# Patient Record
Sex: FEMALE | Employment: OTHER | URBAN - METROPOLITAN AREA
[De-identification: names, ages, dates, MRNs, and addresses within clinical notes are randomized per-mention and may not be internally consistent; named-entity substitution may affect disease eponyms.]

---

## 2017-01-17 ENCOUNTER — PRE VISIT (OUTPATIENT)
Dept: NEUROLOGY | Facility: CLINIC | Age: 82
End: 2017-01-17

## 2017-01-17 NOTE — TELEPHONE ENCOUNTER
1.  Date/reason for appt:  3/22/17   Peripheral Neuropathy    2.  Referring provider:  MARION Lew [facility]    3.  Call to patient (Yes / No - short description):  Yes, transferred from .  Only place seen in last 10 years for this.    4.  Previous care at / records requested from:  [facility]

## 2017-01-23 ASSESSMENT — ENCOUNTER SYMPTOMS
TREMORS: 0
NUMBNESS: 1
HEADACHES: 0
SPEECH CHANGE: 0
TINGLING: 1
DIZZINESS: 1
ARTHRALGIAS: 1
DISTURBANCES IN COORDINATION: 0
STIFFNESS: 1
NECK PAIN: 0
WEAKNESS: 0
SEIZURES: 0
BACK PAIN: 1
MYALGIAS: 1
LOSS OF CONSCIOUSNESS: 0
PARALYSIS: 0
MUSCLE CRAMPS: 1
MUSCLE WEAKNESS: 0
MEMORY LOSS: 0
JOINT SWELLING: 0

## 2017-01-23 ASSESSMENT — ACTIVITIES OF DAILY LIVING (ADL)
ARE_THERE_FIREARMS_IN_YOUR_HOME?: N
ARE_THERE_CARBON_MONOXIDE_DETECTORS_IN_YOUR_HOME?: Y
DO_MEMBERS_OF_YOUR_HOUSEHOLD_USE_SUNSCREEN?: Y
ARE_THERE_SMOKE_DETECTORS_IN_YOUR_HOME?: Y
DO_MEMBERS_OF_YOUR_HOUSEHOLD_USE_SAFETY_HELMETS?: Y
DO_MEMBERS_OF_YOUR_HOUSEHOLD_WEAR_SEAT_BELTS?: Y

## 2017-01-23 NOTE — TELEPHONE ENCOUNTER
[facility] records forwarded to clinic.     *Seen by neuro in [facility] (years ago)

## 2017-01-27 ENCOUNTER — OFFICE VISIT (OUTPATIENT)
Dept: INTERNAL MEDICINE | Facility: CLINIC | Age: 82
End: 2017-01-27

## 2017-01-27 VITALS
HEIGHT: 60 IN | TEMPERATURE: 97.7 F | DIASTOLIC BLOOD PRESSURE: 78 MMHG | WEIGHT: 121.5 LBS | OXYGEN SATURATION: 97 % | RESPIRATION RATE: 16 BRPM | SYSTOLIC BLOOD PRESSURE: 137 MMHG | BODY MASS INDEX: 23.85 KG/M2 | HEART RATE: 67 BPM

## 2017-01-27 DIAGNOSIS — I10 BENIGN ESSENTIAL HYPERTENSION: ICD-10-CM

## 2017-01-27 DIAGNOSIS — M89.9 DISORDER OF BONE: ICD-10-CM

## 2017-01-27 DIAGNOSIS — Z13.820 SCREENING FOR OSTEOPOROSIS: ICD-10-CM

## 2017-01-27 DIAGNOSIS — E78.5 HYPERLIPIDEMIA LDL GOAL <100: ICD-10-CM

## 2017-01-27 DIAGNOSIS — Z76.89 ENCOUNTER TO ESTABLISH CARE: ICD-10-CM

## 2017-01-27 DIAGNOSIS — E78.5 HYPERLIPIDEMIA LDL GOAL <100: Primary | ICD-10-CM

## 2017-01-27 LAB
ANION GAP SERPL CALCULATED.3IONS-SCNC: 8 MMOL/L (ref 3–14)
BUN SERPL-MCNC: 20 MG/DL (ref 7–30)
CALCIUM SERPL-MCNC: 9.4 MG/DL (ref 8.5–10.1)
CHLORIDE SERPL-SCNC: 107 MMOL/L (ref 94–109)
CHOLEST SERPL-MCNC: 153 MG/DL
CO2 SERPL-SCNC: 28 MMOL/L (ref 20–32)
CREAT SERPL-MCNC: 0.94 MG/DL (ref 0.52–1.04)
CREAT UR-MCNC: 99 MG/DL
GFR SERPL CREATININE-BSD FRML MDRD: 57 ML/MIN/1.7M2
GLUCOSE SERPL-MCNC: 91 MG/DL (ref 70–99)
HDLC SERPL-MCNC: 61 MG/DL
LDLC SERPL CALC-MCNC: 73 MG/DL
MICROALBUMIN UR-MCNC: 5 MG/L
MICROALBUMIN/CREAT UR: 5.5 MG/G CR (ref 0–25)
NONHDLC SERPL-MCNC: 92 MG/DL
POTASSIUM SERPL-SCNC: 4.1 MMOL/L (ref 3.4–5.3)
SODIUM SERPL-SCNC: 142 MMOL/L (ref 133–144)
TRIGL SERPL-MCNC: 96 MG/DL

## 2017-01-27 RX ORDER — ATORVASTATIN CALCIUM 20 MG/1
20 TABLET, FILM COATED ORAL DAILY
COMMUNITY
End: 2017-01-27

## 2017-01-27 RX ORDER — LISINOPRIL 10 MG/1
10 TABLET ORAL DAILY
Qty: 90 TABLET | Refills: 3 | Status: SHIPPED | OUTPATIENT
Start: 2017-01-27 | End: 2018-01-12

## 2017-01-27 RX ORDER — AMLODIPINE BESYLATE 10 MG/1
5 TABLET ORAL DAILY
Qty: 45 TABLET | Refills: 3 | Status: SHIPPED | OUTPATIENT
Start: 2017-01-27 | End: 2018-01-12

## 2017-01-27 RX ORDER — GABAPENTIN 400 MG/1
CAPSULE ORAL
COMMUNITY
End: 2017-04-03

## 2017-01-27 RX ORDER — ATORVASTATIN CALCIUM 20 MG/1
20 TABLET, FILM COATED ORAL DAILY
Qty: 90 TABLET | Refills: 3 | Status: SHIPPED | OUTPATIENT
Start: 2017-01-27 | End: 2018-01-12

## 2017-01-27 RX ORDER — AMLODIPINE BESYLATE 10 MG/1
10 TABLET ORAL DAILY
COMMUNITY
End: 2017-01-27

## 2017-01-27 ASSESSMENT — PAIN SCALES - GENERAL: PAINLEVEL: NO PAIN (0)

## 2017-01-27 NOTE — NURSING NOTE
Chief Complaint   Patient presents with     Establish Care     Here to establish care; from Emmanuelle Groves CMA at 10:05 AM on 1/27/2017

## 2017-01-27 NOTE — MR AVS SNAPSHOT
After Visit Summary   1/27/2017    Kerri Rocha    MRN: [id number]           Patient Information     Date Of Birth          1935        Visit Information        Provider Department      1/27/2017 10:00 AM Tammy Wellington MD [facility]        Today's Diagnoses     Hyperlipidemia LDL goal <100    -  1     Benign essential hypertension         Encounter to establish care         Screening for osteoporosis         Disorder of bone            Care Instructions    Primary Care Center Medication Refill Request Information:  * Please contact your pharmacy regarding ANY request for medication refills.  ** [facility] Prescription Fax = [phone]  * Please allow 3 business days for routine medication refills.  * Please allow 5 business days for controlled substance medication refills.     Primary Care Center Test Result notification information:  *You will be notified with in 7-10 days of your appointment day regarding the results of your test.  If you are on MyChart you will be notified as soon as the provider has reviewed the results and signed off on them.    [facility] [place] [phone]   Please have the Flu Vaccine you received in [id number], including both Pneumonia, and Tetanus Vaccine FAXED to [facility] at [phone] for your Immunization Records or bring a copy at your next visit. Thank you!  DEXA Screening Tool    Dexa Scan  Is the patient taking any calcium supplements? , if yes patient must stop calcium supplements 48 hours prior to appointment.  Radiology ([facility] Center) [phone]  Radiology ([facility]) [phone] (2nd Floor [facility])            Follow-ups after your visit        Your next 10 appointments already scheduled     Jan 27, 2017 10:30 AM   LAB with  LAB   [facility] Lab ([facility])    [address]  1st Floor  [place] [address] [phone]           Patient must bring picture ID.   Patient should be prepared to give a urine specimen  Please do not eat 10-12 hours before your appointment if you are coming in fasting for labs on lipids, cholesterol, or glucose (sugar).  Pregnant women should follow their Care Team instructions. Water with medications is okay. Do not drink coffee or other fluids.   If you have concerns about taking  your medications, please ask at office or if scheduling via [employer], send a message by clicking on Secure Messaging, Message Your Care Team.            Mar 22, 2017  9:50 AM   (Arrive by 9:35 AM)   New Neuropathy with Jaskaran Callahan MD   [facility] Neurology ([facility] [place])    [address]  [address] [place] [address] [phone]              Future tests that were ordered for you today     Open Future Orders        Priority Expected Expires Ordered    Dexa hip/pelvis/spine* Routine  1/27/2018 1/27/2017    Lipid panel reflex to direct LDL Routine  1/27/2018 1/27/2017    Basic metabolic panel Routine  1/27/2018 1/27/2017            Who to contact     Please call your clinic at [phone] to:    Ask questions about your health    Make or cancel appointments    Discuss your medicines    Learn about your test results    Speak to your doctor   If you have compliments or concerns about an experience at your clinic, or if you wish to file a complaint, please contact [facility] Patient Relations at [phone] or email us at [email or web address]         Additional Information About Your Visit        [employer] Information     [employer] gives you secure access to your electronic health record. If you see a primary care provider, you can also send messages to your care team and make appointments. If you have questions, please call your primary care clinic.  If you do not have a primary care provider, please call [phone] and they will assist you.      [employer] is an electronic gateway  "that provides easy, online access to your medical records. With [employer], you can request a clinic appointment, read your test results, renew a prescription or communicate with your care team.     To access your existing account, please contact your [facility] or call [phone] for assistance.        Care EveryWhere ID     This is your Care EveryWhere ID. This could be used by other organizations to access your [place] medical records  [id number]        Your Vitals Were     Pulse Temperature Respirations Height BMI (Body Mass Index) Pulse Oximetry    67 97.7  F (36.5  C) (Oral) 16 1.53 m (5' 0.25\") 23.54 kg/m2 97%    Breastfeeding?                   No            Blood Pressure from Last 3 Encounters:   01/27/17 137/78    Weight from Last 3 Encounters:   01/27/17 55.112 kg (121 lb 8 oz)              We Performed the Following     Albumin Random Urine Quantitative          Today's Medication Changes          These changes are accurate as of: 1/27/17 10:24 AM.  If you have any questions, ask your nurse or doctor.               These medicines have changed or have updated prescriptions.        Dose/Directions    amLODIPine 10 MG tablet   Commonly known as:  NORVASC   This may have changed:  how much to take   Used for:  Benign essential hypertension   Changed by:  Tammy Wellington MD        Dose:  5 mg   Take 0.5 tablets (5 mg) by mouth daily Take 1/2 tablet daily.   Quantity:  45 tablet   Refills:  3       lisinopril 10 MG tablet   Commonly known as:  PRINIVIL/ZESTRIL   This may have changed:    - medication strength  - when to take this   Used for:  Benign essential hypertension   Changed by:  Tammy Wellington MD        Dose:  10 mg   Take 1 tablet (10 mg) by mouth daily   Quantity:  90 tablet   Refills:  3            Where to get your medicines      These medications were sent to [facility] - [place], [place] - [address]  [address], [facility] [address]     Phone:  [phone]    - amLODIPine 10 MG tablet  - atorvastatin 20 MG tablet  - lisinopril 10 MG tablet             Primary Care Provider    None Specified       No primary provider on file.        Thank you!     Thank you for choosing [facility]  for your care. Our goal is always to provide you with excellent care. Hearing back from our patients is one way we can continue to improve our services. Please take a few minutes to complete the written survey that you may receive in the mail after your visit with us. Thank you!             Your Updated Medication List - Protect others around you: Learn how to safely use, store and throw away your medicines at www.disposemymeds.org.          This list is accurate as of: 1/27/17 10:24 AM.  Always use your most recent med list.                   Brand Name Dispense Instructions for use    amLODIPine 10 MG tablet    NORVASC    45 tablet    Take 0.5 tablets (5 mg) by mouth daily Take 1/2 tablet daily.       atorvastatin 20 MG tablet    LIPITOR    90 tablet    Take 1 tablet (20 mg) by mouth daily Take 1 tablet at bedtime daily.       gabapentin 400 MG capsule    NEURONTIN         lisinopril 10 MG tablet    PRINIVIL/ZESTRIL    90 tablet    Take 1 tablet (10 mg) by mouth daily

## 2017-01-27 NOTE — PATIENT INSTRUCTIONS
[facility] Medication Refill Request Information:  * Please contact your pharmacy regarding ANY request for medication refills.  ** [facility] Prescription Fax = [phone]  * Please allow 3 business days for routine medication refills.  * Please allow 5 business days for controlled substance medication refills.     [facility] Test Result notification information:  *You will be notified with in 7-10 days of your appointment day regarding the results of your test.  If you are on MyChart you will be notified as soon as the provider has reviewed the results and signed off on them.    [facility] [phone]   Please have the Flu Vaccine you received in [id number], including both Pneumonia, and Tetanus Vaccine FAXED to [facility] at [phone] for your Immunization Records or bring a copy at your next visit. Thank you!  DEXA Screening Tool    Dexa Scan  Is the patient taking any calcium supplements? , if yes patient must stop calcium supplements 48 hours prior to appointment.  Radiology ([facility]) [phone]  Radiology ([facility]) [phone] (2nd Floor [facility])

## 2017-01-27 NOTE — PROGRESS NOTES
HPI:       Kerri Rocha is a 81 year old female who presents for the following  Patient presents with: Establish Care    Kerri is moving here from [facility]. They have moved up here to be closer to their family. They haven't found a home yet so are living with their children. Her  is a retired superintendent of [employer]. She doesn't have much wrong with her. She had back surgery last March but that was about last year. She is wondering when she needs labs.     Problem, Medication and Allergy Lists were reviewed and are current.  Patient is a new patient to this clinic and so  I reviewed/updated the Past Medical History, the Family History and the Social History.          Review of Systems:   Review of Systems     Constitutional:  Negative for fever, chills, weight loss, weight gain, fatigue, decreased appetite, night sweats, recent stressors, height gain, height loss, post-operative complications, incisional pain, hallucinations, increased energy, hyperactivity and confused.   HENT:  Negative for ear pain, hearing loss, tinnitus, nosebleeds, trouble swallowing, hoarse voice, mouth sores, sore throat, ear discharge, tooth pain, gum tenderness, taste disturbance, smell disturbance, hearing aid, bleeding gums, dry mouth, sinus pain, sinus congestion and neck mass.    Eyes:  Negative for double vision, pain, redness, eye pain, decreased vision, eye watering, eye bulging, eye dryness, flashing lights, spots, floaters, strabismus, tunnel vision, jaundice and eye irritation.   Respiratory:   Negative for cough, hemoptysis, sputum production, shortness of breath, wheezing, sleep disturbances due to breathing, snores loudly, respiratory pain, dyspnea on exertion, cough disturbing sleep and postural dyspnea.    Cardiovascular:  Negative for chest pain, dyspnea on exertion, palpitations, orthopnea, claudication, leg swelling, fingers/toes turn blue, hypertension, hypotension, syncope, history of heart  murmur, chest pain on exertion, chest pain at rest, pacemaker, few scattered varicosities, leg pain, sleep disturbances due to breathing, tachycardia, light-headedness, exercise intolerance and edema.   Gastrointestinal:  Negative for heartburn, nausea, vomiting, abdominal pain, diarrhea, constipation, blood in stool, melena, rectal pain, bloating, hemorrhoids, bowel incontinence, jaundice, rectal bleeding, coffee ground emesis and change in stool.   Genitourinary:  Negative for bladder incontinence, dysuria, urgency, hematuria, flank pain, vaginal discharge, difficulty urinating, genital sores, dyspareunia, decreased libido, nocturia, voiding less frequently, arousal difficulty, abnormal vaginal bleeding, excessive menstruation, menstrual changes, hot flashes, vaginal dryness and postmenopausal bleeding.   Musculoskeletal:  Positive for myalgias, back pain, arthralgias, stiffness and muscle cramps. Negative for joint swelling, neck pain, bone pain, muscle weakness and fracture.   Skin:  Negative for nail changes, itching, poor wound healing, rash, hair changes, skin changes, acne, warts, poor wound healing, scarring, flaky skin, Raynaud's phenomenon, sensitivity to sunlight and skin thickening.   Neurological:  Positive for dizziness, tingling and numbness. Negative for tremors, speech change, seizures, loss of consciousness, weakness, light-headedness, headaches, disturbances in coordination, memory loss, difficulty walking and paralysis.   Endo/Heme:  Negative for anemia, swollen glands and bruises/bleeds easily.   Psychiatric/Behavioral:  Negative for depression, hallucinations, memory loss, decreased concentration, mood swings and panic attacks.    Breast:  Negative for breast discharge, breast mass, breast pain and nipple retraction.   Endocrine:  Negative for altered temperature regulation, polyphagia, polydipsia, unwanted hair growth and change in facial hair.            Physical Exam:   /78 mmHg   "Pulse 67  Temp(Src) 97.7  F (36.5  C) (Oral)  Resp 16  Ht 1.53 m (5' 0.25\")  Wt 55.112 kg (121 lb 8 oz)  BMI 23.54 kg/m2  SpO2 97%  Breastfeeding? No  Body mass index is 23.54 kg/(m^2).  Vitals were reviewed     Physical Exam   Constitutional: She is oriented to person, place, and time and well-developed, well-nourished, and in no distress. No distress.   HENT:   Head: Normocephalic and atraumatic.   Right Ear: Tympanic membrane normal.   Left Ear: Tympanic membrane normal.   Mouth/Throat: Oropharynx is clear and moist.   Eyes: Conjunctivae and EOM are normal. Pupils are equal, round, and reactive to light. No scleral icterus.   Neck: Neck supple. No thyromegaly present.   Cardiovascular: Normal rate, regular rhythm, normal heart sounds and intact distal pulses.  Exam reveals no gallop and no friction rub.    No murmur heard.  Pulmonary/Chest: Effort normal and breath sounds normal. No respiratory distress. She has no wheezes.   Abdominal: Soft. Bowel sounds are normal. She exhibits no distension. There is no tenderness.   Musculoskeletal: Normal range of motion. She exhibits no edema.   Lymphadenopathy:     She has no cervical adenopathy.   Neurological: She is alert and oriented to person, place, and time. She exhibits normal muscle tone. Gait normal. Coordination normal.   Skin: Skin is warm and dry. No rash noted. She is not diaphoretic.   Psychiatric: Mood and affect normal.           Results:     Pending  Assessment and Plan     Kerri was seen today for establish care.    Diagnoses and all orders for this visit:    Hyperlipidemia LDL goal <100  -     Lipid panel reflex to direct LDL; Future  -     atorvastatin (LIPITOR) 20 MG tablet; Take 1 tablet (20 mg) by mouth daily Take 1 tablet at bedtime daily.    Benign essential hypertension  -     lisinopril (PRINIVIL/ZESTRIL) 10 MG tablet; Take 1 tablet (10 mg) by mouth daily  -     amLODIPine (NORVASC) 10 MG tablet; Take 0.5 tablets (5 mg) by mouth daily Take " 1/2 tablet daily.  -     Basic metabolic panel; Future  -     Albumin Random Urine Quantitative    Encounter to establish care  -     Lipid panel reflex to direct LDL; Future  -     Basic metabolic panel; Future    Screening for osteoporosis  -     Dexa hip/pelvis/spine*; Future    Disorder of bone   -     Dexa hip/pelvis/spine*; Future        Options for treatment and follow-up care were reviewed with the patient. Kerri Rocha engaged in the decision making process and verbalized understanding of the options discussed and agreed with the final plan.    Tammy Galvan MD  Jan 27, 2017

## 2017-02-02 PROBLEM — G60.9 IDIOPATHIC PERIPHERAL NEUROPATHY: Status: ACTIVE | Noted: 2017-02-02

## 2017-02-02 ASSESSMENT — ENCOUNTER SYMPTOMS
PARALYSIS: 0
HEADACHES: 0
CLAUDICATION: 0
HYPOTENSION: 0
SLEEP DISTURBANCES DUE TO BREATHING: 0
MEMORY LOSS: 0
JAUNDICE: 0
DECREASED CONCENTRATION: 0
LEG SWELLING: 0
EXERCISE INTOLERANCE: 0
HALLUCINATIONS: 0
INCREASED ENERGY: 0
EYE IRRITATION: 0
DIZZINESS: 1
SPEECH CHANGE: 0
HEMATURIA: 0
CONSTIPATION: 0
LOSS OF CONSCIOUSNESS: 0
MYALGIAS: 1
SPUTUM PRODUCTION: 0
ALTERED TEMPERATURE REGULATION: 0
SHORTNESS OF BREATH: 0
JOINT SWELLING: 0
HYPERTENSION: 0
POSTURAL DYSPNEA: 0
FLANK PAIN: 0
INSOMNIA: 0
HEARTBURN: 0
SEIZURES: 0
HOT FLASHES: 0
CHILLS: 0
COUGH DISTURBING SLEEP: 0
NAIL CHANGES: 0
TASTE DISTURBANCE: 0
RECTAL BLEEDING: 0
WEAKNESS: 0
BACK PAIN: 1
LIGHT-HEADEDNESS: 0
SORE THROAT: 0
MUSCLE WEAKNESS: 0
EYE WATERING: 0
DISTURBANCES IN COORDINATION: 0
HOARSE VOICE: 0
DYSURIA: 0
NECK PAIN: 0
HEMOPTYSIS: 0
DOUBLE VISION: 0
EYE PAIN: 0
MUSCLE CRAMPS: 1
SINUS CONGESTION: 0
BLOOD IN STOOL: 0
SMELL DISTURBANCE: 0
PALPITATIONS: 0
DECREASED APPETITE: 0
POLYPHAGIA: 0
VOMITING: 0
TINGLING: 1
STIFFNESS: 1
TACHYCARDIA: 0
SKIN CHANGES: 0
DIARRHEA: 0
NERVOUS/ANXIOUS: 0
SYNCOPE: 0
WEIGHT GAIN: 0
ARTHRALGIAS: 1
RESPIRATORY PAIN: 0
ORTHOPNEA: 0
BOWEL INCONTINENCE: 0
POOR WOUND HEALING: 0
SNORES LOUDLY: 0
DYSPNEA ON EXERTION: 0
SWOLLEN GLANDS: 0
POLYDIPSIA: 0
NUMBNESS: 1
FEVER: 0
ABDOMINAL PAIN: 0
BREAST PAIN: 0
BLOATING: 0
NECK MASS: 0
DECREASED LIBIDO: 0
SINUS PAIN: 0
RECTAL PAIN: 0
NAUSEA: 0
TREMORS: 0
EYE REDNESS: 0
DEPRESSION: 0
BRUISES/BLEEDS EASILY: 0
TROUBLE SWALLOWING: 0
FATIGUE: 0
PANIC: 0
LEG PAIN: 0
COUGH: 0
BREAST MASS: 0
WHEEZING: 0
NIGHT SWEATS: 0
WEIGHT LOSS: 0
DIFFICULTY URINATING: 0

## 2017-03-08 ASSESSMENT — ENCOUNTER SYMPTOMS
SPEECH CHANGE: 0
SEIZURES: 0
JOINT SWELLING: 0
DIZZINESS: 1
MEMORY LOSS: 0
PARALYSIS: 0
STIFFNESS: 1
MUSCLE WEAKNESS: 1
DISTURBANCES IN COORDINATION: 0
ARTHRALGIAS: 0
TREMORS: 0
LOSS OF CONSCIOUSNESS: 0
WEAKNESS: 1
HEADACHES: 0
NECK PAIN: 1
TINGLING: 1
BACK PAIN: 1
MUSCLE CRAMPS: 1
MYALGIAS: 1

## 2017-03-14 ENCOUNTER — TELEPHONE (OUTPATIENT)
Dept: INTERNAL MEDICINE | Facility: CLINIC | Age: 82
End: 2017-03-14

## 2017-03-14 NOTE — TELEPHONE ENCOUNTER
DEXA scan shows osteoporosis. I recommend taking fosamax weekly for this. Please call and order if she is okay with this. Thanks.

## 2017-03-16 NOTE — TELEPHONE ENCOUNTER
Patient returned call. Reviewed Dexa scan result and recommendation from Dr. Kate. Patient with some apprehension regarding starting Fosamax. Would like to discuss further with Dr. Kate. Can be reached at #[phone].

## 2017-03-22 ENCOUNTER — OFFICE VISIT (OUTPATIENT)
Dept: NEUROLOGY | Facility: CLINIC | Age: 82
End: 2017-03-22

## 2017-03-22 VITALS
HEART RATE: 70 BPM | DIASTOLIC BLOOD PRESSURE: 56 MMHG | BODY MASS INDEX: 24.99 KG/M2 | HEIGHT: 60 IN | SYSTOLIC BLOOD PRESSURE: 146 MMHG | WEIGHT: 127.3 LBS

## 2017-03-22 DIAGNOSIS — G60.9 IDIOPATHIC NEUROPATHY: Primary | ICD-10-CM

## 2017-03-22 DIAGNOSIS — Z13.1 ENCOUNTER FOR SCREENING FOR DIABETES MELLITUS: ICD-10-CM

## 2017-03-22 RX ORDER — GABAPENTIN 400 MG/1
400 CAPSULE ORAL
COMMUNITY
End: 2017-04-03

## 2017-03-22 RX ORDER — OMEGA-3 FATTY ACIDS/FISH OIL 300-1000MG
2 CAPSULE ORAL
COMMUNITY

## 2017-03-22 RX ORDER — PHENOL 1.4 %
1200 AEROSOL, SPRAY (ML) MUCOUS MEMBRANE DAILY
COMMUNITY

## 2017-03-22 RX ORDER — AMLODIPINE BESYLATE 5 MG/1
5 TABLET ORAL
COMMUNITY
End: 2017-04-03

## 2017-03-22 RX ORDER — ACETAMINOPHEN 160 MG
1000 TABLET,DISINTEGRATING ORAL DAILY
COMMUNITY

## 2017-03-22 ASSESSMENT — PAIN SCALES - GENERAL: PAINLEVEL: NO PAIN (1)

## 2017-03-22 NOTE — LETTER
3/22/2017       RE: Kerri Rocha  [address]  [place] [address]     Dear Colleague,    Thank you for referring your patient, Kerri Rocha, to the [facility] at [facility]. Please see a copy of my visit note below.    2017      Tammy Galvan MD    [facility]    [address]    [place]  [address]       RE: Kerri Rocha   MRN: [id number]   : 1935      Dear Dr. Lavinia Galvan:      I had the pleasure to see Ms. Rocha at the [facility] today.  As you know, she is an 81-year-old woman with a diagnosis of idiopathic neuropathy.  Her symptoms began about 15-16 years ago from both toes.  It was initially numbness but also sharp or burning pains.  Over the years, the numbness has remained at the top of the foot and the toes and not progressed any proximally.  She denies similar sensory symptoms in the fingertips of the hands, proximal thighs, trunk, perineum or face.  She denies autonomic symp, toms such as bowel or bladder incontinence,orthostatic intolerance or syncope, dry eyes or dry mouth, gastroparesis, nocturnal diarrhea, etc.  She has no dysphagia, blurry or double vision, ptosis, facial numbness, dropped head, dysarthria or other cranial nerve symptoms.  She had cataract surgery last year, and her vision is now 20/20.  She endorses difficulty with her balance, which she attributes to aging.  This is more pronounced in dark places.  She has not had any falls and does not use any assistance to walk.  She drinks only 1 glass of wine per week.  She does not have diabetes, which has been checked several time. She has no family history of neuropathy among 7 siblings.  Her weight has been stable over the last year.  She had lumbar spine surgery a year ago that went well.  She endorses no constitutional symptoms such as night sweats, anorexia, blood in her urine, stool or cough,  suspicious lumps, and no symptoms of connective tissue disease such as alopecia, Raynaud syndrome, oral ulcers, pleuritis, pericarditis, kidney or heart disease, joint pains, etc.  She is not on any restrictive diet, has never had any cancer or chemotherapy.      She was followed by Dr. Quintero in [place], [place], for a number of years, but her original neuropathy workup was done back in [place] in the 2000s.  This was in [facility], and I do not have those records.  According to Dr. Quintero's notes, she was tested for B12, TSH, cryoglobulins, other rheumatic diseases and she had protein electrophoresis and inflammatory labs which were all unremarkable.  She takes multivitamins daily. She is currently on gabapentin at a total of 3200 mg per day (two 400 mg tablets in the morning, 2 at lunch and 4 in the evening).  She was on a larger dose in the past, which was probably working better for her.  When she was tapered recently, her pain has slightly worsened in the evening.  She currently endorses 1/10 pain in the morning, but it is worse as the day goes by.  She did not have sedation, dizziness, myoclonus or other side effects on the higher dose of gabapentin.  She has not been on amitriptyline, Cymbalta or other antidepressants for pain control.  She has tried some icepack preparations but no lidocaine patches or creams for the neuropathy.      FAMILY HISTORY:  Outlined above, negative for neuropathy.      ALLERGIES:  Codeine, Tylenol #3 and sulfa, reaction hives or rash with most of them.      SOCIAL HISTORY:  No smoking.  Drinks alcohol 1 wine a week.  No illicit drug use.  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      REVIEW OF SYSTEMS:  A 14-point review of systems is negative except for HPI.      CURRENT MEDICATIONS:  Amlodipine, atorvastatin, lisinopril, calcium, vitamin D, gabapentin, Omega 3 fatty acids.      PHYSICAL EXAMINATION:  She is a pleasant 81-year-old woman who appears her  stated age.  Her blood pressure is 146/56, pulse is 70 and regular.  She weighs 57.7 kg.  Height is 153 cm.  She endorses no pain, or 1/10.  Neuro exam shows that she is awake, alert and oriented x3, able to provide a coherent history.  Cranial nerves II through XII are normal.  Motor exam shows normal strength, tone and bulk throughout.  Reflexes are 2 in biceps, triceps, brachioradialis, knees, absent at the right ankle and 1 or trace at the left ankle.  Plantar responses are bilaterally flexor.  She has no Delmy response.  There are no fasciculations.  Sensory examination discloses asymmetrically reduced vibration at the toes which is about 1-2 seconds at the right great toe and 5 at the left (this may be explained by superimposed right L5 radiculopathy). It is 6 seconds at the right medial malleolus, 9 at the left, and 6 seconds and symmetric at the kneecaps and normal at index fingers, right 16 seconds, left 14-15.  Position sensation appeared normal.  Pinprick is reduced at the dorsum of the foot and toes, and it becomes normal about 1-2 inches higher than the ankle level.  This is the same as Dr. Quintero's exam findings in previous years.  She has no definite loss of pinprick or tactile sensation in the tips of the fingers.  Coordination is intact in finger-to-nose and heel-to-shin.  There is no tremor.  She can walk easily unassisted.  Romberg sign is probably negative.  She is actually able to do 5 steps of tandem, although she has some hesitancy.      IMPRESSION:  Idiopathic neuropathy.      I spent about 35 minutes with Ms. Rocha, of which more than half was counseling.  I told her I agree with the diagnosis.  She has had 15-16 years of symmetric sensory symptoms in the feet without any obvious cause from her history and physical examination, and reportedly unremarkable labs in the past.  I would probably repeat an oral glucose tolerance test, which I recommended and ordered.  Otherwise, I would not do  any labs again in the absence of risk factors.  I had her sign a release of records form so that I can look at the lab tests done in the early s in [facility] in [place].  If there is any important lab for neuropathy workup that was not done then, I am happy to add it.      I counseled her that idiopathic neuropathy generally has a benign prognosis and there is minimal or sometimes no progression of the symptoms over the years.  For symptomatic management, I recommended increasing the gabapentin to 2 pills in the morning, 3 at noon and 4 in the evening.  This would be a total of 3600 mg daily.  She can go higher, but doses more than 3600 mg may not be very well tolerated in the elderly, and in that situation, if the pain is incompletely relieved, I would recommend using lidocaine patches or cream.  I will see Ms. Hooker in followup as necessary. If symptomatic management of the pain becomes more challenging in the future, please contact our clinic.      Thank you for allowing me to participate in her care.      Sincerely,      Jaskaran Callahan MD      D: 2017 10:02   T: 2017 22:24   MT: DANTE      Name:     TUTU HOOKER   MRN:      [id number]        Account:      [id number]   :      1935           Service Date: 2017      Document: [id number]

## 2017-03-22 NOTE — MR AVS SNAPSHOT
After Visit Summary   3/22/2017    Kerri Rocha    MRN: [id number]           Patient Information     Date Of Birth          1935        Visit Information        Provider Department      3/22/2017 9:50 AM Jaskaran Callahan MD [facility]        Today's Diagnoses     Idiopathic neuropathy    -  1    Disorder of glucose metabolism (H)        Encounter for screening for diabetes mellitus           Care Instructions    We think your neuropathy is actually idiopathic (ie unexplained). The prognosis of those neuropathies is usually good, which means that they progress minimally (or not at all) over the years.  We will ask you to sign a release of records form so that we can look at the blood tests you did in [facility] in the past.  I ordered an oral glucose tolerance test for diabetes. This should be done after overnight fasting. This can be done in any [facility] of your choice.  Please increase your gabapentin to 2 tablets in the morning, 3 at noon, and 4 in the evening.   If that's not enough to control your pain in the evening/night, consider using lidocaine patches.    Call  with any questions and ask to speak to Neuropathy Clinic nurse (An Muniz).  Thank you!          Follow-ups after your visit        Your next 10 appointments already scheduled     Apr 03, 2017  1:30 PM CDT   (Arrive by 1:15 PM)   Return Visit with Tammy Galvan MD   [facility] ([facility])    [address] [place] [id number] [phone]              Future tests that were ordered for you today     Open Future Orders        Priority Expected Expires Ordered    Glucose tolerance std non preg Routine  3/22/2018 3/22/2017            Who to contact     Please call your clinic at [phone] to:    Ask questions about your health    Make or cancel appointments    Discuss your medicines    Learn about your test  "results    Speak to your doctor   If you have compliments or concerns about an experience at your clinic, or if you wish to file a complaint, please contact [facility] Patient Relations at [phone] or email us at [email or web address]         Additional Information About Your Visit        Naonexthart Information     [email or web address] gives you secure access to your electronic health record. If you see a primary care provider, you can also send messages to your care team and make appointments. If you have questions, please call your primary care clinic.  If you do not have a primary care provider, please call [phone] and they will assist you.      Re[employer] is an electronic gateway that provides easy, online access to your medical records. With Re[employer], you can request a clinic appointment, read your test results, renew a prescription or communicate with your care team.     To access your existing account, please contact your [facility] or call [phone] for assistance.        Care EveryWhere ID     This is your Care EveryWhere ID. This could be used by other organizations to access your [place] medical records  [id number]        Your Vitals Were     Pulse Height BMI (Body Mass Index)             70 1.53 m (5' 0.25\") 24.66 kg/m2          Blood Pressure from Last 3 Encounters:   03/22/17 146/56   01/27/17 137/78    Weight from Last 3 Encounters:   03/22/17 57.7 kg (127 lb 4.8 oz)   01/27/17 55.1 kg (121 lb 8 oz)               Primary Care Provider Office Phone # Fax #    Tammy Beverly Galvan [phone]       [address] [place] [address]        Thank you!     Thank you for choosing [facility]  for your care. Our goal is always to provide you with excellent care. Hearing back from our patients is one way we can continue to improve our services. Please take a few minutes to complete the " written survey that you may receive in the mail after your visit with us. Thank you!             Your Updated Medication List - Protect others around you: Learn how to safely use, store and throw away your medicines at www.disposemymeds.org.          This list is accurate as of: 3/22/17  9:54 AM.  Always use your most recent med list.                   Brand Name Dispense Instructions for use    * amLODIPine 5 MG tablet    NORVASC     Take 5 mg by mouth       * amLODIPine 10 MG tablet    NORVASC    45 tablet    Take 0.5 tablets (5 mg) by mouth daily Take 1/2 tablet daily.       atorvastatin 20 MG tablet    LIPITOR    90 tablet    Take 1 tablet (20 mg) by mouth daily Take 1 tablet at bedtime daily.       calcium carbonate 600 MG tablet    OS-CAMMIE 600 mg [place]. Ca         * gabapentin 400 MG capsule    NEURONTIN         * gabapentin 400 MG capsule    NEURONTIN     Take 400 mg by mouth       lisinopril 10 MG tablet    PRINIVIL/ZESTRIL    90 tablet    Take 1 tablet (10 mg) by mouth daily       omega 3 1000 MG Caps      Take 2 g by mouth       vitamin D3 2000 UNITS Caps          * Notice:  This list has 4 medication(s) that are the same as other medications prescribed for you. Read the directions carefully, and ask your doctor or other care provider to review them with you.

## 2017-03-22 NOTE — PATIENT INSTRUCTIONS
We think your neuropathy is actually idiopathic (ie unexplained). The prognosis of those neuropathies is usually good, which means that they progress minimally (or not at all) over the years.  We will ask you to sign a release of records form so that we can look at the blood tests you did in [facility] in the past.  I ordered an oral glucose tolerance test for diabetes. This should be done after overnight fasting. This can be done in any [facility] of your choice.  Please increase your gabapentin to 2 tablets in the morning, 3 at noon, and 4 in the evening.   If that's not enough to control your pain in the evening/night, consider using lidocaine patches.    Call  with any questions and ask to speak to Neuropathy Clinic nurse (An Muniz).  Thank you!

## 2017-03-23 NOTE — PROGRESS NOTES
2017      Tammy Galvan MD    [address], [facility] [address]    [place]  [address]       RE: Kerri Rocha   MRN: [id number]   : 1935      Dear Dr. Lavinia Galvan:      I had the pleasure to see Ms. Rocha at the [facility] today.  As you know, she is an 81-year-old woman with a diagnosis of idiopathic neuropathy.  Her symptoms began about 15-16 years ago from both toes.  It was initially numbness but also sharp or burning pains.  Over the years, the numbness has remained at the top of the foot and the toes and not progressed any proximally.  She denies similar sensory symptoms in the fingertips of the hands, proximal thighs, trunk, perineum or face.  She denies autonomic symp, toms such as bowel or bladder incontinence,orthostatic intolerance or syncope, dry eyes or dry mouth, gastroparesis, nocturnal diarrhea, etc.  She has no dysphagia, blurry or double vision, ptosis, facial numbness, dropped head, dysarthria or other cranial nerve symptoms.  She had cataract surgery last year, and her vision is now 20/20.  She endorses difficulty with her balance, which she attributes to aging.  This is more pronounced in dark places.  She has not had any falls and does not use any assistance to walk.  She drinks only 1 glass of wine per week.  She does not have diabetes, which has been checked several time. She has no family history of neuropathy among 7 siblings.  Her weight has been stable over the last year.  She had lumbar spine surgery a year ago that went well.  She endorses no constitutional symptoms such as night sweats, anorexia, blood in her urine, stool or cough, suspicious lumps, and no symptoms of connective tissue disease such as alopecia, Raynaud syndrome, oral ulcers, pleuritis, pericarditis, kidney or heart disease, joint pains, etc.  She is not on any restrictive diet, has never had any cancer or chemotherapy.      She was  followed by Dr. Quintero in [place], [place], for a number of years, but her original neuropathy workup was done back in [place] in the 2000s.  [facility] and I do not have those records.  According to Dr. Quintero's notes, she was tested for B12, TSH, cryoglobulins, other rheumatic diseases and she had protein electrophoresis and inflammatory labs which were all unremarkable.  She takes multivitamins daily. She is currently on gabapentin at a total of 3200 mg per day (two 400 mg tablets in the morning, 2 at lunch and 4 in the evening).  She was on a larger dose in the past, which was probably working better for her.  When she was tapered recently, her pain has slightly worsened in the evening.  She currently endorses 1/10 pain in the morning, but it is worse as the day goes by.  She did not have sedation, dizziness, myoclonus or other side effects on the higher dose of gabapentin.  She has not been on amitriptyline, Cymbalta or other antidepressants for pain control.  She has tried some icepack preparations but no lidocaine patches or creams for the neuropathy.      FAMILY HISTORY:  Outlined above, negative for neuropathy.      ALLERGIES:  Codeine, Tylenol #3 and sulfa, reaction hives or rash with most of them.      SOCIAL HISTORY:  No smoking.  Drinks alcohol 1 wine a week.  No illicit drug use.  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      REVIEW OF SYSTEMS:  A 14-point review of systems is negative except for HPI.      CURRENT MEDICATIONS:  Amlodipine, atorvastatin, lisinopril, calcium, vitamin D, gabapentin, Omega 3 fatty acids.      PHYSICAL EXAMINATION:  She is a pleasant 81-year-old woman who appears her stated age.  Her blood pressure is 146/56, pulse is 70 and regular.  She weighs 57.7 kg.  Height is 153 cm.  She endorses no pain, or 1/10.  Neuro exam shows that she is awake, alert and oriented x3, able to provide a coherent history.  Cranial nerves II through XII are  normal.  Motor exam shows normal strength, tone and bulk throughout.  Reflexes are 2 in biceps, triceps, brachioradialis, knees, absent at the right ankle and 1 or trace at the left ankle.  Plantar responses are bilaterally flexor.  She has no Delmy response.  There are no fasciculations.  Sensory examination discloses asymmetrically reduced vibration at the toes which is about 1-2 seconds at the right great toe and 5 at the left (this may be explained by superimposed right L5 radiculopathy). It is 6 seconds at the right medial malleolus, 9 at the left, and 6 seconds and symmetric at the kneecaps and normal at index fingers, right 16 seconds, left 14-15.  Position sensation appeared normal.  Pinprick is reduced at the dorsum of the foot and toes, and it becomes normal about 1-2 inches higher than the ankle level.  This is the same as Dr. Quintero's exam findings in previous years.  She has no definite loss of pinprick or tactile sensation in the tips of the fingers.  Coordination is intact in finger-to-nose and heel-to-shin.  There is no tremor.  She can walk easily unassisted.  Romberg sign is probably negative.  She is actually able to do 5 steps of tandem, although she has some hesitancy.      IMPRESSION:  Idiopathic neuropathy.      I spent about 35 minutes with Ms. Rocha, of which more than half was counseling.  I told her I agree with the diagnosis.  She has had 15-16 years of symmetric sensory symptoms in the feet without any obvious cause from her history and physical examination, and reportedly unremarkable labs in the past.  I would probably repeat an oral glucose tolerance test, which I recommended and ordered.  Otherwise, I would not do any labs again in the absence of risk factors.  I had her sign a release of records form so that I can look at the lab tests done in the early 2000s in [facility] in [place].  If there is any important lab for neuropathy workup that was not done then, I am happy to add  it.      I counseled her that idiopathic neuropathy generally has a benign prognosis and there is minimal or sometimes no progression of the symptoms over the years.  For symptomatic management, I recommended increasing the gabapentin to 2 pills in the morning, 3 at noon and 4 in the evening.  This would be a total of 3600 mg daily.  She can go higher, but doses more than 3600 mg may not be very well tolerated in the elderly, and in that situation, if the pain is incompletely relieved, I would recommend using lidocaine patches or cream.  I will see Ms. Hooker in followup as necessary. If symptomatic management of the pain becomes more challenging in the future, please contact our clinic.      Thank you for allowing me to participate in her care.      Sincerely,            MD DULCE Nettles MD             D: 2017 10:02   T: 2017 22:24   MT: DANTE      Name:     TUTU HOOKER   MRN:      [id number]        Account:      [id number]   :      1935           Service Date: 2017      Document: [id number]

## 2017-03-27 ENCOUNTER — TELEPHONE (OUTPATIENT)
Dept: NEUROLOGY | Facility: CLINIC | Age: 82
End: 2017-03-27

## 2017-03-27 NOTE — PROGRESS NOTES
Answers for HPI/ROS submitted by the patient on 3/8/2017   General Symptoms: No  Skin Symptoms: No  HENT Symptoms: No  EYE SYMPTOMS: No  HEART SYMPTOMS: No  LUNG SYMPTOMS: No  INTESTINAL SYMPTOMS: No  URINARY SYMPTOMS: Yes  GYNECOLOGIC SYMPTOMS: No  BREAST SYMPTOMS: No  SKELETAL SYMPTOMS: Yes  BLOOD SYMPTOMS: No  NERVOUS SYSTEM SYMPTOMS: Yes  MENTAL HEALTH SYMPTOMS: No  Trouble holding urine or incontinence: Yes  Back pain: Yes  Muscle aches: Yes  Neck pain: Yes  Swollen joints: No  Joint pain: No  Bone pain: No  Muscle cramps: Yes  Muscle weakness: Yes  Joint stiffness: Yes  Bone fracture: No  Trouble with coordination: No  Dizziness or trouble with balance: Yes  Fainting or black-out spells: No  Memory loss: No  Headache: No  Seizures: No  Speech problems: No  Tingling: Yes  Tremor: No  Weakness: Yes  Difficulty walking: No  Paralysis: No

## 2017-04-03 ENCOUNTER — OFFICE VISIT (OUTPATIENT)
Dept: INTERNAL MEDICINE | Facility: CLINIC | Age: 82
End: 2017-04-03

## 2017-04-03 VITALS
WEIGHT: 125 LBS | HEART RATE: 76 BPM | SYSTOLIC BLOOD PRESSURE: 138 MMHG | BODY MASS INDEX: 24.21 KG/M2 | DIASTOLIC BLOOD PRESSURE: 79 MMHG

## 2017-04-03 DIAGNOSIS — G62.9 NEUROPATHY: ICD-10-CM

## 2017-04-03 DIAGNOSIS — M81.8 OTHER OSTEOPOROSIS WITHOUT CURRENT PATHOLOGICAL FRACTURE: ICD-10-CM

## 2017-04-03 DIAGNOSIS — M81.0 OSTEOPOROSIS: Primary | ICD-10-CM

## 2017-04-03 DIAGNOSIS — M81.0 OSTEOPOROSIS: ICD-10-CM

## 2017-04-03 LAB
CALCIUM SERPL-MCNC: 8.7 MG/DL (ref 8.5–10.1)
DEPRECATED CALCIDIOL+CALCIFEROL SERPL-MC: 61 UG/L (ref 20–75)
MAGNESIUM SERPL-MCNC: 2.3 MG/DL (ref 1.6–2.3)
PHOSPHATE SERPL-MCNC: 3.4 MG/DL (ref 2.5–4.5)
PTH-INTACT SERPL-MCNC: 87 PG/ML (ref 12–72)
TSH SERPL DL<=0.005 MIU/L-ACNC: 1.52 MU/L (ref 0.4–4)

## 2017-04-03 PROCEDURE — 83970 ASSAY OF PARATHORMONE: CPT | Performed by: INTERNAL MEDICINE

## 2017-04-03 PROCEDURE — 82306 VITAMIN D 25 HYDROXY: CPT | Performed by: INTERNAL MEDICINE

## 2017-04-03 RX ORDER — GABAPENTIN 400 MG/1
CAPSULE ORAL
Qty: 270 CAPSULE | Refills: 11 | Status: SHIPPED | OUTPATIENT
Start: 2017-04-03 | End: 2018-04-23

## 2017-04-03 ASSESSMENT — PAIN SCALES - GENERAL: PAINLEVEL: NO PAIN (0)

## 2017-04-03 NOTE — PATIENT INSTRUCTIONS
Primary Care Center Medication Refill Request Information:  * Please contact your pharmacy regarding ANY request for medication refills.  ** [facility] Prescription Fax = [phone]  * Please allow 3 business days for routine medication refills.  * Please allow 5 business days for controlled substance medication refills.     Primary Care Center Test Result notification information:  *You will be notified with in 7-10 days of your appointment day regarding the results of your test.  If you are on MyChart you will be notified as soon as the provider has reviewed the results and signed off on them.      For osteoporosis:    -Recommend discussing with your dentist when it's okay to start Fosamax.    -When you're cleared by your dentist, call [phone], discuss with my nurse Brigid.    -Then I'll go ahead and prescribe it.

## 2017-04-03 NOTE — MR AVS SNAPSHOT
After Visit Summary   4/3/2017    Kerri Rocha    MRN: [id number]           Patient Information     Date Of Birth          1935        Visit Information        Provider Department      4/3/2017 1:30 PM Tammy Wellington MD [facility]        Today's Diagnoses     Neuropathy (H)    -  1    Osteoporosis        Other osteoporosis without current pathological fracture           Care Instructions    Primary Care Center Medication Refill Request Information:  * Please contact your pharmacy regarding ANY request for medication refills.  ** [facility] Prescription Fax = [phone]  * Please allow 3 business days for routine medication refills.  * Please allow 5 business days for controlled substance medication refills.     Primary Care Center Test Result notification information:  *You will be notified with in 7-10 days of your appointment day regarding the results of your test.  If you are on MyChart you will be notified as soon as the provider has reviewed the results and signed off on them.      For osteoporosis:    -Recommend discussing with your dentist when it's okay to start Fosamax.    -When you're cleared by your dentist, call [phone], discuss with my nurse Brigid.    -Then I'll go ahead and prescribe it.         Follow-ups after your visit        Future tests that were ordered for you today     Open Future Orders        Priority Expected Expires Ordered    Vitamin D Deficiency Routine 4/3/2017 4/3/2018 4/3/2017    Parathyroid Hormone Intact Routine 4/3/2017 4/3/2018 4/3/2017    Phosphorus Routine 4/3/2017 4/17/2017 4/3/2017    Calcium Routine 4/3/2017 4/17/2017 4/3/2017    Magnesium Routine 4/3/2017 4/17/2017 4/3/2017    TSH with free T4 reflex Routine 4/3/2017 4/17/2017 4/3/2017            Who to contact     Please call your clinic at [phone] to:    Ask questions about your health    Make or cancel appointments    Discuss your medicines    Learn about your test  results    Speak to your doctor   If you have compliments or concerns about an experience at your clinic, or if you wish to file a complaint, please contact [facility] Patient Relations at [phone] or email us at [email or web address]         Additional Information About Your Visit        Celcuityhart Information     Celcuityhart gives you secure access to your electronic health record. If you see a primary care provider, you can also send messages to your care team and make appointments. If you have questions, please call your primary care clinic.  If you do not have a primary care provider, please call [phone] and they will assist you.      Checkd.In is an electronic gateway that provides easy, online access to your medical records. With Checkd.In, you can request a clinic appointment, read your test results, renew a prescription or communicate with your care team.     To access your existing account, please contact your [facility] or call [phone] for assistance.        Care EveryWhere ID     This is your Care EveryWhere ID. This could be used by other organizations to access your [place] medical records  [id number]        Your Vitals Were     Pulse Breastfeeding? BMI (Body Mass Index)             76 No 24.21 kg/m2          Blood Pressure from Last 3 Encounters:   04/03/17 138/79   03/22/17 146/56   01/27/17 137/78    Weight from Last 3 Encounters:   04/03/17 56.7 kg (125 lb)   03/22/17 57.7 kg (127 lb 4.8 oz)   01/27/17 55.1 kg (121 lb 8 oz)                 Today's Medication Changes          These changes are accurate as of: 4/3/17  1:43 PM.  If you have any questions, ask your nurse or doctor.               These medicines have changed or have updated prescriptions.        Dose/Directions    amLODIPine 10 MG tablet   Commonly known as:  NORVASC   This may have changed:  Another medication with the same name was removed. Continue taking this  medication, and follow the directions you see here.   Used for:  Benign essential hypertension   Changed by:  Tammy Wellington MD        Dose:  5 mg   Take 0.5 tablets (5 mg) by mouth daily Take 1/2 tablet daily.   Quantity:  45 tablet   Refills:  3       gabapentin 400 MG capsule   Commonly known as:  NEURONTIN   This may have changed:    - additional instructions  - Another medication with the same name was removed. Continue taking this medication, and follow the directions you see here.   Used for:  Neuropathy (H), Osteoporosis   Changed by:  Tammy Wellington MD        Take 2 capsules in the morning, 3 at noon, and 4 in the evening.   Quantity:  270 capsule   Refills:  11            Where to get your medicines      These medications were sent to [facility] - [facility] [address]  [address], [facility] [address]     Phone:  [phone]     gabapentin 400 MG capsule                Primary Care Provider Office Phone # Fax #    Tammy Galvan [phone]       [address] [place] [address]        Thank you!     Thank you for choosing [facility]  for your care. Our goal is always to provide you with excellent care. Hearing back from our patients is one way we can continue to improve our services. Please take a few minutes to complete the written survey that you may receive in the mail after your visit with us. Thank you!             Your Updated Medication List - Protect others around you: Learn how to safely use, store and throw away your medicines at www.disposemymeds.org.          This list is accurate as of: 4/3/17  1:43 PM.  Always use your most recent med list.                   Brand Name Dispense Instructions for use    amLODIPine 10 MG tablet    NORVASC    45 tablet    Take 0.5 tablets (5 mg) by mouth daily Take 1/2 tablet daily.       atorvastatin 20 MG tablet     LIPITOR    90 tablet    Take 1 tablet (20 mg) by mouth daily Take 1 tablet at bedtime daily.       calcium carbonate 600 MG tablet    OS-CAMMIE 600 mg [place]. Ca         gabapentin 400 MG capsule    NEURONTIN    270 capsule    Take 2 capsules in the morning, 3 at noon, and 4 in the evening.       lisinopril 10 MG tablet    PRINIVIL/ZESTRIL    90 tablet    Take 1 tablet (10 mg) by mouth daily       omega 3 1000 MG Caps      Take 2 g by mouth       vitamin D3 2000 UNITS Caps

## 2017-04-03 NOTE — NURSING NOTE
Chief Complaint   Patient presents with     Results     Patient here to discuss dexa scan results.       Chris Marte CMA at 1:15 PM on 4/3/2017.

## 2017-04-04 LAB — ALBUMIN SERPL-MCNC: 4.1 G/DL (ref 3.4–5)

## 2017-08-16 ENCOUNTER — TRANSFERRED RECORDS (OUTPATIENT)
Dept: HEALTH INFORMATION MANAGEMENT | Facility: CLINIC | Age: 82
End: 2017-08-16

## 2017-08-22 ENCOUNTER — TRANSFERRED RECORDS (OUTPATIENT)
Dept: HEALTH INFORMATION MANAGEMENT | Facility: CLINIC | Age: 82
End: 2017-08-22

## 2017-08-22 ENCOUNTER — TELEPHONE (OUTPATIENT)
Dept: INTERNAL MEDICINE | Facility: CLINIC | Age: 82
End: 2017-08-22

## 2017-08-22 DIAGNOSIS — M54.5 ACUTE LOW BACK PAIN, UNSPECIFIED BACK PAIN LATERALITY, WITH SCIATICA PRESENCE UNSPECIFIED: Primary | ICD-10-CM

## 2017-08-22 NOTE — TELEPHONE ENCOUNTER
----- Message from Nat Glez sent at 8/22/2017 11:39 AM CDT -----  Regarding: Fall  Contact: [phone]  Patient called here twice regarding a fall, and she ended up going to ER and now having a MRI this afternoon.  They told her to call primary care regarding medication she might have to have.  Having MRI in [place] this afternoon.      Pt states she seen by spinal specialist today-Dr Olivas ordered MRI at 3:30 today.Dr Olivas to call Dr Kate with results and suggestions.  Tegan Britt RN 11:58 AM on 8/22/2017.

## 2017-08-24 RX ORDER — TRAMADOL HYDROCHLORIDE 50 MG/1
50 TABLET ORAL EVERY 8 HOURS PRN
Qty: 30 TABLET | Refills: 0 | Status: SHIPPED | OUTPATIENT
Start: 2017-08-24 | End: 2017-09-07

## 2017-08-24 NOTE — TELEPHONE ENCOUNTER
Rx authorized, faxed to [facility] pharmacy, pt notified.  Brigid Gonsalez RN  -----------------------      ----- Message from Donald Romeo sent at 8/23/2017  3:02 PM CDT -----  Regarding: Rx request   Contact: [phone]  Patient reports she was told by a spinal clinic to get a hold of her PCP to prescribed tramadol for her to help with her pain. She reports she had a fall recently and was given a back brace.

## 2017-09-07 ENCOUNTER — TELEPHONE (OUTPATIENT)
Dept: INTERNAL MEDICINE | Facility: CLINIC | Age: 82
End: 2017-09-07

## 2017-09-07 DIAGNOSIS — M54.5 ACUTE LOW BACK PAIN, UNSPECIFIED BACK PAIN LATERALITY, WITH SCIATICA PRESENCE UNSPECIFIED: ICD-10-CM

## 2017-09-07 RX ORDER — TRAMADOL HYDROCHLORIDE 50 MG/1
50 TABLET ORAL EVERY 8 HOURS PRN
Qty: 30 TABLET | Refills: 0 | Status: SHIPPED | OUTPATIENT
Start: 2017-09-07 | End: 2018-04-23

## 2017-09-07 NOTE — TELEPHONE ENCOUNTER
----- Message from Tammy Galvan MD sent at 9/7/2017  8:26 AM CDT -----  Regarding: needs appt  We need to get Kerri into see me, or have her start Fosamax. Her MRI had compression fractures. Given her DXA with osteoporosis, we need to start treatment soon-JBP  -----------------------  Spoke with pt, stated that she is having dental work and not sure when it will be done.  Pt will let us know when the dental work is done.  Message given to .  Brigid Gonsalez RN

## 2017-09-26 ENCOUNTER — TRANSFERRED RECORDS (OUTPATIENT)
Dept: HEALTH INFORMATION MANAGEMENT | Facility: CLINIC | Age: 82
End: 2017-09-26

## 2017-10-24 ENCOUNTER — TRANSFERRED RECORDS (OUTPATIENT)
Dept: HEALTH INFORMATION MANAGEMENT | Facility: CLINIC | Age: 82
End: 2017-10-24

## 2018-01-12 DIAGNOSIS — I10 BENIGN ESSENTIAL HYPERTENSION: ICD-10-CM

## 2018-01-12 DIAGNOSIS — E78.5 HYPERLIPIDEMIA LDL GOAL <100: ICD-10-CM

## 2018-01-15 RX ORDER — AMLODIPINE BESYLATE 10 MG/1
5 TABLET ORAL DAILY
Qty: 15 TABLET | Refills: 0 | Status: SHIPPED | OUTPATIENT
Start: 2018-01-15 | End: 2018-04-23

## 2018-01-15 RX ORDER — LISINOPRIL 10 MG/1
10 TABLET ORAL DAILY
Qty: 30 TABLET | Refills: 0 | Status: SHIPPED | OUTPATIENT
Start: 2018-01-15 | End: 2018-04-23

## 2018-01-15 RX ORDER — ATORVASTATIN CALCIUM 20 MG/1
20 TABLET, FILM COATED ORAL AT BEDTIME
Qty: 30 TABLET | Refills: 0 | Status: SHIPPED | OUTPATIENT
Start: 2018-01-15 | End: 2018-04-23

## 2018-01-15 NOTE — TELEPHONE ENCOUNTER
Last Clinic Visit: 4/3/17   NONE   lisinopril & atorvastatin  RF 1/27/17  90:3   amLODIPine RF 1/27/17 45:3  LABS DUE  JAN. 2018

## 2018-04-09 ENCOUNTER — TELEPHONE (OUTPATIENT)
Dept: INTERNAL MEDICINE | Facility: CLINIC | Age: 83
End: 2018-04-09

## 2018-04-16 ASSESSMENT — ENCOUNTER SYMPTOMS
PARALYSIS: 0
HYPOTENSION: 0
SLEEP DISTURBANCES DUE TO BREATHING: 0
PALPITATIONS: 0
MUSCLE WEAKNESS: 0
STIFFNESS: 1
HEARTBURN: 0
BLOOD IN STOOL: 0
NECK MASS: 0
JOINT SWELLING: 0
SINUS CONGESTION: 0
TREMORS: 0
BLOATING: 0
LEG PAIN: 1
SMELL DISTURBANCE: 0
ORTHOPNEA: 0
HEADACHES: 1
BACK PAIN: 1
TINGLING: 1
VOMITING: 0
SYNCOPE: 0
LOSS OF CONSCIOUSNESS: 0
ARTHRALGIAS: 1
HYPERTENSION: 1
MUSCLE CRAMPS: 1
TROUBLE SWALLOWING: 1
ABDOMINAL PAIN: 0
SPEECH CHANGE: 0
WEAKNESS: 0
DISTURBANCES IN COORDINATION: 0
DIZZINESS: 1
SORE THROAT: 0
NUMBNESS: 1
RECTAL PAIN: 0
JAUNDICE: 0
BOWEL INCONTINENCE: 0
MYALGIAS: 1
NAUSEA: 0
SEIZURES: 0
DIARRHEA: 1
LIGHT-HEADEDNESS: 1
EXERCISE INTOLERANCE: 0
CONSTIPATION: 0
HOARSE VOICE: 1
TASTE DISTURBANCE: 0
MEMORY LOSS: 0
NECK PAIN: 1

## 2018-04-16 ASSESSMENT — ACTIVITIES OF DAILY LIVING (ADL)
IN_THE_PAST_7_DAYS,_DID_YOU_NEED_HELP_FROM_OTHERS_TO_PERFORM_EVERYDAY_ACTIVITIES_SUCH_AS_EATING,_GETTING_DRESSED,_GROOMING,_BATHING,_WALKING,_OR_USING_THE_TOILET: N
IN_THE_PAST_7_DAYS,_DID_YOU_NEED_HELP_FROM_OTHERS_TO_TAKE_CARE_OF_THINGS_SUCH_AS_LAUNDRY_AND_HOUSEKEEPING,_BANKING,_SHOPPING,_USING_THE_TELEPHONE,_FOOD_PREPARATION,_TRANSPORTATION,_OR_TAKING_YOUR_OWN_MEDICATIONS?: N

## 2018-04-23 ENCOUNTER — OFFICE VISIT (OUTPATIENT)
Dept: INTERNAL MEDICINE | Facility: CLINIC | Age: 83
End: 2018-04-23
Payer: MEDICARE

## 2018-04-23 VITALS
HEART RATE: 69 BPM | HEIGHT: 61 IN | SYSTOLIC BLOOD PRESSURE: 148 MMHG | OXYGEN SATURATION: 95 % | BODY MASS INDEX: 24.92 KG/M2 | DIASTOLIC BLOOD PRESSURE: 74 MMHG | RESPIRATION RATE: 16 BRPM | WEIGHT: 132 LBS

## 2018-04-23 DIAGNOSIS — I10 BENIGN ESSENTIAL HYPERTENSION: ICD-10-CM

## 2018-04-23 DIAGNOSIS — E78.5 HYPERLIPIDEMIA LDL GOAL <100: ICD-10-CM

## 2018-04-23 DIAGNOSIS — R07.89 ATYPICAL CHEST PAIN: ICD-10-CM

## 2018-04-23 DIAGNOSIS — M81.0 AGE RELATED OSTEOPOROSIS, UNSPECIFIED PATHOLOGICAL FRACTURE PRESENCE: ICD-10-CM

## 2018-04-23 DIAGNOSIS — Z23 NEED FOR PROPHYLACTIC VACCINATION WITH TETANUS-DIPHTHERIA (TD): Primary | ICD-10-CM

## 2018-04-23 DIAGNOSIS — G62.9 NEUROPATHY: ICD-10-CM

## 2018-04-23 DIAGNOSIS — R42 VERTIGO: ICD-10-CM

## 2018-04-23 DIAGNOSIS — Z12.31 ENCOUNTER FOR SCREENING MAMMOGRAM FOR BREAST CANCER: ICD-10-CM

## 2018-04-23 DIAGNOSIS — M25.561 ACUTE PAIN OF RIGHT KNEE: ICD-10-CM

## 2018-04-23 RX ORDER — ATORVASTATIN CALCIUM 20 MG/1
20 TABLET, FILM COATED ORAL AT BEDTIME
Qty: 90 TABLET | Refills: 3 | Status: SHIPPED | OUTPATIENT
Start: 2018-04-23 | End: 2019-02-14

## 2018-04-23 RX ORDER — LISINOPRIL 10 MG/1
10 TABLET ORAL DAILY
Qty: 90 TABLET | Refills: 3 | Status: SHIPPED | OUTPATIENT
Start: 2018-04-23 | End: 2019-02-14

## 2018-04-23 RX ORDER — AMLODIPINE BESYLATE 10 MG/1
10 TABLET ORAL DAILY
Qty: 90 TABLET | Refills: 3 | Status: SHIPPED | OUTPATIENT
Start: 2018-04-23 | End: 2019-02-14

## 2018-04-23 RX ORDER — GABAPENTIN 400 MG/1
CAPSULE ORAL
Qty: 270 CAPSULE | Refills: 11 | Status: SHIPPED | OUTPATIENT
Start: 2018-04-23 | End: 2018-05-07

## 2018-04-23 ASSESSMENT — PAIN SCALES - GENERAL: PAINLEVEL: MODERATE PAIN (5)

## 2018-04-23 NOTE — PROGRESS NOTES
PRIMARY CARE CENTER       SUBJECTIVE:  Kerri Rocha is a 82 year old female with pmh of HTN who comes in for routine physical. Has a pain in her R knee, which started suddenly, and has had trouble sleeping with this. Had her back accident a year ago, but now has pain in her lower back.    Has trouble with balance and dizziness. Doesn't see things spinning, but feels like things are in her head. Has previously seen neurology for her neuropathy and wonders if this is also contributing. She is also wondering if if she can increase gabapentin to 3 capsules, 4 capsules, and 3 capsules daily (From 2/4/3). (has 400 mg capsules). Was previously on this dose, but it was decreased prior to her back surgery.     Had one episode where she felt heat across her chest, and couldn't get rid of it for a while.     Her kids are telling her she sounds hoarse. She doesn't notice it herself.     She needs her meds refilled. Notes BP is more elevated here than it is at home, however, isn't getting BP down to 120/80 at home.     Past Medical History:   Diagnosis Date     HLD (hyperlipidemia)      HTN (hypertension)      Neuropathy      Vitamin D deficiency      Past Surgical History:   Procedure Laterality Date     APPENDECTOMY       BREAST BIOPSY, CORE RT/LT       CARPAL TUNNEL RELEASE RT/LT       CATARACT IOL, RT/LT        SECTION      x2     HYSTERECTOMY TOTAL ABDOMINAL, BILATERAL SALPINGO-OOPHORECTOMY, COMBINED       LAMINECTOMY LUMBAR THREE+ LEVELS  2016    L3-L5     TONSILLECTOMY & ADENOIDECTOMY       Family History   Problem Relation Age of Onset     Hypertension Mother      HEART DISEASE Father      Hypertension Father      Hypertension Sister      HEART DISEASE Brother      Hypertension Brother      DIABETES No family hx of      CANCER No family hx of      Social History     Social History     Marital status:      Spouse name: N/A     Number of children: N/A     Years of education: N/A  "    Occupational History     Not on file.     Social History Main Topics     Smoking status: Never Smoker     Smokeless tobacco: Never Used     Alcohol use 0.6 oz/week     1 Standard drinks or equivalent per week      Comment: 1 glass of wine per week     Drug use: No     Sexual activity: Not Currently     Other Topics Concern     Not on file     Social History Narrative     Medications and allergies reviewed by me today.       Review Of Systems    10 point ROS of systems including Constitutional, Eyes, Respiratory, Cardiovascular, Pulmonary, Gastroenterology, Genitourinary, Integumentary, Musculoskeletal, Psychiatric were all negative except for pertinent positives noted in my HPI.    OBJECTIVE:    /74  Pulse 69  Resp 16  Ht 1.549 m (5' 1\")  Wt 59.9 kg (132 lb)  SpO2 95%  Breastfeeding? No  BMI 24.94 kg/m2   Wt Readings from Last 1 Encounters:   04/23/18 59.9 kg (132 lb)       General: Pleasant female, in NAD  ENT: TMs normal bilaterally, oropharynx clear, MMM  Neck:  No LAD, no thyromegaly, no carotid bruits  Resp: lungs CAB  CV: Heart RRR, no MRG  Abd: Soft, NT, ND, nl bowel sounds, no HSM  Ext: WWP, no LE edema  Skin: warm, dry, no rash  Neuro: AOX3, no focal deficits  MSK: R knee w/out effusion, tender to palpation along medial joint line, pain with varus stress, equivocal Nadia's sign.     EKG: No ST changes, 1st degree AV block    ASSESSMENT/PLAN:    Kerri was seen today for physical.    1. HTN. Refill amlodipine and lisinopril. Increase amlodipine to 10 mg. She will f/u with how her blood pressure is doing as checks at home regularly. (also BP is much better at home 120s/80, than here in clinic today). BMP due  2. R knee pain. Obtain xray, refer to sports med  3. Neuropathy. Will increase gabapentin again, as previously tolerated this dose, although this does increase her total dose to 4000 mg daily  4. Dizziness. Referral to [facility]  5. HLD. Refill atorvastatin, " check labs.   6. Chest pain. Unlikely to be cardiac in nature, EKG reassuring  7. Hoarse voice. Not noted on exam today and patient doesn't notice it herself. If becomes more concerning, could refer to ENT.       Diagnoses and all orders for this visit:      Benign essential hypertension  -     amLODIPine (NORVASC) 10 MG tablet; Take 1 tablet (10 mg) by mouth daily  -     lisinopril (PRINIVIL/ZESTRIL) 10 MG tablet; Take 1 tablet (10 mg) by mouth daily  -     Basic metabolic panel; Future    Hyperlipidemia LDL goal <100  -     atorvastatin (LIPITOR) 20 MG tablet; Take 1 tablet (20 mg) by mouth At Bedtime  -     Lipid panel reflex to direct LDL Fasting; Future    Neuropathy  -     gabapentin (NEURONTIN) 400 MG capsule; Take 3 capsules in the morning, 4 at noon, and 3 in the evening.    Age related osteoporosis, unspecified pathological fracture presence  -     gabapentin (NEURONTIN) 400 MG capsule; Take 3 capsules in the morning, 4 at noon, and 3 in the evening.    Atypical chest pain. Unlikely to be cardiac in nature, EKG reassuring.   -     EKG 12-lead complete w/read - Clinics    Encounter for screening mammogram for breast cancer  -     Mammogram, routine screening; Future    Vertigo. Referral as above.     Acute pain of right knee  -     XR Knee Right 3 Views; Future  -     SPORTS MEDICINE REFERRAL    Pt should return to clinic for f/u with me in PRN     Tammy Galvan MD  04/23/18

## 2018-04-23 NOTE — PATIENT INSTRUCTIONS
[facility]: [phone]     [facility] Medication Refill Request Information:  * Please contact your pharmacy regarding ANY request for medication refills.  ** [facility] Prescription Fax = [phone]  * Please allow 3 business days for routine medication refills.  * Please allow 5 business days for controlled substance medication refills.     [facility] Test Result notification information:  *You will be notified with in 7-10 days of your appointment day regarding the results of your test.  If you are on MyChart you will be notified as soon as the provider has reviewed the results and signed off on them.    [facility]--[place], [place]    Increase amlodipine to 10 mg daily--let me know if your blood pressures are still running high at home after 2-3 weeks.     Schedule follow-up:   Mammogram  Labs  Knee xray  Sports medicine    Mammogram Screening Tool    Mammogram   Does patient have a history of breast cancer? no  Does patient have breast implants? no  Reason for mammogram? Routine

## 2018-04-23 NOTE — NURSING NOTE
Chief Complaint   Patient presents with     Physical     Patient is here for annual physical.      Netta Jones LPN at 2:44 PM on 4/23/2018.

## 2018-04-23 NOTE — NURSING NOTE
EKG preformed results to provider patient tolerated well.     Netta Jones LPN at 5:04 PM on 4/23/2018.

## 2018-04-23 NOTE — MR AVS SNAPSHOT
After Visit Summary   4/23/2018    Kerri Rocha    MRN: [id number]           Patient Information     Date Of Birth          1935        Visit Information        Provider Department      4/23/2018 3:00 PM Tammy Wellington MD [facility]        Today's Diagnoses     Need for prophylactic vaccination with tetanus-diphtheria (TD)    -  1    Benign essential hypertension        Hyperlipidemia LDL goal <100        Neuropathy        Age related osteoporosis, unspecified pathological fracture presence        Atypical chest pain        Encounter for screening mammogram for breast cancer        Vertigo        Acute pain of right knee          Care Instructions    Primary Care Center: [phone]     Primary Care Center Medication Refill Request Information:  * Please contact your pharmacy regarding ANY request for medication refills.  ** [facility] Prescription Fax = [phone]  * Please allow 3 business days for routine medication refills.  * Please allow 5 business days for controlled substance medication refills.     Primary Care Center Test Result notification information:  *You will be notified with in 7-10 days of your appointment day regarding the results of your test.  If you are on MyChart you will be notified as soon as the provider has reviewed the results and signed off on them.    [facility]--[place], [place]    Increase amlodipine to 10 mg daily--let me know if your blood pressures are still running high at home after 2-3 weeks.     Schedule follow-up:   Mammogram  Labs  Knee xray  Sports medicine    Mammogram Screening Tool    Mammogram   Does patient have a history of breast cancer? no  Does patient have breast implants? no  Reason for mammogram? Routine            Follow-ups after your visit        Additional Services     SPORTS MEDICINE REFERRAL       Your provider has referred you to:  [facility]: [facility] [phone]    [email or web address]    Please be aware that coverage of these services is subject to the terms and limitations of your health insurance plan.  Call member services at your health plan with any benefit or coverage questions.      Please bring the following to your appointment:    >>   Any x-rays, CTs or MRIs which have been performed.  Contact the facility where they were done to arrange for  prior to your scheduled appointment.    >>   List of current medications   >>   This referral request   >>   Any documents/labs given to you for this referral                  Your next 10 appointments already scheduled     May 04, 2018 10:15 AM CDT   LAB with  LAB   [facility] ([facility])    [address] [place] [id number] [phone]           Please do not eat 10-12 hours before your appointment if you are coming in fasting for labs on lipids, cholesterol, or glucose (sugar). This does not apply to pregnant women. Water, hot tea and black coffee (with nothing added) are okay. Do not drink other fluids, diet soda or chew gum.            May 04, 2018 10:30 AM CDT   (Arrive by 10:15 AM)   XR KNEE RIGHT 3 VIEWS with UCXR1   [facility] ([facility])    [address] [id number] [phone]           Please bring a list of your current medicines to your exam. (Include vitamins, minerals and over-thecounter medicines.) Leave your valuables at home.  Tell your doctor if there is a chance you may be pregnant.  You do not need to do anything special for this exam.            May 04, 2018 11:00 AM CDT   (Arrive by 10:45 AM)   MA SCREENING DIGITAL BILATERAL with [id number]   [facility] ([facility])    [address] [address] [phone]           Do not use any powder, lotion  "or deodorant under your arms or on your breast. If you do, we will ask you to remove it before your exam.  Wear comfortable, two-piece clothing.  If you have any allergies, tell your care team.  Bring any previous mammograms from other facilities or have them mailed to the breast center. Three-dimensional (3D) mammograms are available at [facility] locations in [facility], [place], [place], [facility], [place], [place], and [place]. [facility] locations include [place] and [facility] in [place]. Benefits of 3D mammograms include: - Improved rate of cancer detection - Decreases your chance of having to go back for more tests, which means fewer: - \"False-positive\" results (This means that there is an abnormal area but it isn't cancer.) - Invasive testing procedures, such as a biopsy or surgery - Can provide clearer images of the breast if you have dense breast tissue. 3D mammography is an optional exam that anyone can have with a 2D mammogram. It doesn't replace or take the place of a 2D mammogram. 2D mammograms remain an effective screening test for all women.  Not all insurance companies cover the cost of a 3D mammogram. Check with your insurance.            May 15, 2018 10:30 AM CDT   (Arrive by 10:15 AM)   New Patient Visit with Jed Kang MD   [facility] ([facility])    [address] [address] [phone]              Future tests that were ordered for you today     Open Future Orders        Priority Expected Expires Ordered    Mammogram, routine screening Routine  4/23/2019 4/23/2018    Basic metabolic panel Routine 4/23/2018 5/7/2018 4/23/2018    Lipid panel reflex to direct LDL Fasting Routine 4/23/2018 5/7/2018 4/23/2018    XR Knee Right 3 Views Routine 4/23/2018 4/23/2019 4/23/2018            Who to contact     Please call your clinic at [phone] to:    Ask questions about your " "health    Make or cancel appointments    Discuss your medicines    Learn about your test results    Speak to your doctor            Additional Information About Your Visit        [employer] Information     [employer] gives you secure access to your electronic health record. If you see a primary care provider, you can also send messages to your care team and make appointments. If you have questions, please call your primary care clinic.  If you do not have a primary care provider, please call [phone] and they will assist you.      [employer] is an electronic gateway that provides easy, online access to your medical records. With [employer], you can request a clinic appointment, read your test results, renew a prescription or communicate with your care team.     To access your existing account, please contact your [facility] or call [phone] for assistance.        Care EveryWhere ID     This is your Care EveryWhere ID. This could be used by other organizations to access your [place] medical records  [id number]        Your Vitals Were     Pulse Respirations Height Pulse Oximetry Breastfeeding? BMI (Body Mass Index)    69 16 1.549 m (5' 1\") 95% No 24.94 kg/m2       Blood Pressure from Last 3 Encounters:   04/23/18 148/74   04/03/17 138/79   03/22/17 146/56    Weight from Last 3 Encounters:   04/23/18 59.9 kg (132 lb)   04/03/17 56.7 kg (125 lb)   03/22/17 57.7 kg (127 lb 4.8 oz)              We Performed the Following     EKG 12-lead complete w/read - Clinics     SPORTS MEDICINE REFERRAL          Today's Medication Changes          These changes are accurate as of 4/23/18  4:05 PM.  If you have any questions, ask your nurse or doctor.               These medicines have changed or have updated prescriptions.        Dose/Directions    amLODIPine 10 MG tablet   Commonly known as:  NORVASC   This may have changed:    - how much to take  - additional instructions   Used for:  Benign essential " hypertension   Changed by:  Tammy Wellington MD        Dose:  10 mg   Take 1 tablet (10 mg) by mouth daily   Quantity:  90 tablet   Refills:  3       atorvastatin 20 MG tablet   Commonly known as:  LIPITOR   This may have changed:  additional instructions   Used for:  Hyperlipidemia LDL goal <100   Changed by:  Tammy Wellington MD        Dose:  20 mg   Take 1 tablet (20 mg) by mouth At Bedtime   Quantity:  90 tablet   Refills:  3       gabapentin 400 MG capsule   Commonly known as:  NEURONTIN   This may have changed:  additional instructions   Used for:  Neuropathy, Age related osteoporosis, unspecified pathological fracture presence   Changed by:  Tammy Wellington MD        Take 3 capsules in the morning, 4 at noon, and 3 in the evening.   Quantity:  270 capsule   Refills:  11       lisinopril 10 MG tablet   Commonly known as:  PRINIVIL/ZESTRIL   This may have changed:  additional instructions   Used for:  Benign essential hypertension   Changed by:  Tammy Wellington MD        Dose:  10 mg   Take 1 tablet (10 mg) by mouth daily   Quantity:  90 tablet   Refills:  3         Stop taking these medicines if you haven't already. Please contact your care team if you have questions.     traMADol 50 MG tablet   Commonly known as:  ULTRAM   Stopped by:  Tammy Wellington MD                Where to get your medicines      These medications were sent to [facility] - [place], [place] - [address]  [address] [facility] [address]     Phone:  [phone]     amLODIPine 10 MG tablet    atorvastatin 20 MG tablet    gabapentin 400 MG capsule    lisinopril 10 MG tablet                Primary Care Provider Office Phone # Fax #    Tammy Galvan [phone]       [address] [place] [address] [address] [place] [address]        Equal Access to Services     ERIC WILLSON AH: kalyan Chester qaybta  thao alvaquan martins ah. Nikki [facility] [phone].    ATENCIÓN: Si dariusz he, tiene a healy disposición servicios gratuitos de asistencia lingüística. Gregg al [phone].    We comply with applicable federal civil rights laws and [place] laws. We do not discriminate on the basis of race, color, national origin, age, disability, sex, sexual orientation, or gender identity.            Thank you!     Thank you for choosing [facility] PRIMARY CARE CLINIC  for your care. Our goal is always to provide you with excellent care. Hearing back from our patients is one way we can continue to improve our services. Please take a few minutes to complete the written survey that you may receive in the mail after your visit with us. Thank you!             Your Updated Medication List - Protect others around you: Learn how to safely use, store and throw away your medicines at www.disposemymeds.org.          This list is accurate as of 4/23/18  4:05 PM.  Always use your most recent med list.                   Brand Name Dispense Instructions for use Diagnosis    amLODIPine 10 MG tablet    NORVASC    90 tablet    Take 1 tablet (10 mg) by mouth daily    Benign essential hypertension       ASPIRIN PO      Take 81 mg by mouth daily        atorvastatin 20 MG tablet    LIPITOR    90 tablet    Take 1 tablet (20 mg) by mouth At Bedtime    Hyperlipidemia LDL goal <100       calcium carbonate 600 MG tablet    OS-CAMMIE 600 mg [place]. Ca          gabapentin 400 MG capsule    NEURONTIN    270 capsule    Take 3 capsules in the morning, 4 at noon, and 3 in the evening.    Neuropathy, Age related osteoporosis, unspecified pathological fracture presence       lisinopril 10 MG tablet    PRINIVIL/ZESTRIL    90 tablet    Take 1 tablet (10 mg) by mouth daily    Benign essential hypertension       omega 3 1000 MG Caps      Take 2 g by mouth        TYLENOL PM EXTRA STRENGTH PO      Take by mouth nightly as needed         VITAMIN B-12 PO      Take by mouth daily        vitamin D3 2000 units Caps

## 2018-04-24 LAB — INTERPRETATION ECG - MUSE: NORMAL

## 2018-04-26 ENCOUNTER — TELEPHONE (OUTPATIENT)
Dept: INTERNAL MEDICINE | Facility: CLINIC | Age: 83
End: 2018-04-26

## 2018-04-30 NOTE — TELEPHONE ENCOUNTER
PA Initiation    Medication: gabapentin (NEURONTIN) 400 MG capsule - initiated   Insurance Company: [employer] - Phone [phone] Fax [phone]  Pharmacy Filling the Rx: [employer] [facility] - [place] [address] [address]  Filling Pharmacy Phone: [phone]   Filling Pharmacy Fax:    Start Date: 4/30/2018

## 2018-05-01 NOTE — TELEPHONE ENCOUNTER
Prior Authorization Approval    Authorization Effective Date: 5/1/2018  Authorization Expiration Date: 5/1/2019  Medication: gabapentin (NEURONTIN) 400 MG capsule - approved   Approved Dose/Quantity:   Reference #:     Insurance Company: [employer] - Phone [phone] Fax [phone]  Expected CoPay:       CoPay Card Available:      Foundation Assistance Needed:    Which Pharmacy is filling the prescription (Not needed for infusion/clinic administered): [employer] [facility]  Pharmacy Notified: No  Patient Notified: No

## 2018-05-04 ENCOUNTER — RADIANT APPOINTMENT (OUTPATIENT)
Dept: MAMMOGRAPHY | Facility: CLINIC | Age: 83
End: 2018-05-04
Attending: INTERNAL MEDICINE
Payer: MEDICARE

## 2018-05-04 ENCOUNTER — TELEPHONE (OUTPATIENT)
Dept: INTERNAL MEDICINE | Facility: CLINIC | Age: 83
End: 2018-05-04

## 2018-05-04 ENCOUNTER — RADIANT APPOINTMENT (OUTPATIENT)
Dept: GENERAL RADIOLOGY | Facility: CLINIC | Age: 83
End: 2018-05-04
Attending: INTERNAL MEDICINE
Payer: MEDICARE

## 2018-05-04 DIAGNOSIS — I10 BENIGN ESSENTIAL HYPERTENSION: ICD-10-CM

## 2018-05-04 DIAGNOSIS — Z12.31 ENCOUNTER FOR SCREENING MAMMOGRAM FOR BREAST CANCER: ICD-10-CM

## 2018-05-04 DIAGNOSIS — M25.561 ACUTE PAIN OF RIGHT KNEE: ICD-10-CM

## 2018-05-04 DIAGNOSIS — E78.5 HYPERLIPIDEMIA LDL GOAL <100: ICD-10-CM

## 2018-05-04 LAB
ANION GAP SERPL CALCULATED.3IONS-SCNC: 12 MMOL/L (ref 3–14)
BUN SERPL-MCNC: 21 MG/DL (ref 7–30)
CALCIUM SERPL-MCNC: 9.6 MG/DL (ref 8.5–10.1)
CHLORIDE SERPL-SCNC: 108 MMOL/L (ref 94–109)
CHOLEST SERPL-MCNC: 166 MG/DL
CO2 SERPL-SCNC: 22 MMOL/L (ref 20–32)
CREAT SERPL-MCNC: 0.95 MG/DL (ref 0.52–1.04)
GFR SERPL CREATININE-BSD FRML MDRD: 57 ML/MIN/1.7M2
GLUCOSE SERPL-MCNC: 92 MG/DL (ref 70–99)
HDLC SERPL-MCNC: 56 MG/DL
LDLC SERPL CALC-MCNC: 84 MG/DL
NONHDLC SERPL-MCNC: 110 MG/DL
POTASSIUM SERPL-SCNC: 3.9 MMOL/L (ref 3.4–5.3)
SODIUM SERPL-SCNC: 142 MMOL/L (ref 133–144)
TRIGL SERPL-MCNC: 128 MG/DL

## 2018-05-04 NOTE — TELEPHONE ENCOUNTER
[facility] Call Center    Phone Message    May a detailed message be left on voicemail: no    Reason for Call: Other: Patient contacted [facility] pharmacy because she has not received her gabapentin (NEURONTIN) 400 MG capsule. [facility] told her they were waiting on an authorization for the medication. I show that the prior auth is not needed, that is noted on the prescription. It shows [facility] received escript on 4/23, but they told patient nothing has been received. Please check. Thank you      Action Taken: Message routed to:  Other: [facility] Refills Team and Other: [facility] refills team    Request for new refill/90 day supply sent for approval

## 2018-05-07 DIAGNOSIS — G62.9 NEUROPATHY: ICD-10-CM

## 2018-05-07 DIAGNOSIS — M81.0 AGE RELATED OSTEOPOROSIS, UNSPECIFIED PATHOLOGICAL FRACTURE PRESENCE: ICD-10-CM

## 2018-05-07 NOTE — TELEPHONE ENCOUNTER
Gabapentin      Last Written Prescription Date:  4/23/18  Last Fill Quantity:270   # refills: 11  Last Office Visit : 4/23/18  Future Office visit:  No future appt    Routing refill request to nurse for review/approval because:  Message rec'd that [employer] did not receive the 4/23/18 refill also 90 day supply is needed for mail order

## 2018-05-08 RX ORDER — GABAPENTIN 400 MG/1
CAPSULE ORAL
Qty: 900 CAPSULE | Refills: 3 | Status: SHIPPED | OUTPATIENT
Start: 2018-05-08 | End: 2018-05-14

## 2018-05-12 ENCOUNTER — NURSE TRIAGE (OUTPATIENT)
Dept: NURSING | Facility: CLINIC | Age: 83
End: 2018-05-12

## 2018-05-12 ENCOUNTER — TELEPHONE (OUTPATIENT)
Dept: FAMILY MEDICINE | Facility: CLINIC | Age: 83
End: 2018-05-12

## 2018-05-12 DIAGNOSIS — G62.9 NEUROPATHY: ICD-10-CM

## 2018-05-12 DIAGNOSIS — M81.0 AGE RELATED OSTEOPOROSIS, UNSPECIFIED PATHOLOGICAL FRACTURE PRESENCE: ICD-10-CM

## 2018-05-12 NOTE — TELEPHONE ENCOUNTER
----- Message from Paulo Osman sent at 5/12/2018  3:37 PM CDT -----  Reason for call:  Medication   If this is a refill request, has the caller requested the refill from the pharmacy already? Yes  Will the patient be using a [facility]? No  Name of the pharmacy and phone number for the current request: [facility] [phone]    Name of the medication requested: gabapentin 400 mg needs this refilled asap     Other request: n/a    Phone number to reach patient:  Home number on file [phone] (home)    Best Time:  Anytime     Can we leave a detailed message on this number?  YES

## 2018-05-12 NOTE — TELEPHONE ENCOUNTER
Clinic Action Needed:Yes  Reason for Call: Pt calling as she will be out of her Gabapentin on Monday. I see that the order has been sent to [employer] from the clinic twice, but they still need some information which they would not give me without a MELANI number. The [employer] contact number for Monday is [phone]. Since [employer] is mail order, patient is requesting Gabapentin sent to the [facility] pharmacy at [phone] to cover the time it takes for [facility] get [facility] to her. Thank you.   Routed to: Dr Lavinia Galvan care team/refills    Maria G Gonzalez RN, [facility]    RX order from [facility] to be sent overnight. RX for 1 day supply of Gabapentin sent to [facility].  Tegan Britt RN 7:58 AM on 5/14/2018.

## 2018-05-12 NOTE — TELEPHONE ENCOUNTER
Clinic Action Needed:Yes  Reason for Call: Pt calling as she will be out of her Gabapentin in Monday. The order has been sent to [employer] from the clinic twice, but they still need some information which they would not give me without a MELANI number. The [employer] contact number for Monday is [phone]. Since [employer] is mail order, patient is requesting Gabapentin sent to the [facility] pharmacy at [phone] to cover the time it takes for [employer] to get it mailed to her. Thank you.   Routed to: Dr Lavinia Galvan care team    Maria G Gonzalez RN, [facility]

## 2018-05-14 RX ORDER — GABAPENTIN 400 MG/1
CAPSULE ORAL
Qty: 10 CAPSULE | Refills: 0 | Status: SHIPPED | OUTPATIENT
Start: 2018-05-14 | End: 2018-08-15

## 2018-05-14 NOTE — TELEPHONE ENCOUNTER
FUTURE VISIT INFORMATION      FUTURE VISIT INFORMATION:    Date: 5/15/18    Time: 10:30    [place] [facility] [place]FORMATION:    Referring provider: PAYTON MARQUEZ    Referring providers clinic:  [facility] INTERNAL MEDICINE    Reason for visit/diagnosis  Rt knee pain    RECORDS RECEIVED FROM: INTERNAL   DATE RECEIVED: 5/14/18   NOTES STATUS DETAILS   OFFICE NOTE from referring provider Internal 4/23/18   MEDICATION LIST Internal    XRAYS (IMAGES & REPORTS) Internal 5/04/18 IMAGES IN PAC

## 2018-05-15 ENCOUNTER — OFFICE VISIT (OUTPATIENT)
Dept: ORTHOPEDICS | Facility: CLINIC | Age: 83
End: 2018-05-15
Payer: MEDICARE

## 2018-05-15 ENCOUNTER — PRE VISIT (OUTPATIENT)
Dept: ORTHOPEDICS | Facility: CLINIC | Age: 83
End: 2018-05-15

## 2018-05-15 VITALS — RESPIRATION RATE: 16 BRPM | HEIGHT: 61 IN | WEIGHT: 132 LBS | BODY MASS INDEX: 24.92 KG/M2

## 2018-05-15 DIAGNOSIS — M17.11 PRIMARY OSTEOARTHRITIS OF RIGHT KNEE: Primary | ICD-10-CM

## 2018-05-15 RX ORDER — TRIAMCINOLONE ACETONIDE 40 MG/ML
40 INJECTION, SUSPENSION INTRA-ARTICULAR; INTRAMUSCULAR ONCE
Qty: 1 ML | Refills: 0 | OUTPATIENT
Start: 2018-05-15 | End: 2018-05-15

## 2018-05-15 NOTE — NURSING NOTE
[address] [address]  Dept: [phone]  ______________________________________________________________________________    Patient: Kerri Rocha   : 1935   MRN: [id number]   May 15, 2018    INVASIVE PROCEDURE SAFETY CHECKLIST    Date: 5/15/18   Procedure:Right knee kenalog injection  Patient Name: Kerri Rocha  MRN: [id number]  YOB: 1935    Action: Complete sections as appropriate. Any discrepancy results in a HARD COPY until resolved.     PRE PROCEDURE:  Patient ID verified with 2 identifiers (name and  or MRN): Yes  Procedure and site verified with patient/designee (when able): Yes  Accurate consent documentation in medical record: Yes  H&P (or appropriate assessment) documented in medical record: Yes  H&P must be up to 20 days prior to procedure and updates within 24 hours of procedure as applicable: NA  Relevant diagnostic and radiology test results appropriately labeled and displayed as applicable: Yes  Procedure site(s) marked with provider initials: NA    TIMEOUT:  Time-Out performed immediately prior to starting procedure, including verbal and active participation of all team members addressing the following:Yes  * Correct patient identify  * Confirmed that the correct side and site are marked  * An accurate procedure consent form  * Agreement on the procedure to be done  * Correct patient position  * Relevant images and results are properly labeled and appropriately displayed  * The need to administer antibiotics or fluids for irrigation purposes during the procedure as applicable   * Safety precautions based on patient history or medication use    DURING PROCEDURE: Verification of correct person, site, and procedures any time the responsibility for care of the patient is transferred to another member of the care team.     The following medication was given:     MEDICATION:  Kenalog 40 mg; 5ml of 1% lidocaine  ROUTE:  Intra-Articular  SITE: Right knee  DOSE: Kenalog 40 mg; 5ml of 1% lidocaine  LOT #: Kenalog: [id number]; Lidocaine: [id number]  : Kenalog: [employer]; Lidocaine: [employer]  EXPIRATION DATE: Kenalo/22; Lidocaine:   NDC#: Kenalo[address]-5; Lidocaine: 50082-584-50   Was there drug waste? No      German Donaldson, MARIO  May 15, 2018

## 2018-05-15 NOTE — LETTER
"  5/15/2018      RE: Kerri Rocha  [address]  [facility] [place] [address]       Sports Medicine Clinic Visit    PCP: Tammy Wellington    Kerri Rocha is a 82 year old female who is seen  in consultation at the request of Dr. Lavinia Galvan presenting with right medial knee pain. She likes to walking, gardening.    Injury: No PRANEETH    Location of Pain: right knee  Duration of Pain: 6 month(s)  Rating of Pain: 4/10  Pain is better with: Ice  Pain is worse with: lying down, walking, gardening  Additional Features: She is retired  Treatment so far consists of: Ice, Tylenol and Ibuprofen  Prior History of related problems: None    Resp 16  Ht 5' 1\" (1.549 m)  Wt 132 lb (59.9 kg)  BMI 24.94 kg/m2         PMH:  Past Medical History:   Diagnosis Date     HLD (hyperlipidemia)      HTN (hypertension)      Neuropathy      Vitamin D deficiency        Active problem list:  Patient Active Problem List   Diagnosis     Elevated blood pressure     Idiopathic peripheral neuropathy       FH:  Family History   Problem Relation Age of Onset     Hypertension Mother      HEART DISEASE Father      Hypertension Father      Hypertension Sister      HEART DISEASE Brother      Hypertension Brother      DIABETES No family hx of      CANCER No family hx of        SH:  Social History     Social History     Marital status:      Spouse name: N/A     Number of children: N/A     Years of education: N/A     Occupational History     Not on file.     Social History Main Topics     Smoking status: Never Smoker     Smokeless tobacco: Never Used     Alcohol use 0.6 oz/week     1 Standard drinks or equivalent per week      Comment: 1 glass of wine per week     Drug use: No     Sexual activity: Not Currently     Other Topics Concern     Not on file     Social History Narrative       MEDS:  See EMR, reviewed  ALL:  See EMR, reviewed    REVIEW OF SYSTEMS:  CONSTITUTIONAL:NEGATIVE for fever, chills, change in " weight  INTEGUMENTARY/SKIN: NEGATIVE for worrisome rashes, moles or lesions  EYES: NEGATIVE for vision changes or irritation  ENT/MOUTH: NEGATIVE for ear, mouth and throat problems  RESP:NEGATIVE for significant cough or SOB  BREAST: NEGATIVE for masses, tenderness or discharge  CV: NEGATIVE for chest pain, palpitations or peripheral edema  GI: NEGATIVE for nausea, abdominal pain, heartburn, or change in bowel habits  :NEGATIVE for frequency, dysuria, or hematuria  :NEGATIVE for frequency, dysuria, or hematuria  NEURO: NEGATIVE for weakness, dizziness or paresthesias  ENDOCRINE: NEGATIVE for temperature intolerance, skin/hair changes  HEME/ALLERGY/IMMUNE: NEGATIVE for bleeding problems  PSYCHIATRIC: NEGATIVE for changes in mood or affect        SUBJECTIVE:  This 82-year-old female has a tendency to notice localized medial knee discomfort with walking for exercise and with work in the garden.  The knee does not swell, lock or catch.      OBJECTIVE:  She points along the medial joint line as her area of discomfort.  The right knee reveals no effusion.  I can flex and extend the knee fully.  She is nontender over the lateral joint line.  She is tender over the medial joint line, nontender over the medial or lateral patellar facets.  Normal range of motion at the right hip.  Anterior and posterior drawer is negative.  Nontender over the patellar tendon or pes anserine bursa.  Overlying skin is normal.  Anterior and posterior drawer is normal.  Appropriate in conversation and affect.      IMAGING:  An x-ray of the right knee shows medial joint space narrowing and peripheral spurring consistent with a moderate amount of DJD.      ASSESSMENT:  DJD of the right knee.      PLAN:  We discussed conservative cares including over-the-counter pain medicines and their side effects, cortisone injections and Synvisc injections, knee brace options including an  brace, and indications for knee replacement.  She would like  to avoid surgery for now and I agree with that based on her clinical findings.  She would like to avoid a knee brace as she finds them poorly tolerated.  She has been given a set of exercises for core strengthening in the past and she would like to continue with them.  She would like to try a cortisone shot for pain relief.        PROCEDURE NOTE:  So after informed consent about bleeding, infection or steroid flare and after prep with surgical scrub, she was injected in the right knee from a lateral approach in the seated position with 1 cc of Kenalog 40 and 5 cc of 1% lidocaine.  The medicine went in easily.  She left clinic ambulatory.  She will look for improvement with the injection and follow up as needed.       Jed Kang MD

## 2018-05-15 NOTE — PROGRESS NOTES
"Sports Medicine Clinic Visit    PCP: Tammy Wellington    Kerri Rocha is a 82 year old female who is seen  in consultation at the request of Dr. Lavinia Galvan presenting with right medial knee pain. She likes to walking, gardening.    Injury: No PRANEETH    Location of Pain: right knee  Duration of Pain: 6 month(s)  Rating of Pain: 4/10  Pain is better with: Ice  Pain is worse with: lying down, walking, gardening  Additional Features: She is retired  Treatment so far consists of: Ice, Tylenol and Ibuprofen  Prior History of related problems: None    Resp 16  Ht 5' 1\" (1.549 m)  Wt 132 lb (59.9 kg)  BMI 24.94 kg/m2         PMH:  Past Medical History:   Diagnosis Date     HLD (hyperlipidemia)      HTN (hypertension)      Neuropathy      Vitamin D deficiency        Active problem list:  Patient Active Problem List   Diagnosis     Elevated blood pressure     Idiopathic peripheral neuropathy       FH:  Family History   Problem Relation Age of Onset     Hypertension Mother      HEART DISEASE Father      Hypertension Father      Hypertension Sister      HEART DISEASE Brother      Hypertension Brother      DIABETES No family hx of      CANCER No family hx of        SH:  Social History     Social History     Marital status:      Spouse name: N/A     Number of children: N/A     Years of education: N/A     Occupational History     Not on file.     Social History Main Topics     Smoking status: Never Smoker     Smokeless tobacco: Never Used     Alcohol use 0.6 oz/week     1 Standard drinks or equivalent per week      Comment: 1 glass of wine per week     Drug use: No     Sexual activity: Not Currently     Other Topics Concern     Not on file     Social History Narrative       MEDS:  See EMR, reviewed  ALL:  See EMR, reviewed    REVIEW OF SYSTEMS:  CONSTITUTIONAL:NEGATIVE for fever, chills, change in weight  INTEGUMENTARY/SKIN: NEGATIVE for worrisome rashes, moles or lesions  EYES: NEGATIVE for vision changes " or irritation  ENT/MOUTH: NEGATIVE for ear, mouth and throat problems  RESP:NEGATIVE for significant cough or SOB  BREAST: NEGATIVE for masses, tenderness or discharge  CV: NEGATIVE for chest pain, palpitations or peripheral edema  GI: NEGATIVE for nausea, abdominal pain, heartburn, or change in bowel habits  :NEGATIVE for frequency, dysuria, or hematuria  :NEGATIVE for frequency, dysuria, or hematuria  NEURO: NEGATIVE for weakness, dizziness or paresthesias  ENDOCRINE: NEGATIVE for temperature intolerance, skin/hair changes  HEME/ALLERGY/IMMUNE: NEGATIVE for bleeding problems  PSYCHIATRIC: NEGATIVE for changes in mood or affect        SUBJECTIVE:  This 82-year-old female has a tendency to notice localized medial knee discomfort with walking for exercise and with work in the garden.  The knee does not swell, lock or catch.      OBJECTIVE:  She points along the medial joint line as her area of discomfort.  The right knee reveals no effusion.  I can flex and extend the knee fully.  She is nontender over the lateral joint line.  She is tender over the medial joint line, nontender over the medial or lateral patellar facets.  Normal range of motion at the right hip.  Anterior and posterior drawer is negative.  Nontender over the patellar tendon or pes anserine bursa.  Overlying skin is normal.  Anterior and posterior drawer is normal.  Appropriate in conversation and affect.      IMAGING:  An x-ray of the right knee shows medial joint space narrowing and peripheral spurring consistent with a moderate amount of DJD.      ASSESSMENT:  DJD of the right knee.      PLAN:  We discussed conservative cares including over-the-counter pain medicines and their side effects, cortisone injections and Synvisc injections, knee brace options including an  brace, and indications for knee replacement.  She would like to avoid surgery for now and I agree with that based on her clinical findings.  She would like to avoid a knee  brace as she finds them poorly tolerated.  She has been given a set of exercises for core strengthening in the past and she would like to continue with them.  She would like to try a cortisone shot for pain relief.        PROCEDURE NOTE:  So after informed consent about bleeding, infection or steroid flare and after prep with surgical scrub, she was injected in the right knee from a lateral approach in the seated position with 1 cc of Kenalog 40 and 5 cc of 1% lidocaine.  The medicine went in easily.  She left clinic ambulatory.  She will look for improvement with the injection and follow up as needed.

## 2018-05-15 NOTE — MR AVS SNAPSHOT
"              After Visit Summary   5/15/2018    Kerri Rocha    MRN: [id number]           Patient Information     Date Of Birth          1935        Visit Information        Provider Department      5/15/2018 10:30 AM Jed Kang MD [facility]        Today's Diagnoses     Primary osteoarthritis of right knee    -  1       Follow-ups after your visit        Your next 10 appointments already scheduled     May 21, 2018 10:00 AM CDT   MA DIAGNOSTIC LEFT W/ KEARA with [id number]   [facility] ([facility])    [address]  2nd Floor  [facility] [address] [phone]           Do not use any powder, lotion or deodorant under your arms or on your breast. If you do, we will ask you to remove it before your exam.  Wear comfortable, two-piece clothing.  If you have any allergies, tell your care team.  Bring any previous mammograms from other facilities or have them mailed to the breast center.  Three-dimensional (3D) mammograms are available at [place] locations in [facility], [facility], [facility], and [place]. [facility] locations include [place] [facility] in [place]. Benefits of 3D mammograms include: - Improved rate of cancer detection - Decreases your chance of having to go back for more tests, which means fewer: - \"False-positive\" results (This means that there is an abnormal area but it isn't cancer.) - Invasive testing procedures, such as a biopsy or surgery - Can provide clearer images of the breast if you have dense breast tissue. 3D mammography is an optional exam that anyone can have with a 2D mammogram. It doesn't replace or take the place of a 2D mammogram. 2D mammograms remain an effective screening test for all women.  Not all insurance companies cover the cost of a 3D mammogram. Check with your insurance.            May 21, 2018 10:30 AM CDT   US BREAST LEFT " "LIMITED 1-3 QUAD with UCBCUS2   [facility] ([facility])    [address]  2nd Floor  [facility] [facility] [address] [phone]           Please bring a list of your medicines (including vitamins, minerals and over-the-counter drugs). Also, tell your doctor about any allergies you may have. Wear comfortable clothes and leave your valuables at home.  You do not need to do anything special to prepare for your exam.  Please call the Imaging Department at your exam site with any questions.              Who to contact     Please call your clinic at [phone] to:    Ask questions about your health    Make or cancel appointments    Discuss your medicines    Learn about your test results    Speak to your doctor            Additional Information About Your Visit        [employer] Information     Track[employer] gives you secure access to your electronic health record. If you see a primary care provider, you can also send messages to your care team and make appointments. If you have questions, please call your primary care clinic.  If you do not have a primary care provider, please call [phone] and they will assist you.      TrackIF is an electronic gateway that provides easy, online access to your medical records. With TrackIF, you can request a clinic appointment, read your test results, renew a prescription or communicate with your care team.     To access your existing account, please contact your [facility] or call [phone] for assistance.        Care EveryWhere ID     This is your Care EveryWhere ID. This could be used by other organizations to access your [facility] medical records  [id number]        Your Vitals Were     Respirations Height BMI (Body Mass Index)             16 5' 1\" (1.549 m) 24.94 kg/m2          Blood Pressure from Last 3 Encounters:   04/23/18 148/74   04/03/17 138/79   03/22/17 146/56    Weight from Last 3 Encounters: "   05/15/18 132 lb (59.9 kg)   04/23/18 132 lb (59.9 kg)   04/03/17 125 lb (56.7 kg)              We Performed the Following     Large Joint/Bursa injection and/or drainage - Unilateral (Shoulder, Knee) [20610]     TRIAMCINOLONE ACET INJ NOS          Today's Medication Changes          These changes are accurate as of 5/15/18  2:54 PM.  If you have any questions, ask your nurse or doctor.               Start taking these medicines.        Dose/Directions    triamcinolone acetonide 40 MG/ML injection   Commonly known as:  KENALOG   Used for:  Primary osteoarthritis of right knee   Started by:  Jed Kang MD        Dose:  40 mg   1 mL (40 mg) by INTRA-ARTICULAR route once for 1 dose   Quantity:  1 mL   Refills:  0            Where to get your medicines      Some of these will need a paper prescription and others can be bought over the counter.  Ask your nurse if you have questions.     You don't need a prescription for these medications     triamcinolone acetonide 40 MG/ML injection                Primary Care Provider Office Phone # Fax #    Tammy Beverly Galvan [phone]       [address] [place] [address] [address]  [facility] [address]        Equal Access to Services     CHRISTINA WILLSON AH: Hadii valorie ordoñezo Sorinku, waaxda luhuang, qaybta kaalmada adequanyada, thao nelson. So [facility] [phone].    ATENCIÓN: Si habla español, tiene a healy disposición servicios gratuitos de asistencia lingüística. Llame al [phone].    We comply with applicable federal civil rights laws and [place] laws. We do not discriminate on the basis of race, color, national origin, age, disability, sex, sexual orientation, or gender identity.            Thank you!     Thank you for choosing [facility]  for your care. Our goal is always to provide you with excellent care. Hearing back from our patients is one way we can continue to improve our services. Please take a few  minutes to complete the written survey that you may receive in the mail after your visit with us. Thank you!             Your Updated Medication List - Protect others around you: Learn how to safely use, store and throw away your medicines at www.disposemymeds.org.          This list is accurate as of 5/15/18  2:54 PM.  Always use your most recent med list.                   Brand Name Dispense Instructions for use Diagnosis    amLODIPine 10 MG tablet    NORVASC    90 tablet    Take 1 tablet (10 mg) by mouth daily    Benign essential hypertension       ASPIRIN PO      Take 81 mg by mouth daily        atorvastatin 20 MG tablet    LIPITOR    90 tablet    Take 1 tablet (20 mg) by mouth At Bedtime    Hyperlipidemia LDL goal <100       calcium carbonate 600 MG tablet    OS-CAMMIE 600 mg [place]          gabapentin 400 MG capsule    NEURONTIN    10 capsule    Take 3 capsules in the morning, 4 at noon, and 3 in the evening.    Neuropathy, Age related osteoporosis, unspecified pathological fracture presence       lisinopril 10 MG tablet    PRINIVIL/ZESTRIL    90 tablet    Take 1 tablet (10 mg) by mouth daily    Benign essential hypertension       omega 3 1000 MG Caps      Take 2 g by mouth        triamcinolone acetonide 40 MG/ML injection    KENALOG    1 mL    1 mL (40 mg) by INTRA-ARTICULAR route once for 1 dose    Primary osteoarthritis of right knee       TYLENOL PM EXTRA STRENGTH PO      Take by mouth nightly as needed        VITAMIN B-12 PO      Take by mouth daily        vitamin D3 2000 units Caps

## 2018-05-15 NOTE — Clinical Note
Thank you for allowing me to see your patient in Sports Medicine Clinic.  Please see the attached copy of our visit.  Sincerely,  Jed Kang MD

## 2018-05-21 ENCOUNTER — RADIANT APPOINTMENT (OUTPATIENT)
Dept: MAMMOGRAPHY | Facility: CLINIC | Age: 83
End: 2018-05-21
Attending: INTERNAL MEDICINE
Payer: MEDICARE

## 2018-05-21 DIAGNOSIS — R92.8 ABNORMAL MAMMOGRAM OF LEFT BREAST: ICD-10-CM

## 2018-06-13 ENCOUNTER — TRANSFERRED RECORDS (OUTPATIENT)
Dept: HEALTH INFORMATION MANAGEMENT | Facility: CLINIC | Age: 83
End: 2018-06-13

## 2018-08-15 DIAGNOSIS — M81.0 AGE RELATED OSTEOPOROSIS, UNSPECIFIED PATHOLOGICAL FRACTURE PRESENCE: ICD-10-CM

## 2018-08-15 DIAGNOSIS — G62.9 NEUROPATHY: ICD-10-CM

## 2018-08-16 NOTE — TELEPHONE ENCOUNTER
Gabapentin     Last Written Prescription Date:  5-14-18  Last Fill Quantity: 10,   # refills: 0  Last Office Visit : 4-23-18  Future Office visit:  none    Routing refill request to RN for review/approval because:  Drug not on the [facility] [facility] or [facility] refill protocol.  Need clarification for directions.  Dr. Kate note 4-23-18  Neuropathy  -     gabapentin (NEURONTIN) 400 MG capsule; Take 3 capsules in the morning, 4 at noon, and 3 in the evening.

## 2018-08-17 RX ORDER — GABAPENTIN 400 MG/1
CAPSULE ORAL
Qty: 300 CAPSULE | Refills: 2 | Status: SHIPPED | OUTPATIENT
Start: 2018-08-17 | End: 2018-09-28

## 2018-08-24 ENCOUNTER — TELEPHONE (OUTPATIENT)
Dept: INTERNAL MEDICINE | Facility: CLINIC | Age: 83
End: 2018-08-24

## 2018-08-24 NOTE — TELEPHONE ENCOUNTER
[facility] Call Center    Phone Message    May a detailed message be left on voicemail: yes    Reason for Call: Other: Pharmacy calling making sure that we know that rx is over the limit of 3600mg daily and needs a call to OK to fill it.     Action Taken: Message routed to:  [facility]    RX approved.  Tegan Britt RN 6:26 AM on 8/27/2018.

## 2018-09-28 ENCOUNTER — TELEPHONE (OUTPATIENT)
Dept: INTERNAL MEDICINE | Facility: CLINIC | Age: 83
End: 2018-09-28

## 2018-09-28 DIAGNOSIS — M81.0 AGE RELATED OSTEOPOROSIS, UNSPECIFIED PATHOLOGICAL FRACTURE PRESENCE: ICD-10-CM

## 2018-09-28 DIAGNOSIS — G62.9 NEUROPATHY: ICD-10-CM

## 2018-10-01 NOTE — TELEPHONE ENCOUNTER
gabapentin (NEURONTIN) 400 MG   Last Written Prescription Date:  8/17/18  Last Fill Quantity: 300,   # refills: 2  Last Office Visit : 4/23/18  Future Office visit:  NONE  Routing refill request to provider for review/approval because:  Drug not on the refill protocol or controlled substance

## 2018-10-02 RX ORDER — GABAPENTIN 400 MG/1
CAPSULE ORAL
Qty: 300 CAPSULE | Refills: 2 | Status: SHIPPED | OUTPATIENT
Start: 2018-10-02 | End: 2019-02-14

## 2018-10-09 ENCOUNTER — TELEPHONE (OUTPATIENT)
Dept: INTERNAL MEDICINE | Facility: CLINIC | Age: 83
End: 2018-10-09

## 2018-10-09 DIAGNOSIS — G62.9 NEUROPATHY: Primary | ICD-10-CM

## 2018-10-09 NOTE — TELEPHONE ENCOUNTER
[facility] Call Center    Phone Message    May a detailed message be left on voicemail: yes    Reason for Call: Other: Pt calling about getting a referral to see a Neurologist. Pleae call Pt back.     Action Taken: Message routed to:  [facility]

## 2018-10-09 NOTE — TELEPHONE ENCOUNTER
Neurology referral pended, sent to Dr. Kaet for approval, or for an appointment to see PCP again if recommended.    Romina Anderson RN on 10/9/2018 at 4:51 PM

## 2018-10-09 NOTE — TELEPHONE ENCOUNTER
Contacted patient regarding her need for referral to neurology.  Patient prefers a female provider as she has had difficulty on her past exam there on 3/22/17.  Her last exam was 4/20/2018 with Dr. Kate and writer informed her she may need a recheck visit to be then be referred.  She states she has neuropathy and would like to see a neurologist again without another exam. Will inform JUANCHO. Conrad Francois LPN at 4:01 PM on 10/9/2018

## 2018-10-10 NOTE — TELEPHONE ENCOUNTER
Patient was reached by phone and notified that referral was approved, and that she will receive a call from neurology schedulers.    Romina Anderson RN on 10/10/2018 at 9:00 AM

## 2018-11-03 ASSESSMENT — ENCOUNTER SYMPTOMS
JOINT SWELLING: 0
BACK PAIN: 1
TREMORS: 0
LOSS OF CONSCIOUSNESS: 0
HEADACHES: 1
WEAKNESS: 1
PARALYSIS: 0
DIZZINESS: 1
MEMORY LOSS: 0
MUSCLE WEAKNESS: 0
MUSCLE CRAMPS: 1
TINGLING: 1
ARTHRALGIAS: 1
DISTURBANCES IN COORDINATION: 1
NECK PAIN: 1
MYALGIAS: 1
SPEECH CHANGE: 0
STIFFNESS: 1
SEIZURES: 0
NUMBNESS: 1

## 2018-11-05 ENCOUNTER — OFFICE VISIT (OUTPATIENT)
Dept: NEUROLOGY | Facility: CLINIC | Age: 83
End: 2018-11-05
Payer: MEDICARE

## 2018-11-05 VITALS
SYSTOLIC BLOOD PRESSURE: 144 MMHG | BODY MASS INDEX: 25.11 KG/M2 | HEART RATE: 71 BPM | HEIGHT: 61 IN | DIASTOLIC BLOOD PRESSURE: 65 MMHG | OXYGEN SATURATION: 98 % | WEIGHT: 133 LBS

## 2018-11-05 DIAGNOSIS — G60.9 IDIOPATHIC PERIPHERAL NEUROPATHY: ICD-10-CM

## 2018-11-05 DIAGNOSIS — R03.0 ELEVATED BLOOD PRESSURE READING: ICD-10-CM

## 2018-11-05 DIAGNOSIS — R42 DIZZINESS: ICD-10-CM

## 2018-11-05 DIAGNOSIS — R26.89 BALANCE PROBLEMS: Primary | ICD-10-CM

## 2018-11-05 ASSESSMENT — PAIN SCALES - GENERAL: PAINLEVEL: MODERATE PAIN (5)

## 2018-11-05 NOTE — PATIENT INSTRUCTIONS
Plan:  Recommended to discuss with Primary Care about pain management and weaning off gabapentin to see if dizziness and balance will improve.   Brain MRI for any intracranial changes to explain balance problems  Vitamin B12 and MMA  Follow up after the imaging to review    Elevated blood pressure today. Recommended to follow up with your PCP.     Follow up for neuropathy in 6 months or sooner if needed if any weakness

## 2018-11-05 NOTE — MR AVS SNAPSHOT
After Visit Summary   11/5/2018    Kerri Rocha    MRN: [id number]           Patient Information     Date Of Birth          1935        Visit Information        Provider Department      11/5/2018 10:30 AM Freya Dumont APRN [facility]        Today's Diagnoses     Balance problems    -  1    Dizziness        Idiopathic peripheral neuropathy        Elevated blood pressure reading          Care Instructions    Plan:  Recommended to discuss with Primary Care about pain management and weaning off gabapentin to see if dizziness and balance will improve.   Brain MRI for any intracranial changes to explain balance problems  Vitamin B12 and MMA  Follow up after the imaging to review    Elevated blood pressure today. Recommended to follow up with your PCP.     Follow up for neuropathy in 6 months or sooner if needed if any weakness            Follow-ups after your visit        Your next 10 appointments already scheduled     Nov 18, 2018 10:45 AM CST   MR BRAIN W/O & W CONTRAST with [address] [facility] ([facility])    [address] [place] [id number] [phone]           How do I prepare for my exam? (Food and drink instructions) **If you will be receiving sedation or general anesthesia, please see special notes below.**  How do I prepare for my exam? (Other instructions) Take your medicines as usual, unless your doctor tells you not to. You may or may not receive intravenous (IV) contrast for this exam pending the discretion of the Radiologist.  You do not need to do anything special to prepare.  **If you will be receiving sedation or general anesthesia, please see special notes below.**  What should I wear: The MRI machine uses a strong magnet. Please wear clothes without metal (snaps, zippers). A sweatsuit works well, or we may give you a hospital gown. Please remove any body piercings and hair extensions  before you arrive. You will also remove watches, jewelry, hairpins, wallets, dentures, partial dental plates and hearing aids. You may wear contact lenses, and you may be able to wear your rings. We have a safe place to keep your personal items, but it is safer to leave them at home.  How long does the exam take: Most tests take 30 to 60 minutes.  HOWEVER, IF YOUR DOCTOR PRESCRIBES ANESTHESIA please plan on spending four to five hours in the recovery room.  What should I bring:  Bring a list of your current medicines to your exam (including vitamins, minerals and over-the-counter drugs).  Do I need a :  **If you will be receiving sedation or general anesthesia, please see special notes below.**  What should I do after the exam: No Restrictions, You may resume normal activities.  What is this test: MRI (magnetic resonance imaging) uses a strong magnet and radio waves to look inside the body. An MRA (magnetic resonance angiogram) does the same thing, but it lets us look at your blood vessels. A computer turns the radio waves into pictures showing cross sections of the body, much like slices of bread. This helps us see any problems more clearly. You may receive fluid (called  contrast ) before or during your scan. The fluid helps us see the pictures better. We give the fluid through an IV (small needle in your arm).  Who should I call with questions:  Please call the Imaging Department at your exam site with any questions. Directions, parking instructions, and other information is available on our website, [email or web address].  How do I prepare if I m having sedation or anesthesia? **IMPORTANT** THE INSTRUCTIONS BELOW ARE ONLY FOR THOSE PATIENTS WHO HAVE BEEN TOLD THEY WILL RECEIVE SEDATION OR GENERAL ANESTHESIA DURING THEIR MRI PROCEDURE:  IF YOU WILL RECEIVE SEDATION (take medicine to help you relax during your exam): You must get the medicine from your doctor before you arrive. Bring the medicine to the exam.  Do not take it at home. Arrive one hour early. Bring someone who can take you home after the test. Your medicine will make you sleepy. After the exam, you may not drive, take a bus or take a taxi by yourself. No eating 8 hours before your exam. You may have clear liquids up until 4 hours before your exam. (Clear liquids include water, clear tea, black coffee and fruit juice without pulp.)  IF YOU WILL RECEIVE ANESTHESIA (be asleep for your exam): Arrive 1 1/2 hours early. Bring someone who can take you home after the test. You may not drive, take a bus or take a taxi by yourself. No eating 8 hours before your exam. You may have clear liquids up until 4 hours before your exam. (Clear liquids include water, clear tea, black coffee and fruit juice without pulp.)            May 06, 2019 12:30 PM CDT   (Arrive by 12:15 PM)   Return Visit with CAROLINE Martinez [facility] ([facility])    [address] [place] [id number] [phone]              Future tests that were ordered for you today     Open Future Orders        Priority Expected Expires Ordered    MR Brain w/o & w Contrast Routine  11/5/2019 11/5/2018            Who to contact     Please call your clinic at [phone] to:    Ask questions about your health    Make or cancel appointments    Discuss your medicines    Learn about your test results    Speak to your doctor            Additional Information About Your Visit        [employer] Information     [employer] gives you secure access to your electronic health record. If you see a primary care provider, you can also send messages to your care team and make appointments. If you have questions, please call your primary care clinic.  If you do not have a primary care provider, please call [phone] and they will assist you.      [employer] is an electronic gateway that provides easy, online access to your medical records. With [employer],  "you can request a clinic appointment, read your test results, renew a prescription or communicate with your care team.     To access your existing account, please contact your [facility] or call [phone] for assistance.        Care EveryWhere ID     This is your Care EveryWhere ID. This could be used by other organizations to access your [place] medical records  [id number]        Your Vitals Were     Pulse Height Pulse Oximetry BMI (Body Mass Index)          71 1.549 m (5' 1\") 98% 25.13 kg/m2         Blood Pressure from Last 3 Encounters:   11/05/18 144/65   04/23/18 148/74   04/03/17 138/79    Weight from Last 3 Encounters:   11/05/18 60.3 kg (133 lb)   05/15/18 59.9 kg (132 lb)   04/23/18 59.9 kg (132 lb)              We Performed the Following     Methylmalonic acid     Vitamin B12        Primary Care Provider Office Phone # Fax #    Atmmy Beverly Galvan [phone]       [address] [facility] [address]        Equal Access to Services     [facility]: Hadii valorie pierre hadasho Sorinku, waaxda luqadaha, qaybta kaalmada ollie, thao martins . So [facility] [phone].    ATENCIÓN: Si habla español, tiene a healy disposición servicios gratuitos de asistencia lingüística. Gregg al [phone].    We comply with applicable federal civil rights laws and [place] laws. We do not discriminate on the basis of race, color, national origin, age, disability, sex, sexual orientation, or gender identity.            Thank you!     Thank you for choosing [facility]  for your care. Our goal is always to provide you with excellent care. Hearing back from our patients is one way we can continue to improve our services. Please take a few minutes to complete the written survey that you may receive in the mail after your visit with us. Thank you!             Your Updated Medication List - Protect others around you: Learn how to safely " use, store and throw away your medicines at www.disposemymeds.org.          This list is accurate as of 11/5/18 12:24 PM.  Always use your most recent med list.                   Brand Name Dispense Instructions for use Diagnosis    amLODIPine 10 MG tablet    NORVASC    90 tablet    Take 1 tablet (10 mg) by mouth daily    Benign essential hypertension       ASPIRIN PO      Take 81 mg by mouth daily        atorvastatin 20 MG tablet    LIPITOR    90 tablet    Take 1 tablet (20 mg) by mouth At Bedtime    Hyperlipidemia LDL goal <100       calcium carbonate 600 MG tablet    OS-CAMMIE 600 mg [place]. Ca          gabapentin 400 MG capsule    NEURONTIN    300 capsule    TAKE 3 CAPSULES IN THE MORNING, 4 CAPSULES AT NOON, AND 3 CAPSULES IN THE EVENING    Neuropathy, Age related osteoporosis, unspecified pathological fracture presence       lisinopril 10 MG tablet    PRINIVIL/ZESTRIL    90 tablet    Take 1 tablet (10 mg) by mouth daily    Benign essential hypertension       omega 3 1000 MG Caps      Take 2 g by mouth        TYLENOL PM EXTRA STRENGTH PO      Take by mouth nightly as needed        VITAMIN B-12 PO      Take by mouth daily        vitamin D3 2000 units Caps
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
## 2018-11-18 ENCOUNTER — RADIANT APPOINTMENT (OUTPATIENT)
Dept: MRI IMAGING | Facility: CLINIC | Age: 83
End: 2018-11-18
Attending: NURSE PRACTITIONER
Payer: MEDICARE

## 2018-11-18 DIAGNOSIS — R42 DIZZINESS: ICD-10-CM

## 2018-11-18 DIAGNOSIS — R26.89 BALANCE PROBLEMS: ICD-10-CM

## 2018-11-18 LAB
CREAT BLD-MCNC: 1 MG/DL (ref 0.52–1.04)
GFR SERPL CREATININE-BSD FRML MDRD: 53 ML/MIN/1.7M2

## 2018-11-18 RX ORDER — GADOBUTROL 604.72 MG/ML
7.5 INJECTION INTRAVENOUS ONCE
Status: COMPLETED | OUTPATIENT
Start: 2018-11-18 | End: 2018-11-18

## 2018-11-18 RX ADMIN — GADOBUTROL 7.5 ML: 604.72 INJECTION INTRAVENOUS at 10:49

## 2018-11-18 NOTE — DISCHARGE INSTRUCTIONS
MRI Contrast Discharge Instructions    The IV contrast you received today will pass out of your body in your  urine. This will happen in the next 24 hours. You will not feel this process.  Your urine will not change color.    Drink at least 4 extra glasses of water or juice today (unless your doctor  has restricted your fluids). This reduces the stress on your kidneys.  You may take your regular medicines.    If you are on dialysis: It is best to have dialysis today.    If you have a reaction: Most reactions happen right away. If you have  any new symptoms after leaving the hospital (such as hives or swelling),  call your hospital at the correct number below. Or call your family doctor.  If you have breathing distress or wheezing, call 911.    Special instructions: ***    I have read and understand the above information.    Signature:______________________________________ Date:___________    Staff:__________________________________________ Date:___________     Time:__________    [facility] Departments:    ___[facility]: [phone]  ___[facility]: [phone]  ___[facility]: [phone] ___[facility]: [phone]  ___[facility]: [phone]  ___[facility]: [phone]  ___[phone]  ___[facility]: [phone]  ___[phone]

## 2018-11-20 NOTE — PROGRESS NOTES
Dear Kerri,   Your recent brain MRI images and results were reviewed. No acute findings at this time. Please let me know if you have any questions.   Sincerely, Freya

## 2018-12-03 ENCOUNTER — MYC MEDICAL ADVICE (OUTPATIENT)
Dept: INTERNAL MEDICINE | Facility: CLINIC | Age: 83
End: 2018-12-03

## 2018-12-11 ENCOUNTER — TELEPHONE (OUTPATIENT)
Dept: INTERNAL MEDICINE | Facility: CLINIC | Age: 83
End: 2018-12-11

## 2018-12-11 NOTE — TELEPHONE ENCOUNTER
[facility] Call Center    Phone Message    May a detailed message be left on voicemail: yes    Reason for Call: Other: Pt instructed to schedule f/u appt with Dr. Kate, but first available is 1/22 and Pt doesn't want to wait that long. Pt also concerned about dizziness and balance and would like to be seen sooner if possible.      Action Taken: Message routed to:  [facility] ([facility] Primary Care Clinic

## 2018-12-13 ENCOUNTER — TELEPHONE (OUTPATIENT)
Dept: NEUROLOGY | Facility: CLINIC | Age: 83
End: 2018-12-13

## 2018-12-13 NOTE — TELEPHONE ENCOUNTER
Called and spoke to the patient. Worsening dizziness after starting weaning gabapentin. Recommended not to decrease gabapentin and follow up with PCP and Neurology. Patient is seeing Dr Kate and Neurology on Monday. Patient is in agreement with the plan.

## 2018-12-13 NOTE — TELEPHONE ENCOUNTER
"[facility] Call Center    Phone Message    May a detailed message be left on voicemail: yes    Reason for Call: Other: Pt calling stating that Freya had instructed her to talk to Dr. Kate about reducing her gabapentin dosage which she has but now states she is \"dizzier than ever\" and is asking if that could possibly be a symptom of withdrawal. Please call back to discuss.     Action Taken: Message routed to:  [facility] ([facility]):  NEUROLOGY  "

## 2018-12-15 ASSESSMENT — ENCOUNTER SYMPTOMS
SINUS PAIN: 0
LOSS OF CONSCIOUSNESS: 0
SMELL DISTURBANCE: 0
DISTURBANCES IN COORDINATION: 1
PARALYSIS: 0
HEADACHES: 1
SINUS CONGESTION: 0
TROUBLE SWALLOWING: 0
HOARSE VOICE: 0
SORE THROAT: 0
TASTE DISTURBANCE: 0
WEAKNESS: 1
MEMORY LOSS: 0
TINGLING: 1
SPEECH CHANGE: 0
SEIZURES: 0
DIZZINESS: 1
NECK MASS: 0
TREMORS: 0
NUMBNESS: 0

## 2018-12-17 ENCOUNTER — OFFICE VISIT (OUTPATIENT)
Dept: INTERNAL MEDICINE | Facility: CLINIC | Age: 83
End: 2018-12-17
Payer: MEDICARE

## 2018-12-17 ENCOUNTER — OFFICE VISIT (OUTPATIENT)
Dept: NEUROLOGY | Facility: CLINIC | Age: 83
End: 2018-12-17
Payer: MEDICARE

## 2018-12-17 VITALS
BODY MASS INDEX: 25.32 KG/M2 | WEIGHT: 134 LBS | SYSTOLIC BLOOD PRESSURE: 145 MMHG | HEART RATE: 74 BPM | DIASTOLIC BLOOD PRESSURE: 74 MMHG | OXYGEN SATURATION: 95 %

## 2018-12-17 VITALS
WEIGHT: 134 LBS | BODY MASS INDEX: 25.32 KG/M2 | OXYGEN SATURATION: 95 % | HEART RATE: 74 BPM | SYSTOLIC BLOOD PRESSURE: 145 MMHG | DIASTOLIC BLOOD PRESSURE: 74 MMHG

## 2018-12-17 DIAGNOSIS — R42 VERTIGO: Primary | ICD-10-CM

## 2018-12-17 DIAGNOSIS — M54.2 NECK PAIN: ICD-10-CM

## 2018-12-17 DIAGNOSIS — R29.2 HYPERREFLEXIA: ICD-10-CM

## 2018-12-17 DIAGNOSIS — R26.81 UNSTEADY GAIT: Primary | ICD-10-CM

## 2018-12-17 DIAGNOSIS — Z91.81 HISTORY OF FALL: ICD-10-CM

## 2018-12-17 ASSESSMENT — PAIN SCALES - GENERAL
PAINLEVEL: NO PAIN (0)
PAINLEVEL: NO PAIN (0)

## 2018-12-17 NOTE — PROGRESS NOTES
"                              Internal Medicine [facility]  -----------------------------------------------------------------  History of Present Illness   Kerri Rocha is a 83 year old female with a history of chronic  dizziness, HTN, HLD, europathy presenting for follow-up.    It was recommended she try to decrease gabapentin to reduce dizziness and imbalance issues. Was on an every third day taper of gabapentin, but stopped taper after temporarily worsened dizziness. Taper plan outlined below, but was stopped at 6 (2x2x2), so for a total 2400mg per day for temporarily worsened dizziness. She is on amlodipidine 10mg, lisinopril 10mg as well. Lightheadedness as decreased. Still dizzy and feeling imbalanced. Going to dizziness clinic every week. No change in the neuropathy with the low dose of the gabapentin. Sometimes swollen ankles after standing for a long time.    Not having falls since last visit. Needs railing for stairs. Feels \"wobbly.\" Worse when walking around. Does not walk as far as she used to, just due to being afraid of following.      Since starting taper has been getting headaches, which are frontal symmetric, lasting at most an hour, any time during the day. No confusion. Also last Thursday, had one time severe case of vertigo.  Did [place] maneuvers at dizziness clinic for this one time episode of room-spinning vertigo, which was successful.    Takes blood pressures at home: 133/72 this morning, often. Max has been 142 SBP, often 133-140 SBP. Pulse has been 70s.     Dizziness A/P from neuro 11/2018:  Subjective dizziness and balance problems worsening gradually for the past year. It appears to be multifactorial-medications may be contributing to her symptoms. No apparent parkinsonian ,cerebellar features, ataxia on the exam today.   Patient's therapist from [facility] concern of possible central etiology of patient's dizziness/vertigo. Recommended to discuss with Primary Care about pain management " and weaning off gabapentin to see if dizziness and balance will improve. Reviewed brain MRI results and no central findings to explain patient's balance problems at this time.     Taper plan, 12/3:  I have on the mediation instructions that you take 3 pills in the morning, 4 at noon, and 3 at night. The taper would then look like this:     Days 1-3: 3 pills in morning, 3 pills at noon, 3 pills at night   Days 4-6: 2 pills in morning, 3 pills at noon, 3 pills at night   Days 7-9: 2 pills in morning, 2 at noon, 2 at night   Days 10-12: 1 pill in morning, 2 at noon, 2 at night   Days 13-15: 1 pill in morning, 1 at noon, 2 at night.     -----------------------------------------------------------------  Assessment & Plan     Dizziness  Her dizziness is likely multifactorial from medications, elderly age, and longstanding progressive peripheral neuropathy all contributing. MRI brain negative for pathological central etiology. In terms of medications, dizziness experienced with 4-10% of patients on lisinopril, 1-3% on amlodipine; both dose related. For gabapentin 17-28% experience dizziness as an adverse effect.  Will finish neuro requested labs for B12 mediated neuropathy.   - Will slow taper to reduction by one 400mg gabapentin pill every seven days, until 1 pill (400mg) three times per day for total dose reduced to 1200mg daily   -Will get MRA of head/neck to r/o basilar insufficiency as pt feels her symptoms were better after PT worked on her neck   [ ] B12   [ ] MMA    Hypertension  Well controlled at home at this time.    Yusef Jones MD (PGY-1)  [facility]  -----------------------------------------------------------------  Physical Exam   /74   Pulse 74   Wt 60.8 kg (134 lb)   SpO2 95%   BMI 25.32 kg/m      General Appearance: in no apparent distress  HEENT: eyes equal and reactive to light  Respiratory: CTAB  Cardiovascular: RRR, no murmurs  GI: abdomen nontender, nondistended,  BS+  Lymph/Hematologic: no LAD  Genitourinary: not examined  Skin: no rashes   Musculoskeletal: no peripheral edema  Neurologic: A@O, decreased sensation to light touch and vibration ankles down  Psychiatric: appropriate affect    -----------------------------------------------------------------  Additional Patient History    Social History     Tobacco Use     Smoking status: Never Smoker     Smokeless tobacco: Never Used   Substance Use Topics     Alcohol use: Yes     Alcohol/week: 0.6 oz     Types: 1 Standard drinks or equivalent per week     Comment: 1 glass of wine per week     Drug use: No       Review of Systems   The 10 point Review of Systems is negative other than noted in the HPI or here.    Past Medical History    Past Medical History:   Diagnosis Date     HLD (hyperlipidemia)      HTN (hypertension)      Neuropathy      Vitamin D deficiency        Past Surgical History   Past Surgical History:   Procedure Laterality Date     APPENDECTOMY       BREAST BIOPSY, CORE RT/LT       CARPAL TUNNEL RELEASE RT/LT       CATARACT IOL, RT/LT        SECTION      x2     HYSTERECTOMY TOTAL ABDOMINAL, BILATERAL SALPINGO-OOPHORECTOMY, COMBINED       LAMINECTOMY LUMBAR THREE+ LEVELS  2016    L3-L5     TONSILLECTOMY & ADENOIDECTOMY         Family History    Family History   Problem Relation Age of Onset     Hypertension Mother      Heart Disease Father      Hypertension Father      Hypertension Sister      Heart Disease Brother      Hypertension Brother      Diabetes No family hx of      Cancer No family hx of        Prior to Admission Medications     Current Outpatient Medications on File Prior to Visit:  amLODIPine (NORVASC) 10 MG tablet Take 1 tablet (10 mg) by mouth daily   ASPIRIN PO Take 81 mg by mouth daily   atorvastatin (LIPITOR) 20 MG tablet Take 1 tablet (20 mg) by mouth At Bedtime   calcium carbonate (OS-CAMMIE 600 MG [place]. [place]) 600 MG tablet    Cholecalciferol (VITAMIN D3) 2000 UNITS CAPS     Cyanocobalamin (VITAMIN B-12 PO) Take by mouth daily   Diphenhydramine-APAP, sleep, (TYLENOL PM EXTRA STRENGTH PO) Take by mouth nightly as needed   gabapentin (NEURONTIN) 400 MG capsule TAKE 3 CAPSULES IN THE MORNING, 4 CAPSULES AT NOON, AND 3 CAPSULES IN THE EVENING   lisinopril (PRINIVIL/ZESTRIL) 10 MG tablet Take 1 tablet (10 mg) by mouth daily   omega 3 1000 MG CAPS Take 2 g by mouth     No current facility-administered medications on file prior to visit.     Allergies      Allergies   Allergen Reactions     Codeine      Sulfa Drugs      Sulfasalazine Other (See Comments)     Sulfate Rash     Tylenol Extra Strength Palpitations       Data: Reviewed in Epic.    Attestation:  I, Tammy Galvan, saw this patient with the resident and agree with the resident s findings and plan of care as documented in the resident s note.      Tammy Galvan MD

## 2018-12-17 NOTE — PROGRESS NOTES
"Re: Kerri Rocha      MRN# [id number]  YOB: 1935  Date of Visit:12/17/2018     OUTPATIENT NEUROLOGY VISIT NOTE    Reason for Visit:  Balance and dizziness follow up    Interval History:  Kerri Rocha is a 83-year-old female presents to the clinic today for balance and dizziness follow up.  Initial Neurology visit on 11/05/18 for dizziness and balance problems felt to be possibly medication related.   Onset of unbalance/dizzy feeling for about 4-5 years and gradual onset and worse in the couple of years. Patient reports that imbalance not getting better but dizziness may be better after starting weaning off gabapentin. Patient reports that she has a persistent neck  pain in the back of head and cervical MRI pending.   Patient reports that dizziness feeling is not \"room spinning\" but feeling of \"heaviness\" in the back of her head and a lot of \"clicking in the neck\" and \"shoulder tension. Patient reports that she feels unbalance or \"wobly\" and feels \"unsteady.\" Patient reports that \"wobly\" has been increasing after weaning off gabapentin. Patient reports that she has been taking about 2600 mg daily in divided doses.   Patient reports that neuropathy pain has not gotten worse and able to control it. No symptoms in the upper extremities.   Patient reports that she has persistent headaches in the frontal area and lasting 45 min -an hour. Patient reports that with positioning her head looking strength ahead headaches typically improved. No vision changes.    History of falls x2 last summer but mechanical falls (chasing grandkid in the house). No head injury. Reports that she \"twisted\" her lower back and was in the brace.   No SOB, double vision  No memory problems  No jaw pain  No tremor  Reports some joint stiffness due to arthritis.     Imbalance feeling with walking and feeling that her legs are \"wobly\" and feeling weak not improving with weaning gabapentin. Patient reports that dizziness may be somewhat " better with weaning off gabapentin    Brain MRI no acute findings to explain patient's chronic balance and dizziness symptoms.       Past Medical History reviewed   Past Medical History:   Diagnosis Date     HLD (hyperlipidemia)      HTN (hypertension)      Neuropathy      Vitamin D deficiency        Social History     Tobacco Use     Smoking status: Never Smoker     Smokeless tobacco: Never Used   Substance Use Topics     Alcohol use: Yes     Alcohol/week: 0.6 oz     Types: 1 Standard drinks or equivalent per week     Comment: 1 glass of wine per week    reviewed and verified with the patient     Allergies   Allergen Reactions     Codeine      Sulfa Drugs      Sulfasalazine Other (See Comments)     Sulfate Rash     Tylenol Extra Strength Palpitations       Current Outpatient Medications   Medication Sig Dispense Refill     amLODIPine (NORVASC) 10 MG tablet Take 1 tablet (10 mg) by mouth daily 90 tablet 3     ASPIRIN PO Take 81 mg by mouth daily       atorvastatin (LIPITOR) 20 MG tablet Take 1 tablet (20 mg) by mouth At Bedtime 90 tablet 3     calcium carbonate (OS-CAMMIE 600 MG [place]. [place]) 600 MG tablet        Cholecalciferol (VITAMIN D3) 2000 UNITS CAPS        Cyanocobalamin (VITAMIN B-12 PO) Take by mouth daily       Diphenhydramine-APAP, sleep, (TYLENOL PM EXTRA STRENGTH PO) Take by mouth nightly as needed       gabapentin (NEURONTIN) 400 MG capsule TAKE 3 CAPSULES IN THE MORNING, 4 CAPSULES AT NOON, AND 3 CAPSULES IN THE EVENING 300 capsule 2     lisinopril (PRINIVIL/ZESTRIL) 10 MG tablet Take 1 tablet (10 mg) by mouth daily 90 tablet 3     omega 3 1000 MG CAPS Take 2 g by mouth     reviewed and verified with the patient    Review of Systems:   A 10-point ROS including constitutional, eyes, respiratory, cardiovascular, gastroenterology, genitourinary, integumentary, musculoskeletal, neurology and psychiatric were all negative except as mentioned in the interval history.      General Exam:   /74   Pulse 74    Wt 60.8 kg (134 lb)   SpO2 95%   BMI 25.32 kg/m    GEN: Awake, NAD; good eye contact, responses appropriately   HEENT: Head atraumatic/Normocephalic. Scalp normal. Pupils equally round, 4 mm, reactive to light and accommodation, sclera and conjunctiva normal. Speech is fluent without dysarthria. EOM intact. There is no nystagmus. Has conjugated gaze. Face is symmetrical. Intact and symmetrical eyebrow and lid raise and eyelid closure, smiles. Hearing Intact to conversation speech. The palates elevates symmetrical. The tongue protrudes midline with no atrophy or fasciculations. Strength  5/5 in the upper and lower extremities bilaterally. Sensation is intact to  touch throughout.  Reflexes brisk and symmetrical at biceps, triceps, brachioradialis, 3+ bilateral patellar, and not able to illicit Achilles. Negative Babinski with downgoing toes bilaterally. Coordination reveals finger-nose-finger with normal speed and accuracy. Walks slow and somewhat wide based.   Assessment and Plan:  Persistent off balance/unsteadiness  feeling especially with walking and weakness in the legs and hyperreflexia, except not able to illicit Achilles but history of neuropathy. Symptoms gradually worsening in the past 1-2 years.  Question of myelopathy given her history of two falls in the summer.   Gabapentin weaning off may be helping with dizziness but not helping with the balance. Recommended cervical MRI for myelopathy evaluation.   Patient has brain MRA and neck MRA ordered per PCP and pending    Follow up after the imaging. Will stop weaning gabapentin for now until reviewing imaging.     Plan discussed with the patient:  Brain MRA and neck MRA -pending per PCP orders  Cervical MRI for imbalance and possible myelopathy evaluation   Stop for now weaning gabapentin until imaging results are back or weaning in a very slow rate  Labs-vit B12 and MMA was ordered on 11/05 but never completed  Follow up after the imaging.       I  discussed all my recommendation with Kerri Rocha. The patient verbalizes understanding and comfortable with the plan. The patient has our clinic phone number to call with any questions or concerns. All of the patient's questions were answered from the best of my current knowledge.   Time spent with pt answering questions, discussing findings, counseling and coordinating care was more than 50% the appointment time, 40 minutes.         CAROLINE Lobo, CNP  [facility]    Answers for HPI/ROS submitted by the patient on 12/15/2018   General Symptoms: No  Skin Symptoms: No  HENT Symptoms: Yes  EYE SYMPTOMS: No  HEART SYMPTOMS: No  LUNG SYMPTOMS: No  INTESTINAL SYMPTOMS: No  URINARY SYMPTOMS: No  GYNECOLOGIC SYMPTOMS: No  BREAST SYMPTOMS: No  SKELETAL SYMPTOMS: No  BLOOD SYMPTOMS: No  NERVOUS SYSTEM SYMPTOMS: Yes  MENTAL HEALTH SYMPTOMS: No  Ear pain: No  Ear discharge: No  Hearing loss: No  Tinnitus: No  Nosebleeds: No  Congestion: No  Sinus pain: No  Trouble swallowing: No   Voice hoarseness: No  Mouth sores: No  Sore throat: No  Tooth pain: No  Gum tenderness: No  Bleeding gums: No  Change in taste: No  Change in sense of smell: No  Dry mouth: Yes  Hearing aid used: No  Neck lump: No  Trouble with coordination: Yes  Dizziness or trouble with balance: Yes  Fainting or black-out spells: No  Memory loss: No  Headache: Yes  Seizures: No  Speech problems: No  Tingling: Yes  Tremor: No  Weakness: Yes  Difficulty walking: Yes  Paralysis: No  Numbness: No

## 2018-12-17 NOTE — LETTER
"12/17/2018       RE: Kerri Rocha  [address]  [facility] [address]     Dear Colleague,    Thank you for referring your patient, Kerri Rocha, to the [facility] at [facility]. Please see a copy of my visit note below.    Re: Kerri Rocha      MRN# [id number]  YOB: 1935  Date of Visit:12/17/2018     OUTPATIENT NEUROLOGY VISIT NOTE    Reason for Visit:  Balance and dizziness follow up    Interval History:  Kerri Rocha is a 83-year-old female presents to the clinic today for balance and dizziness follow up.  Initial Neurology visit on 11/05/18 for dizziness and balance problems felt to be possibly medication related.   Onset of unbalance/dizzy feeling for about 4-5 years and gradual onset and worse in the couple of years. Patient reports that imbalance not getting better but dizziness may be better after starting weaning off gabapentin. Patient reports that she has a persistent neck  pain in the back of head and cervical MRI pending.   Patient reports that dizziness feeling is not \"room spinning\" but feeling of \"heaviness\" in the back of her head and a lot of \"clicking in the neck\" and \"shoulder tension. Patient reports that she feels unbalance or \"wobly\" and feels \"unsteady.\" Patient reports that \"wobly\" has been increasing after weaning off gabapentin. Patient reports that she has been taking about 2600 mg daily in divided doses.   Patient reports that neuropathy pain has not gotten worse and able to control it. No symptoms in the upper extremities.   Patient reports that she has persistent headaches in the frontal area and lasting 45 min -an hour. Patient reports that with positioning her head looking strength ahead headaches typically improved. No vision changes.    History of falls x2 last summer but mechanical falls (chasing grandkid in the house). No head injury. Reports that she \"twisted\" her lower back and was in the brace.   No SOB, " "double vision  No memory problems  No jaw pain  No tremor  Reports some joint stiffness due to arthritis.     Imbalance feeling with walking and feeling that her legs are \"wobly\" and feeling weak not improving with weaning gabapentin. Patient reports that dizziness may be somewhat better with weaning off gabapentin    Brain MRI no acute findings to explain patient's chronic balance and dizziness symptoms.       Past Medical History reviewed   Past Medical History:   Diagnosis Date     HLD (hyperlipidemia)      HTN (hypertension)      Neuropathy      Vitamin D deficiency        Social History     Tobacco Use     Smoking status: Never Smoker     Smokeless tobacco: Never Used   Substance Use Topics     Alcohol use: Yes     Alcohol/week: 0.6 oz     Types: 1 Standard drinks or equivalent per week     Comment: 1 glass of wine per week    reviewed and verified with the patient     Allergies   Allergen Reactions     Codeine      Sulfa Drugs      Sulfasalazine Other (See Comments)     Sulfate Rash     Tylenol Extra Strength Palpitations       Current Outpatient Medications   Medication Sig Dispense Refill     amLODIPine (NORVASC) 10 MG tablet Take 1 tablet (10 mg) by mouth daily 90 tablet 3     ASPIRIN PO Take 81 mg by mouth daily       atorvastatin (LIPITOR) 20 MG tablet Take 1 tablet (20 mg) by mouth At Bedtime 90 tablet 3     calcium carbonate (OS-CAMMIE 600 MG [place]. [place]) 600 MG tablet        Cholecalciferol (VITAMIN D3) 2000 UNITS CAPS        Cyanocobalamin (VITAMIN B-12 PO) Take by mouth daily       Diphenhydramine-APAP, sleep, (TYLENOL PM EXTRA STRENGTH PO) Take by mouth nightly as needed       gabapentin (NEURONTIN) 400 MG capsule TAKE 3 CAPSULES IN THE MORNING, 4 CAPSULES AT NOON, AND 3 CAPSULES IN THE EVENING 300 capsule 2     lisinopril (PRINIVIL/ZESTRIL) 10 MG tablet Take 1 tablet (10 mg) by mouth daily 90 tablet 3     omega 3 1000 MG CAPS Take 2 g by mouth     reviewed and verified with the patient    Review of " Systems:   A 10-point ROS including constitutional, eyes, respiratory, cardiovascular, gastroenterology, genitourinary, integumentary, musculoskeletal, neurology and psychiatric were all negative except as mentioned in the interval history.      General Exam:   /74   Pulse 74   Wt 60.8 kg (134 lb)   SpO2 95%   BMI 25.32 kg/m     GEN: Awake, NAD; good eye contact, responses appropriately   HEENT: Head atraumatic/Normocephalic. Scalp normal. Pupils equally round, 4 mm, reactive to light and accommodation, sclera and conjunctiva normal. Speech is fluent without dysarthria. EOM intact. There is no nystagmus. Has conjugated gaze. Face is symmetrical. Intact and symmetrical eyebrow and lid raise and eyelid closure, smiles. Hearing Intact to conversation speech. The palates elevates symmetrical. The tongue protrudes midline with no atrophy or fasciculations. Strength  5/5 in the upper and lower extremities bilaterally. Sensation is intact to  touch throughout.  Reflexes brisk and symmetrical at biceps, triceps, brachioradialis, 3+ bilateral patellar, and not able to illicit Achilles. Negative Babinski with downgoing toes bilaterally. Coordination reveals finger-nose-finger with normal speed and accuracy. Walks slow and somewhat wide based.   Assessment and Plan:  Persistent off balance/unsteadiness  feeling especially with walking and weakness in the legs and hyperreflexia, except not able to illicit Achilles but history of neuropathy. Symptoms gradually worsening in the past 1-2 years.  Question of myelopathy given her history of two falls in the summer.   Gabapentin weaning off may be helping with dizziness but not helping with the balance. Recommended cervical MRI for myelopathy evaluation.   Patient has brain MRA and neck MRA ordered per PCP and pending    Follow up after the imaging. Will stop weaning gabapentin for now until reviewing imaging.     Plan discussed with the patient:  Brain MRA and neck MRA  -pending per PCP orders  Cervical MRI for imbalance and possible myelopathy evaluation   Stop for now weaning gabapentin until imaging results are back or weaning in a very slow rate  Labs-vit B12 and MMA was ordered on 11/05 but never completed  Follow up after the imaging.       I discussed all my recommendation with Kerri Rocha. The patient verbalizes understanding and comfortable with the plan. The patient has our clinic phone number to call with any questions or concerns. All of the patient's questions were answered from the best of my current knowledge.   Time spent with pt answering questions, discussing findings, counseling and coordinating care was more than 50% the appointment time, 40 minutes.         CAROLINE Lobo, CNP  [facility]    Answers for HPI/ROS submitted by the patient on 12/15/2018   General Symptoms: No  Skin Symptoms: No  HENT Symptoms: Yes  EYE SYMPTOMS: No  HEART SYMPTOMS: No  LUNG SYMPTOMS: No  INTESTINAL SYMPTOMS: No  URINARY SYMPTOMS: No  GYNECOLOGIC SYMPTOMS: No  BREAST SYMPTOMS: No  SKELETAL SYMPTOMS: No  BLOOD SYMPTOMS: No  NERVOUS SYSTEM SYMPTOMS: Yes  MENTAL HEALTH SYMPTOMS: No  Ear pain: No  Ear discharge: No  Hearing loss: No  Tinnitus: No  Nosebleeds: No  Congestion: No  Sinus pain: No  Trouble swallowing: No   Voice hoarseness: No  Mouth sores: No  Sore throat: No  Tooth pain: No  Gum tenderness: No  Bleeding gums: No  Change in taste: No  Change in sense of smell: No  Dry mouth: Yes  Hearing aid used: No  Neck lump: No  Trouble with coordination: Yes  Dizziness or trouble with balance: Yes  Fainting or black-out spells: No  Memory loss: No  Headache: Yes  Seizures: No  Speech problems: No  Tingling: Yes  Tremor: No  Weakness: Yes  Difficulty walking: Yes  Paralysis: No  Numbness: No      Again, thank you for allowing me to participate in the care of your patient.      Sincerely,    CAROLINE Martinez CNP

## 2018-12-17 NOTE — PATIENT INSTRUCTIONS
[facility] Medication Refill Request Information:  * Please contact your pharmacy regarding ANY request for medication refills.  ** [facility] Prescription Fax = [phone]  * Please allow 3 business days for routine medication refills.  * Please allow 5 business days for controlled substance medication refills.     [facility] Test Result notification information:  *You will be notified with in 7-10 days of your appointment day regarding the results of your test.  If you are on MyChart you will be notified as soon as the provider has reviewed the results and signed off on them.    [facility]: [phone]

## 2018-12-17 NOTE — NURSING NOTE
Chief Complaint   Patient presents with     Dizziness     here to follow up     NEUROPATHY     here to follow up       Emil Mackenzie, EMT 2:56 PM on 12/17/2018.

## 2018-12-17 NOTE — PATIENT INSTRUCTIONS
Plan:  Brain MRA and neck MRA -pending per PCP orders  Cervical MRI for imbalance and myelopathy evaluation   Stop for now weaning gabapentin until imaging results are back   Follow up after the imaging.

## 2018-12-17 NOTE — NURSING NOTE
Chief Complaint   Patient presents with     RECHECK     [facility] RETURN F/U DIZZINESS/ MED       Chago Aguilar, EMT

## 2019-01-08 ASSESSMENT — ENCOUNTER SYMPTOMS
HALLUCINATIONS: 0
NAUSEA: 1
INCREASED ENERGY: 1
ABDOMINAL PAIN: 0
HEARTBURN: 0
VOMITING: 0
ALTERED TEMPERATURE REGULATION: 1
POLYDIPSIA: 0
RECTAL PAIN: 0
BLOATING: 0
BLOOD IN STOOL: 0
CHILLS: 1
JAUNDICE: 0
POLYPHAGIA: 0
CONSTIPATION: 0
WEIGHT LOSS: 0
DECREASED APPETITE: 1
WEIGHT GAIN: 0
DIARRHEA: 1
FATIGUE: 1
FEVER: 1
NIGHT SWEATS: 0
BOWEL INCONTINENCE: 0

## 2019-01-12 ENCOUNTER — ANCILLARY PROCEDURE (OUTPATIENT)
Dept: MRI IMAGING | Facility: CLINIC | Age: 84
End: 2019-01-12
Attending: INTERNAL MEDICINE
Payer: MEDICARE

## 2019-01-12 ENCOUNTER — ANCILLARY PROCEDURE (OUTPATIENT)
Dept: MRI IMAGING | Facility: CLINIC | Age: 84
End: 2019-01-12
Attending: NURSE PRACTITIONER
Payer: MEDICARE

## 2019-01-12 DIAGNOSIS — Z91.81 HISTORY OF FALL: ICD-10-CM

## 2019-01-12 DIAGNOSIS — R29.2 HYPERREFLEXIA: ICD-10-CM

## 2019-01-12 DIAGNOSIS — M54.2 NECK PAIN: ICD-10-CM

## 2019-01-12 DIAGNOSIS — R26.81 UNSTEADY GAIT: ICD-10-CM

## 2019-01-12 DIAGNOSIS — R42 VERTIGO: ICD-10-CM

## 2019-01-12 RX ORDER — GADOBUTROL 604.72 MG/ML
7.5 INJECTION INTRAVENOUS ONCE
Status: COMPLETED | OUTPATIENT
Start: 2019-01-12 | End: 2019-01-12

## 2019-01-12 RX ADMIN — GADOBUTROL 6 ML: 604.72 INJECTION INTRAVENOUS at 10:24

## 2019-01-12 NOTE — DISCHARGE INSTRUCTIONS
MRI Contrast Discharge Instructions    The IV contrast you received today will pass out of your body in your  urine. This will happen in the next 24 hours. You will not feel this process.  Your urine will not change color.    Drink at least 4 extra glasses of water or juice today (unless your doctor  has restricted your fluids). This reduces the stress on your kidneys.  You may take your regular medicines.    If you are on dialysis: It is best to have dialysis today.    If you have a reaction: Most reactions happen right away. If you have  any new symptoms after leaving the hospital (such as hives or swelling),  call your hospital at the correct number below. Or call your family doctor.  If you have breathing distress or wheezing, call 911.    Special instructions: ***    I have read and understand the above information.    Signature:______________________________________ Date:___________    Staff:__________________________________________ Date:___________     Time:__________    [place] [facility] Departments:    ___[place]: [phone]  ___[facility]: [phone]  ___[place]: [phone] ___[facility]: [phone]  ___[facility]: [phone]  ___[facility]: [phone]  ___[phone]  ___[facility]: [phone]  ___[phone]

## 2019-01-16 ENCOUNTER — OFFICE VISIT (OUTPATIENT)
Dept: NEUROLOGY | Facility: CLINIC | Age: 84
End: 2019-01-16
Payer: MEDICARE

## 2019-01-16 VITALS
SYSTOLIC BLOOD PRESSURE: 123 MMHG | WEIGHT: 132.8 LBS | BODY MASS INDEX: 25.09 KG/M2 | OXYGEN SATURATION: 96 % | HEART RATE: 69 BPM | DIASTOLIC BLOOD PRESSURE: 74 MMHG

## 2019-01-16 DIAGNOSIS — R42 DIZZINESS: ICD-10-CM

## 2019-01-16 DIAGNOSIS — R26.89 BALANCE PROBLEMS: ICD-10-CM

## 2019-01-16 DIAGNOSIS — R26.9 ABNORMAL GAIT: Primary | ICD-10-CM

## 2019-01-16 ASSESSMENT — PAIN SCALES - GENERAL: PAINLEVEL: NO PAIN (0)

## 2019-01-16 NOTE — PROGRESS NOTES
"Re: Kerri Rocha      MRN# [id number]  YOB: 1935  Date of Visit:1/16/2018     OUTPATIENT NEUROLOGY VISIT NOTE    Reason for Visit:  Balance and dizziness follow up    Interval History:  Kerri Rocha is a 83-year-old female presents to the clinic today for balance and dizziness follow up. Last Neurology follow up on 12/17/2018 and balance and dizziness were felt medication related. Patient was in process of tapering gabapentin. Patient reports that decreasing gabapentin she feels that \"mentally\" in control but no a \"big improvement\" in dizziness or balance. Will continue gabapentin at current dose  Patient has been going  to the [facility] for chronic dizziness since spring of 2018 and feels that there are may be some benefits  Patient underwent brain MRI, cervical MRI and MRA and results and images reviewed and no structural findings to explain patient's dizziness.   History of idiopathic neuropathy and saw Dr Callahan in 2017, see note for details.   Patient denies falls. Neuropathy may be contributing to imbalance feeling per patient.   Patient reports fall in the summer of 2018 but did not hit her head or any concussion.   Patient reports that she has headache on the right side and always on the right side. Patient reports that pain can be pounding. Patient reports that headaches are daily for 3 weeks but occasional headaches before that. Patient reports that headache comes around mid-morning and gradually intensifies and patient takes acetaminophen extra strength and headache goes away and re-occurring in the afternoon \"sometime.\" Patient reports that headache makes her nauseous and \"keeps going.\" No worsening dizziness. No vision changes.     Patient reports that dizziness with \"up and walking\" but \"not laying down.\"  Dizziness is worse in the morning with walking around and moving her head head then dizziness subsides when sitting down/laying and worsen again with activity. Patient reports that she " "\"keeps going\".   Patient reports that she \"keeps going\" and has been feeling dizzy for 7 years.   Denies any chest pain, SOB or palpitation.     Denies history of head or neck trauma,  loss of consciousness, seizure, double vision, blurred vision, hearing difficulty, speech or swallowing difficulty, weakness or numbness in face and arms, urinary or bowel incontinence.     Past Medical History reviewed   Past Medical History:   Diagnosis Date     HLD (hyperlipidemia)      HTN (hypertension)      Neuropathy      Vitamin D deficiency        Social History     Tobacco Use     Smoking status: Never Smoker     Smokeless tobacco: Never Used   Substance Use Topics     Alcohol use: Yes     Alcohol/week: 0.6 oz     Types: 1 Standard drinks or equivalent per week     Comment: 1 glass of wine per week    reviewed and verified with the patient     Allergies   Allergen Reactions     Codeine      Sulfa Drugs      Sulfasalazine Other (See Comments)     Sulfate Rash     Tylenol Extra Strength Palpitations       Current Outpatient Medications   Medication Sig Dispense Refill     amLODIPine (NORVASC) 10 MG tablet Take 1 tablet (10 mg) by mouth daily 90 tablet 3     ASPIRIN PO Take 81 mg by mouth daily       atorvastatin (LIPITOR) 20 MG tablet Take 1 tablet (20 mg) by mouth At Bedtime 90 tablet 3     calcium carbonate (OS-CAMMIE 600 MG Sun'aq. CA) 600 MG tablet        Cholecalciferol (VITAMIN D3) 2000 UNITS CAPS        Cyanocobalamin (VITAMIN B-12 PO) Take by mouth daily       Diphenhydramine-APAP, sleep, (TYLENOL PM EXTRA STRENGTH PO) Take by mouth nightly as needed       gabapentin (NEURONTIN) 400 MG capsule TAKE 3 CAPSULES IN THE MORNING, 4 CAPSULES AT NOON, AND 3 CAPSULES IN THE EVENING 300 capsule 2     lisinopril (PRINIVIL/ZESTRIL) 10 MG tablet Take 1 tablet (10 mg) by mouth daily 90 tablet 3     omega 3 1000 MG CAPS Take 2 g by mouth     reviewed and verified with the patient    Review of Systems:   A 10-point ROS including " constitutional, eyes, respiratory, cardiovascular, gastroenterology, genitourinary, integumentary, musculoskeletal, neurology and psychiatric were all negative except as mentioned in the interval history.      General Exam:   /74 (BP Location: Left arm, Patient Position: Sitting, Cuff Size: Adult Regular)   Pulse 69   Wt 60.2 kg (132 lb 12.8 oz)   SpO2 96%   BMI 25.09 kg/m     BP sitting 144/66 HR 63 and after standing for 2 minutes Standing /69 HR 70  GEN: Awake, NAD; good eye contact, responses appropriately, some mild bradykinesia, moving slower around possibly due to feeling dizzy,    HEENT: Head atraumatic/Normocephalic. Scalp normal. TA intact. Myofascial tenderness neck and traps bilaterally. Pupils equally round, 2 mm, reactive to light and accommodation, sclera and conjunctiva normal. Fundoscopic examination reveals normal vessels no papilledema.   Neck: Some resistense with passive ROM.  Speech is fluent without dysarthria. EOM intact. There is no nystagmus. Face is symmetrical. Intact and symmetrical eyebrow and lid raise and eyelid closure, smiles. Hearing Intact to conversation speech. The palates elevates symmetrical. The tongue protrudes midline with no atrophy or fasciculations. Strength  5/5 in the upper and lower extremities bilaterally. Sensation is intact to  touch throughout.  Reflexes brisk symmetrical at biceps, triceps, brachioradialis, patellar, and left  Achilles and not able to obtain right Achilles. Coordination reveals finger-nose-finger and finger tapping with normal speed and accuracy. mild bradykinesia, subtle right resting hand tremor, some more pronounced increased tone right UE.  DATA:  Results for TUTU HOOKER (MRN [id number]) as of 1/16/2019 14:17   Ref. Range 4/3/2017 13:58   TSH Latest Ref Range: 0.40 - 4.00 mU/L 1.52     Assessment and Plan:  Chronic persistent subjective dizziness for 7 years and getting worse gradually. History of idiopathic neuropathy for  many years.   Patient has been going  to the [facility] for chronic dizziness since spring of 2018 and feels that there are may be some benefits but not significant.   Meds were evaluated for possibly causing dizziness and gabapentin dose was reduced but no improvement with reducing the dose so patient will continue gabapentin. Denies any falls and has been working with PT.   Negative brain MRI, cervical MRI and MRA for any structural causes of dizziness. No parkinsonian cardinal features on the exam. It appears that dizziness multifactorial. Patient reports some chronic neck pain but stops at the base of her skull on both sides with activity and right temple/frontal headache. Patient reports ongoing stress -her  lost his vision sight due to MVA. Patient  denies depression or anxiety-patient reports that she always can reach out.   Patient reports that headache is new but could be MSK and do to cervical kyphosis  There are some changes observed today-unstable narrow based gait with staggering to the sides, mild bradykinesia, mild/subtle right  hand tremor at rest, some more pronounced increased tone right UE, brisk reflexes except right Achilles -not able to illicit.   BP not orthostatic to explain patient's persistent off balance feeling and dizziness. Would like to observe patient over time and obtain a second opinion with one of our neurologist in 3-6 months. Patient is in agreement with the plan.     Plan:  Continue gabapentin for neuropathy pain management  PT for gait and falls prevention and muscle tension  Lab -Vitamin B12 and MMA levels.   Use a walker for dizziness and falls prevention. Main focus is on falls prevention-Do not fall.   Follow up with one of our neurologist in 3-6 months or sooner if needed.     Return sooner for headache re-evaluation and management if getting worse.         I discussed all my recommendation with Kerri Rocha. The patient verbalizes understanding and comfortable with the  plan. The patient has our clinic phone number to call with any questions or concerns. All of the patient's questions were answered from the best of my current knowledge.       Time spent with pt answering questions, discussing findings, counseling and coordinating care was more than 50% the appointment time, 46  minutes.         CAROLINE Lobo, CNP  [facility]    Answers for HPI/ROS submitted by the patient on 1/8/2019   General Symptoms: Yes  Skin Symptoms: No  HENT Symptoms: No  EYE SYMPTOMS: No  HEART SYMPTOMS: No  LUNG SYMPTOMS: No  INTESTINAL SYMPTOMS: Yes  URINARY SYMPTOMS: No  GYNECOLOGIC SYMPTOMS: No  BREAST SYMPTOMS: No  SKELETAL SYMPTOMS: No  BLOOD SYMPTOMS: No  NERVOUS SYSTEM SYMPTOMS: No  MENTAL HEALTH SYMPTOMS: No  Fever: Yes  Loss of appetite: Yes  Weight loss: No  Weight gain: No  Fatigue: Yes  Night sweats: No  Chills: Yes  Increased stress: No  Excessive hunger: No  Excessive thirst: No  Feeling hot or cold when others believe the temperature is normal: Yes  Loss of height: No  Post-operative complications: No  Surgical site pain: No  Hallucinations: No  Change in or Loss of Energy: Yes  Hyperactivity: No  Confusion: No  Heart burn or indigestion: No  Nausea: Yes  Vomiting: No  Abdominal pain: No  Bloating: No  Constipation: No  Diarrhea: Yes  Blood in stool: No  Black stools: No  Rectal or Anal pain: No  Fecal incontinence: No  Yellowing of skin or eyes: No  Vomit with blood: No  Change in stools: No

## 2019-01-16 NOTE — PATIENT INSTRUCTIONS
Plan:  Continue gabapentin for neuropathy pain management  PT for gait and falls prevention and muscle tension  Lab -Vitamin B12 and MMA levels.   Use a walker for dizziness and falls prevention. Main focus is on falls prevention-Do not fall.   Follow up with one of our neurologist in 3-6 months or sooner if needed.     Return sooner for headache re-evaluation and management if getting worse.

## 2019-01-16 NOTE — NURSING NOTE
Chief Complaint   Patient presents with     RECHECK     [facility] RETURN - MED/DIZZINESS FOLLOW UP       Chago Aguilar, EMT

## 2019-02-06 DIAGNOSIS — R26.89 BALANCE PROBLEMS: ICD-10-CM

## 2019-02-06 LAB — VIT B12 SERPL-MCNC: 2230 PG/ML (ref 193–986)

## 2019-02-08 LAB — METHYLMALONATE SERPL-SCNC: 0.14 UMOL/L (ref 0–0.4)

## 2019-02-14 DIAGNOSIS — I10 BENIGN ESSENTIAL HYPERTENSION: ICD-10-CM

## 2019-02-14 DIAGNOSIS — G62.9 NEUROPATHY: ICD-10-CM

## 2019-02-14 DIAGNOSIS — E78.5 HYPERLIPIDEMIA LDL GOAL <100: ICD-10-CM

## 2019-02-14 DIAGNOSIS — M81.0 AGE RELATED OSTEOPOROSIS, UNSPECIFIED PATHOLOGICAL FRACTURE PRESENCE: ICD-10-CM

## 2019-02-15 RX ORDER — LISINOPRIL 10 MG/1
10 TABLET ORAL DAILY
Qty: 90 TABLET | Refills: 0 | Status: SHIPPED | OUTPATIENT
Start: 2019-02-15 | End: 2019-07-01

## 2019-02-15 RX ORDER — ATORVASTATIN CALCIUM 20 MG/1
20 TABLET, FILM COATED ORAL AT BEDTIME
Qty: 90 TABLET | Refills: 0 | Status: SHIPPED | OUTPATIENT
Start: 2019-02-15 | End: 2019-07-01

## 2019-02-15 RX ORDER — AMLODIPINE BESYLATE 10 MG/1
10 TABLET ORAL DAILY
Qty: 90 TABLET | Refills: 0 | Status: SHIPPED | OUTPATIENT
Start: 2019-02-15 | End: 2019-07-01

## 2019-02-15 RX ORDER — GABAPENTIN 400 MG/1
CAPSULE ORAL
Qty: 300 CAPSULE | Refills: 2 | Status: SHIPPED | OUTPATIENT
Start: 2019-02-15 | End: 2019-07-01

## 2019-02-15 NOTE — TELEPHONE ENCOUNTER
gabapentin (NEURONTIN) 400 MG    Last Written Prescription Date:  10/2/18  Last Fill Quantity: 300,   # refills: 2  Last Office Visit : 12/17/18  Future Office visit:  NONE  Routing refill request to provider for review/approval because:  Drug not on the refill protocol      lisinopril (PRINIVIL/ZESTRIL) 10 MG      Last Written Prescription Date:  4/23/18   Last Fill Quantity: 90,   # refills: 3  Routing refill request to provider for review/approval because:  90 DAY RF PENDING     ABNORMAL CREATININE      amLODIPine (NORVASC) 10 MG tablet  Last Written Prescription Date:  4/23/18  Last Fill Quantity: 90,   # refills: 3  Routing refill request to provider for review/approval because:  90 DAY RF PENDING     ABNORMAL CREATININE      ** EARLY / MAIL ORDERS

## 2019-03-28 ASSESSMENT — ENCOUNTER SYMPTOMS
NUMBNESS: 0
MYALGIAS: 1
JOINT SWELLING: 0
MUSCLE CRAMPS: 1
WEAKNESS: 1
LOSS OF CONSCIOUSNESS: 0
DISTURBANCES IN COORDINATION: 0
SEIZURES: 0
MEMORY LOSS: 0
DIZZINESS: 1
TINGLING: 1
ARTHRALGIAS: 1
MUSCLE WEAKNESS: 1
NECK PAIN: 1
SPEECH CHANGE: 0
STIFFNESS: 1
PARALYSIS: 0
TREMORS: 0
BACK PAIN: 1
HEADACHES: 0

## 2019-04-04 ENCOUNTER — OFFICE VISIT (OUTPATIENT)
Dept: NEUROLOGY | Facility: CLINIC | Age: 84
End: 2019-04-04
Payer: MEDICARE

## 2019-04-04 VITALS
WEIGHT: 133 LBS | BODY MASS INDEX: 26.11 KG/M2 | RESPIRATION RATE: 16 BRPM | HEIGHT: 60 IN | OXYGEN SATURATION: 97 % | TEMPERATURE: 98 F

## 2019-04-04 DIAGNOSIS — R29.898 WEAKNESS OF BOTH LOWER EXTREMITIES: Primary | ICD-10-CM

## 2019-04-04 PROBLEM — H52.223 REGULAR ASTIGMATISM OF BOTH EYES: Status: ACTIVE | Noted: 2018-08-31

## 2019-04-04 PROBLEM — Z96.1 PSEUDOPHAKIA OF BOTH EYES: Status: ACTIVE | Noted: 2018-08-31

## 2019-04-04 ASSESSMENT — MIFFLIN-ST. JEOR: SCORE: 984.78

## 2019-04-04 ASSESSMENT — PAIN SCALES - GENERAL: PAINLEVEL: NO PAIN (0)

## 2019-04-04 NOTE — LETTER
4/4/2019       RE: Kerri Rocha  [address] [facility] [address]     Dear Colleague,    Thank you for referring your patient, Kerri Rocha, to the [facility]. Please see a copy of my visit note below.    Service Date: 04/04/2019      REASON FOR VISIT:   This patient is an 83-year-old right-handed woman seen for evaluation of problems with balance.        HISTORY OF PRESENT ILLNESS:   She has a complicated history.  She reports that she has had neuropathy for 15 years.  She also has a history of high blood pressure and is on treatment for that.  She has a couple of different symptoms.  She has problems with dizziness.  She also has problems with imbalance.  She distinguishes between the two.  The dizziness is a sensation of movement.  She has had an extensive evaluation for this.  This included MRI of the brain and also MRI of the cervical spine.  She had a 10-week course of treatment at the [facility].  Her symptom of dizziness improved but not the symptom of imbalance.  I have reviewed the MRI of the brain with her.  It was done in November of last year.  It was normal for age.  She also had an MRI of the cervical spine which showed multilevel degenerative change but no compromise of neural structures.  An MR angiogram of the neck showed no stenosis.      She reports the problems with the balance have been going on for the last few years.  She did have low back surgery in 2015 for severe spinal stenosis at L4-L5.  She did notice progressive symptoms of imbalance.  This probably began 3 years ago and has slowly worsened.  She notices it especially when she is working in the garden.  If she gets down on her knees, she has a very difficult time trying to get up.  When she gets up, she feels off balance.  Also, if she gets out of bed in the morning she feels off balance.  Her walking is tentative.  She takes baby steps.   She does not walk like she used to.  She feels part of this may be due to the neuropathy.  She gets an occasional electric feeling in her body when she is walking.  She does have symptoms of lightheadedness but also feels that her legs are weak.  She is aware of the Epley maneuver and can do it herself for intermittent symptoms of vertigo.  She has had numbness in her big toes for many years.      PAST MEDICAL HISTORY:  Significant for high blood pressure.  She does not have diabetes, thyroid or asthma.  She is on gabapentin.  She has also been on vitamin B12 replacement.  She has not had pertinent surgery or trauma to the head, neck or back, other than the low back surgery of 2015.  She does not drink or smoke.      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      FAMILY HISTORY:  Noncontributory.      PHYSICAL EXAMINATION:   GENERAL:  The patient is cooperative and in no distress.   VITAL SIGNS:  Her blood pressure is 150/65 sitting and 150/70 standing.     There are no carotid bruits.  Auscultation of the heart shows S1 and S2.   NEUROLOGIC:  The patient is alert, oriented and lucid.  Cranial nerve testing shows full visual fields to confrontation.  Funduscopic exam shows sharp discs bilaterally.  Eye movements are complete and conjugate without nystagmus.  Pupils react minimally to light.  Facial sensation is normal.  Face moves symmetrically.  Palate elevates in the midline.  Tongue protrudes in the midline.  Motor evaluation shows no pronator drift, normal finger tapping, finger-nose-finger and heel-knee-shin.  Strength is fairly well preserved to bedside exam in the arms, hands, legs and feet.  Muscle stretch reflexes are low amplitude and symmetric in the arms, brisk at the knees, reduced at the left ankle and questionably present at the right ankle.  Right toe is equivocal, probably because of withdrawal.  Left toe is downgoing.  Sensory exam shows preserved vibration and  temperature in the hands but absent vibration in the toes and preserved at the ankles.  Romberg sign is present.  She can walk on her toes, holding onto me for balance, but cannot walk on her heels.  When she walks down the gardner, her gait is slow and deliberate and she does tend to take baby steps.  She says she does this because she feels unsure of her feet.      She did have a recent vitamin B12 level performed in February and it was normal at 2200.  Imaging studies are noted above.  I also reviewed an MRI scan of the lumbar spine done at [facility] in 2017.  It does show some degenerative change and disk bulging but no major compromise of neural structures and no significant spinal stenosis.      ASSESSMENT:   1.  Complaints of imbalance and leg weakness.   2.  History of dizziness.   3.  History of neuropathy.   4.  History of lumbar spine decompression L4-L5.      DISCUSSION:  The patient is seen with the above problem list.  In the patient's mind, she draws a distinction between the symptom of dizziness and the symptom of imbalance.  The symptom of imbalance seems to relate more to her legs, such as when she is working in the garden and tries to get up and she cannot get up.  She does have some weakness in ankle dorsiflexion when trying to walk on her heels bilaterally.  She has a history of lumbar spine surgery.  Imaging a year and a half ago was unremarkable.  I am going to repeat MRI imaging of the lumbar spine to make sure there is not recurring spinal stenosis.  Electrodiagnostic study of the legs will also be obtained to evaluate neuropathy and also polyradiculopathy.  I will see her in followup at the conclusion of this testing for reexamination.      Raymundo Solorio MD      cc:   CAROLINE Sandoval, CNP   [id number]   [place] [address]      D: 04/04/2019   T: 04/04/2019   MT: AKA      Name:     TUTU HOOKER   MRN:      [id number]        Account:       [id number]   :      1935           Service Date: 2019      Document: [id number]

## 2019-04-04 NOTE — PROGRESS NOTES
Service Date: 04/04/2019      REASON FOR VISIT:   This patient is an 83-year-old right-handed woman seen for evaluation of problems with balance.        HISTORY OF PRESENT ILLNESS:   She has a complicated history.  She reports that she has had neuropathy for 15 years.  She also has a history of high blood pressure and is on treatment for that.  She has a couple of different symptoms.  She has problems with dizziness.  She also has problems with imbalance.  She distinguishes between the two.  The dizziness is a sensation of movement.  She has had an extensive evaluation for this.  This included MRI of the brain and also MRI of the cervical spine.  She had a 10-week course of treatment at the [facility].  Her symptom of dizziness improved but not the symptom of imbalance.  I have reviewed the MRI of the brain with her.  It was done in November of last year.  It was normal for age.  She also had an MRI of the cervical spine which showed multilevel degenerative change but no compromise of neural structures.  An MR angiogram of the neck showed no stenosis.      She reports the problems with the balance have been going on for the last few years.  She did have low back surgery in 2015 for severe spinal stenosis at L4-L5.  She did notice progressive symptoms of imbalance.  This probably began 3 years ago and has slowly worsened.  She notices it especially when she is working in the garden.  If she gets down on her knees, she has a very difficult time trying to get up.  When she gets up, she feels off balance.  Also, if she gets out of bed in the morning she feels off balance.  Her walking is tentative.  She takes baby steps.  She does not walk like she used to.  She feels part of this may be due to the neuropathy.  She gets an occasional electric feeling in her body when she is walking.  She does have symptoms of lightheadedness but also feels that her legs are weak.  She is aware of the Epley  maneuver and can do it herself for intermittent symptoms of vertigo.  She has had numbness in her big toes for many years.      PAST MEDICAL HISTORY:  Significant for high blood pressure.  She does not have diabetes, thyroid or asthma.  She is on gabapentin.  She has also been on vitamin B12 replacement.  She has not had pertinent surgery or trauma to the head, neck or back, other than the low back surgery of 2015.  She does not drink or smoke.      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      FAMILY HISTORY:  Noncontributory.      PHYSICAL EXAMINATION:   GENERAL:  The patient is cooperative and in no distress.   VITAL SIGNS:  Her blood pressure is 150/65 sitting and 150/70 standing.     There are no carotid bruits.  Auscultation of the heart shows S1 and S2.   NEUROLOGIC:  The patient is alert, oriented and lucid.  Cranial nerve testing shows full visual fields to confrontation.  Funduscopic exam shows sharp discs bilaterally.  Eye movements are complete and conjugate without nystagmus.  Pupils react minimally to light.  Facial sensation is normal.  Face moves symmetrically.  Palate elevates in the midline.  Tongue protrudes in the midline.  Motor evaluation shows no pronator drift, normal finger tapping, finger-nose-finger and heel-knee-shin.  Strength is fairly well preserved to bedside exam in the arms, hands, legs and feet.  Muscle stretch reflexes are low amplitude and symmetric in the arms, brisk at the knees, reduced at the left ankle and questionably present at the right ankle.  Right toe is equivocal, probably because of withdrawal.  Left toe is downgoing.  Sensory exam shows preserved vibration and temperature in the hands but absent vibration in the toes and preserved at the ankles.  Romberg sign is present.  She can walk on her toes, holding onto me for balance, but cannot walk on her heels.  When she walks down the gardner, her gait is slow and deliberate and she does  tend to take baby steps.  She says she does this because she feels unsure of her feet.      She did have a recent vitamin B12 level performed in February and it was normal at 2200.  Imaging studies are noted above.  I also reviewed an MRI scan of the lumbar spine done at [facility] in 2017.  It does show some degenerative change and disk bulging but no major compromise of neural structures and no significant spinal stenosis.      ASSESSMENT:   1.  Complaints of imbalance and leg weakness.   2.  History of dizziness.   3.  History of neuropathy.   4.  History of lumbar spine decompression L4-L5.      DISCUSSION:  The patient is seen with the above problem list.  In the patient's mind, she draws a distinction between the symptom of dizziness and the symptom of imbalance.  The symptom of imbalance seems to relate more to her legs, such as when she is working in the garden and tries to get up and she cannot get up.  She does have some weakness in ankle dorsiflexion when trying to walk on her heels bilaterally.  She has a history of lumbar spine surgery.  Imaging a year and a half ago was unremarkable.  I am going to repeat MRI imaging of the lumbar spine to make sure there is not recurring spinal stenosis.  Electrodiagnostic study of the legs will also be obtained to evaluate neuropathy and also polyradiculopathy.  I will see her in followup at the conclusion of this testing for reexamination.      Wilver Solorio MD      cc:   CAROLINE Sandoval, CNP   [id number]   [place] [address]         WILVER SOLORIO MD             D: 2019   T: 2019   MT: AKA      Name:     TUTU HOOKER   MRN:      [id number]        Account:      [id number]   :      1935           Service Date: 2019      Document: [id number]          4/15 addendum: reviewed MRI with pt.  There is evidence for multilevel foraminal narrowing, which might contribute to sx of leg  weakness. No spinal stenosis.  Follow-up after emg.         4/24 addendum: reviewed EMG report with pt.  Worsening neuropathy, with superimposed polyradiculopathy.  Will discuss at f/u appt.

## 2019-04-04 NOTE — NURSING NOTE
Chief Complaint   Patient presents with     Dizziness     Gait Problem     [facility] NEW DIZZINESS/BALANCE PROBLEM F/U       Ramón Espinal, EMT

## 2019-04-13 ENCOUNTER — ANCILLARY PROCEDURE (OUTPATIENT)
Dept: MRI IMAGING | Facility: CLINIC | Age: 84
End: 2019-04-13
Attending: PSYCHIATRY & NEUROLOGY
Payer: MEDICARE

## 2019-04-13 DIAGNOSIS — R29.898 WEAKNESS OF BOTH LOWER EXTREMITIES: ICD-10-CM

## 2019-04-13 LAB
CREAT BLD-MCNC: 0.9 MG/DL (ref 0.52–1.04)
GFR SERPL CREATININE-BSD FRML MDRD: 60 ML/MIN/{1.73_M2}

## 2019-04-13 RX ORDER — GADOBUTROL 604.72 MG/ML
7.5 INJECTION INTRAVENOUS ONCE
Status: COMPLETED | OUTPATIENT
Start: 2019-04-13 | End: 2019-04-13

## 2019-04-13 RX ADMIN — GADOBUTROL 6 ML: 604.72 INJECTION INTRAVENOUS at 14:35

## 2019-04-22 ENCOUNTER — OFFICE VISIT (OUTPATIENT)
Dept: NEUROLOGY | Facility: CLINIC | Age: 84
End: 2019-04-22
Attending: PSYCHIATRY & NEUROLOGY
Payer: MEDICARE

## 2019-04-22 DIAGNOSIS — R29.898 WEAKNESS OF BOTH LOWER EXTREMITIES: ICD-10-CM

## 2019-04-22 NOTE — LETTER
4/22/2019       RE: Kerri Rocha  [address] [facility] [facility] [address]     Dear Colleague,    Thank you for referring your patient, Kerri Rocha, to the [facility] EMG at [facility]. Please see a copy of my visit note below.        [facility]  Electrodiagnostic Laboratory    Nerve Conduction & EMG Report          Patient: Kerri Rocha YOB: 1935  Patient ID: [id number] Age: 83 Years 11 Months  Gender: Female      History & Examination:  Referred by Dr Solorio for EMG evaluation of bilateral leg pain and neuropathy.     Kerri reports she has been diagnosed with a peripheral neuropathy for 20 years but more recently symptoms have spread from legs up to arms. She is having trouble with balance because of the numbness and her legs feel weak.    She previously has had back surgery and had a recent MRI lumbar spine revealing multilevel lumbar spondylosis most pronounced at L3-4. A synovial cyst is narrowing the L5-S1 neural foramen on the right possibly impinging he S1 nerve root.    Techniques:  Sensory and motor conduction studies were done with surface recording electrodes. EMG was done with a concentric needle electrode.     Results:  Sensory studies of the right lower extremity elicit no response.  In the right upper extremity both median and ulnar antidromic studies are low amplitude.  Right radial study is within normal limits.    Motor nerve conduction studies:  Peroneal and tibial motor nerve conduction studies reveal low amplitude responses.  Median and ulnar motor nerve conduction studies are within normal limits.    Right tibial F waves are within normal limits.    On needle examination the patient has prominent neurogenic changes seen distally that normalized more proximally in the right lower extremity.      Interpretation:  This is an abnormal EMG.  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    There is not clear evidence for overlying lumbosacral radiculopathy but in light of the severe peripheral neuropathy these may be masked and clinical correlation is advised.      EMG Physician:    Janine Maher MD FAAN      Sensory NCS      Nerve / Sites Rec. Site Onset Peak NP Amp Ref. PP Amp Dist Micha Ref. Temp     ms ms  V  V  V cm m/s m/s  C   R MEDIAN - Dig II Anti      Wrist Dig II 2.81 3.59 7.9 10.0 13.4 14 49.8 48.0 33.6   R ULNAR - Dig V Anti      Wrist Dig V 2.29 2.81 4.3 8.0 0.78 12.5 54.5 48.0 33.1   R RADIAL - Snuff      Forearm Snuff 2.03 2.60 18.8 15.0 36.5 12.5 61.5 48.0 32.9   R SURAL - Lat Mall 60      Calf Ankle NR NR NR 5.0 NR 14 NR 38.0 33.9   R SUP PERONEAL      Lat Leg Thao NR NR NR  NR 12.5 NR 38.0 33.9       Motor NCS      Nerve / Sites Rec. Site Lat Ref. Amp Ref. Rel Amp Dist Micha Ref. Dur. Area Temp.     ms ms mV mV % cm m/s m/s ms %  C   R MEDIAN - APB      Wrist APB 3.65 4.40 5.1 5.0 100 8   5.21 100 32.9      Elbow APB 7.55  4.4  86.4 20 51.2 48.0 5.83 92.4 33   R ULNAR - ADM      Wrist ADM 2.40 3.50 7.1 5.0 100 8   6.35 100 33      B.Elbow ADM 5.78  5.9  83 18 53.2 48.0 5.99 90.1 33      A.Elbow ADM 7.34  5.3  75.6 8 51.2 48.0 6.09 85.6 33   R DEEP PERONEAL - EDB 60      Ankle EDB 4.11 6.00 1.2 2.0 100 8   3.91 100 33.9      FibHead EDB 10.78  1.0  87.3 29 43.5 38.0 4.38 80.3 34      Pop Fos EDB 12.71  1.1  91 8 41.5 38.0 4.11 94.4 33.9   R TIBIAL - AH      Ankle AH 4.01 6.00 3.3 4.0 100 8   5.16 100 33.6      Pop Fos AH 12.29  2.0  60.2 39 47.1 38.0 6.41 91.4 33.6       F  Wave      Nerve Min F Lat Max F Lat Mean FLat Temp.    ms ms ms  C   R TIBIAL 44.38 57.34 50.91 33.6       EMG Summary Table     Spontaneous MUAP Recruitment    IA Fib/PSW Fasc H.F. Amp Dur. PPP Pattern   R. TIB ANTERIOR N None None None 1+ 1+ None Mild Reduced   R. GASTROCN (MED) N None None None 2+ 2+ None Mild Reduced   R. TIB POSTERIOR N None None None 1+ 1+  None Mild Reduced   R. VAST LATERALIS N None None None N N None Normal   R. ADD LONGUS N None None None 1+ 1+ None Normal   R. GLUTEUS MED N None None None N N None Normal   R. GLUTEUS MAX N None None None N N None Normal                              Again, thank you for allowing me to participate in the care of your patient.      Sincerely,    Janine Maher MD

## 2019-04-22 NOTE — PROGRESS NOTES
[facility]  Electrodiagnostic Laboratory    Nerve Conduction & EMG Report          Patient: Kerri Rocha YOB: 1935  Patient ID: [id number] Age: 83 Years 11 Months  Gender: Female      History & Examination:  Referred by Dr Solorio for EMG evaluation of bilateral leg pain and neuropathy.     Kerri reports she has been diagnosed with a peripheral neuropathy for 20 years but more recently symptoms have spread from legs up to arms. She is having trouble with balance because of the numbness and her legs feel weak.    She previously has had back surgery and had a recent MRI lumbar spine revealing multilevel lumbar spondylosis most pronounced at L3-4. A synovial cyst is narrowing the L5-S1 neural foramen on the right possibly impinging he S1 nerve root.    Techniques:  Sensory and motor conduction studies were done with surface recording electrodes. EMG was done with a concentric needle electrode.     Results:  Sensory studies of the right lower extremity elicit no response.  In the right upper extremity both median and ulnar antidromic studies are low amplitude.  Right radial study is within normal limits.    Motor nerve conduction studies:  Peroneal and tibial motor nerve conduction studies reveal low amplitude responses.  Median and ulnar motor nerve conduction studies are within normal limits.    Right tibial F waves are within normal limits.    On needle examination the patient has prominent neurogenic changes seen distally that normalized more proximally in the right lower extremity.      Interpretation:  This is an abnormal EMG.  Findings are consistent with a severe length-dependent axonal sensorimotor polyneuropathy that appears to have advanced from prior EMG in 2003.    There is not clear evidence for overlying lumbosacral radiculopathy but in light of the severe peripheral neuropathy these may be masked and clinical correlation is advised.      EMG Physician:    Janine Maher MD  FAAN            Sensory NCS      Nerve / Sites Rec. Site Onset Peak NP Amp Ref. PP Amp Dist Micha Ref. Temp     ms ms  V  V  V cm m/s m/s  C   R MEDIAN - Dig II Anti      Wrist Dig II 2.81 3.59 7.9 10.0 13.4 14 49.8 48.0 33.6   R ULNAR - Dig V Anti      Wrist Dig V 2.29 2.81 4.3 8.0 0.78 12.5 54.5 48.0 33.1   R RADIAL - Snuff      Forearm Snuff 2.03 2.60 18.8 15.0 36.5 12.5 61.5 48.0 32.9   R SURAL - Lat Mall 60      Calf Ankle NR NR NR 5.0 NR 14 NR 38.0 33.9   R SUP PERONEAL      Lat Leg Thao NR NR NR  NR 12.5 NR 38.0 33.9       Motor NCS      Nerve / Sites Rec. Site Lat Ref. Amp Ref. Rel Amp Dist Micha Ref. Dur. Area Temp.     ms ms mV mV % cm m/s m/s ms %  C   R MEDIAN - APB      Wrist APB 3.65 4.40 5.1 5.0 100 8   5.21 100 32.9      Elbow APB 7.55  4.4  86.4 20 51.2 48.0 5.83 92.4 33   R ULNAR - ADM      Wrist ADM 2.40 3.50 7.1 5.0 100 8   6.35 100 33      B.Elbow ADM 5.78  5.9  83 18 53.2 48.0 5.99 90.1 33      A.Elbow ADM 7.34  5.3  75.6 8 51.2 48.0 6.09 85.6 33   R DEEP PERONEAL - EDB 60      Ankle EDB 4.11 6.00 1.2 2.0 100 8   3.91 100 33.9      FibHead EDB 10.78  1.0  87.3 29 43.5 38.0 4.38 80.3 34      Pop Fos EDB 12.71  1.1  91 8 41.5 38.0 4.11 94.4 33.9   R TIBIAL - AH      Ankle AH 4.01 6.00 3.3 4.0 100 8   5.16 100 33.6      Pop Fos AH 12.29  2.0  60.2 39 47.1 38.0 6.41 91.4 33.6       F  Wave      Nerve Min F Lat Max F Lat Mean FLat Temp.    ms ms ms  C   R TIBIAL 44.38 57.34 50.91 33.6       EMG Summary Table     Spontaneous MUAP Recruitment    IA Fib/PSW Fasc H.F. Amp Dur. PPP Pattern   R. TIB ANTERIOR N None None None 1+ 1+ None Mild Reduced   R. GASTROCN (MED) N None None None 2+ 2+ None Mild Reduced   R. TIB POSTERIOR N None None None 1+ 1+ None Mild Reduced   R. VAST LATERALIS N None None None N N None Normal   R. ADD LONGUS N None None None 1+ 1+ None Normal   R. GLUTEUS MED N None None None N N None Normal   R. GLUTEUS MAX N None None None N N None Normal

## 2019-04-27 ASSESSMENT — ENCOUNTER SYMPTOMS
SPEECH CHANGE: 0
MYALGIAS: 1
NUMBNESS: 1
DISTURBANCES IN COORDINATION: 0
JOINT SWELLING: 0
MUSCLE CRAMPS: 1
PARALYSIS: 0
TREMORS: 0
STIFFNESS: 1
MEMORY LOSS: 0
LOSS OF CONSCIOUSNESS: 0
NECK PAIN: 1
TINGLING: 1
SEIZURES: 0
HEADACHES: 0
ARTHRALGIAS: 0
MUSCLE WEAKNESS: 1
DIZZINESS: 1
WEAKNESS: 1
BACK PAIN: 1

## 2019-05-02 ENCOUNTER — OFFICE VISIT (OUTPATIENT)
Dept: NEUROLOGY | Facility: CLINIC | Age: 84
End: 2019-05-02
Payer: MEDICARE

## 2019-05-02 VITALS
OXYGEN SATURATION: 95 % | SYSTOLIC BLOOD PRESSURE: 148 MMHG | HEART RATE: 70 BPM | BODY MASS INDEX: 25.29 KG/M2 | WEIGHT: 128.8 LBS | HEIGHT: 60 IN | DIASTOLIC BLOOD PRESSURE: 60 MMHG

## 2019-05-02 DIAGNOSIS — M54.16 LUMBAR RADICULOPATHY: Primary | ICD-10-CM

## 2019-05-02 ASSESSMENT — MIFFLIN-ST. JEOR: SCORE: 961.76

## 2019-05-02 ASSESSMENT — PAIN SCALES - GENERAL: PAINLEVEL: NO PAIN (0)

## 2019-05-02 NOTE — LETTER
5/2/2019       RE: Kerri Rocha  [address] [facility] [address]     Dear Colleague,    Thank you for referring your patient, Kerri Rocha, to the [facility] NEUROLOGY at [facility]. Please see a copy of my visit note below.    Service Date: 05/02/2019      INTERVAL HISTORY:  This patient is seen in followup with issues of dizziness, imbalance and neuropathy.  I saw the patient about a month ago.  She has a somewhat complicated history.  Her main complaint is that when she goes to work in her garden, she cannot get up back onto her feet.  She has prickly feet from neuropathy.  She has a history of neuropathy going back 15 years.  She initially saw Dr. Callahan for this 2 years ago.  The neuropathy symptoms have not really changed much in the last year.  Her legs feel wobbly.  She is off balance.  She did have low back surgery.  That helped for a period of time.  She is having the symptoms back.  She has had an extensive evaluation.  She had a course of treatment at the [facility] and really did not improve at all.  I focused more issues with her low back and legs.  She seemed to have some weakness in ankle dorsiflexion.  Also, her main symptom as noted is difficulty getting back onto her feet when she has been a kneeling and working in the garden.  She does have some difficulty climbing stairs and has to hold onto the banister because of dizziness and her legs feeling wobbly.      PHYSICAL EXAMINATION:   GENERAL:   The patient is cooperative and in no distress.   VITAL SIGNS:  Her blood pressure is 148/60.     NEUROLOGIC:  She has good strength in her legs.  She can perform a partial genuflection with each leg holding onto me for balance.  She can get up on her heels and toes and take a couple of steps.  Muscle stretch reflexes are normal at the knees, reduced at the left ankle and trace at the right ankle.      She did have MRI of the lumbar  spine.  This was in addition to other studies performed earlier including MRI cervical and MRI brain.  The MRI of the lumbar spine shows multilevel degenerative change.  There is spondylosis, especially at L3-L4 and also at L4-L5.  There was evidence of the fusion at L4-L5.  There is foraminal narrowing on the left at L3-L4 and on the right at S1 due to a cyst.  She had an electrodiagnostic evaluation by Dr. Maher.  I have reviewed those results with the patient.  It shows worsening neuropathy compared to an EMG from .  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  The left leg was not tested.      ASSESSMENT:   1.  Dizziness, imbalance and leg weakness.   2.  Longstanding history of neuropathy.      DISCUSSION:  The patient is seen with the above problem list.  At this point, I have recommended to the patient a course of physical therapy to try and focus on improving strength and conditioning in the legs.  I think that might be a contributing part of the problem.  I suspect that a large part of her dizziness and imbalance relates to the neuropathy and impaired proprioception.  She has a longstanding diagnosis of idiopathic neuropathy.  More recently, a vitamin B12 level was checked and it was normal.  I am not going to pursue additional testing at this time.  She reports no history of diabetes and no family history of diabetes.  I will see her in followup in 4-6 weeks for reexamination.      This was a 25-minute appointment, more than half of which was spent in counseling and coordination of care.      Raymundo Solorio MD       D: 2019   T: 2019   MT: AKA      Name:     TUTU HOOKER   MRN:      [id number]        Account:      [id number]   :      1935           Service Date: 2019      Document: [id number]

## 2019-05-02 NOTE — NURSING NOTE
Chief Complaint   Patient presents with     RECHECK     [facility] RETURN - 1 MONTH FOLLOW UP       Chago Aguilar, EMT

## 2019-05-02 NOTE — PROGRESS NOTES
Service Date: 05/02/2019      INTERVAL HISTORY:  This patient is seen in followup with issues of dizziness, imbalance and neuropathy.  I saw the patient about a month ago.  She has a somewhat complicated history.  Her main complaint is that when she goes to work in her garden, she cannot get up back onto her feet.  She has prickly feet from neuropathy.  She has a history of neuropathy going back 15 years.  She initially saw Dr. Callahan for this 2 years ago.  The neuropathy symptoms have not really changed much in the last year.  Her legs feel wobbly.  She is off balance.  She did have low back surgery.  That helped for a period of time.  She is having the symptoms back.  She has had an extensive evaluation.  She had a course of treatment at the [facility] and really did not improve at all.  I focused more issues with her low back and legs.  She seemed to have some weakness in ankle dorsiflexion.  Also, her main symptom as noted is difficulty getting back onto her feet when she has been a kneeling and working in the garden.  She does have some difficulty climbing stairs and has to hold onto the banister because of dizziness and her legs feeling wobbly.      PHYSICAL EXAMINATION:   GENERAL:   The patient is cooperative and in no distress.   VITAL SIGNS:  Her blood pressure is 148/60.     NEUROLOGIC:  She has good strength in her legs.  She can perform a partial genuflection with each leg holding onto me for balance.  She can get up on her heels and toes and take a couple of steps.  Muscle stretch reflexes are normal at the knees, reduced at the left ankle and trace at the right ankle.      She did have MRI of the lumbar spine.  This was in addition to other studies performed earlier including MRI cervical and MRI brain.  The MRI of the lumbar spine shows multilevel degenerative change.  There is spondylosis, especially at L3-L4 and also at L4-L5.  There was evidence of the fusion at L4-L5.   There is foraminal narrowing on the left at L3-L4 and on the right at S1 due to a cyst.  She had an electrodiagnostic evaluation by Dr. Maher.  I have reviewed those results with the patient.  It shows worsening neuropathy compared to an EMG from .  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  The left leg was not tested.      ASSESSMENT:   1.  Dizziness, imbalance and leg weakness.   2.  Longstanding history of neuropathy.      DISCUSSION:  The patient is seen with the above problem list.  At this point, I have recommended to the patient a course of physical therapy to try and focus on improving strength and conditioning in the legs.  I think that might be a contributing part of the problem.  I suspect that a large part of her dizziness and imbalance relates to the neuropathy and impaired proprioception.  She has a longstanding diagnosis of idiopathic neuropathy.  More recently, a vitamin B12 level was checked and it was normal.  I am not going to pursue additional testing at this time.  She reports no history of diabetes and no family history of diabetes.  I will see her in followup in 4-6 weeks for reexamination.      This was a 25-minute appointment, more than half of which was spent in counseling and coordination of care.      MD WILVER Alatorre MD             D: 2019   T: 2019   MT: AKA      Name:     TUTU HOOKER   MRN:      [id number]        Account:      [id number]   :      1935           Service Date: 2019      Document: [id number]

## 2019-05-08 ENCOUNTER — HOSPITAL ENCOUNTER (OUTPATIENT)
Dept: PHYSICAL THERAPY | Facility: CLINIC | Age: 84
End: 2019-05-08
Payer: MEDICARE

## 2019-05-08 DIAGNOSIS — M62.81 GENERALIZED MUSCLE WEAKNESS: Primary | ICD-10-CM

## 2019-05-08 DIAGNOSIS — R26.89 IMPAIRMENT OF BALANCE: ICD-10-CM

## 2019-05-08 DIAGNOSIS — M54.16 LUMBAR RADICULOPATHY: ICD-10-CM

## 2019-05-08 PROCEDURE — 97112 NEUROMUSCULAR REEDUCATION: CPT | Mod: GP | Performed by: PHYSICAL THERAPIST

## 2019-05-08 PROCEDURE — 97162 PT EVAL MOD COMPLEX 30 MIN: CPT | Mod: GP | Performed by: PHYSICAL THERAPIST

## 2019-05-08 PROCEDURE — 97110 THERAPEUTIC EXERCISES: CPT | Mod: GP | Performed by: PHYSICAL THERAPIST

## 2019-05-08 NOTE — PROGRESS NOTES
[facility]        OUTPATIENT PHYSICAL THERAPY FUNCTIONAL EVALUATION  PLAN OF TREATMENT FOR OUTPATIENT REHABILITATION  (COMPLETE FOR INITIAL CLAIMS ONLY)  Patient's Last Name, First Name, M.I.  YOB: 1935  Kerri Rocha        Provider's Name   [facility]   Medical Record No.  [id number]     Start of Care Date:  05/08/19   Onset Date:  05/02/19(date of order; long history of back pain & nueropathy)   Type:     _X__PT   ____OT  ____SLP Medical Diagnosis: Lumbar radiculopathy      PT Diagnosis:  Impaired balance Visits from SOC:  1                              __________________________________________________________________________________  Plan of Treatment/Functional Goals:  balance training, neuromuscular re-education, gait training, strengthening, stretching, IADL retraining     GOALS  HEP  In order to facilitate gains in general strength and endurance, gait, balance and functional mobility outside of physical therapy, patient will demonstrate independence with a HEP.  07/06/19    Functional Reach  In order to demonstrate decreased risk for falls, patient will demonstrate improved functional reach of > 10 inches.  07/06/19    Functional lower extremity strength  Patient will report improved ease and tolerance for functional mobility and demonstrate increased functional lower extremity strength and endurance by completing at least 2 heel rises.  07/06/19    25' walk  Patient will demonstrate improved gait speed of 6.46 seconds (preferred pace) in order to demonstrate decreased risk for falls and improved bebeto.  07/06/19    Therapy Frequency:  1 time/week   Predicted Duration of Therapy Intervention:  60 days    America Vidal, PT, DPT                                    I CERTIFY THE NEED FOR THESE SERVICES FURNISHED UNDER        THIS PLAN OF TREATMENT AND WHILE  UNDER MY CARE     (Physician co-signature of this document indicates review and certification of the therapy plan).                Certification Date From:    5/8/2019  Certification Date To:   7/6/2019    Referring Provider:  Raymundo Solorio MD    Initial Assessment  See Epic Evaluation- Start of Care Date: 05/08/19

## 2019-05-08 NOTE — PROGRESS NOTES
"   05/08/19 0945   Quick Adds   Quick Adds Certification   Type of Visit Initial OP PT Evaluation   General Information   Start of Care Date 05/08/19   Referring Physician Raymundo Solorio MD   Orders Evaluate and Treat as Indicated   Order Date 05/02/19   Medical Diagnosis Lumbar radiculopathy    Onset of illness/injury or Date of Surgery 05/02/19  (date of order; long history of back pain & nueropathy)   Special Instructions pt with polyradiculopathy and neuropathy.  leg strengthening and conditioning.   Surgical/Medical history reviewed Yes   Pertinent history of current problem (include personal factors and/or comorbidities that impact the POC) Patient is an 83 y.o. female referred to physical therapy to gaurang and treat.  Patient reporting that she has neuropathy in her feet (notices most at night and will use ice packs to help her sleep and chronic low back and neck pain.  Patient also reports that she is dizzy; has had therapy at [facility] for 8 weeks with no significant improvement noted.  Patient reporting that she is feels \"wobbly all the time\" in her legs and that the dizziness is intermittent.  Patient reporting that she will \"wait it out\" when feeling dizzy.  Patient reporting ability to perform ADLs, IADLs, and walk as long as she is not dizzy.  Patient has not been able to garden because on inability to get up from the ground and at times will feel dizzy.  Patient reporting that she is not sure if therapy will help as she has had therapy in the past (pre and post back surgery and for dizziness) without significant improvements; is also worried that Medicare will not pay for therapy.  Pertinent PMH and surgical history includes: HLD, HTN, neuropathy, and lumbar fusion.   Pertinent Visual History  glasses   Prior level of function comment Active and independent - likes to garden, walk, and read.   Current Community Support Family/friend caregiver  ()   Patient " "role/Employment history Retired  (teacher)   Living environment [place]   Home/Community Accessibility Comments Patient lives in a one level home with her  with 14 stairs to balance (rail present).   Current Assistive Devices   (None)   ADL Devices   (None)   Patient/Family Goals Statement \"strength in legs\"   Fall Risk Screen   Fall screen completed by PT   Have you fallen 2 or more times in the past year? No   Have you fallen and had an injury in the past year? No   Timed Up and Go score (seconds) 8.70   Is patient a fall risk? Yes   Fall screen comments Based on below objective findings, patient at risk for falls.   Abuse Screen (yes response referral indicated)   Feels Unsafe at Home or Work/School no   Feels Threatened by Someone no   Does Anyone Try to Keep You From Having Contact with Others or Doing Things Outside Your Home? no   Physical Signs of Abuse Present no   Pain   Pain comments Chronic neck and low back pain is \"ache\" - uses Advil; Neuropathy is \"prickly\" - uses gabapentin    Cognitive Status Examination   Orientation orientation to person, place and time   Level of Consciousness alert   Follows Commands and Answers Questions 100% of the time   Personal Safety and Judgment intact   Memory intact   Posture   Posture Forward head position;Protracted shoulders   Range of Motion (ROM)   ROM Comment Bilateral upper and lower extremity AROM WFL for tasks performed   Strength   Strength Comments Bilateral lower extremity strength impaired based on 30 second sit to/from stand test (see below) and heel-rise test; patient unable to perform without support able to perform 11 on right and 9 on left with bilateral upper extremity support; mean for gender and age 61-80 is 2.7.   Bed Mobility   Bed Mobility Comments Patient reports independence   Transfer Skills   Transfer Comments Independent   Gait   Gait Comments Patient able to ambulate around clinic space independently demonstrating decreased " "speed; no significant postural instability observed.   Gait Special Tests   Gait Special Tests 25 FOOT TIMED WALK   Gait Special Tests 25 Foot Timed Walk   Seconds 7.80  (for age and gender < 6.63 seconds)   Steps 15 Steps   Comments when asked to walk faster: 6.46 seconds in 14 steps   Balance   Balance Comments Patient demonstrating impaired standing balance, see below objective findings.   Balance Special Tests   Balance Special Tests Sit to stand reps;Sharpened Romberg;Romberg;Single leg stance right;Single leg stance left   Balance special tests other Functional reach: 6.5 inches; sorcing indicating 2 times greater risk for falls in the next 6 months   Balance Special Tests Single Leg Stance Right,   Comments unable to perform without upper extremity support   Balance Special Tests Single Leg Stance Left   Comments unable to perform without upper extremity support   Balance Special Tests Romberg   Seconds 30 Seconds   Comments Patient able to obtain position independently and hold for at least 30 seconds with eyes opened and eyes closed; increased postural stay noted with eyes closed and patient reporting feeling very \"wobbly\" and off balanced   Balance Special Tests Sharpened Romberg   Comments Unable to obtain position; able to obtain modified tandem stance (heel by arch) with upper extremity to obtain position, once in position able to hold for at least 20-30 seconds without upper extremity support with ankle strategies observed and patient reporting feeling very \"wobbly\" and off balanced   Balance Special Tests Sit to Stand Reps in 30 Seconds   Reps in 30 seconds 8  (norm for age and gender > 9)   Height 17 inches   Comments no upper extremity support; slow to perform because of fear of getting dizzy   Sensory Examination   Sensory Perception Comments diminished sensation to light touch bilateral feet (left > right); big toe proprioception WNL bilateral   Coordination   Coordination no deficits were " identified   Muscle Tone   Muscle Tone no deficits were identified   Planned Therapy Interventions   Planned Therapy Interventions balance training;neuromuscular re-education;gait training;strengthening;stretching;IADL retraining   Clinical Impression   Criteria for Skilled Therapeutic Interventions Met yes, treatment indicated   PT Diagnosis Impaired balance   Influenced by the following impairments Decreased functional bilateral lower extremity strength, impaired balance, decreased gait speed, chronic neck and low back pain, chronic dizziness   Functional limitations due to impairments Decreased safety and tolerance for IADLs and recreational activities, increased risk for falls   Clinical Presentation Evolving/Changing   Clinical Presentation Rationale Based on current presentation, PMH, and social support.   Clinical Decision Making (Complexity) Moderate complexity   Therapy Frequency 1 time/week   Predicted Duration of Therapy Intervention (days/wks) 60 days   Risk & Benefits of therapy have been explained Yes   Patient, Family & other staff in agreement with plan of care Yes   Education Assessment   Preferred Learning Style Listening;Demonstration;Pictures/video   Barriers to Learning No barriers   GOALS   PT Eval Goals 1;2;3;4   Goal 1   Goal Identifier HEP   Goal Description In order to facilitate gains in general strength and endurance, gait, balance and functional mobility outside of physical therapy, patient will demonstrate independence with a HEP.   Target Date 07/06/19   Goal 2   Goal Identifier Functional Reach   Goal Description In order to demonstrate decreased risk for falls, patient will demonstrate improved functional reach of > 10 inches.   Target Date 07/06/19   Goal 3   Goal Identifier Functional lower extremity strength   Goal Description Patient will report improved ease and tolerance for functional mobility and demonstrate increased functional lower extremity strength and endurance by  completing at least 2 heel rises.   Target Date 07/06/19   Goal 4   Goal Identifier 25' walk   Goal Description Patient will demonstrate improved gait speed of 6.46 seconds (preferred pace) in order to demonstrate decreased risk for falls and improved bebeto.   Target Date 07/06/19   Total Evaluation Time   PT [place], Moderate Complexity Minutes (62380) 30

## 2019-05-15 ENCOUNTER — HOSPITAL ENCOUNTER (OUTPATIENT)
Dept: PHYSICAL THERAPY | Facility: CLINIC | Age: 84
End: 2019-05-15
Payer: MEDICARE

## 2019-05-15 DIAGNOSIS — M54.16 LUMBAR RADICULOPATHY: ICD-10-CM

## 2019-05-15 DIAGNOSIS — R26.89 IMPAIRMENT OF BALANCE: ICD-10-CM

## 2019-05-15 DIAGNOSIS — M62.81 GENERALIZED MUSCLE WEAKNESS: Primary | ICD-10-CM

## 2019-05-15 PROCEDURE — 97112 NEUROMUSCULAR REEDUCATION: CPT | Mod: GP | Performed by: PHYSICAL THERAPIST

## 2019-05-15 PROCEDURE — 97110 THERAPEUTIC EXERCISES: CPT | Mod: GP | Performed by: PHYSICAL THERAPIST

## 2019-05-22 ENCOUNTER — HOSPITAL ENCOUNTER (OUTPATIENT)
Dept: PHYSICAL THERAPY | Facility: CLINIC | Age: 84
End: 2019-05-22
Payer: MEDICARE

## 2019-05-22 DIAGNOSIS — M62.81 GENERALIZED MUSCLE WEAKNESS: Primary | ICD-10-CM

## 2019-05-22 DIAGNOSIS — R26.89 IMPAIRMENT OF BALANCE: ICD-10-CM

## 2019-05-22 DIAGNOSIS — M54.16 LUMBAR RADICULOPATHY: ICD-10-CM

## 2019-05-22 PROCEDURE — 97110 THERAPEUTIC EXERCISES: CPT | Mod: GP | Performed by: PHYSICAL THERAPIST

## 2019-05-22 PROCEDURE — 97112 NEUROMUSCULAR REEDUCATION: CPT | Mod: GP | Performed by: PHYSICAL THERAPIST

## 2019-05-29 ENCOUNTER — HOSPITAL ENCOUNTER (OUTPATIENT)
Dept: PHYSICAL THERAPY | Facility: CLINIC | Age: 84
End: 2019-05-29
Payer: MEDICARE

## 2019-05-29 DIAGNOSIS — R26.89 IMPAIRMENT OF BALANCE: Primary | ICD-10-CM

## 2019-05-29 DIAGNOSIS — M62.81 GENERALIZED MUSCLE WEAKNESS: ICD-10-CM

## 2019-05-29 DIAGNOSIS — M54.16 LUMBAR RADICULOPATHY: ICD-10-CM

## 2019-05-29 PROCEDURE — 97110 THERAPEUTIC EXERCISES: CPT | Mod: GP | Performed by: PHYSICAL THERAPIST

## 2019-05-29 PROCEDURE — 97112 NEUROMUSCULAR REEDUCATION: CPT | Mod: GP | Performed by: PHYSICAL THERAPIST

## 2019-05-30 ASSESSMENT — ENCOUNTER SYMPTOMS
MEMORY LOSS: 0
STIFFNESS: 1
HEADACHES: 0
SEIZURES: 0
PARALYSIS: 0
DIZZINESS: 1
JOINT SWELLING: 1
MUSCLE CRAMPS: 1
TINGLING: 1
DISTURBANCES IN COORDINATION: 1
NECK PAIN: 1
WEAKNESS: 1
LOSS OF CONSCIOUSNESS: 0
MUSCLE WEAKNESS: 1
MYALGIAS: 1
TREMORS: 0
ARTHRALGIAS: 1
NUMBNESS: 1
SPEECH CHANGE: 0
BACK PAIN: 1

## 2019-06-05 ENCOUNTER — HOSPITAL ENCOUNTER (OUTPATIENT)
Dept: PHYSICAL THERAPY | Facility: CLINIC | Age: 84
End: 2019-06-05
Payer: MEDICARE

## 2019-06-05 DIAGNOSIS — M62.81 GENERALIZED MUSCLE WEAKNESS: Primary | ICD-10-CM

## 2019-06-05 DIAGNOSIS — R26.89 IMPAIRMENT OF BALANCE: ICD-10-CM

## 2019-06-05 DIAGNOSIS — M54.16 LUMBAR RADICULOPATHY: ICD-10-CM

## 2019-06-05 PROCEDURE — 97112 NEUROMUSCULAR REEDUCATION: CPT | Mod: GP | Performed by: PHYSICAL THERAPIST

## 2019-06-05 PROCEDURE — 97110 THERAPEUTIC EXERCISES: CPT | Mod: GP | Performed by: PHYSICAL THERAPIST

## 2019-06-05 PROCEDURE — 97530 THERAPEUTIC ACTIVITIES: CPT | Mod: GP | Performed by: PHYSICAL THERAPIST

## 2019-06-07 ENCOUNTER — OFFICE VISIT (OUTPATIENT)
Dept: NEUROLOGY | Facility: CLINIC | Age: 84
End: 2019-06-07
Payer: MEDICARE

## 2019-06-07 VITALS
WEIGHT: 131.6 LBS | SYSTOLIC BLOOD PRESSURE: 137 MMHG | BODY MASS INDEX: 25.84 KG/M2 | OXYGEN SATURATION: 96 % | DIASTOLIC BLOOD PRESSURE: 74 MMHG | HEIGHT: 60 IN | HEART RATE: 79 BPM

## 2019-06-07 DIAGNOSIS — M54.16 LUMBAR POLYRADICULOPATHY: Primary | ICD-10-CM

## 2019-06-07 ASSESSMENT — PAIN SCALES - GENERAL: PAINLEVEL: NO PAIN (0)

## 2019-06-07 ASSESSMENT — MIFFLIN-ST. JEOR: SCORE: 973.43

## 2019-06-07 NOTE — LETTER
6/7/2019       RE: Kerri Rocha  [address] [facility] [address]     Dear Colleague,    Thank you for referring your patient, Kerri Rocha, to the [facility] NEUROLOGY at [facility]. Please see a copy of my visit note below.    Service Date: 06/07/2019      INTERVAL HISTORY:  This patient is seen in followup with multiple issues of imbalance, neuropathy and lumbar radiculopathy.  I saw her most recently in early May.  She has longstanding issues with dizziness.  Her main complaint is that when she is working in the garden she has a difficult time getting up off her knees because she will get dizzy and lightheaded.  She has prickly feet from neuropathy.  She has had an extensive course of evaluation and treatment including through the [facility], and that was not very helpful to her.  She was in clinic in May and at that time I decided to focus on her chronic lumbar radiculopathy.  She has had lumbar spinal fusion at L4-L5.  I thought that if we could improve strength and conditioning in the legs through physical therapy that that might help part of her problem.  She has been going to therapy.  The dizziness is not any better.  She has chronic low back pain and was wondering if she could get an epidural steroid injection.  Her calves are painful bilaterally and her feet have neuropathic symptoms.  She is on gabapentin 2400 mg a day, which helps her neuropathy.  Her sleep is good, but she does take Tylenol PM to help with her sleep.  She thinks the therapy may be helping somewhat focusing on the exercise.      PHYSICAL EXAMINATION:     GENERAL:  The patient is cooperative and in no distress.     VITAL SIGNS:  Her blood pressure is 137/74.     NEUROLOGIC:  She easily gets out of a chair without the use of her arms.  She can walk on her heels and toes.  She can perform a partial genuflection on each leg individually holding onto me for balance.   Reflexes are present at the knees and absent at the ankles.      I reviewed again the MRI of the lumbar spine.  There is multilevel degenerative change.  There is a moderately severe left and mild right neural foraminal narrowing at L3-L4, and there is also a cyst at the right neural foramina of L5-S1 likely impinging the right S1 nerve root.      ASSESSMENT:   1.  Dizziness, lightheadedness and imbalance.   2.  Chronic lumbar radiculopathy.   3.  Neuropathy.      DISCUSSION:  The patient is seen with the above problem list.  The patient's main complaint has to do with difficulty getting up off her knees when she is working in her garden and this is extremely frustrating to her.  I think most of this relates to deconditioning and leg weakness related to her neuropathy and chronic lumbar problems.  She is in a course of physical therapy for the legs and I am going to recommend a conditioning program for core strengthening.  I have sent this referral in to physical therapy.  I will see her in followup in 3 months for reexamination.  I encouraged her to call if there are questions or problems before then.      Raymundo Solorio MD       D: 2019   T: 2019   MT: AKA      Name:     TUTU HOOKER   MRN:      [id number]        Account:      [id number]   :      1935           Service Date: 2019      Document: [id number]

## 2019-06-07 NOTE — NURSING NOTE
Chief Complaint   Patient presents with     RECHECK     [facility] RETURN NEUROPATHY AND DIZZINESS 4-6WKS       Monica Zuleta, EMT

## 2019-06-07 NOTE — PROGRESS NOTES
Service Date: 06/07/2019      INTERVAL HISTORY:  This patient is seen in followup with multiple issues of imbalance, neuropathy and lumbar radiculopathy.  I saw her most recently in early May.  She has longstanding issues with dizziness.  Her main complaint is that when she is working in the garden she has a difficult time getting up off her knees because she will get dizzy and lightheaded.  She has prickly feet from neuropathy.  She has had an extensive course of evaluation and treatment including through the [facility], and that was not very helpful to her.  She was in clinic in May and at that time I decided to focus on her chronic lumbar radiculopathy.  She has had lumbar spinal fusion at L4-L5.  I thought that if we could improve strength and conditioning in the legs through physical therapy that that might help part of her problem.  She has been going to therapy.  The dizziness is not any better.  She has chronic low back pain and was wondering if she could get an epidural steroid injection.  Her calves are painful bilaterally and her feet have neuropathic symptoms.  She is on gabapentin 2400 mg a day, which helps her neuropathy.  Her sleep is good, but she does take Tylenol PM to help with her sleep.  She thinks the therapy may be helping somewhat focusing on the exercise.      PHYSICAL EXAMINATION:     GENERAL:  The patient is cooperative and in no distress.     VITAL SIGNS:  Her blood pressure is 137/74.     NEUROLOGIC:  She easily gets out of a chair without the use of her arms.  She can walk on her heels and toes.  She can perform a partial genuflection on each leg individually holding onto me for balance.  Reflexes are present at the knees and absent at the ankles.      I reviewed again the MRI of the lumbar spine.  There is multilevel degenerative change.  There is a moderately severe left and mild right neural foraminal narrowing at L3-L4, and there is also a cyst at the right  neural foramina of L5-S1 likely impinging the right S1 nerve root.      ASSESSMENT:   1.  Dizziness, lightheadedness and imbalance.   2.  Chronic lumbar radiculopathy.   3.  Neuropathy.      DISCUSSION:  The patient is seen with the above problem list.  The patient's main complaint has to do with difficulty getting up off her knees when she is working in her garden and this is extremely frustrating to her.  I think most of this relates to deconditioning and leg weakness related to her neuropathy and chronic lumbar problems.  She is in a course of physical therapy for the legs and I am going to recommend a conditioning program for core strengthening.  I have sent this referral in to physical therapy.  I will see her in followup in 3 months for reexamination.  I encouraged her to call if there are questions or problems before then.      MD WILVER Alatorre MD             D: 2019   T: 2019   MT: AKA      Name:     TUTU HOOKER   MRN:      [id number]        Account:      [id number]   :      1935           Service Date: 2019      Document: [id number]

## 2019-06-12 ENCOUNTER — HOSPITAL ENCOUNTER (OUTPATIENT)
Dept: PHYSICAL THERAPY | Facility: CLINIC | Age: 84
End: 2019-06-12
Payer: MEDICARE

## 2019-06-12 DIAGNOSIS — M62.81 GENERALIZED MUSCLE WEAKNESS: Primary | ICD-10-CM

## 2019-06-12 DIAGNOSIS — R26.89 IMPAIRMENT OF BALANCE: ICD-10-CM

## 2019-06-12 DIAGNOSIS — M54.16 LUMBAR RADICULOPATHY: ICD-10-CM

## 2019-06-12 PROCEDURE — 97112 NEUROMUSCULAR REEDUCATION: CPT | Mod: GP | Performed by: PHYSICAL THERAPIST

## 2019-06-12 PROCEDURE — 97110 THERAPEUTIC EXERCISES: CPT | Mod: GP | Performed by: PHYSICAL THERAPIST

## 2019-06-26 ENCOUNTER — HOSPITAL ENCOUNTER (OUTPATIENT)
Dept: PHYSICAL THERAPY | Facility: CLINIC | Age: 84
End: 2019-06-26
Payer: MEDICARE

## 2019-06-26 DIAGNOSIS — R26.89 IMPAIRMENT OF BALANCE: ICD-10-CM

## 2019-06-26 DIAGNOSIS — M54.16 LUMBAR RADICULOPATHY: ICD-10-CM

## 2019-06-26 DIAGNOSIS — M62.81 GENERALIZED MUSCLE WEAKNESS: Primary | ICD-10-CM

## 2019-06-26 PROCEDURE — 97112 NEUROMUSCULAR REEDUCATION: CPT | Mod: GP | Performed by: PHYSICAL THERAPIST

## 2019-06-26 PROCEDURE — 97110 THERAPEUTIC EXERCISES: CPT | Mod: GP | Performed by: PHYSICAL THERAPIST

## 2019-07-01 ENCOUNTER — TELEPHONE (OUTPATIENT)
Dept: NEUROLOGY | Facility: CLINIC | Age: 84
End: 2019-07-01

## 2019-07-01 DIAGNOSIS — E78.5 HYPERLIPIDEMIA LDL GOAL <100: ICD-10-CM

## 2019-07-01 DIAGNOSIS — G62.9 NEUROPATHY: ICD-10-CM

## 2019-07-01 DIAGNOSIS — I10 BENIGN ESSENTIAL HYPERTENSION: ICD-10-CM

## 2019-07-01 DIAGNOSIS — M81.0 AGE RELATED OSTEOPOROSIS, UNSPECIFIED PATHOLOGICAL FRACTURE PRESENCE: ICD-10-CM

## 2019-07-01 RX ORDER — LISINOPRIL 10 MG/1
10 TABLET ORAL DAILY
Qty: 90 TABLET | Refills: 0 | Status: SHIPPED | OUTPATIENT
Start: 2019-07-01 | End: 2019-08-02

## 2019-07-01 RX ORDER — AMLODIPINE BESYLATE 10 MG/1
10 TABLET ORAL DAILY
Qty: 90 TABLET | Refills: 0 | Status: SHIPPED | OUTPATIENT
Start: 2019-07-01 | End: 2019-08-02

## 2019-07-01 RX ORDER — GABAPENTIN 400 MG/1
CAPSULE ORAL
Qty: 300 CAPSULE | Refills: 2 | Status: SHIPPED | OUTPATIENT
Start: 2019-07-01 | End: 2020-03-25

## 2019-07-01 RX ORDER — ATORVASTATIN CALCIUM 20 MG/1
20 TABLET, FILM COATED ORAL AT BEDTIME
Qty: 90 TABLET | Refills: 0 | Status: SHIPPED | OUTPATIENT
Start: 2019-07-01 | End: 2019-08-02

## 2019-07-01 NOTE — TELEPHONE ENCOUNTER
Attempted to reach patient regarding what change was suppose to be made to the medication. Want to clarify for accuracy before sending in new script

## 2019-07-01 NOTE — TELEPHONE ENCOUNTER
Rx Authorization:    Date last refill ordered: 2/15/19    Quantity ordered: 300    # refills: 2    Date of last clinic visit: 6/7/19    Date of next clinic visit: 9/19/19    Include pertinent information from patients message: Script needs to be changed from primary to neurology for prescribing.

## 2019-07-01 NOTE — TELEPHONE ENCOUNTER
[facility] Call Center    Phone Message    May a detailed message be left on voicemail: no    Reason for Call: Medication Refill Request    Has the patient contacted the pharmacy for the refill? Yes   Name of medication being requested: lisinopril (PRINIVIL/ZESTRIL) 10 MG tablet  amLODIPine (NORVASC) 10 MG tablet  atorvastatin (LIPITOR) 20 MG tablet  Provider who prescribed the medication:  Dr. Kate  Pharmacy: [facility]  Date medication is needed: Pt has enough for 7 days      Action Taken: Message routed to:  [facility] MED REFILLS TEAM

## 2019-07-01 NOTE — TELEPHONE ENCOUNTER
[facility] Call Center    Phone Message    May a detailed message be left on voicemail: no    Reason for Call: Medication Question or concern regarding medication   Prescription Clarification  Name of Medication: gabapentin (NEURONTIN) 400 MG capsule  Prescribing Provider: Dr. Kate listed, med change done by Freya Dumont, NP, Pt's neurologist is Dr. Solorio   Pharmacy: [facility]   What on the order needs clarification? Pt says her med script that was changed by Freya Dumont was never sent out to be changed to her pharmacy.          Action Taken: Message routed to:  [facility] ([facility]): [facility] NEUROLOGY ADULT [facility]

## 2019-07-01 NOTE — TELEPHONE ENCOUNTER
RX clarified with patient, sent to [facility] for refill

## 2019-07-03 ENCOUNTER — HOSPITAL ENCOUNTER (OUTPATIENT)
Dept: PHYSICAL THERAPY | Facility: CLINIC | Age: 84
End: 2019-07-03
Payer: MEDICARE

## 2019-07-03 DIAGNOSIS — M62.81 GENERALIZED MUSCLE WEAKNESS: Primary | ICD-10-CM

## 2019-07-03 DIAGNOSIS — M54.16 LUMBAR RADICULOPATHY: ICD-10-CM

## 2019-07-03 DIAGNOSIS — R26.89 IMPAIRMENT OF BALANCE: ICD-10-CM

## 2019-07-03 PROCEDURE — 97110 THERAPEUTIC EXERCISES: CPT | Mod: GP | Performed by: PHYSICAL THERAPIST

## 2019-07-03 PROCEDURE — 97112 NEUROMUSCULAR REEDUCATION: CPT | Mod: GP | Performed by: PHYSICAL THERAPIST

## 2019-07-03 NOTE — PROGRESS NOTES
[facility]      OUTPATIENT PHYSICAL THERAPY  PLAN OF TREATMENT FOR OUTPATIENT REHABILITATION    Patient's Last Name, First Name, M.I.                YOB: 1935  Kerri Rocha                           Provider's Name  [facility] Medical Record No.  [id number]                               Onset Date: 05/02/19(date of order; long history of back pain & nueropathy)   Start of Care Date: 5/8/2019   Type:     _X_PT   ___OT   ___SLP Medical Diagnosis: Lumbar radiculopathy                        PT Diagnosis: Impaired balance      _________________________________________________________________________________  Plan of Treatment:  balance training, neuromuscular re-education, gait training, strengthening, stretching, IADL retraining    Frequency/Duration: 1x/week for 4 weeks     Goals:    Goal Identifier HEP   Goal Description In order to facilitate gains in general strength and endurance, gait, balance and functional mobility outside of physical therapy, patient will demonstrate independence with a HEP.   Target Date 08/09/19   Date Met      Progress: Has been able tolerate progression of exercises, continues to benefit from cues to perform correctly.      Goal Identifier MET: Functional Reach   Goal Description In order to demonstrate decreased risk for falls, patient will demonstrate improved functional reach of > 10 inches.   Target Date 07/06/19   Date Met  07/03/19   Progress:      Goal Identifier Functional lower extremity strength   Goal Description Patient will report improved ease and tolerance for functional mobility and demonstrate increased functional lower extremity strength and endurance by completing at least 2 heel rises.   Target Date 08/09/19   Date Met      Progress: Improved since initial evaluation; able to complete 1 on each side with light finger tips on wall vs  requiring upper extremity support to complete.      Goal Identifier MET: 25' walk   Goal Description Patient will demonstrate improved gait speed of 6.46 seconds (preferred pace) in order to demonstrate decreased risk for falls and improved bebeto.   Target Date 07/06/19   Date Met  07/03/19   Progress:      Progress Toward Goals: Patient has met 2 out of 4 goals and is demonstrating good progression towards remaining goals.  Patient continues to present with limitations with regards to functional core and bilateral lower extremity strength, however, is improving.  Patient will continue to benefit from skilled physical therapy to reach remaining goals in order to maximize tolerance, ease, and safety with functional mobility tasks, IADLs and recreational activities, and minimize risk for falls.     Certification date from 7/3/2019 to 8/9/2019.    America Vidal, PT, DPT          I CERTIFY THE NEED FOR THESE SERVICES FURNISHED UNDER        THIS PLAN OF TREATMENT AND WHILE UNDER MY CARE     (Physician co-signature of this document indicates review and certification of the therapy plan).                 Referring Provider: Raymundo Solorio MD

## 2019-07-03 NOTE — PROGRESS NOTES
"Outpatient Physical Therapy Progress Note     Patient: Kerri Rocha  : 1935    Beginning/End Dates of Reporting Period:  2019 to 7/3/2019; total of     Referring Provider: Raymundo Solorio MD    Therapy Diagnosis: Impaired balance     Client Self Report: Patient arriving today and states that she had no dizzy spells this past week and that her plantar fasciitis is better.  Patient reporting occasional pain in right lower back (T10-L2 area) and describes it as \"achy\" and can be \"sharp\" if turn too fast.  Patient reports that overall things have improved since attending physical therapy.  Patient reporting that she still needs to hold onto something when getting up from the ground, but is okay with this.  Patient reporting that her walking is better - no longer needs to reach out to hold onto things when walking.  Patient reporting that stairs are difficult - \"I don't feel steady, I have to hang onto the railing, if my back is out I have to go one step at a time.\"  Patient reporting that she has changed her primary provider.    Objective Measurements:  Objective Measure: functional reach  Details: 10.5 inches(at eval: 6.5 inches)  Objective Measure: heel raise  Details: able to complete 1 on each side with finger tips on wall(at eval required bilateral upper extremity support)  Objective Measure: 25' walk  Details: 6.27 seconds in 13 steps(at eval 7.80 seconds in 15 steps)      Outcome Measures (most recent score): N/A    Goals:  Goal Identifier HEP   Goal Description In order to facilitate gains in general strength and endurance, gait, balance and functional mobility outside of physical therapy, patient will demonstrate independence with a HEP.   Target Date 19   Date Met      Progress: Has been able tolerate progression of exercises, continues to benefit from cues to perform correctly.     Goal Identifier MET: Functional Reach   Goal Description In order to demonstrate decreased risk for " falls, patient will demonstrate improved functional reach of > 10 inches.   Target Date 07/06/19   Date Met  07/03/19   Progress:     Goal Identifier Functional lower extremity strength   Goal Description Patient will report improved ease and tolerance for functional mobility and demonstrate increased functional lower extremity strength and endurance by completing at least 2 heel rises.   Target Date 08/09/19   Date Met      Progress: See above     Goal Identifier MET: 25' walk   Goal Description Patient will demonstrate improved gait speed of 6.46 seconds (preferred pace) in order to demonstrate decreased risk for falls and improved bebeto.   Target Date 07/06/19   Date Met  07/03/19   Progress:     Progress Toward Goals: Patient has met 2 out of 4 goals and is demonstrating good progression towards remaining goals.  Patient continues to present with limitations with regards to functional core and bilateral lower extremity strength, however, is improving.  Patient will continue to benefit from skilled physical therapy to reach remaining goals in order to maximize tolerance, ease, and safety with functional mobility tasks, IADLs and recreational activities, and minimize risk for falls.    Plan:  Continue therapy per current plan of care; 1x/week for 4 weeks    Discharge:  No

## 2019-07-10 ENCOUNTER — HOSPITAL ENCOUNTER (OUTPATIENT)
Dept: PHYSICAL THERAPY | Facility: CLINIC | Age: 84
End: 2019-07-10
Payer: MEDICARE

## 2019-07-10 DIAGNOSIS — M62.81 GENERALIZED MUSCLE WEAKNESS: ICD-10-CM

## 2019-07-10 DIAGNOSIS — M54.16 LUMBAR RADICULOPATHY: ICD-10-CM

## 2019-07-10 DIAGNOSIS — R26.89 IMPAIRMENT OF BALANCE: Primary | ICD-10-CM

## 2019-07-10 PROCEDURE — 97112 NEUROMUSCULAR REEDUCATION: CPT | Mod: GP | Performed by: PHYSICAL THERAPIST

## 2019-07-10 PROCEDURE — 97110 THERAPEUTIC EXERCISES: CPT | Mod: GP | Performed by: PHYSICAL THERAPIST

## 2019-07-15 ENCOUNTER — HOSPITAL ENCOUNTER (OUTPATIENT)
Dept: PHYSICAL THERAPY | Facility: CLINIC | Age: 84
End: 2019-07-15
Payer: MEDICARE

## 2019-07-15 DIAGNOSIS — M62.81 GENERALIZED MUSCLE WEAKNESS: Primary | ICD-10-CM

## 2019-07-15 DIAGNOSIS — R26.89 IMPAIRMENT OF BALANCE: ICD-10-CM

## 2019-07-15 DIAGNOSIS — M54.16 LUMBAR RADICULOPATHY: ICD-10-CM

## 2019-07-15 PROCEDURE — 97110 THERAPEUTIC EXERCISES: CPT | Mod: GP | Performed by: PHYSICAL THERAPIST

## 2019-07-15 PROCEDURE — 97112 NEUROMUSCULAR REEDUCATION: CPT | Mod: GP | Performed by: PHYSICAL THERAPIST

## 2019-07-15 NOTE — PROGRESS NOTES
"Outpatient Physical Therapy Discharge Note     Patient: Kerri Rocha  : 1935    Beginning/End Dates of Reporting Period:  7/3/2019 to 7/15/2019; total of 10 visits from 2019 - 7/15/2019    Referring Provider: Raymundo Solorio MD    Therapy Diagnosis: Impaired balance     Client Self Report: Patient arriving today and reports things are going well; no dizziness since previous session, still feels \"on and off\" with balance - states that is from the neuropathy, some pain with performing plank.  Patient reports that she signed up for [employer] at the [facility]; has not gone to a class yet. About 10-12 minutes into session patient reporting feeling good and confident with independent HEP and asked to be discharged.  Overall, patient pleased with her progress and is feeling much better with general mobility and functional activities.    Objective Measurements:  Objective Measure: heel raise  Details: able to complete 2 on each side with finger tips on wall     Goals:  Goal Identifier MET: HEP   Goal Description In order to facilitate gains in general strength and endurance, gait, balance and functional mobility outside of physical therapy, patient will demonstrate independence with a HEP.   Target Date 19   Date Met  07/15/19   Progress:     Goal Identifier MET: Functional Reach   Goal Description In order to demonstrate decreased risk for falls, patient will demonstrate improved functional reach of > 10 inches.   Target Date 19   Date Met  19   Progress:     Goal Identifier MET: Functional lower extremity strength   Goal Description Patient will report improved ease and tolerance for functional mobility and demonstrate increased functional lower extremity strength and endurance by completing at least 2 heel rises.   Target Date 19   Date Met  07/15/19   Progress:     Goal Identifier MET: 25' walk   Goal Description Patient will demonstrate improved gait " speed of 6.46 seconds (preferred pace) in order to demonstrate decreased risk for falls and improved bebeto.   Target Date 07/06/19   Date Met  07/03/19   Progress:     Progress Toward Goals: Patient has met all goals at this time.  Patient continues to report some balance issues, however, objective assessments and performance of higher level balance tasks indicating minimal risk for falls and patient demonstrating ability to continue to address this with performance of independent HEP.    Plan:  Discharge from therapy.    Discharge:    Reason for Discharge: Patient has met all goals.    Equipment Issued: N/A    Discharge Plan: Patient to continue home program and is planning to participate in community exercise classes.

## 2019-08-01 ASSESSMENT — ENCOUNTER SYMPTOMS
JOINT SWELLING: 0
SORE THROAT: 0
NAUSEA: 0
DIZZINESS: 0
MYALGIAS: 1
FREQUENCY: 0
DYSURIA: 0
EYE PAIN: 0
PALPITATIONS: 0
FEVER: 0
CONSTIPATION: 0
PARESTHESIAS: 0
WEAKNESS: 0
ARTHRALGIAS: 1
HEMATOCHEZIA: 0
BREAST MASS: 0
CHILLS: 0
HEARTBURN: 0
HEMATURIA: 0
ABDOMINAL PAIN: 0
DIARRHEA: 0
COUGH: 1
HEADACHES: 0
SHORTNESS OF BREATH: 0
NERVOUS/ANXIOUS: 0

## 2019-08-01 ASSESSMENT — ACTIVITIES OF DAILY LIVING (ADL): CURRENT_FUNCTION: NO ASSISTANCE NEEDED

## 2019-08-02 ENCOUNTER — OFFICE VISIT (OUTPATIENT)
Dept: PEDIATRICS | Facility: CLINIC | Age: 84
End: 2019-08-02
Payer: MEDICARE

## 2019-08-02 VITALS
WEIGHT: 133 LBS | DIASTOLIC BLOOD PRESSURE: 56 MMHG | TEMPERATURE: 98.2 F | OXYGEN SATURATION: 97 % | HEART RATE: 71 BPM | HEIGHT: 60 IN | SYSTOLIC BLOOD PRESSURE: 128 MMHG | BODY MASS INDEX: 26.11 KG/M2

## 2019-08-02 DIAGNOSIS — M81.6 LOCALIZED OSTEOPOROSIS WITHOUT CURRENT PATHOLOGICAL FRACTURE: ICD-10-CM

## 2019-08-02 DIAGNOSIS — R60.0 LOWER EXTREMITY EDEMA: ICD-10-CM

## 2019-08-02 DIAGNOSIS — E78.2 MIXED HYPERLIPIDEMIA: ICD-10-CM

## 2019-08-02 DIAGNOSIS — I10 ESSENTIAL HYPERTENSION: ICD-10-CM

## 2019-08-02 DIAGNOSIS — Z00.00 ENCOUNTER FOR MEDICARE ANNUAL WELLNESS EXAM: Primary | ICD-10-CM

## 2019-08-02 DIAGNOSIS — G60.9 IDIOPATHIC PERIPHERAL NEUROPATHY: ICD-10-CM

## 2019-08-02 PROCEDURE — 99213 OFFICE O/P EST LOW 20 MIN: CPT | Mod: 25 | Performed by: INTERNAL MEDICINE

## 2019-08-02 PROCEDURE — G0439 PPPS, SUBSEQ VISIT: HCPCS | Performed by: INTERNAL MEDICINE

## 2019-08-02 RX ORDER — LISINOPRIL 10 MG/1
10 TABLET ORAL DAILY
Qty: 90 TABLET | Refills: 3 | Status: SHIPPED | OUTPATIENT
Start: 2019-08-02 | End: 2020-08-10

## 2019-08-02 RX ORDER — ATORVASTATIN CALCIUM 20 MG/1
20 TABLET, FILM COATED ORAL AT BEDTIME
Qty: 90 TABLET | Refills: 3 | Status: SHIPPED | OUTPATIENT
Start: 2019-08-02 | End: 2020-08-10

## 2019-08-02 RX ORDER — AMLODIPINE BESYLATE 10 MG/1
10 TABLET ORAL DAILY
Qty: 90 TABLET | Refills: 3 | Status: SHIPPED | OUTPATIENT
Start: 2019-08-02 | End: 2020-08-10

## 2019-08-02 ASSESSMENT — ENCOUNTER SYMPTOMS
HEARTBURN: 0
NAUSEA: 0
JOINT SWELLING: 0
SORE THROAT: 0
COUGH: 1
DIARRHEA: 0
PALPITATIONS: 0
ABDOMINAL PAIN: 0
CHILLS: 0
MYALGIAS: 1
FEVER: 0
NERVOUS/ANXIOUS: 0
DYSURIA: 0
SHORTNESS OF BREATH: 0
CONSTIPATION: 0
PARESTHESIAS: 0
ARTHRALGIAS: 1
WEAKNESS: 0
EYE PAIN: 0
BREAST MASS: 0
HEADACHES: 0
HEMATOCHEZIA: 0
FREQUENCY: 0
HEMATURIA: 0
DIZZINESS: 0

## 2019-08-02 ASSESSMENT — ACTIVITIES OF DAILY LIVING (ADL): CURRENT_FUNCTION: NO ASSISTANCE NEEDED

## 2019-08-02 ASSESSMENT — MIFFLIN-ST. JEOR: SCORE: 977.29

## 2019-08-02 NOTE — PROGRESS NOTES
"SUBJECTIVE:   Kerri Rocha is a 84 year old female who presents for Preventive Visit.    Are you in the first 12 months of your Medicare coverage?  No    Healthy Habits:     In general, how would you rate your overall health?  Good    Frequency of exercise:  6-7 days/week    Duration of exercise:  15-30 minutes    Do you usually eat at least 4 servings of fruit and vegetables a day, include whole grains    & fiber and avoid regularly eating high fat or \"junk\" foods?  Yes    Taking medications regularly:  Yes    Medication side effects:  None    Ability to successfully perform activities of daily living:  No assistance needed    Home Safety:  No safety concerns identified    Hearing Impairment:  No hearing concerns    In the past 6 months, have you been bothered by leaking of urine?  No    In general, how would you rate your overall mental or emotional health?  Good      PHQ-2 Total Score: 0    Additional concerns today:  Yes    Other Concerns: Would like Referral for Neurologist   ------------------------------------    # Idiopathic neuropathy (right foot to mid ankle, left foot and starting to go up mid ankle)  - [facility]  - sees Neurology - said she was complicated bc the dizziness doesn't usually go with neuropathy  - physical therapy was really helpful for the dizziness  - imbalance is still there - does exercises - doesn't this this is going to get better  - wonders if there is a more local Neurologist she can see, hard to get to the .  - 6/2019 Neurology - Dr. Solorio  I reviewed again the MRI of the lumbar spine.  There is multilevel degenerative change.  There is a moderately severe left and mild right neural foraminal narrowing at L3-L4, and there is also a cyst at the right neural foramina of L5-S1 likely impinging the right S1 nerve root.   ASSESSMENT:   1.  Dizziness, lightheadedness and imbalance.   2.  Chronic lumbar radiculopathy.   3.  Neuropathy.   DISCUSSION:  The patient " is seen with the above problem list.  The patient's main complaint has to do with difficulty getting up off her knees when she is working in her garden and this is extremely frustrating to her.  I think most of this relates to deconditioning and leg weakness related to her neuropathy and chronic lumbar problems.  She is in a course of physical therapy for the legs and I am going to recommend a conditioning program for core strengthening.  I have sent this referral in to physical therapy.  I will see her in followup in 3 months for reexamination.  I encouraged her to call if there are questions or problems before then.       4/2018 Last Physical with Dr. Kate  1. HTN. Refill amlodipine and lisinopril. Increase amlodipine to 10 mg. She will f/u with how her blood pressure is doing as checks at home regularly. (also BP is much better at home 120s/80, than here in clinic today). BMP due  2. R knee pain. Obtain xray, refer to sports med  3. Neuropathy. Will increase gabapentin again, as previously tolerated this dose, although this does increase her total dose to 4000 mg daily  4. Dizziness. [facility]  5. HLD. Refill atorvastatin, check labs.   6. Chest pain. Unlikely to be cardiac in nature, EKG reassuring  7. Hoarse voice. Not noted on exam today and patient doesn't notice it herself. If becomes more concerning, could refer to ENT.     # HTN  - amlodipine 10mg, lisinopril 10mg  - last BMP 5/2018   Ref. Range 5/4/2018 10:19   Sodium Latest Ref Range: 133 - 144 mmol/L 142   Potassium Latest Ref Range: 3.4 - 5.3 mmol/L 3.9   Chloride Latest Ref Range: 94 - 109 mmol/L 108   Carbon Dioxide Latest Ref Range: 20 - 32 mmol/L 22   Urea Nitrogen Latest Ref Range: 7 - 30 mg/dL 21   Creatinine Latest Ref Range: 0.52 - 1.04 mg/dL 0.95   GFR Estimate Latest Ref Range: >60 mL/min/1.7m2 57 (L)   GFR Estimate If Black Latest Ref Range: >60 mL/min/1.7m2 68   Calcium Latest Ref Range: 8.5 - 10.1  mg/dL 9.6     # HLD  - atorvastatin 20mg  - last lipids   Ref. Range 1/27/2017 10:59   Cholesterol Latest Ref Range: <200 mg/dL 153   HDL Cholesterol Latest Ref Range: >49 mg/dL 61   LDL Cholesterol Calculated Latest Ref Range: <100 mg/dL 73   Non HDL Cholesterol Latest Ref Range: <130 mg/dL 92   Triglycerides Latest Ref Range: <150 mg/dL 96     # Osteoporosis  # Vitamin D def  - vitamin D 2000 international unit(s) daily  - 4/2017 Vitamin D 25OH: 61  - 3/2017 Dual energy x-ray absorptiometry results:  Region BMD T - score Z - score   Neck Left 0.707 g/cm  -2.4 0.0   Total Left 0.732 g/cm  -2.2 0.1             Neck Right 0.981 g/cm  -0.4 2.0   Total Right 0.796 g/cm  -1.7 0.6             Radius 33% 0.460 g/cm  -3.5 -0.7                       Conclusions:  The most negative and valid T-score of   -3.5   at the level of the wrist,      at the level of the total body,   corresponds with  osteoporosis.    ----------------------------------------------------------------    Do you feel safe in your environment? Yes    Do you have a Health Care Directive? Yes: Patient states has Advance Directive and will bring in a copy to clinic.    Fall risk  Fallen 2 or more times in the past year?: No  Any fall with injury in the past year?: No    Cognitive Screening   1) Repeat 3 items (Leader, Season, Table)    2) Clock draw: NORMAL  3) 3 item recall: Recalls 2 objects   Results: NORMAL clock, 1-2 items recalled: COGNITIVE IMPAIRMENT LESS LIKELY    Mini-CogTM Copyright [employer] [place]. Licensed by the author for use in [employer]; reprinted with permission (trina@.[email or web address]). All rights reserved.      Do you have sleep apnea, excessive snoring or daytime drowsiness?: no    Reviewed and updated as needed this visit by clinical staff  Tobacco  Allergies  Meds  Med Hx  Surg Hx  Fam Hx  Soc Hx      Reviewed and updated as needed this visit by Provider  Meds  Med Hx  Surg Hx        Social History     Tobacco Use     Smoking  status: Never Smoker     Smokeless tobacco: Never Used   Substance Use Topics     Alcohol use: Not Currently     Alcohol/week: 0.6 oz     Types: 1 Standard drinks or equivalent per week     Comment: 1 glass of wine per week     If you drink alcohol do you typically have >3 drinks per day or >7 drinks per week? No    Alcohol Use 8/2/2019   Prescreen: >3 drinks/day or >7 drinks/week? -   Prescreen: >3 drinks/day or >7 drinks/week? No     Current providers sharing in care for this patient include:   Patient Care Team:  Parrish Arizmendi MD as PCP - General (Internal Medicine)  Raymundo Solorio MD as MD (Neurology)    The following health maintenance items are reviewed in Epic and correct as of today:  Health Maintenance   Topic Date Due     ANNUAL REVIEW OF HM ORDERS  1935     ADVANCE CARE PLANNING  1935     ZOSTER IMMUNIZATION (1 of 2) 05/19/1986     PNEUMOCOCCAL IMMUNIZATION 65+ LOW/MEDIUM RISK (2 of 2 - PPSV23) 10/20/2005     MEDICARE ANNUAL WELLNESS VISIT  04/23/2019     FALL RISK ASSESSMENT  04/23/2019     INFLUENZA VACCINE (1) 09/01/2019     DTAP/TDAP/TD IMMUNIZATION (4 - Td) 08/15/2022     DEXA  Completed     PHQ-2  Completed     IPV IMMUNIZATION  Aged Out     MENINGITIS IMMUNIZATION  Aged Out     Pneumonia Vaccine:Adults age 65+ who have not received previous Pneumovax (PPSV23) or PCV13 as an adult: Should first be given PCV13 AND then should be given PPSV23 6-12 months after PCV13    Review of Systems   Constitutional: Negative for chills and fever.   HENT: Negative for congestion, ear pain, hearing loss and sore throat.    Eyes: Negative for pain and visual disturbance.   Respiratory: Positive for cough. Negative for shortness of breath.    Cardiovascular: Positive for peripheral edema. Negative for chest pain and palpitations.   Gastrointestinal: Negative for abdominal pain, constipation, diarrhea, heartburn, hematochezia and nausea.   Breasts:  Negative for tenderness, breast mass  "and discharge.   Genitourinary: Negative for dysuria, frequency, genital sores, hematuria, pelvic pain, urgency, vaginal bleeding and vaginal discharge.   Musculoskeletal: Positive for arthralgias and myalgias. Negative for joint swelling.   Skin: Negative for rash.   Neurological: Negative for dizziness, weakness, headaches and paresthesias.   Psychiatric/Behavioral: Negative for mood changes. The patient is not nervous/anxious.      OBJECTIVE:   /56 (BP Location: Right arm, Patient Position: Chair, Cuff Size: Adult Regular)   Pulse 71   Temp 98.2  F (36.8  C) (Oral)   Ht 1.52 m (4' 11.84\")   Wt 60.3 kg (133 lb)   SpO2 97%   BMI 26.11 kg/m   Estimated body mass index is 26.11 kg/m  as calculated from the following:    Height as of this encounter: 1.52 m (4' 11.84\").    Weight as of this encounter: 60.3 kg (133 lb).  Physical Exam  GENERAL: healthy, alert and no distress  EYES: Eyes grossly normal to inspection, PERRL and conjunctivae and sclerae normal  HENT: ear canals and TM's normal, nose and mouth without ulcers or lesions  NECK: no adenopathy, no asymmetry, masses, or scars and thyroid normal to palpation  RESP: lungs clear to auscultation - no rales, rhonchi or wheezes  CV: regular rate and rhythm, normal S1 S2, no S3 or S4, no murmur, click or rub, and peripheral pulses strong, trace to 1+ LE edema bilaterally  ABDOMEN: soft, nontender, no hepatosplenomegaly, no masses and bowel sounds normal  MS: no gross musculoskeletal defects noted, trace to 1+ LE edema bilaterally  SKIN: no suspicious lesions or rashes  NEURO: Normal strength and tone, mentation intact and speech normal  PSYCH: mentation appears normal, affect normal/bright    Diagnostic Test Results:  See results note.    ASSESSMENT / PLAN:   1. Encounter for Medicare annual wellness exam  Establishing care.   Reviewed colon cancer and mammo screening recommendations given age. Will hold on any screening this year and readdress next year. " May have > 10 years life expectancy so would not be unreasonable but certainly would need to balance risks/benefits.    2. Essential hypertension  Well controlled.   Continue current meds, refilled.   Consider lower dose of amlodipine if LE swelling does not resolve.  - Albumin Random Urine Quantitative with Creat Ratio; Future  - Comprehensive metabolic panel; Future  - amLODIPine (NORVASC) 10 MG tablet; Take 1 tablet (10 mg) by mouth daily  Dispense: 90 tablet; Refill: 3  - lisinopril (PRINIVIL/ZESTRIL) 10 MG tablet; Take 1 tablet (10 mg) by mouth daily  Dispense: 90 tablet; Refill: 3  - OFFICE/OUTPT VISIT,EST,LEVL III    3. Hyperlipidemia LDL goal <100  Stable on lipitor.   Discussed that without CAD/DM history there is not great evidence to recommend asa as primary prevention in people > age 70.  - Lipid panel reflex to direct LDL Fasting; Future  - atorvastatin (LIPITOR) 20 MG tablet; Take 1 tablet (20 mg) by mouth At Bedtime  Dispense: 90 tablet; Refill: 3  - OFFICE/OUTPT VISIT,EST,LEVL III    4. Localized osteoporosis without current pathological fracture  Would recommend treatment with fosamax but having ongoing dental work. She will call when this is complete. Would want OV or phone visit to review risks/benefits of fosamax.  - Vitamin D Deficiency; Future    5. Idiopathic peripheral neuropathy  Stable, extensive workup completed. She will continue to follow with Neuro, local option given.  - NEUROLOGY ADULT REFERRAL    6. Lower extremity edema  ? May be related to humidity. No other signs of CHF. Would consider decreasing amlodipine dose if does not resolve to see if this helps. She will let us know.    ----------  PATIENT INSTRUCTIONS    It was nice to see you in clinic.    Let me know if you ever want a new referral to Physical Therapy - keep doing your exercises.    Neurology  You saw Freya Dumont APRN CNP (Nurse Practitioner)     Nurse Practitioner at the [facility]  I did also place  "a referral to a group that works down in [place]. You would need to call them to schedule. I would recommend having your Neurology records and Imaging reports sent to them prior to your appointment.    You do have osteoporosis. When you're done with the dental work we can talk about a medication to treat this called Fosamax. I'd want to talk either in person or on the phone before we start this.     We'll have you come back for fasting labs (electrolytes, kidney function, liver function, vitamin D, cholesterol)    We'll talk about if a mammogram next year makes sense or not.   We decided to hold on a colonoscopy for now.    --------------------------    End of Life Planning:  Patient currently has an advanced directive: Yes.  Practitioner is supportive of decision.    COUNSELING:  Reviewed preventive health counseling, as reflected in patient instructions       Regular exercise       Healthy diet/nutrition       Dental care       Fall risk prevention     Estimated body mass index is 26.11 kg/m  as calculated from the following:    Height as of this encounter: 1.52 m (4' 11.84\").    Weight as of this encounter: 60.3 kg (133 lb).     reports that she has never smoked. She has never used smokeless tobacco.    Appropriate preventive services were discussed with this patient, including applicable screening as appropriate for cardiovascular disease, diabetes, osteopenia/osteoporosis, and glaucoma.  As appropriate for age/gender, discussed screening for colorectal cancer, prostate cancer, breast cancer, and cervical cancer. Checklist reviewing preventive services available has been given to the patient.    Reviewed patients plan of care and provided an AVS. The Basic Care Plan (routine screening as documented in Health Maintenance) for Kerri meets the Care Plan requirement. This Care Plan has been established and reviewed with the Patient.    Counseling Resources:  ATP IV Guidelines  Pooled Cohorts Equation " Calculator  Breast Cancer Risk Calculator  FRAX Risk Assessment  ICSI Preventive Guidelines  Dietary Guidelines for Americans, 2010  [employer]'s MyPlate  ASA Prophylaxis  Lung CA Screening    Parrish Arizmendi MD  [facility]

## 2019-08-02 NOTE — PATIENT INSTRUCTIONS
It was nice to see you in clinic.    Let me know if you ever want a new referral to Physical Therapy - keep doing your exercises.    Neurology  You saw Freya Dmuont APRN CNP (Nurse Practitioner)     Nurse Practitioner at the [facility]  I did also place a referral to a group that works down in [place]. You would need to call them to schedule. I would recommend having your Neurology records and Imaging reports sent to them prior to your appointment.    You do have osteoporosis. When you're done with the dental work we can talk about a medication to treat this called Fosamax. I'd want to talk either in person or on the phone before we start this.     We'll have you come back for fasting labs (electrolytes, kidney function, liver function, vitamin D, cholesterol)    We'll talk about if a mammogram next year makes sense or not.   We decided to hold on a colonoscopy for now.    Shingles  If you are over 50 I recommend the new Shingrix vaccine. Two doses of Shingrix provides more than 90% protection against shingles and postherpetic neuralgia (PHN), a type of chronic pain that is the most common complication of shingles.   - even if you've had the older shingles vaccine (Zostavax) it's okay to get the new vaccine.   - even if you've had singles before the vaccine can be helpful.    Typically the best (and cheapest!) place to get this vaccine is our pharmacy downstairs. You do not need an appointment and can walk in any time they are open. The vaccine is a two shot series - I recommend getting the second shot 2-6 months after the first dose.    You may have a sore arm and feel mild flu-like symptoms for a day or two after the vaccine. Most people do not need to adjust their regular activities. It's okay to take tylenol or ibuprofen if you have side effects.       Patient Education   Personalized Prevention Plan  You are due for the preventive services outlined below.  Your care team is available to  assist you in scheduling these services.  If you have already completed any of these items, please share that information with your care team to update in your medical record.  Health Maintenance Due   Topic Date Due     ANNUAL REVIEW OF HM ORDERS  1935     Discuss Advance Care Planning  1935     Zoster (Shingles) Vaccine (1 of 2) 05/19/1986     Pneumococcal Vaccine (2 of 2 - PPSV23) 10/20/2005     Annual Wellness Visit  04/23/2019     FALL RISK ASSESSMENT  04/23/2019

## 2019-08-06 DIAGNOSIS — M81.6 LOCALIZED OSTEOPOROSIS WITHOUT CURRENT PATHOLOGICAL FRACTURE: ICD-10-CM

## 2019-08-06 DIAGNOSIS — E78.2 MIXED HYPERLIPIDEMIA: ICD-10-CM

## 2019-08-06 DIAGNOSIS — I10 ESSENTIAL HYPERTENSION: ICD-10-CM

## 2019-08-06 LAB
ALBUMIN SERPL-MCNC: 3.7 G/DL (ref 3.4–5)
ALP SERPL-CCNC: 70 U/L (ref 40–150)
ALT SERPL W P-5'-P-CCNC: 28 U/L (ref 0–50)
ANION GAP SERPL CALCULATED.3IONS-SCNC: 7 MMOL/L (ref 3–14)
AST SERPL W P-5'-P-CCNC: 32 U/L (ref 0–45)
BILIRUB SERPL-MCNC: 0.5 MG/DL (ref 0.2–1.3)
BUN SERPL-MCNC: 20 MG/DL (ref 7–30)
CALCIUM SERPL-MCNC: 9.3 MG/DL (ref 8.5–10.1)
CHLORIDE SERPL-SCNC: 113 MMOL/L (ref 94–109)
CHOLEST SERPL-MCNC: 140 MG/DL
CO2 SERPL-SCNC: 23 MMOL/L (ref 20–32)
CREAT SERPL-MCNC: 0.98 MG/DL (ref 0.52–1.04)
CREAT UR-MCNC: 100 MG/DL
GFR SERPL CREATININE-BSD FRML MDRD: 54 ML/MIN/{1.73_M2}
GLUCOSE SERPL-MCNC: 90 MG/DL (ref 70–99)
HDLC SERPL-MCNC: 32 MG/DL
LDLC SERPL CALC-MCNC: 77 MG/DL
MICROALBUMIN UR-MCNC: 6 MG/L
MICROALBUMIN/CREAT UR: 5.74 MG/G CR (ref 0–25)
NONHDLC SERPL-MCNC: 108 MG/DL
POTASSIUM SERPL-SCNC: 4.6 MMOL/L (ref 3.4–5.3)
PROT SERPL-MCNC: 7 G/DL (ref 6.8–8.8)
SODIUM SERPL-SCNC: 143 MMOL/L (ref 133–144)
TRIGL SERPL-MCNC: 153 MG/DL

## 2019-08-06 PROCEDURE — 36415 COLL VENOUS BLD VENIPUNCTURE: CPT | Performed by: INTERNAL MEDICINE

## 2019-08-06 PROCEDURE — 80061 LIPID PANEL: CPT | Performed by: INTERNAL MEDICINE

## 2019-08-06 PROCEDURE — 80053 COMPREHEN METABOLIC PANEL: CPT | Performed by: INTERNAL MEDICINE

## 2019-08-06 PROCEDURE — 82043 UR ALBUMIN QUANTITATIVE: CPT | Performed by: INTERNAL MEDICINE

## 2019-08-06 PROCEDURE — 82306 VITAMIN D 25 HYDROXY: CPT | Performed by: INTERNAL MEDICINE

## 2019-08-08 LAB — DEPRECATED CALCIDIOL+CALCIFEROL SERPL-MC: 66 UG/L (ref 20–75)

## 2019-09-25 ENCOUNTER — OFFICE VISIT (OUTPATIENT)
Dept: PEDIATRICS | Facility: CLINIC | Age: 84
End: 2019-09-25
Payer: MEDICARE

## 2019-09-25 VITALS
HEART RATE: 71 BPM | HEIGHT: 60 IN | TEMPERATURE: 97.8 F | WEIGHT: 130.7 LBS | SYSTOLIC BLOOD PRESSURE: 120 MMHG | RESPIRATION RATE: 16 BRPM | DIASTOLIC BLOOD PRESSURE: 50 MMHG | OXYGEN SATURATION: 97 % | BODY MASS INDEX: 25.66 KG/M2

## 2019-09-25 DIAGNOSIS — R42 DIZZINESS: Primary | ICD-10-CM

## 2019-09-25 PROCEDURE — 99213 OFFICE O/P EST LOW 20 MIN: CPT | Mod: GE | Performed by: STUDENT IN AN ORGANIZED HEALTH CARE EDUCATION/TRAINING PROGRAM

## 2019-09-25 ASSESSMENT — MIFFLIN-ST. JEOR: SCORE: 966.86

## 2019-10-22 ENCOUNTER — HOSPITAL ENCOUNTER (OUTPATIENT)
Dept: PHYSICAL THERAPY | Facility: CLINIC | Age: 84
End: 2019-10-22
Attending: PEDIATRICS
Payer: MEDICARE

## 2019-10-22 DIAGNOSIS — R42 DIZZINESS: Primary | ICD-10-CM

## 2019-10-22 PROCEDURE — 97162 PT EVAL MOD COMPLEX 30 MIN: CPT | Mod: GP | Performed by: PHYSICAL THERAPIST

## 2019-10-28 NOTE — PROGRESS NOTES
"   10/22/19 1300   Quick Adds   Quick Adds Vestibular Eval   Type of Visit Initial OP PT Evaluation   General Information   Start of Care Date 10/22/19   Referring Physician Alicia Uriostegui MD    Orders Evaluate and Treat as Indicated   Medical Diagnosis Dizziness   Pertinent history of current problem (include personal factors and/or comorbidities that impact the POC) Patient presenting with long standing dizziness complaints. Reports to have worked with PT recently for balance- focused a lot on core. Did find this helpful and balance has improved, but am still dizzy. Patient reporting that she still does exercises previously prescribed including VOR x 1 drills. Reporting to have also worked with the dizziness and balance center in [place]. Did not feel this to be affective and that testing did not show any problems with the inner ear. Reporting that she will feel dizziness \"inside my head.\" Denying a room spinning sensation and notes that she is more symptomatic nearing end of day and when in busy spaces. No apparent positional component. Reporting occasional headaches, neck pain. Denying migraine history. PMH: Arthritis, elevated BP, peripheral neuropathy. Denying changes in hearing or vision. BP is normally well controlled with medicaitons. MRI returned normal head, neck, upper back- negative.   Living environment House/[facility]   Home/Community Accessibility Comments Main floor living, but is able to go up and down steps.  Hand rail present.   Assistive Devices Comments Is not utilizing an AD   Patient/Family Goals Statement Resolve dizziness   Fall Risk Screen   Fall screen completed by PT   Have you fallen 2 or more times in the past year? No   Have you fallen and had an injury in the past year? No   Is patient a fall risk? Yes   Fall screen comments Inherently at elevated risk of falling due to dizziness symptoms.    Functional Scales   Functional Scales and Outcomes DHI = 44/100   Pain   Pain " comments Neuropathy- feet and ankles; pins and needles.  Headaches on the R side predominately- cervicogenic presentation   Cognitive Status Examination   Orientation orientation to person, place and time   Level of Consciousness alert   Follows Commands and Answers Questions 100% of the time   Personal Safety and Judgment intact   Memory intact   Posture   Posture Forward head position;Protracted shoulders   Range of Motion (ROM)   ROM Comment B LEs functionally assessed through general mobility screening and found to bew ithin functional limits.    Strength   Strength Comments B LEs functionally assessed through general mobility screening and found to be within functional limits and globally graded at least 3/5 as patient is able to lift B extremities agaisnt gravity without restriction.    Bed Mobility   Bed Mobility Comments IND   Transfer Skills   Transfer Comments IND   Gait   Gait Comments IND and demonstrating normalized mechancis    Balance   Balance Comments Deficits noted in postural stability with elevated symptoms. patint able to self correct without difficulty. Baseline neuropathy will place paitent at elevated risk of falling with EC conditioning and when navigating in the dark.    Sensory Examination   Sensory Perception Comments Baseline neuropathy through B feet.    Muscle Tone   Muscle Tone no deficits were identified   Cervicogenic Screen   Neck ROM Restricted with extension and B rotation. Asymmetry noted in rotation with greater degree of restriction occuring with R sided rotation. Patient reproting heavy feeling through forehead with flexion, though ROM is within normative range. Cervicogenic testing positive with repeated rotation x 30 seconds (performed in offcie chair with inner ear stationary).   Vertebral Artery Test Normal   Alar Ligament Test Normal   Transverse Ligament Test Normal   Transverse Ligament Test Comments Tested with use of sharp lamin   Oculomotor Exam   Smooth Pursuit  Normal   Saccades Normal   VOR Normal   Rapid Head Thrust Normal   Rapid Head Thrust Comments Significant guarding noted, requiring multiple attempts.    Convergence Testing Normal   Infrared Goggle Exam or Frenzel Lense Exam   Vestibular Suppressant in Last 24 Hours? No   Exam completed with Infrared Goggles   Spontaneous Nystagmus Negative   Gaze Evoked Nystagmus Negative   Head Shake Horizontal Nystagmus Negative   Head Shake Horizontal Nystagmus comments Reporting heaviness.    Elliott-Hallpike (right) Negative   Elliott-Hallpike (Left) Negative   HSCC Supine Roll Test (Right) Negative   HSCC Supine Roll Test (Left) Negative   Planned Therapy Interventions   Planned Therapy Interventions balance training;neuromuscular re-education;ROM;strengthening;stretching;manual therapy;motor coordination training;other (see comments)  (Vestibualr rheab)   Clinical Impression   Criteria for Skilled Therapeutic Interventions Met yes, treatment indicated   PT Diagnosis Decreased safe functional mobility and activity toerlance   Influenced by the following impairments Dizziness, asymmetry in cervical ROM, cervicogenic presentation headaches, balance deficits; peripheral neuropathy.    Functional limitations due to impairments Decreased safe functional mobility and activity toerlance   Clinical Presentation Evolving/Changing   Clinical Decision Making (Complexity) Moderate complexity   Therapy Frequency 2 times/Week   Predicted Duration of Therapy Intervention (days/wks) 6 weeks; decreasing frequency as indicated   Risk & Benefits of therapy have been explained Yes   Patient, Family & other staff in agreement with plan of care Yes   Clinical Impression Comments Patient presneting with long standing dizziness symptoms. Has particiapted in traditional balance therapy and vestibualr therapy (VOR x 1 drills) without relief. Testing found negative for vestibualr hypofunction or BPPV. Positive testing noted for cervicogenic screening. The  patient will benefit from skilled PT services to address identieifed limitaitosn and facilitate return to baseline level of function.    GOALS   PT Eval Goals 1;2;3   Goal 1   Goal Identifier DHI   Goal Description The patient will score 26/100 or less on DHI assessment to demonstrate a significant improvement in dizziness symptom severity.    Target Date 12/31/19   Goal 2   Goal Identifier HEP   Goal Description The patient will be IND in performance of HEP to address dizziness symptoms, posture, strength and mobility to facilitate continued gains outside of therapy.    Target Date 12/31/19   Total Evaluation Time   PT [place], Moderate Complexity Minutes (13353) 60

## 2019-10-28 NOTE — PROGRESS NOTES
[facility]        OUTPATIENT PHYSICAL THERAPY FUNCTIONAL EVALUATION  PLAN OF TREATMENT FOR OUTPATIENT REHABILITATION  (COMPLETE FOR INITIAL CLAIMS ONLY)  Patient's Last Name, First Name, M.I.  YOB: 1935  Kerri Rocha        Provider's Name   [facility]   Medical Record No.  [id number]     Start of Care Date:  10/22/19   Onset Date:   9/25/19   Type:     _X__PT   ____OT  ____SLP Medical Diagnosis:   Dizziness     PT Diagnosis:  Decreased safe functional mobility and activity toerlance Visits from SOC:  1                              __________________________________________________________________________________  Plan of Treatment/Functional Goals:  balance training, neuromuscular re-education, ROM, strengthening, stretching, manual therapy, motor coordination training, other (see comments)(Vestibualr rheab)           GOALS  DHI  The patient will score 26/100 or less on DHI assessment to demonstrate a significant improvement in dizziness symptom severity.   12/31/19    HEP  The patient will be IND in performance of HEP to address dizziness symptoms, posture, strength and mobility to facilitate continued gains outside of therapy.   12/31/19                                                                      Therapy Frequency:  2 times/Week   Predicted Duration of Therapy Intervention:  6 weeks; decreasing frequency as indicated    Mel Blanton, PT                                    I CERTIFY THE NEED FOR THESE SERVICES FURNISHED UNDER        THIS PLAN OF TREATMENT AND WHILE UNDER MY CARE     (Physician co-signature of this document indicates review and certification of the therapy plan).                Certification Date From:    10/22/19  Certification Date To:   1/20/19    Referring Provider:  Alicia Uriostegui MD     Initial Assessment  See  Epic Evaluation- Start of Care Date: 10/22/19

## 2019-10-30 ENCOUNTER — HOSPITAL ENCOUNTER (OUTPATIENT)
Dept: PHYSICAL THERAPY | Facility: CLINIC | Age: 84
End: 2019-10-30
Payer: MEDICARE

## 2019-10-30 DIAGNOSIS — R42 DIZZINESS: Primary | ICD-10-CM

## 2019-10-30 PROCEDURE — 97110 THERAPEUTIC EXERCISES: CPT | Mod: GP | Performed by: PHYSICAL THERAPIST

## 2019-10-30 PROCEDURE — 97140 MANUAL THERAPY 1/> REGIONS: CPT | Mod: GP | Performed by: PHYSICAL THERAPIST

## 2019-10-31 ENCOUNTER — HOSPITAL ENCOUNTER (OUTPATIENT)
Dept: PHYSICAL THERAPY | Facility: CLINIC | Age: 84
End: 2019-10-31
Payer: MEDICARE

## 2019-10-31 DIAGNOSIS — M54.16 LUMBAR RADICULOPATHY: ICD-10-CM

## 2019-10-31 DIAGNOSIS — M62.81 GENERALIZED MUSCLE WEAKNESS: ICD-10-CM

## 2019-10-31 DIAGNOSIS — R42 DIZZINESS: ICD-10-CM

## 2019-10-31 DIAGNOSIS — R26.89 IMPAIRMENT OF BALANCE: Primary | ICD-10-CM

## 2019-10-31 PROCEDURE — 97110 THERAPEUTIC EXERCISES: CPT | Mod: GP | Performed by: PHYSICAL THERAPIST

## 2019-10-31 PROCEDURE — 97140 MANUAL THERAPY 1/> REGIONS: CPT | Mod: GP | Performed by: PHYSICAL THERAPIST

## 2019-11-13 ENCOUNTER — HOSPITAL ENCOUNTER (OUTPATIENT)
Dept: PHYSICAL THERAPY | Facility: CLINIC | Age: 84
End: 2019-11-13
Payer: MEDICARE

## 2019-11-13 DIAGNOSIS — R42 DIZZINESS: ICD-10-CM

## 2019-11-13 DIAGNOSIS — R26.89 IMPAIRMENT OF BALANCE: Primary | ICD-10-CM

## 2019-11-13 DIAGNOSIS — M54.16 LUMBAR RADICULOPATHY: ICD-10-CM

## 2019-11-13 DIAGNOSIS — M62.81 GENERALIZED MUSCLE WEAKNESS: ICD-10-CM

## 2019-11-13 PROCEDURE — 97140 MANUAL THERAPY 1/> REGIONS: CPT | Mod: GP | Performed by: PHYSICAL THERAPIST

## 2019-11-13 PROCEDURE — 97110 THERAPEUTIC EXERCISES: CPT | Mod: GP | Performed by: PHYSICAL THERAPIST

## 2019-11-14 ENCOUNTER — TELEPHONE (OUTPATIENT)
Dept: NEUROLOGY | Facility: CLINIC | Age: 84
End: 2019-11-14

## 2019-11-14 NOTE — TELEPHONE ENCOUNTER
I talked with pt about scheduling appointment with Dr. Solorio on 12/20. She says she will be out of town that day. I offered her a spot at [facility] on 12/3 with Dr. Solorio. She is going to see if anyone could drive her to this appointment. She will call me back tomorrow. I will hold for her overnight.     Kenisha Husain RN

## 2019-11-14 NOTE — TELEPHONE ENCOUNTER
I called pt to see what her appointment with Freya was regarding. She did confirm it was for possible pinched nerve. Her primary care recommended she see neurology. As Freya is primarily a headache provider we cancelled her appointment today. I will work to find her the appropriate provider and soonest appointment.     Kenisha Husain RN

## 2019-11-15 ENCOUNTER — TELEPHONE (OUTPATIENT)
Dept: NEUROLOGY | Facility: CLINIC | Age: 84
End: 2019-11-15

## 2019-11-15 NOTE — TELEPHONE ENCOUNTER
I called pt and left voicemail we had set her up for a phone visit with Dr. Solorio on 12/3 at 2pm. I asked her to stay close to her phone at that time. I will have the clinic notify her if they are running behind that day.     Kenisha Husain RN

## 2019-11-15 NOTE — TELEPHONE ENCOUNTER
I returned pt call. She says she is unable to make the available office visits with Dr. Solorio. I will ask Dr. Solorio if he could do a phone visit with her. I still have a slot held for her on 12/3  At 2pm.     Kenisha Husain RN

## 2019-11-15 NOTE — TELEPHONE ENCOUNTER
[facility] Call Center    Phone Message    May a detailed message be left on voicemail: yes    Reason for Call: Other: Pt wanted to call to inform nursing that she cannot make either 12/3 or 12/20, and that she will call back at a later time. Please advise.    Action Taken: Message routed to:  [facility] ([facility] Neuro

## 2019-11-18 ENCOUNTER — HOSPITAL ENCOUNTER (OUTPATIENT)
Dept: PHYSICAL THERAPY | Facility: CLINIC | Age: 84
End: 2019-11-18
Payer: MEDICARE

## 2019-11-18 DIAGNOSIS — M62.81 GENERALIZED MUSCLE WEAKNESS: Primary | ICD-10-CM

## 2019-11-18 DIAGNOSIS — R26.89 IMPAIRMENT OF BALANCE: ICD-10-CM

## 2019-11-18 DIAGNOSIS — R42 DIZZINESS: ICD-10-CM

## 2019-11-18 DIAGNOSIS — M54.16 LUMBAR RADICULOPATHY: ICD-10-CM

## 2019-11-18 PROCEDURE — 97140 MANUAL THERAPY 1/> REGIONS: CPT | Mod: GP | Performed by: PHYSICAL THERAPIST

## 2019-11-18 PROCEDURE — 97110 THERAPEUTIC EXERCISES: CPT | Mod: GP | Performed by: PHYSICAL THERAPIST

## 2019-12-03 ENCOUNTER — VIRTUAL VISIT (OUTPATIENT)
Dept: NEUROLOGY | Facility: CLINIC | Age: 84
End: 2019-12-03
Payer: MEDICARE

## 2019-12-03 ENCOUNTER — HOSPITAL ENCOUNTER (OUTPATIENT)
Dept: PHYSICAL THERAPY | Facility: CLINIC | Age: 84
End: 2019-12-03
Payer: MEDICARE

## 2019-12-03 ENCOUNTER — TELEPHONE (OUTPATIENT)
Dept: PEDIATRICS | Facility: CLINIC | Age: 84
End: 2019-12-03

## 2019-12-03 DIAGNOSIS — M54.9 BACK PAIN, UNSPECIFIED BACK LOCATION, UNSPECIFIED BACK PAIN LATERALITY, UNSPECIFIED CHRONICITY: Primary | ICD-10-CM

## 2019-12-03 DIAGNOSIS — R26.9 GAIT DISORDER: Primary | ICD-10-CM

## 2019-12-03 DIAGNOSIS — R26.89 IMPAIRMENT OF BALANCE: ICD-10-CM

## 2019-12-03 DIAGNOSIS — M62.81 GENERALIZED MUSCLE WEAKNESS: ICD-10-CM

## 2019-12-03 DIAGNOSIS — R42 DIZZINESS: Primary | ICD-10-CM

## 2019-12-03 PROCEDURE — G2012 BRIEF CHECK IN BY MD/QHP: HCPCS | Performed by: PSYCHIATRY & NEUROLOGY

## 2019-12-03 PROCEDURE — 97110 THERAPEUTIC EXERCISES: CPT | Mod: GP | Performed by: PHYSICAL THERAPIST

## 2019-12-03 NOTE — PROGRESS NOTES
[facility] Virtual Visit Medical Authorization (Verbal).  Completed this form with pt, prior to transferring call to Dr. Solorio.  Sent to HIM's to be scanned to pts chart.  Ksenia Devlin LPN

## 2019-12-03 NOTE — TELEPHONE ENCOUNTER
Reason for call:  Other   Patient called regarding (reason for call): Referral for Ortho  Additional comments: Patient had a telephone visit today with her neurologist who recommended that she see an orthopedist for her back pain, patient needs a referral from her PCP for this.    Phone number to reach patient:  Home number on file [phone] (home)    Best Time:  any    Can we leave a detailed message on this number?  YES

## 2019-12-05 ENCOUNTER — TELEPHONE (OUTPATIENT)
Dept: NEUROLOGY | Facility: CLINIC | Age: 84
End: 2019-12-05

## 2019-12-09 NOTE — PROGRESS NOTES
Visit Date:   12/03/2019      NEUROLOGY CONSULTATION      HISTORY OF PRESENT ILLNESS:  This was a phone consultation with the patient.  I had seen her in the past, most recently in June.  She has dizziness, lightheadedness, imbalance and chronic lumbar radiculopathy as well as neuropathy.  I have sent her to physical therapy which includes Dizzy and Balance therapy.  She has been receiving intense treatments.  She seems to be at a plateau.  She has had an extensive evaluation for her problems.  She had a brain MRI performed in 11/2018.  It was an unremarkable study including the auditory apparatus.  In January, she had an MRI of the cervical spine which showed mild changes, but no significant stenosis.  In April, she had an MRI of the lumbar spine.  She has had lumbar spine surgery in 2016 including fusion.  This was beneficial.  She is now having ongoing symptoms in the low back, mainly in the legs there is weakness.  She has the well-established numbness and tingling in the feet due to the neuropathy, but she says in the legs it is more the weakness and clumsiness.  The MRI of the lumbar spine showed multilevel spondylosis, especially at 3-4 with moderate to severe left foraminal narrowing and a synovial cyst on the right at L5-S1, likely impinging on the right S1 nerve root.  She says when she gets up she takes baby steps and it takes a little while before she can actually get her feet going on.  She feels like she is having worsening balance, incoordination, walking, and increased low back pain.      She also had an electrodiagnostic evaluation in April by Dr. Maher.  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      ASSESSMENT:  Concerns about low back pain, worsening dizziness, imbalance and leg weakness.      DISCUSSION:  The patient is concerned about the above issues.  This was a phone  interview, so I did not have the opportunity to examine her.  I explained the differential diagnosis to her.  These problems could be coming from the low back.  If that is the case, she has had physical therapy and it is not helping.  The question would be whether or not she should be referred to Orthopedic or Neurosurgery to address the low back issues to see if there is anything further that can be done.  In the absence of ongoing denervation on electrodiagnostic study, I am not sure there is much to offer her at this time.  Alternatively, there may be another explanation.  The therapist raised the possibility that it could be coming from her hips.  If that is the case, the hip issue should be addressed first.  She does complain of pain in her hips.      She does have an upcoming trip to [place] with her family, but is concerned that she may not be able to be very active if she goes to [place] because her walking is so limited by the symptoms described above.      I advised the patient to follow up with primary care and get a referral with regard to the hip question.  If that is not the explanation, she will need to see me back in clinic for reevaluation and reexamination to determine what else should be undertaken to try and sort out her issues.  She will keep me posted on her progress.                     WILVER MOON MD             D: 2019   T: 2019   MT: AURELIO      Name:     TUTU HOOKER   MRN:      [id number]        Account:      [id number]   :      1935           Visit Date:   2019      Document: [id number]

## 2019-12-10 ENCOUNTER — HOSPITAL ENCOUNTER (OUTPATIENT)
Dept: PHYSICAL THERAPY | Facility: CLINIC | Age: 84
End: 2019-12-10
Payer: MEDICARE

## 2019-12-10 ENCOUNTER — OFFICE VISIT (OUTPATIENT)
Dept: PALLIATIVE MEDICINE | Facility: CLINIC | Age: 84
End: 2019-12-10
Payer: MEDICARE

## 2019-12-10 VITALS
SYSTOLIC BLOOD PRESSURE: 136 MMHG | HEART RATE: 70 BPM | WEIGHT: 130 LBS | OXYGEN SATURATION: 97 % | DIASTOLIC BLOOD PRESSURE: 65 MMHG | HEIGHT: 60 IN | BODY MASS INDEX: 25.52 KG/M2

## 2019-12-10 DIAGNOSIS — Z98.1 S/P LUMBAR FUSION: ICD-10-CM

## 2019-12-10 DIAGNOSIS — M54.16 LUMBAR RADICULOPATHY: ICD-10-CM

## 2019-12-10 DIAGNOSIS — M47.816 SPONDYLOSIS OF LUMBAR REGION WITHOUT MYELOPATHY OR RADICULOPATHY: ICD-10-CM

## 2019-12-10 DIAGNOSIS — R26.89 IMPAIRMENT OF BALANCE: ICD-10-CM

## 2019-12-10 DIAGNOSIS — R42 DIZZINESS: Primary | ICD-10-CM

## 2019-12-10 DIAGNOSIS — M53.3 SACROILIAC JOINT PAIN: Primary | ICD-10-CM

## 2019-12-10 DIAGNOSIS — M62.81 GENERALIZED MUSCLE WEAKNESS: ICD-10-CM

## 2019-12-10 PROCEDURE — 99207 ZZC CONSULT E&M CHANGED TO INITIAL LEVEL: CPT | Performed by: PHYSICAL MEDICINE & REHABILITATION

## 2019-12-10 PROCEDURE — 97110 THERAPEUTIC EXERCISES: CPT | Mod: GP | Performed by: PHYSICAL THERAPIST

## 2019-12-10 PROCEDURE — 99203 OFFICE O/P NEW LOW 30 MIN: CPT | Performed by: PHYSICAL MEDICINE & REHABILITATION

## 2019-12-10 ASSESSMENT — MIFFLIN-ST. JEOR: SCORE: 963.64

## 2019-12-10 NOTE — PROGRESS NOTES
[facility] Medical Spine Consultation    Date of visit: 12/10/2019    Primary Care Provider: Dr. Parrish Arizmendi    Reason for consultation:    Kerri Rocha is a 84 year old female who is seen in consultation today at the request of Constantin Carrion MD.    Consultation and Evaluation for: Medical spine evaluation    Review of [place] Prescription Monitoring Program (): Today I have also reviewed the patient's history of controlled substance use, as provided by [place] licensed pharmacies and prescriber dispensers.     Review of Pain Questionnaire: Please see the [facility] [place] health questionnaire, which the patient completed and reviewed with me in detail.    Review of Electronic Chart: Today I have also reviewed available medical information in the patient's medical record at [facility] ([facility]), including relevant provider notes, laboratory work, and imaging.     Chief Complaint:    Back pain    Pain history:  Kerri Rocha is a 84 year old female with a PMH significant for idiopathic peripheral neuropathy, HLD, HTN who first started having problems with back pain several years ago. She had a L4-5 lumbar fusion in 2016. She had good relief of pain after the fusion. Today she states that over the last 1.5 years back pain has been increasing gradually over time. The pain is fairly constant but varies in severity. It is aggravated by prolonged sitting or standing in one spot, getting into the out of the car. It is somewhat relieved with heat. Mainly located in bilateral low back/buttock. No radiating symptoms into the legs. Mostly aching in quality. On average pain is 6-7/10 in severity. Denies red flags including: bowel or bladder symptoms, fever, chills, saddle anesthesia, profound motor loss, history of cancer, history of immune compromise, weight loss.     She has a hx of peripheral neuropathy. States she has difficulty walking intially during the day and by the middle of the  afternoon she feels she can walk normally. She has impaired balance. Had an EMG in 2019 which also showed evidence of a chronic polyradiculopathy of L3,L5 and S1 myotomes (this report is not available to review).  There was no evidence for ongoing denervation. She had an MRI which showed moderate to severe left L3-4 foraminal stenosis and potentially right S1 nerve root impingement.    She has been working with PT mainly for dizziness and balance.     Current medication treatments include:  Gabapentin 800 mg TID - [facility] for back pain  Tylenol 1,000 mg - [facility]   Advil prn - [facility]    Pain medications are being prescribed by [facility].    Previous medication treatments included:  Lidocaine patches - [facility]    Other treatments have included:  Behavioral interventions: none  PT: Yes - has had PT for dizziness, balance, core strengthening - No benefit with core strengthening.   Manual Medicine: No  Surgeries: L4-5 lumbar fusion in  - [place]  Acupuncture: none  TENs Unit: Yes - before surgery  Injections: Yes - prior to surgery - [facility] for short period of time   Other: heat - [facility]    Past Medical History:  Past Medical History:   Diagnosis Date     HLD (hyperlipidemia)      HTN (hypertension)      Idiopathic neuropathy      Vitamin D deficiency      Past Surgical History:  Past Surgical History:   Procedure Laterality Date     APPENDECTOMY       BREAST BIOPSY, CORE RT/LT       CARPAL TUNNEL RELEASE RT/LT       CATARACT IOL, RT/LT        SECTION      x2     HYSTERECTOMY TOTAL ABDOMINAL, BILATERAL SALPINGO-OOPHORECTOMY, COMBINED       LAMINECTOMY LUMBAR THREE+ LEVELS  2016    L3-L5     TONSILLECTOMY & ADENOIDECTOMY       Medications:  Current Outpatient Medications   Medication Sig Dispense Refill     amLODIPine (NORVASC) 10 MG tablet Take 1 tablet (10 mg) by mouth daily 90 tablet 3     ASPIRIN PO Take 81 mg by mouth daily       atorvastatin (LIPITOR) 20 MG tablet Take 1 tablet (20 mg) by mouth At Bedtime 90 tablet 3      calcium carbonate (OS-CAMMIE 600 MG [place]. [place]) 600 MG tablet Take 1,200 mg by mouth daily        Cholecalciferol (VITAMIN D3) 2000 UNITS CAPS 5,000 Int'l Units daily        Cyanocobalamin (VITAMIN B-12 PO) Take by mouth daily       Diphenhydramine-APAP, sleep, (TYLENOL PM EXTRA STRENGTH PO) Take by mouth nightly as needed       gabapentin (NEURONTIN) 400 MG capsule TAKE 2 CAPSULES IN THE MORNING, TAKE 2 CAPSULES AT NOON AND TAKE 2 CAPSULES IN THE EVENING 300 capsule 2     lisinopril (PRINIVIL/ZESTRIL) 10 MG tablet Take 1 tablet (10 mg) by mouth daily 90 tablet 3     omega 3 1000 MG CAPS Take 2 g by mouth       Allergies:     Allergies   Allergen Reactions     Codeine      Sulfa Drugs      Sulfasalazine Other (See Comments)     Sulfate Rash     Social History:  Home situation: Lives in [place], [place] with .   Occupation/Schooling: Retired teacher  Tobacco use: None  Alcohol use: None  Drug use: None    Family history:  Family History   Problem Relation Age of Onset     Hypertension Mother      Heart Disease Father      Hypertension Father      Coronary Artery Disease Father      Hypertension Sister      Heart Disease Brother      Hypertension Brother      Diabetes No family hx of      Cancer No family hx of      Diagnostic Tests:  Lumbar MRI completed on  showed:        Review of Systems:    POSTIVE IN BOLD  GENERAL: fever/chills, fatigue, general unwell feeling, weight gain/loss.  HEAD/EYES:  headache, dizziness, or vision changes.    EARS/NOSE/THROAT:  Nosebleeds, hearing loss, sinus infection, earache, tinnitus.  IMMUNE:  Allergies, cancer, immune deficiency, or infections.  SKIN:  Urticaria, rash, hives  HEME/Lymphatic:   anemia, easy bruising, easy bleeding.  RESPIRATORY:  cough, wheezing, or shortness of breath  CARDIOVASCULAR/Circulation:  Extremity edema, syncope, hypertension, tachycardia, or angina.  GASTROINTESTINAL:  abdominal pain, nausea/emesis, diarrhea, constipation,  hematochezia, or  "melena.  ENDOCRINE:  Diabetes, steroid use,  thyroid disease or osteoporosis.  MUSCULOSKELETAL: neck pain, back pain, arthralgia, arthritis, or gout.  GENITOURINARY:  frequency, urgency, dysuria, difficulty voiding, hematuria or incontinence.  NEUROLOGIC:  weakness, numbness, paresthesias, seizure, tremor, stroke or memory loss.  PSYCHIATRIC:  depression, anxiety, stress, suicidal thoughts or mood swings.     Physical Exam:  Vitals:    12/10/19 1524   BP: 136/65   Pulse: 70   SpO2: 97%   Weight: 59 kg (130 lb)   Height: 1.52 m (4' 11.84\")     Exam:  Constitutional: healthy, alert and no distress  Head: normocephalic. Atraumatic.   Eyes: no redness or jaundice noted   ENT: oropharnx normal.  MMM.  Neck supple.    Respiratory: breathing unlabored on room air  Gastrointestinal: soft, non-tender  : deferred  Skin: no suspicious lesions or rashes  Psychiatric: mentation appears normal and affect normal/bright    Musculoskeletal exam:  Gait/Station/Posture: Gait slow, posture normal  Thoracic spine:  Normal   Lumbar spine: ROM wnl. Pain with lumbar extension/rotation, right > left.   Tender to palpation over b/l PSIS, b/l gluteal tenderness, pain with palpation of SI joints b/l and with sacral compression. Positive yeoman's b/l  Straight leg exam: negative b/l     Bilateral hip motion without discomfort     Neurologic exam:  Motor:  5/5 LE strength    Reflexes:     Patella:  R:  2/4 L: 2/4   Achilles:  R:  1/4 L: 1/4     Sensory:  (ower extremities):   Light touch: normal    Allodynia: absent    Hyperalgesia: absent     Assessment:  Kerri Rocha is a 84 year old female with a past medical history significant for idiopathic peripheral neuropathy, s/p L4-5 lumbar fusion, HLD, HTN  who presents with complaints of axial low back pain.     1. Chronic low back pain: Etiology likely due to bilateral SI joint dysfunction. On exam she has positive yeoman's, pain with palpation of PSIS, positive yeoman's, pain with sacral " compression and palpation of SI joints. There could also be a component of facet arthropathy below her fusion at L5-S1. MRI shows moderate to severe left L3-4 foraminal stenosis and potentially right S1 nerve root impingement but she has no symptoms correlating with these findings.      2. S/p L4-5 lumbar fusion    3. Hx of idiopathic peripheral neuropathy     Plan:  Diagnosis reviewed, treatment options addressed, and risks/benefits discussed. The patient was involved in shared decision making regarding the plan as laid out below.    1. Physical Therapy:  Consider PT for low back pain at next visit.   2. Diagnostic Studies:  No new imaging needed at this time.   3. Medication Management: No medication changes made today.    4. Further procedures recommended: Order placed for bilateral SI joint injections  5. Follow up: 2-3 weeks after injection    Frieda Novak MD  [facility]      Total time spent face to face was 60 minutes and more than 50% of face to face time was spent in counseling and/or coordination of care regarding the diagnosis and recommendations above.

## 2019-12-10 NOTE — PATIENT INSTRUCTIONS
Thank you for coming to [facility] for your care. It is my goal to partner with you to help you reach your optimal state of health.     You were seen today for your back pain. As discussed during your visit these are the recommendations:    1. Medications: No medication changes made today.  2. Therapies: Will see how the injection works and at the next visit can discuss PT for back pain.   3. Procedures: Order placed for SI joint injections. You will be called to have these scheduled.  4. Imaging/Diagnostic Studies: No new imaging needed today  5. Follow up: follow up with me in clinic 2-3 weeks after the injection.   ----------------------------------------------------------------  Clinic Number:  [phone]     Call with any questions about your care and for scheduling assistance.     Calls are returned Monday through Friday between 8 AM and 4:30 PM. We usually get back to you within 2 business days depending on the issue/request.    If we are prescribing your medications:    For opioid medication refills, call the clinic or send a [employer] message 7 days in advance.  Please include:    Name of requested medication    Name of the pharmacy.    For non-opioid medications, call your pharmacy directly to request a refill. Please allow 3-4 days to be processed.     Per [place] State Law:    All controlled substance prescriptions must be filled within 30 days of being written.      For those controlled substances allowing refills, pickup must occur within 30 days of last fill.      We believe regular attendance is key to your success in our program!      Any time you are unable to keep your appointment we ask that you call us at least 24 hours in advance to cancel.This will allow us to offer the appointment time to another patient.     Multiple missed appointments may lead to dismissal from the clinic.

## 2019-12-10 NOTE — TELEPHONE ENCOUNTER
Left message for Ivone stating that Dr. Solorio did have a telephone visit with this pt so changing it to an office visit is not advised due to coding. Recommend she check with IT on how to get the notes transcribed. Francisca Pyle RN    
Office visit notes are transcribed. Francisca Pyle RN    
[facility] Call Center    Phone Message    May a detailed message be left on voicemail: yes    Reason for Call: Ivone from [facility] called stating that the patients 12/3/19 appointment with Dr. Solorio needs to be changed from a Telephone visit to a office visit. She is unable to transcribe the visit. Please advise. Thank you    Action Taken: Message routed to:  Adult Clinics: Neurology p [id number]  
62 y/o F with CHF, DM, HTN presents with abdominal pain worsened with eating x 2 weeks. Well appearing, afebrile. Symptoms possibly due to cholelithiasis, will perform RUQ US. Symptoms possibly due to gastroparesis.

## 2019-12-11 ENCOUNTER — TELEPHONE (OUTPATIENT)
Dept: PALLIATIVE MEDICINE | Facility: CLINIC | Age: 84
End: 2019-12-11

## 2019-12-11 NOTE — TELEPHONE ENCOUNTER
CHAR to schedule Bilateral SI joint.    Janine NORRIS    [facility]

## 2019-12-11 NOTE — TELEPHONE ENCOUNTER
Pre-screening Questions for Radiology Injections:    Injection to be done at which interventional clinic site? [facility]    Instruct patient to arrive as directed prior to the scheduled appointment time:    [place] minutes before      [place]: 30 minutes before; if IV needed 1 hour before     Procedure ordered by Dr. Novak    Procedure ordered? Bilateral SI Joint      Transforaminal Cervical MAGUE - Dr. Ly Lester ONLY    What insurance would patient like us to bill for this procedure? Medicare/ Cigna      Worker's comp or MVA (motor vehicle accident) -Any injection DO NOT SCHEDULE and route to Rozina Desouza.      [email or web address] insurance - For SI joint injections, DO NOT SCHEDULE and route Rozina Desouza.       [employer] - Any injection besides hip/shoulder/knee joint DO NOT SCHEDULE and route to Rozina Desouza. She will obtain PA and call pt back to schedule procedure or notify pt of denial.       [employer]-Route to Rozina for review      **BCBS- ALL need to be routed to [place] for review if a PA is needed**      IF SCHEDULING IN [place] AND NEEDS A PA, IT IS OKAY TO SCHEDULE. [place] HANDLES THEIR OWN PA'S AFTER THE PATIENT IS SCHEDULED. PLEASE SCHEDULE AT LEAST 1 WEEK OUT SO A PA CAN BE OBTAINED.    Any chance of pregnancy? Not Applicable   If YES, do NOT schedule and route to RN pool    Is an  needed? No     Patient has a drive home? (mandatory) YES: Informed    Is patient taking any blood thinners (i.e. plavix, coumadin, jantoven, warfarin, heparin, pradaxa or dabigatran, etc)? No   If hold needed, do NOT schedule, route to RN pool     Is patient taking any aspirin products (includes Excedrin and Fiorinal)? Yes - Pt takes 81mg daily; instructed to hold 0 day(s) prior to procedure.      If more than 325mg/day do NOT schedule; route to RN pool     For CERVICAL procedures, hold all aspirin products for 6 days.     Tell pt that if aspirin product is not held for 6 days, the procedure WILL BE  cancelled.      Does the patient have a bleeding or clotting disorder? No     If YES, okay to schedule AND route to RN nurse pool    For any patients with platelet count <100, must be forwarded to provider    Is patient diabetic?  No  If YES, instruct them to bring their glucometer.    Does patient have an active infection or treated for one within the past week? No     Is patient currently taking any antibiotics?  No     For patients on chronic, preventative, or prophylactic antibiotics, procedures may be scheduled.     For patients on antibiotics for active or recent infection:antibiotic course must have been completed for 4 days    Is patient currently taking any steroid medications? (i.e. Prednisone, Medrol)  No     For patients on steroid medications, course must have been completed for 4 days    Reviewed with patient:  If you are started on any steroids or antibiotics between now and your appointment, you must contact us because the procedure may need to be cancelled.  Yes    Is patient actively being treated for cancer or immunocompromised? No  If YES, do NOT schedule and route to RN pool     Are you able to get on and off an exam table with minimal or no assistance? Yes  If NO, do NOT schedule and route to RN pool    Are you able to roll over and lay on your stomach with minimal or no assistance? Yes  If NO, do NOT schedule and route to RN pool     Any allergies to contrast dye, iodine, shellfish, or numbing and steroid medications? No  If YES, route to RN pool AND add allergy information to appointment notes    Allergies: Codeine; Sulfa drugs; Sulfasalazine; and Sulfate      Has the patient had a flu shot or any other vaccinations within 7 days before or after the procedure.  No     Does patient have an MRI/CT?  Not Applicable  Check Procedure Scheduling Grid to see if required.      Was the MRI done within the last 3 years?  NA    If yes, where was the MRI done i.e.[facility], [facility], [place], [facility] etc?       If no, do not schedule and route to RN pool    If MRI was not done at [facility] do NOT schedule and route to RN pool.      If pt has an imaging disc, the injection MAY be scheduled but pt has to bring disc to appt.     If they show up without the disc the injection cannot be done    Reminders (please tell patient if applicable):       Instructed pt to arrive 30 minutes early for IV start if required. (Check Procedure Scheduling Grid)  Not Applicable      If celiac plexus block, informed patient NPO for 6 hours and that it is okay to take medications with sips of water, especially blood pressure medications  Not Applicable         If this is for a cervical procedure, informed patient that aspirin needs to be held for 6 days.   Not Applicable      For all patients not having spinal cord stimulator (SCS) trials or radiofrequency ablations (RFAs), informed patient:    IV sedation is not provided for this procedure.  If you feel that an oral anti-anxiety medication is needed, you can discuss this further with your referring provider or primary care provider.  The Pain Clinic provider will discuss specifics of what the procedure includes at your appointment.  Most procedures last 10-20 minutes.  We use numbing medications to help with any discomfort during the procedure.  Not Applicable      Do not schedule procedures requiring IV placement in the first appointment of the day or first appointment after lunch. Do NOT schedule at 0745, 0815 or 1245.       For patients 85 or older we recommend having an adult stay w/ them for the remainder of the day.       Does the patient have any questions?  NO  Janine Haskins  [facility]

## 2019-12-13 ENCOUNTER — RADIOLOGY INJECTION OFFICE VISIT (OUTPATIENT)
Dept: PALLIATIVE MEDICINE | Facility: CLINIC | Age: 84
End: 2019-12-13
Payer: MEDICARE

## 2019-12-13 ENCOUNTER — ANCILLARY PROCEDURE (OUTPATIENT)
Dept: GENERAL RADIOLOGY | Facility: CLINIC | Age: 84
End: 2019-12-13
Attending: PHYSICAL MEDICINE & REHABILITATION
Payer: MEDICARE

## 2019-12-13 VITALS — SYSTOLIC BLOOD PRESSURE: 149 MMHG | OXYGEN SATURATION: 96 % | HEART RATE: 70 BPM | DIASTOLIC BLOOD PRESSURE: 68 MMHG

## 2019-12-13 DIAGNOSIS — M53.3 SACROILIAC JOINT PAIN: ICD-10-CM

## 2019-12-13 DIAGNOSIS — M53.3 SACROILIAC JOINT PAIN: Primary | ICD-10-CM

## 2019-12-13 PROCEDURE — 27096 INJECT SACROILIAC JOINT: CPT | Mod: 50 | Performed by: PHYSICAL MEDICINE & REHABILITATION

## 2019-12-13 NOTE — PROGRESS NOTES
[facility] - Procedure Note    Date of Service: 12/13/2019  Procedure performed: Bilateral sacroiliac joint injection  Diagnosis: Sacroiliac joint pain  : Frieda Novak MD  Anesthesia: None    Indications: Kerri Rocha is a 84 year old female who is seen by me in clinic presents today for a sacroiliac joint injection. The patient describes bilateral low back/buttock pain. Exam shows full strength and sensation in both lower extremities, tenderness of the sacroiliac (SI) joint, and positive yeoman's bilaterally. The patient reports minimal improvement with conservative treatment, including PT and medications.    Imaging:  MRI completed on 4/13/2019 showed:  Findings: Regarding numbering convention, there are 5 lumbar-type  vertebrae assumed for the purposes of this dictation.  The tip of the  conus medullaris is at  L1.  Regarding alignment, there is grade 1  anterolisthesis of L4 on L5 and of L5 on S1. Postsurgical changes of  instrumented spinal fusion of L4-5 with transpedicular screws,  interconnecting fusion rods and interbody fusion spacer. There is an  interbody fusion spacer at T11-12. There is multilevel disc height  narrowing and disc desiccation, most pronounced at L3-4 with mild disc  height loss.  Regarding bone marrow signal intensity, there is T1 and  T2 hyperintense signal scattered throughout the lumbar vertebral  bodies and the sacrum which drops out on the STIR images, consistent  with focal fatty deposition or possibly cavernous hemangiomas. There  is a superior endplate compression deformity of L1.     On a level by level basis:     L1-2: Posterior disc bulge. Facet arthropathy bilaterally. Mild neural  foraminal stenosis bilaterally. Spinal canal is patent..     L2-3: Posterior disc bulge. Facet arthropathy bilaterally. Ligamentum  flavum hypertrophy. Moderate left and mild right neural foraminal  stenosis. Mild spinal canal stenosis..     L3-4: Small  posterior disc bulge. Facet arthropathy bilaterally. Left  laminectomy. Moderate to severe left and mild to moderate right neural  foraminal stenosis. Spinal canal is patent..     L4-5: Small posterior osteophytic spurring. Facet arthropathy  bilaterally. Decompressive laminectomy. No spinal canal or neural  foraminal stenosis.     L5-S1: Posterior disc bulge. Facet arthropathy bilaterally. Right  sided synovial cyst measures 5 x 5 x 6 mm narrows the right lateral  recess and likely impinges upon the traversing right S1 nerve root.  Mild neural foraminal stenosis bilaterally. Spinal canal is patent..     Contrast-enhanced images demonstrate no abnormal enhancement in the  visualized paraspinous tissues anteriorly or in the spinal canal or  vertebra.                                                                      Impression:   1. Multilevel lumbar spondylosis, most pronounced at L3-4 with  moderate severe left and mild to moderate right neural foraminal  stenosis.  2. Synovial cyst within the right neural foramen of L5-S1 narrows the  right lateral recess and likely impinges upon the traversing right S1  nerve root.  3. Postsurgical changes of instrumented fusion of L4-5.    Allergies:      Allergies   Allergen Reactions     Codeine      Sulfa Drugs      Sulfasalazine Other (See Comments)     Sulfate Rash        Vitals:  BP (!) 146/71   Pulse 71   SpO2 97%     Options/alternatives, benefits and risks were discussed with the patient including bleeding, infection, tissue trauma, exposure to radiation, reaction to medications including seizure, nerve injury, weakness, and numbness.  Questions were answered to her satisfaction and she agrees to proceed. Voluntary informed consent was obtained and signed.     Procedure:  After getting informed consent, patient was brought into the procedure suite and was placed in a prone position on the procedure table.   A Pause for the Cause was performed.  Patient was prepped  and draped in sterile fashion.     After identifying the right SI joint, the C-arm was rotated to a obliquely to obtain the best view of the inferior angle of the joint.  A total of 2 ml of Lidocaine 1%  was used to anesthetize the skin at a skin entry site coaxial with the fluoroscopy beam at this location.  A 22 gauge 3.5 inch needle was advanced under intermittent fluoroscopy until it was felt to enter the SI joint.    The same procedure was repeated on the left side.     A total of 1 ml of Omnipaque-300 was injected, confirming appropriate position, with spread into the intraarticular space, with no intravascular uptake noted.  9 ml of Omnipaque-300 was wasted. Location was verified in lateral view.    4.5 ml of 0.25% bupivacaine with 60 mg of kenalog was injected equally between both sides..  The needle was removed. Hemostasis was achieved, the area was cleaned, and bandaids were placed when appropriate.  The patient tolerated the procedure well, and was taken to the recovery room.    Images were saved to PACS.    Pre-procedure pain score: 7/10  Post-procedure pain score: 0/10    Assessment/Plan: Kerri Rocha is a 84 year old female s/p bilateral SIJ injection due to low back/buttock pain.    1. Following today's procedure, the patient was advised to contact the [facility] for any of the following:   Fever, chills, or night sweats   New onset of pain, numbness, or weakness   Any questions/concerns regarding the procedure  If unable to contact the Pain Center, the patient was instructed to go to a local Emergency Room for any complications.   2. The patient will receive a follow-up call in 1 week.  3. The patient should follow-up with me in 2-3 weeks        Frieda Novak MD  [facility] [place]

## 2019-12-13 NOTE — NURSING NOTE
Discharge Information    IV Discontiued Time:  NA    Amount of Fluid Infused:  NA    Discharge Criteria = When patient returns to baseline or as per MD order    Consciousness:  Pt is fully awake    Circulation:  BP +/- 20% of pre-procedure level    Respiration:  Patient is able to breathe deeply    O2 Sat:  Patient is able to maintain O2 Sat >92% on room air    Activity:  Moves 4 extremities on command    Ambulation:  Patient is able to stand and walk or stand and pivot into wheelchair    Dressing:  Clean/dry or No Dressing    Notes:   Discharge instructions and AVS given to patient    Patient meets criteria for discharge?  YES    Admitted to PCU?  No    Responsible adult present to accompany patient home?  Yes    Signature/Title:    Belen Frey RN Care Coordinator  [facility] [place]     -OM found on L 2nd toe w/ overlying leg cellulitis, ESR 29, gram + cocci on wound cx  -Blood cx (+) Staphylococcus aureus & Serratia marcescens    -c/w broad spec abx w/ vanco and zosyn and downgrade afterwards  -f/u podiatry recs  -plan for amputation in OR on 5/3. medically optimized.  -LILIA inconclusive -OM found on L 2nd toe w/ overlying leg cellulitis, ESR 29, gram + cocci on wound cx  -f/u CRP, BCx2  -c/w broad spec abx w/ vanco and zosyn and downgrade afterwards  -f/u podiatry recs  -plan for amputation in OR on 5/3. will medically optimize prior to OR (see below)  -LILIA inconclusive, will follow up with Podiatry -OM found on L 2nd toe w/ overlying leg cellulitis, ESR 29, gram + cocci on wound cx  -f/u CRP, BCx2  -c/w broad spec abx w/ vanco and zosyn and downgrade afterwards  -f/u podiatry recs  -plan for amputation in OR on 5/3. will medically optimize prior to OR (see below)  -f/u LILIA/PVR and if significant may need vascular surgery consult OM found on L 2nd toe w/ overlying leg cellulitis, ESR 29, gram + cocci on wound cx, s/p amputation on 5/3  -Blood cx (+) Staphylococcus aureus & Serratia marcescens    -f/u podiatry recs, they are not concerned about residual infection as infected area of toe was removed completely  -LILIA inconclusive

## 2019-12-13 NOTE — NURSING NOTE
Pre-procedure Intake    Have you been fasting? NA    If yes, for how long?     Are you taking a prescribed blood thinner such as coumadin, Plavix, Xarelto?    No    If yes, when did you take your last dose?     Do you take aspirin?  Yes -   ASA    If cervical procedure, have you held aspirin for 6 days?   NA    Do you have any allergies to contrast dye, iodine, steroid and/or numbing medications?  NO    Are you currently taking antibiotics or have an active infection?  NO    Have you had a fever/elevated temperature within the past week? NO    Are you currently taking oral steroids? NO    Do you have a ? Yes       Are you pregnant or breastfeeding?  Not Applicable    Are the vital signs normal?  No: BP:146/71

## 2019-12-13 NOTE — PATIENT INSTRUCTIONS
[facility] Procedure Discharge Instructions    Today you saw:   Dr. Frieda Novak       Your procedure:  Sacro-iliac joint injection      Medications used:  Lidocaine (anesthetic)  Bupivacaine (anesthetic)     Kenalog (steroid)  Omnipaque (contrast)              Be cautious when walking as numbness and/or weakness in the legs may occur up to 6-8 hours after the procedure due to effect of the local anesthetic    Do not drive for 6 hours. The effect of the local anesthetic could slow your reflexes.     Avoid strenuous activity for the first 24 hours. You may resume your regular activities after that.     You may shower, however avoid swimming, tub baths or hot tubs for 24 hours following your procedure    You may have a mild to moderate increase in pain for several days following the injection.      You may use ice packs for 10-15 minutes, 3 to 4 times a day at the injection site for comfort    Do not use heat to painful areas for 6 to 8 hours. This will give the local anesthetic time to wear off and prevent you from accidentally burning your skin.    Unless you have been directed to avoid the use of anti-inflammatory medications (NSAIDS-ibuprofen, Aleve, Motrin), you may use these medications or Tylenol for pain control if needed.     With diabetes, check your blood sugar more frequently than usual as your blood sugar may be higher than normal for 10-14 days following a steroid injection. Contact your doctor who manages your diabetes if your blood sugar is higher than usual    Possible side effects of steroids that you may experience include flushing, elevated blood pressure, increased appetite, mild headaches and restlessness.  All of these symptoms will get better with time.    It may take up to 14 days for the steroid medication to start working although you may feel the effect as early as a few days after the procedure.     Follow up with your referring provider in 2-3 weeks      If you  experience any of the following, call the pain center line during work hours at [phone] or on-call physician after hours at [phone]:  -Fever over 100 degree F  -Swelling, bleeding, redness, drainage, warmth at the injection site  -Progressive weakness or numbness in your legs   -Loss of bowel or bladder function  -Unusual headache that is not relieved by Tylenol or your regular headache medication  -Unusual new onset of pain that is not improving

## 2020-01-21 ENCOUNTER — HOSPITAL ENCOUNTER (OUTPATIENT)
Dept: PHYSICAL THERAPY | Facility: CLINIC | Age: 85
End: 2020-01-21
Payer: MEDICARE

## 2020-01-21 DIAGNOSIS — R26.89 IMPAIRMENT OF BALANCE: Primary | ICD-10-CM

## 2020-01-21 DIAGNOSIS — M62.81 GENERALIZED MUSCLE WEAKNESS: ICD-10-CM

## 2020-01-21 DIAGNOSIS — R42 DIZZINESS: ICD-10-CM

## 2020-01-21 PROCEDURE — 97112 NEUROMUSCULAR REEDUCATION: CPT | Mod: GP | Performed by: PHYSICAL THERAPIST

## 2020-01-21 PROCEDURE — 97162 PT EVAL MOD COMPLEX 30 MIN: CPT | Mod: GP | Performed by: PHYSICAL THERAPIST

## 2020-01-27 NOTE — PROGRESS NOTES
01/21/20 1300   Quick Adds   Type of Visit OP PT Re-evaluation   General Information   Start of Care Date 01/21/20   Referring Physician Alicia Uriostegui MD; Frieda Novak MD    Orders Other   Medical Diagnosis PT for LBP  (Verbally noted in chart, requesting order)   Onset of illness/injury or Date of Surgery 12/10/19   Pertinent history of current problem (include personal factors and/or comorbidities that impact the POC) Patient presenting with recent update in medical history. Reporting that she has been working with her new MD for management of back pain. Dr. Novak did provide injections to her sacral region and the patient reports a significant improvement with this. Patient reprots that her MD did recommend PT for her back (confirmed per chart review). The patietn would also like to spend some time working on balance. She reports that her dizziness is a lot better but that she still feels imbalanced in her legs. Confirms that she continues to experience numbness and tingling through her legs and ankles.    Home/Community Accessibility Comments See previous eval- no change   Assistive Devices Comments No AD   Patient/Family Goals Statement Address LBP, balance.    Fall Risk Screen   Fall screen completed by PT   Have you fallen 2 or more times in the past year? No   Have you fallen and had an injury in the past year? No   Is patient a fall risk? Yes   Fall screen comments FGA scoring indicates patient to be at elevated risk of falling. See below for further detail.    Pain   Pain comments LBP- though this has improved since sacral injections.    Cognitive Status Examination   Cognitive Comment See eval dated 10/22- no change   Posture   Posture Forward head position;Protracted shoulders   Range of Motion (ROM)   ROM Comment Completed general ROM screening for low back. Noting Asymmetry in side bending and rotation (greater motion to the L side). ALso limited in extension.    Strength   Manual  Muscle Testing Quick Adds MMT: Hip;MMT: Knee;MMT: Ankle   Strength Comments Proximal weakness identified.    MMT: Hip, Rehab Eval   Hip Flexion - Left Side (3+/5) fair plus, left   Hip ABduction - Left Side (4/5) good, left   Hip ADduction - Left Side (4/5) good, left   Hip Flexion - Right Side (3+/5) fair plus, right   Hip ABduction - Right Side (4/5) good, right   Hip ADduction - Right Side (4/5) good, right   MMT: Knee, Rehab Eval   Knee Flexion - Left Side (4-/5) good minus, left   Knee Extension - Left Side (4-/5) good minus, left   Knee Flexion - Right Side (4/5) good, right   Knee Extension - Right Side (4/5) good, right   MMT: Ankle, Rehab Eval   Ankle Dorsiflexion - Left Side (4-/5) good minus, left   Ankle Inversion - Right Side (4-/5) good minus, right   Bed Mobility   Bed Mobility Comments IND   Transfer Skills   Transfer Comments IND though relying on use of UEs and compensation noted with use of larger FABIAN.    Gait   Gait Comments Patient ambualting with larger FABIAN, asymmetry in stepping length and observable limitations in postural stability.    Gait Special Tests   Gait Special Tests FUNCTIONAL GAIT ASSESSMENT   Gait Special Tests Functional Gait Assessment Score out of 30   Score out of 30 15   Comments Scoring indicates that the patient is at elevated risk of falling.    Balance   Balance Comments Deficits noted in both dynamic and static balance. See FGA comments below. Likewise, patient presenting with excessive postural sway with rhomberg stance. Unable to assume and maintain sharpened rhomberg positioning.    Balance Special Tests   Balance Special Tests Sit to stand reps   Balance Special Tests Sit to Stand Reps in 30 Seconds   Reps in 30 seconds 12   Comments Standard height chair, no UE support though compensation noted through FABIAN.    Sensory Examination   Sensory Perception Comments Deficits in ankles and LEs B.   Coordination   Coordination Comments Deficits noted with partciaption in  higher level balance drills and accuracy of foot placement with heel to toe walking.    Muscle Tone   Muscle Tone no deficits were identified   Planned Therapy Interventions   Planned Therapy Interventions balance training;ADL retraining;IADL retraining;gait training;motor coordination training;neuromuscular re-education;ROM;strengthening;stretching   Clinical Impression   Criteria for Skilled Therapeutic Interventions Met yes, treatment indicated   PT Diagnosis Decreased safe functional mobility, limited activity toerlance due to pain.   Influenced by the following impairments LBP, limitations in strength, postural stability and balance.    Functional limitations due to impairments Decreased safe functional mobility, limited activity toerlance due to pain.   Clinical Presentation Evolving/Changing   Clinical Decision Making (Complexity) Moderate complexity   Therapy Frequency 2 times/Week   Predicted Duration of Therapy Intervention (days/wks) 6 weeks   Risk & Benefits of therapy have been explained Yes   Patient, Family & other staff in agreement with plan of care Yes   GOALS   PT Eval Goals 1;2;3;4   Goal 1   Goal Identifier FGA   Goal Description The patient will score 23/30 or greater on FGA assessment to demonstarte a significant improvement in dynamic balance and to demonstrate that she is at a minimal risk of falling with community based ambualtion.    Target Date 03/31/20   Goal 2   Goal Identifier HEP   Goal Description The patient will be IND in performance of HEP to facilitate gains in strength, enduranc enad balance outside of therapy.    Target Date 03/31/20   Goal 3   Goal Identifier Posture   Goal Description The patient will identify 3-5 strategies that can be used to support her posture in varied activities and will demonstrate approrpiate lifting, carrying, and bending techniques to prevent worsenning back pain and to show improved insight into posture and machanical movement.    Target Date  03/31/20   Goal 4   Goal Identifier Back Pain   Goal Description The patient will reprot a 50% improvement in LBP to demonstrate improved symptom severity and to promote improved ease with functional mobility tasks limiting further debility.    Target Date 03/31/20   Total Evaluation Time   PT [place], Moderate Complexity Minutes (17521) 35

## 2020-01-28 ENCOUNTER — MEDICAL CORRESPONDENCE (OUTPATIENT)
Dept: HEALTH INFORMATION MANAGEMENT | Facility: CLINIC | Age: 85
End: 2020-01-28

## 2020-01-28 ENCOUNTER — HOSPITAL ENCOUNTER (OUTPATIENT)
Dept: PHYSICAL THERAPY | Facility: CLINIC | Age: 85
End: 2020-01-28
Payer: MEDICARE

## 2020-01-28 DIAGNOSIS — R26.89 IMPAIRMENT OF BALANCE: ICD-10-CM

## 2020-01-28 DIAGNOSIS — R42 DIZZINESS: ICD-10-CM

## 2020-01-28 DIAGNOSIS — M62.81 GENERALIZED MUSCLE WEAKNESS: Primary | ICD-10-CM

## 2020-01-28 PROCEDURE — 97110 THERAPEUTIC EXERCISES: CPT | Mod: GP | Performed by: PHYSICAL THERAPIST

## 2020-01-28 PROCEDURE — 97112 NEUROMUSCULAR REEDUCATION: CPT | Mod: GP | Performed by: PHYSICAL THERAPIST

## 2020-01-28 NOTE — PROGRESS NOTES
[facility]    Date of visit: 1/29/2020    Chief complaint:   Chief Complaint   Patient presents with     Pain     Interval history:  Kerri Rocha is a 84 year old female last seen by me on 12/10/2019.      Recommendations/plan at the last visit included:  1. Physical Therapy:  Consider PT for low back pain at next visit.   2. Diagnostic Studies:  No new imaging needed at this time.   3. Medication Management: No medication changes made today.    4. Further procedures recommended: Order placed for bilateral SI joint injections  5. Follow up: 2-3 weeks after injection    Since her last visit, Kerri Rocha reports:  -her pain is better than it was at last visit.   -Had bilateral SI joint injections on 12/13/2019 which were very helpful. Still having ongoing benefit. She was able to enjoy her trip to [place] without pain.   - She is doing PT for low back pain which is helpful. Going once per week for at least 6 weeks.   - Doing her HEP on regular basis   - States she wakes up with some low back pain in the mornings but overall is manageable.     Pain Information:   Pain quality: Aching    Pain timing: Intermittent     Pain rating: intensity ranges from 0/10 to 4/10, and averages 1/10 on a 0-10 scale.    Aggravating factors include: pain when getting up in the morning   Relieving factors include: stretches     Current pain treatments:   Gabapentin 800 mg TID - [facility] for back pain  Tylenol 500 mg daily prn - [facility]  Advil prn - [facility]    Current MME: 0    Review of [place] Prescription Monitoring Program (): No concern for abuse or misuse of controlled medications based on this report.     Annual Controlled Substance Agreement/UDS due date: NA    Past pain treatments:  Previous medication treatments included:  Lidocaine patches - [facility]     Other treatments have included:  Behavioral interventions: none  PT: Yes - has had PT for dizziness, balance, core strengthening - No benefit with core  strengthening.   Manual Medicine: No  Surgeries: L4-5 lumbar fusion in 2016 - [facility]  Acupuncture: none  TENs Unit: Yes - before surgery  Injections: Yes - prior to surgery - [facility] for short period of time. Bilateral SI joint injections on 12/13/2019 by myself - [facility]  Other: heat - [facility]    Medications:  Current Outpatient Medications   Medication Sig Dispense Refill     amLODIPine (NORVASC) 10 MG tablet Take 1 tablet (10 mg) by mouth daily 90 tablet 3     ASPIRIN PO Take 81 mg by mouth daily       atorvastatin (LIPITOR) 20 MG tablet Take 1 tablet (20 mg) by mouth At Bedtime 90 tablet 3     calcium carbonate (OS-CAMMIE 600 MG [place]. [place]) 600 MG tablet Take 1,200 mg by mouth daily        Cholecalciferol (VITAMIN D3) 2000 UNITS CAPS 5,000 Int'l Units daily        Cyanocobalamin (VITAMIN B-12 PO) Take by mouth daily       Diphenhydramine-APAP, sleep, (TYLENOL PM EXTRA STRENGTH PO) Take by mouth nightly as needed       gabapentin (NEURONTIN) 400 MG capsule TAKE 2 CAPSULES IN THE MORNING, TAKE 2 CAPSULES AT NOON AND TAKE 2 CAPSULES IN THE EVENING 300 capsule 2     lisinopril (PRINIVIL/ZESTRIL) 10 MG tablet Take 1 tablet (10 mg) by mouth daily 90 tablet 3     omega 3 1000 MG CAPS Take 2 g by mouth         Medical History: any changes in medical history since they were last seen? No    Review of Systems:  The 14 system ROS was reviewed from the intake questionnaire, and is positive for: none  Any bowel or bladder problems: none  Mood: ok    Physical Exam:  Blood pressure (!) 159/54, pulse 54, SpO2 97 %, not currently breastfeeding.  General: A&O x 3, in no distress  Gait: Normal  Neuro: strength 5/5 in BUE and BLE    Imaging:  Lumbar MRI completed on 4/13/2019 showed:  Findings: Regarding numbering convention, there are 5 lumbar-type  vertebrae assumed for the purposes of this dictation.  The tip of the  conus medullaris is at  L1.  Regarding alignment, there is grade 1  anterolisthesis of L4 on L5 and of L5 on S1. Postsurgical changes  of  instrumented spinal fusion of L4-5 with transpedicular screws,  interconnecting fusion rods and interbody fusion spacer. There is an  interbody fusion spacer at T11-12. There is multilevel disc height  narrowing and disc desiccation, most pronounced at L3-4 with mild disc  height loss.  Regarding bone marrow signal intensity, there is T1 and  T2 hyperintense signal scattered throughout the lumbar vertebral  bodies and the sacrum which drops out on the STIR images, consistent  with focal fatty deposition or possibly cavernous hemangiomas. There  is a superior endplate compression deformity of L1.     On a level by level basis:     L1-2: Posterior disc bulge. Facet arthropathy bilaterally. Mild neural  foraminal stenosis bilaterally. Spinal canal is patent..     L2-3: Posterior disc bulge. Facet arthropathy bilaterally. Ligamentum  flavum hypertrophy. Moderate left and mild right neural foraminal  stenosis. Mild spinal canal stenosis..     L3-4: Small posterior disc bulge. Facet arthropathy bilaterally. Left  laminectomy. Moderate to severe left and mild to moderate right neural  foraminal stenosis. Spinal canal is patent..     L4-5: Small posterior osteophytic spurring. Facet arthropathy  bilaterally. Decompressive laminectomy. No spinal canal or neural  foraminal stenosis.     L5-S1: Posterior disc bulge. Facet arthropathy bilaterally. Right  sided synovial cyst measures 5 x 5 x 6 mm narrows the right lateral  recess and likely impinges upon the traversing right S1 nerve root.  Mild neural foraminal stenosis bilaterally. Spinal canal is patent..     Contrast-enhanced images demonstrate no abnormal enhancement in the  visualized paraspinous tissues anteriorly or in the spinal canal or  vertebra.                                                                      Impression:   1. Multilevel lumbar spondylosis, most pronounced at L3-4 with  moderate severe left and mild to moderate right neural  foraminal  stenosis.  2. Synovial cyst within the right neural foramen of L5-S1 narrows the  right lateral recess and likely impinges upon the traversing right S1  nerve root.  3. Postsurgical changes of instrumented fusion of L4-5.       Assessment:   Kerri Rocha is a 84 year old female with a past medical history significant for idiopathic peripheral neuropathy, s/p L4-5 lumbar fusion, HLD, HTN  who presents with complaints of axial low back pain.      1. Chronic low back pain: Etiology likely due to bilateral SI joint dysfunction. On exam she has positive yeoman's, pain with palpation of PSIS, positive yeoman's, pain with sacral compression and palpation of SI joints. There could also be a component of facet arthropathy below her fusion at L5-S1. MRI shows moderate to severe left L3-4 foraminal stenosis and potentially right S1 nerve root impingement but she has no symptoms correlating with these findings.       2. S/p L4-5 lumbar fusion     3. Hx of idiopathic peripheral neuropathy     Plan:  1. Physical Therapy:  Continue PT for low back pain. She is doing HEP regularly.   2. Diagnostic Studies:  No new imaging needed at this time.   3. Medication Management: No medication changes made today. Discussed she can utilize Tylenol more than she has been if needed. She can use up to 1,000 mg at a time every 8 hours prn.   4. Further procedures recommended: Can repeat bilateral SI joint injections in the future if needed. Recommend no sooner than 3 months.  5. Follow up: PRN  6. She will follow up with her PCP regarding low pulse. She is asymptomatic.     I spent 15 minutes of time face to face with the patient.  Greater than 50% of this time was spent in patient counseling and/or coordination of care.    Frieda Novak MD  [facility]

## 2020-01-28 NOTE — PROGRESS NOTES
[facility]        OUTPATIENT PHYSICAL THERAPY FUNCTIONAL EVALUATION  PLAN OF TREATMENT FOR OUTPATIENT REHABILITATION  (COMPLETE FOR INITIAL CLAIMS ONLY)  Patient's Last Name, First Name, M.I.  YOB: 1935  Kerri Rocha     Provider's Name   [facility]   Medical Record No.  [id number]     Start of Care Date:  01/21/20   Onset Date:  12/10/19   Type:     _X__PT   ____OT  ____SLP Medical Diagnosis:   LBP     PT Diagnosis:  Decreased safe functional mobility, limited activity toerlance due to pain. Visits from SOC:  1                              __________________________________________________________________________________  Plan of Treatment/Functional Goals:  balance training, ADL retraining, IADL retraining, gait training, motor coordination training, neuromuscular re-education, ROM, strengthening, stretching           GOALS  FGA  The patient will score 23/30 or greater on FGA assessment to demonstarte a significant improvement in dynamic balance and to demonstrate that she is at a minimal risk of falling with community based ambualtion.   03/31/20    HEP  The patient will be IND in performance of HEP to facilitate gains in strength, enduranc enad balance outside of therapy.   03/31/20    Posture  The patient will identify 3-5 strategies that can be used to support her posture in varied activities and will demonstrate approrpiate lifting, carrying, and bending techniques to prevent worsenning back pain and to show improved insight into posture and machanical movement.   03/31/20    Back Pain  The patient will reprot a 50% improvement in LBP to demonstrate improved symptom severity and to promote improved ease with functional mobility tasks limiting further debility.   03/31/20                                                Therapy Frequency:  2 times/Week    Predicted Duration of Therapy Intervention:  6 weeks    Mel Blanton, PT                                    I CERTIFY THE NEED FOR THESE SERVICES FURNISHED UNDER        THIS PLAN OF TREATMENT AND WHILE UNDER MY CARE     (Physician co-signature of this document indicates review and certification of the therapy plan).                Certification Date From:    1/21/20  Certification Date To:   4/19/20    Referring Provider:  Frieda Novak MD     Initial Assessment  See Epic Evaluation- Start of Care Date: 01/21/20

## 2020-01-29 ENCOUNTER — OFFICE VISIT (OUTPATIENT)
Dept: PALLIATIVE MEDICINE | Facility: CLINIC | Age: 85
End: 2020-01-29
Payer: MEDICARE

## 2020-01-29 VITALS — SYSTOLIC BLOOD PRESSURE: 159 MMHG | DIASTOLIC BLOOD PRESSURE: 54 MMHG | OXYGEN SATURATION: 97 % | HEART RATE: 54 BPM

## 2020-01-29 DIAGNOSIS — M47.816 SPONDYLOSIS OF LUMBAR REGION WITHOUT MYELOPATHY OR RADICULOPATHY: ICD-10-CM

## 2020-01-29 DIAGNOSIS — Z98.1 S/P LUMBAR FUSION: ICD-10-CM

## 2020-01-29 DIAGNOSIS — M53.3 SACROILIAC JOINT PAIN: Primary | ICD-10-CM

## 2020-01-29 PROCEDURE — 99213 OFFICE O/P EST LOW 20 MIN: CPT | Performed by: PHYSICAL MEDICINE & REHABILITATION

## 2020-01-29 ASSESSMENT — PAIN SCALES - GENERAL: PAINLEVEL: NO PAIN (1)

## 2020-01-29 NOTE — PATIENT INSTRUCTIONS
1. You can take Tylenol up to 1,000 mg at a time every 8 hours as needed. Do not take more than 3,000 mg per day.   2. Consider trying acupuncture for pain  3. Can repeat SI joint injections in the future if pain returns. Recommend no sooner than every 3 months an no more than 4 per year.   4. No follow up scheduled. Can follow up as needed.    ----------------------------------------------------------------  Clinic Number:  [phone]     Call with any questions about your care and for scheduling assistance.     Calls are returned Monday through Friday between 8 AM and 4:30 PM. We usually get back to you within 2 business days depending on the issue/request.    If we are prescribing your medications:    For opioid medication refills, call the clinic or send a [facility] message 7 days in advance.  Please include:    Name of requested medication    Name of the pharmacy.    For non-opioid medications, call your pharmacy directly to request a refill. Please allow 3-4 days to be processed.     Per [place] State Law:    All controlled substance prescriptions must be filled within 30 days of being written.      For those controlled substances allowing refills, pickup must occur within 30 days of last fill.      We believe regular attendance is key to your success in our program!      Any time you are unable to keep your appointment we ask that you call us at least 24 hours in advance to cancel.This will allow us to offer the appointment time to another patient.     Multiple missed appointments may lead to dismissal from the clinic.

## 2020-02-11 ENCOUNTER — HOSPITAL ENCOUNTER (OUTPATIENT)
Dept: PHYSICAL THERAPY | Facility: CLINIC | Age: 85
End: 2020-02-11
Payer: MEDICARE

## 2020-02-11 DIAGNOSIS — M62.81 GENERALIZED MUSCLE WEAKNESS: ICD-10-CM

## 2020-02-11 DIAGNOSIS — R26.89 IMPAIRMENT OF BALANCE: Primary | ICD-10-CM

## 2020-02-11 DIAGNOSIS — R42 DIZZINESS: ICD-10-CM

## 2020-02-11 PROCEDURE — 97110 THERAPEUTIC EXERCISES: CPT | Mod: GP | Performed by: PHYSICAL THERAPIST

## 2020-02-25 ENCOUNTER — HOSPITAL ENCOUNTER (OUTPATIENT)
Dept: PHYSICAL THERAPY | Facility: CLINIC | Age: 85
End: 2020-02-25
Payer: MEDICARE

## 2020-02-25 DIAGNOSIS — M62.81 GENERALIZED MUSCLE WEAKNESS: ICD-10-CM

## 2020-02-25 DIAGNOSIS — R26.89 IMPAIRMENT OF BALANCE: Primary | ICD-10-CM

## 2020-02-25 DIAGNOSIS — R42 DIZZINESS: ICD-10-CM

## 2020-02-25 PROCEDURE — 97110 THERAPEUTIC EXERCISES: CPT | Mod: GP | Performed by: PHYSICAL THERAPIST

## 2020-03-03 ENCOUNTER — HOSPITAL ENCOUNTER (OUTPATIENT)
Dept: PHYSICAL THERAPY | Facility: CLINIC | Age: 85
End: 2020-03-03
Payer: MEDICARE

## 2020-03-03 DIAGNOSIS — R26.89 IMPAIRMENT OF BALANCE: Primary | ICD-10-CM

## 2020-03-03 DIAGNOSIS — M62.81 GENERALIZED MUSCLE WEAKNESS: ICD-10-CM

## 2020-03-03 DIAGNOSIS — R42 DIZZINESS: ICD-10-CM

## 2020-03-03 PROCEDURE — 97112 NEUROMUSCULAR REEDUCATION: CPT | Mod: GP | Performed by: PHYSICAL THERAPIST

## 2020-03-03 PROCEDURE — 97110 THERAPEUTIC EXERCISES: CPT | Mod: GP | Performed by: PHYSICAL THERAPIST

## 2020-03-10 ENCOUNTER — HEALTH MAINTENANCE LETTER (OUTPATIENT)
Age: 85
End: 2020-03-10

## 2020-03-11 NOTE — ADDENDUM NOTE
Encounter addended by: Mel Blanton, PT on: 3/11/2020 9:19 AM   Actions taken: Flowsheet data copied forward, Flowsheet accepted

## 2020-03-23 DIAGNOSIS — M81.0 AGE RELATED OSTEOPOROSIS, UNSPECIFIED PATHOLOGICAL FRACTURE PRESENCE: ICD-10-CM

## 2020-03-23 DIAGNOSIS — G62.9 NEUROPATHY: ICD-10-CM

## 2020-03-24 NOTE — TELEPHONE ENCOUNTER
Rx Authorization:    Requested Medication/ Dose: Gabapentin 400mg    Date last refill ordered: 7/1/2019    Quantity ordered: 300    # refills: 2    Date of last clinic visit with ordering provider: 6/7/2019    Date of next clinic visit with ordering provider: none    All pertinent protocol data (lab date/result):     Include pertinent information from patients message:

## 2020-03-25 RX ORDER — GABAPENTIN 400 MG/1
CAPSULE ORAL
Qty: 300 CAPSULE | Refills: 2 | Status: SHIPPED | OUTPATIENT
Start: 2020-03-25 | End: 2020-09-03

## 2020-07-31 ENCOUNTER — TELEPHONE (OUTPATIENT)
Dept: PEDIATRICS | Facility: CLINIC | Age: 85
End: 2020-07-31

## 2020-07-31 NOTE — TELEPHONE ENCOUNTER
Order/Referral Request:  Who is requesting: patient  Orders being requested: injection   Reason service is needed/diagnosis: Sacroiliac joint pain  When are orders needed by: as soon as possible  Has this been discussed with Provider: Yes  Does patient have a preference on a Group/Provider/Facility?  [facility]  Does patient have an appointment scheduled: No  Where to send Orders: referral on chart  Okay to leave detailed message?  Yes at Home number on file [phone] (home)    Routing

## 2020-07-31 NOTE — TELEPHONE ENCOUNTER
Pt advised to submit evisit.  Pt reports they will wait until their appointment on 8/10 to discuss with Dr. Arizmendi.  This writer informed pt to call if anything changes or gets worse.

## 2020-08-06 NOTE — PROGRESS NOTES
"SUBJECTIVE:   Kerri Rocha is a 85 year old female who presents for Preventive Visit.    Are you in the first 12 months of your Medicare coverage?  No    Healthy Habits:     In general, how would you rate your overall health?  Good    Frequency of exercise:  6-7 days/week    Duration of exercise:  15-30 minutes    Do you usually eat at least 4 servings of fruit and vegetables a day, include whole grains    & fiber and avoid regularly eating high fat or \"junk\" foods?  Yes    Taking medications regularly:  Yes    Barriers to taking medications:  None    Medication side effects:  None    Ability to successfully perform activities of daily living:  Bathing requires assistance    Home Safety:  No safety concerns identified    Hearing Impairment:  No hearing concerns    In the past 6 months, have you been bothered by leaking of urine?  No    In general, how would you rate your overall mental or emotional health?  Excellent      PHQ-2 Total Score: 0    Additional concerns today:  Yes      Do you feel safe in your environment? Yes    Have you ever done Advance Care Planning? (For example, a Health Directive, POLST, or a discussion with a medical provider or your loved ones about your wishes): Yes, advance care planning is on file.    Fall risk  Fallen 2 or more times in the past year?: No  Any fall with injury in the past year?: No  Cognitive Screening   1) Repeat 3 items (Leader, Season, Table)    2) Clock draw: NORMAL  3) 3 item recall: Recalls 1 object   Results: NORMAL clock, 1-2 items recalled: COGNITIVE IMPAIRMENT LESS LIKELY    Mini-CogTM Copyright MISSAEL Keith. Licensed by the author for use in [facility]; reprinted with permission (trina@.[facility]). All rights reserved.      Do you have sleep apnea, excessive snoring or daytime drowsiness?: no    Reviewed and updated as needed this visit by clinical staff  Tobacco  Allergies  Med Hx  Surg Hx  Fam Hx  Soc Hx        Reviewed and updated as needed this " visit by Provider        Social History     Tobacco Use     Smoking status: Never Smoker     Smokeless tobacco: Never Used   Substance Use Topics     Alcohol use: No     Alcohol/week: 1.0 standard drinks     Comment: 1 glass of wine per week     Alcohol Use 8/7/2020   Prescreen: >3 drinks/day or >7 drinks/week? Not Applicable   Prescreen: >3 drinks/day or >7 drinks/week? -     Current providers sharing in care for this patient include:  Patient Care Team:  Parrish Arizmendi MD as PCP - General (Internal Medicine)  Parrish Arizmendi MD as Assigned PCP  Raymundo Solorio MD as MD (Neurology)    The following health maintenance items are reviewed in Epic and correct as of today:  Health Maintenance   Topic Date Due     ANNUAL REVIEW OF HM ORDERS  1935     ADVANCE CARE PLANNING  1935     ZOSTER IMMUNIZATION (1 of 2) 05/19/1986     PNEUMOCOCCAL IMMUNIZATION 65+ LOW/MEDIUM RISK (2 of 2 - PPSV23) 10/20/2005     FALL RISK ASSESSMENT  08/02/2020     INFLUENZA VACCINE (1) 09/01/2020     MEDICARE ANNUAL WELLNESS VISIT  08/10/2021     DTAP/TDAP/TD IMMUNIZATION (4 - Td) 08/15/2022     DEXA  Completed     PHQ-2  Completed     IPV IMMUNIZATION  Aged Out     MENINGITIS IMMUNIZATION  Aged Out     # SI joint pain  - Pain - Dr. Novak - physical therapy, tylenol, can repeat bilateral injections in the future (3 months)    # Dizziness  - still persists but comes and goes  - working on core per recs from Dr. Solorio  - vestibular therapy was helpful    # HLD  - lipitor    # osteoporosis  - vit D, oscal  - had dental implant and due for another one  ? Fosamax    # HTN  - amlodipine 10  - lisinopril 10      Review of Systems   Constitutional: Negative for chills and fever.   HENT: Positive for sore throat. Negative for congestion, ear pain and hearing loss.    Eyes: Negative for pain and visual disturbance.   Respiratory: Negative for cough and shortness of breath.    Cardiovascular: Negative for chest  "pain, palpitations and peripheral edema.   Gastrointestinal: Negative for abdominal pain, constipation, diarrhea, heartburn, hematochezia and nausea.   Breasts:  Negative for tenderness, breast mass and discharge.   Genitourinary: Negative for dysuria, frequency, genital sores, hematuria, pelvic pain, urgency, vaginal bleeding and vaginal discharge.   Musculoskeletal: Positive for arthralgias, joint swelling and myalgias.   Skin: Negative for rash.   Neurological: Positive for dizziness and weakness. Negative for headaches and paresthesias.   Psychiatric/Behavioral: Negative for mood changes. The patient is not nervous/anxious.      Constitutional, HEENT, cardiovascular, pulmonary, gi and gu systems are negative, except as otherwise noted.    OBJECTIVE:   /54 (BP Location: Right arm, Patient Position: Chair, Cuff Size: Adult Regular)   Pulse 67   Temp 97.7  F (36.5  C) (Tympanic)   Resp 18   Ht 1.499 m (4' 11\")   Wt 57.6 kg (126 lb 14.4 oz)   SpO2 98%   BMI 25.63 kg/m   Estimated body mass index is 25.63 kg/m  as calculated from the following:    Height as of this encounter: 1.499 m (4' 11\").    Weight as of this encounter: 57.6 kg (126 lb 14.4 oz).  Physical Exam  GENERAL: healthy, alert and no distress  EYES: Eyes grossly normal to inspection, PERRL and conjunctivae and sclerae normal  HENT: ear canals and TM's normal, nose and mouth without ulcers or lesions  NECK: no adenopathy, no asymmetry, masses, or scars and thyroid normal to palpation  RESP: lungs clear to auscultation - no rales, rhonchi or wheezes  BREAST: normal without masses, tenderness or nipple discharge and no palpable axillary masses or adenopathy, inverted nipples  CV: regular rate and rhythm, normal S1 S2, no S3 or S4, no murmur, click or rub, no peripheral edema and peripheral pulses strong  ABDOMEN: soft, nontender, no hepatosplenomegaly, no masses and bowel sounds normal  MS: no gross musculoskeletal defects noted, no " edema  SKIN: no suspicious lesions or rashes  NEURO: Normal strength and tone, mentation intact and speech normal  PSYCH: mentation appears normal, affect normal/bright    Diagnostic Test Results:  Labs in process    ASSESSMENT / PLAN:   1. Encounter for Medicare annual wellness exam  Discussed okay to discontinue asa as being used for primary prevention and age > 70 years.  - PPSV23, IM/SUBQ (2+ YRS) - [id number]  - ADMIN 1st VACCINE    2. Essential hypertension  BP Readings from Last 6 Encounters:   08/10/20 122/54   01/29/20 (!) 159/54   12/13/19 (!) 149/68   12/10/19 136/65   09/25/19 120/50   08/02/19 128/56     - Albumin Random Urine Quantitative with Creat Ratio  - amLODIPine (NORVASC) 10 MG tablet; Take 1 tablet (10 mg) by mouth daily  Dispense: 90 tablet; Refill: 3  - lisinopril (ZESTRIL) 10 MG tablet; Take 1 tablet (10 mg) by mouth daily  Dispense: 90 tablet; Refill: 3    3. Mixed hyperlipidemia  - Lipid Profile (Chol, Trig, HDL, LDL calc)  - Comprehensive metabolic panel (BMP + Alb, Alk Phos, ALT, AST, Total. Bili, TP)  - atorvastatin (LIPITOR) 20 MG tablet; Take 1 tablet (20 mg) by mouth At Bedtime  Dispense: 90 tablet; Refill: 3    4. Localized osteoporosis without current pathological fracture  Continues to need dental work (implants) - will discuss with Pharmacy and if risk of osteonecrosis high will talk about Endo referral.   - Vitamin D Deficiency  - DX Hip/Pelvis/Spine; Future    5. Idiopathic peripheral neuropathy  6. Vertigo  Establishing with a new Neurologist.    7. Sacroiliac joint pain  Order placed for repeat SI injection.   - PAIN MANAGEMENT REFERRAL    8. Elevated vitamin B12 level  - Vitamin B12    9. Encounter for immunization   - PPSV23, IM/SUBQ (2+ YRS) - [id number]    COUNSELING:  Reviewed preventive health counseling, as reflected in patient instructions       Regular exercise       Vision screening       Hearing screening       Dental care       Immunizations    Vaccinated for:  "Pneumococcal      Estimated body mass index is 25.63 kg/m  as calculated from the following:    Height as of this encounter: 1.499 m (4' 11\").    Weight as of this encounter: 57.6 kg (126 lb 14.4 oz).     reports that she has never smoked. She has never used smokeless tobacco.    Appropriate preventive services were discussed with this patient, including applicable screening as appropriate for cardiovascular disease, diabetes, osteopenia/osteoporosis, and glaucoma.  As appropriate for age/gender, discussed screening for colorectal cancer, prostate cancer, breast cancer, and cervical cancer. Checklist reviewing preventive services available has been given to the patient.    Reviewed patients plan of care and provided an AVS. The Basic Care Plan (routine screening as documented in Health Maintenance) for Kerri meets the Care Plan requirement. This Care Plan has been established and reviewed with the Patient.    Counseling Resources:  ATP IV Guidelines  Pooled Cohorts Equation Calculator  Breast Cancer Risk Calculator  FRAX Risk Assessment  ICSI Preventive Guidelines  Dietary Guidelines for Americans, 2010  [employer]'s MyPlate  ASA Prophylaxis  Lung CA Screening    Parrish Arizmendi MD  [facility]    Identified Health Risks:  "

## 2020-08-06 NOTE — PATIENT INSTRUCTIONS
You should not need a referral for Dr. Gillette. Can call their office and schedule anytime.     Referral to Pain for another injection - I'm so glad it was helpful. They should call you to schedule.     Labs today - I'll send your results via [facility].     They should call you to schedule the dexa scan. I'll be in touch about your bone health and options.     Okay to stop aspirin    Meds refilled for the year.     If you are over 50 I recommend the new Shingrix vaccine. Two doses of Shingrix provides more than 90% protection against shingles and postherpetic neuralgia (PHN), a type of chronic pain that is the most common complication of shingles.   - even if you've had the older shingles vaccine (Zostavax) it's okay to get the new vaccine.   - even if you've had singles before the vaccine can be helpful.    Typically the best (and cheapest!) place to get this vaccine is our pharmacy downstairs. You do not need an appointment and can walk in any time they are open. The vaccine is a two shot series - I recommend getting the second shot 2-6 months after the first dose.    You may have a sore arm and feel mild flu-like symptoms for a day or two after the vaccine. Most people do not need to adjust their regular activities. It's okay to take tylenol or ibuprofen if you have side effects.       Patient Education   Personalized Prevention Plan  You are due for the preventive services outlined below.  Your care team is available to assist you in scheduling these services.  If you have already completed any of these items, please share that information with your care team to update in your medical record.  Health Maintenance Due   Topic Date Due     ANNUAL REVIEW OF HM ORDERS  1935     Discuss Advance Care Planning  1935     Zoster (Shingles) Vaccine (1 of 2) 05/19/1986     Pneumococcal Vaccine (2 of 2 - PPSV23) 10/20/2005     PHQ-2  01/01/2020     FALL RISK ASSESSMENT  08/02/2020     Annual Wellness Visit   08/02/2020

## 2020-08-07 ASSESSMENT — ACTIVITIES OF DAILY LIVING (ADL): CURRENT_FUNCTION: BATHING REQUIRES ASSISTANCE

## 2020-08-07 ASSESSMENT — ENCOUNTER SYMPTOMS
WEAKNESS: 1
BREAST MASS: 0
HEMATURIA: 0
NAUSEA: 0
MYALGIAS: 1
FEVER: 0
PARESTHESIAS: 0
ARTHRALGIAS: 1
FREQUENCY: 0
DIARRHEA: 0
SHORTNESS OF BREATH: 0
ABDOMINAL PAIN: 0
CHILLS: 0
DIZZINESS: 1
DYSURIA: 0
COUGH: 0
CONSTIPATION: 0
NERVOUS/ANXIOUS: 0
HEADACHES: 0
SORE THROAT: 1
HEMATOCHEZIA: 0
PALPITATIONS: 0
EYE PAIN: 0
HEARTBURN: 0
JOINT SWELLING: 1

## 2020-08-10 ENCOUNTER — OFFICE VISIT (OUTPATIENT)
Dept: PEDIATRICS | Facility: CLINIC | Age: 85
End: 2020-08-10
Payer: MEDICARE

## 2020-08-10 VITALS
RESPIRATION RATE: 18 BRPM | WEIGHT: 126.9 LBS | DIASTOLIC BLOOD PRESSURE: 54 MMHG | SYSTOLIC BLOOD PRESSURE: 122 MMHG | HEART RATE: 67 BPM | BODY MASS INDEX: 25.58 KG/M2 | TEMPERATURE: 97.7 F | OXYGEN SATURATION: 98 % | HEIGHT: 59 IN

## 2020-08-10 DIAGNOSIS — M53.3 SACROILIAC JOINT PAIN: ICD-10-CM

## 2020-08-10 DIAGNOSIS — Z23 ENCOUNTER FOR IMMUNIZATION: ICD-10-CM

## 2020-08-10 DIAGNOSIS — E78.2 MIXED HYPERLIPIDEMIA: ICD-10-CM

## 2020-08-10 DIAGNOSIS — R42 VERTIGO: ICD-10-CM

## 2020-08-10 DIAGNOSIS — I10 ESSENTIAL HYPERTENSION: ICD-10-CM

## 2020-08-10 DIAGNOSIS — R79.89 ELEVATED VITAMIN B12 LEVEL: ICD-10-CM

## 2020-08-10 DIAGNOSIS — G60.9 IDIOPATHIC PERIPHERAL NEUROPATHY: ICD-10-CM

## 2020-08-10 DIAGNOSIS — Z00.00 ENCOUNTER FOR MEDICARE ANNUAL WELLNESS EXAM: Primary | ICD-10-CM

## 2020-08-10 DIAGNOSIS — M81.6 LOCALIZED OSTEOPOROSIS WITHOUT CURRENT PATHOLOGICAL FRACTURE: ICD-10-CM

## 2020-08-10 LAB
ALBUMIN SERPL-MCNC: 4.1 G/DL (ref 3.4–5)
ALP SERPL-CCNC: 67 U/L (ref 40–150)
ALT SERPL W P-5'-P-CCNC: 21 U/L (ref 0–50)
ANION GAP SERPL CALCULATED.3IONS-SCNC: 5 MMOL/L (ref 3–14)
AST SERPL W P-5'-P-CCNC: 27 U/L (ref 0–45)
BILIRUB SERPL-MCNC: 0.5 MG/DL (ref 0.2–1.3)
BUN SERPL-MCNC: 18 MG/DL (ref 7–30)
CALCIUM SERPL-MCNC: 10 MG/DL (ref 8.5–10.1)
CHLORIDE SERPL-SCNC: 109 MMOL/L (ref 94–109)
CHOLEST SERPL-MCNC: 147 MG/DL
CO2 SERPL-SCNC: 27 MMOL/L (ref 20–32)
CREAT SERPL-MCNC: 0.97 MG/DL (ref 0.52–1.04)
CREAT UR-MCNC: 27 MG/DL
GFR SERPL CREATININE-BSD FRML MDRD: 54 ML/MIN/{1.73_M2}
GLUCOSE SERPL-MCNC: 99 MG/DL (ref 70–99)
HDLC SERPL-MCNC: 42 MG/DL
LDLC SERPL CALC-MCNC: 79 MG/DL
MICROALBUMIN UR-MCNC: <5 MG/L
MICROALBUMIN/CREAT UR: NORMAL MG/G CR (ref 0–25)
NONHDLC SERPL-MCNC: 105 MG/DL
POTASSIUM SERPL-SCNC: 4.5 MMOL/L (ref 3.4–5.3)
PROT SERPL-MCNC: 7.9 G/DL (ref 6.8–8.8)
SODIUM SERPL-SCNC: 141 MMOL/L (ref 133–144)
TRIGL SERPL-MCNC: 129 MG/DL
VIT B12 SERPL-MCNC: 1705 PG/ML (ref 193–986)

## 2020-08-10 PROCEDURE — G0439 PPPS, SUBSEQ VISIT: HCPCS | Performed by: INTERNAL MEDICINE

## 2020-08-10 PROCEDURE — 82607 VITAMIN B-12: CPT | Performed by: INTERNAL MEDICINE

## 2020-08-10 PROCEDURE — 82306 VITAMIN D 25 HYDROXY: CPT | Performed by: INTERNAL MEDICINE

## 2020-08-10 PROCEDURE — 36415 COLL VENOUS BLD VENIPUNCTURE: CPT | Performed by: INTERNAL MEDICINE

## 2020-08-10 PROCEDURE — 80061 LIPID PANEL: CPT | Performed by: INTERNAL MEDICINE

## 2020-08-10 PROCEDURE — 82043 UR ALBUMIN QUANTITATIVE: CPT | Performed by: INTERNAL MEDICINE

## 2020-08-10 PROCEDURE — 80053 COMPREHEN METABOLIC PANEL: CPT | Performed by: INTERNAL MEDICINE

## 2020-08-10 PROCEDURE — G0009 ADMIN PNEUMOCOCCAL VACCINE: HCPCS | Performed by: INTERNAL MEDICINE

## 2020-08-10 PROCEDURE — 99213 OFFICE O/P EST LOW 20 MIN: CPT | Mod: 25 | Performed by: INTERNAL MEDICINE

## 2020-08-10 PROCEDURE — 90732 PPSV23 VACC 2 YRS+ SUBQ/IM: CPT | Performed by: INTERNAL MEDICINE

## 2020-08-10 RX ORDER — AMLODIPINE BESYLATE 10 MG/1
10 TABLET ORAL DAILY
Qty: 90 TABLET | Refills: 3 | Status: SHIPPED | OUTPATIENT
Start: 2020-08-10 | End: 2021-07-30

## 2020-08-10 RX ORDER — LISINOPRIL 10 MG/1
10 TABLET ORAL DAILY
Qty: 90 TABLET | Refills: 3 | Status: SHIPPED | OUTPATIENT
Start: 2020-08-10 | End: 2021-07-30

## 2020-08-10 RX ORDER — ATORVASTATIN CALCIUM 20 MG/1
20 TABLET, FILM COATED ORAL AT BEDTIME
Qty: 90 TABLET | Refills: 3 | Status: SHIPPED | OUTPATIENT
Start: 2020-08-10 | End: 2021-07-30

## 2020-08-10 ASSESSMENT — ENCOUNTER SYMPTOMS
DIARRHEA: 0
HEARTBURN: 0
HEMATURIA: 0
NAUSEA: 0
FEVER: 0
BREAST MASS: 0
DYSURIA: 0
CHILLS: 0
HEMATOCHEZIA: 0
HEADACHES: 0
FREQUENCY: 0
SHORTNESS OF BREATH: 0
SORE THROAT: 1
COUGH: 0
WEAKNESS: 1
PALPITATIONS: 0
NERVOUS/ANXIOUS: 0
JOINT SWELLING: 1
MYALGIAS: 1
ABDOMINAL PAIN: 0
EYE PAIN: 0
ARTHRALGIAS: 1
PARESTHESIAS: 0
DIZZINESS: 1
CONSTIPATION: 0

## 2020-08-10 ASSESSMENT — MIFFLIN-ST. JEOR: SCORE: 931.24

## 2020-08-10 ASSESSMENT — ACTIVITIES OF DAILY LIVING (ADL): CURRENT_FUNCTION: BATHING REQUIRES ASSISTANCE

## 2020-08-10 NOTE — PROGRESS NOTES
Patient stated that she would like to call back to schedule Dexa appointment.     Aniyah Valle MA  8/10/2020 @ 10:00am

## 2020-08-11 ENCOUNTER — TELEPHONE (OUTPATIENT)
Dept: PALLIATIVE MEDICINE | Facility: CLINIC | Age: 85
End: 2020-08-11

## 2020-08-11 LAB — DEPRECATED CALCIDIOL+CALCIFEROL SERPL-MC: 70 UG/L (ref 20–75)

## 2020-08-11 NOTE — TELEPHONE ENCOUNTER
"Screening Questions for Radiology Injections:    Injection to be done at which interventional clinic site? [facility]    Instruct patient to arrive as directed prior to the scheduled appointment time:    [place] minutes before      [place]: 30 minutes before; if IV needed 1 hour before     Dr. Navarrete-no IV needed for Cervical MAGUE; please instruct to arrive 30\" early    Procedure ordered by Marissa-Bleil    Procedure ordered? bilateral SI joint inection    As a reminder, receiving steroids can decrease your body's ability to fight infection.   Would you still like to move forward with scheduling the injection?  Yes      Transforaminal Cervical MAGUE - no pain provider currently performing    What insurance would patient like us to bill for this procedure? Medicare      Worker's comp or MVA (motor vehicle accident) -Any injection DO NOT SCHEDULE and route to Rozina Deo.      [employer] insurance - For SI joint injections, DO NOT SCHEDULE and route Rozina Deo.       [employer] - Any injection besides hip/shoulder/knee joint DO NOT SCHEDULE and route to Rozina Deo. She will obtain PA and call pt back to schedule procedure or notify pt of denial.       [employer]-Route to [place] for review      **BCBS- ALL need to be routed to [place] for review if a PA is needed**      IF SCHEDULING IN [place] AND NEEDS A PA, IT IS OKAY TO SCHEDULE. [place] HANDLES THEIR OWN PA'S AFTER THE PATIENT IS SCHEDULED. PLEASE SCHEDULE AT LEAST 1 WEEK OUT SO A PA CAN BE OBTAINED.    Any chance of pregnancy? NO   If YES, do NOT schedule and route to RN pool    Is an  needed? No     Patient has a drive home? (mandatory) YES: Informed    Is patient taking any blood thinners (i.e. plavix, coumadin, jantoven, warfarin, heparin, pradaxa or dabigatran, etc)? No   If hold needed, do NOT schedule, route to RN pool     Is patient taking any aspirin products (includes Excedrin and Fiorinal)? Yes - Pt takes 81mg daily; instructed " to hold 0 day(s) prior to procedure.      If more than 325mg/day, OK to schedule; Instruct pt to decrease to less than 325 mg for 7 days AND route to RN pool    For CERVICAL procedures, hold all aspirin products for 6 days.     Tell pt that if aspirin product is not held for 6 days, the procedure WILL BE cancelled.      Does the patient have a bleeding or clotting disorder? No     If YES, okay to schedule AND route to RN nurse pool    For any patients with platelet count <100, must be forwarded to provider    Is patient diabetic?  No  If YES, instruct them to bring their glucometer.    Does patient have an active infection or treated for one within the past week? No     Is patient currently taking any antibiotics?  No     For patients on chronic, preventative, or prophylactic antibiotics, procedures may be scheduled.     For patients on antibiotics for active or recent infection:antibiotic course must have been completed for 4 days    Is patient currently taking any steroid medications? (i.e. Prednisone, Medrol)  No     For patients on steroid medications, course must have been completed for 4 days    Is patient actively being treated for cancer or immunocompromised? No  If YES, do NOT schedule and route to RN pool     Are you able to get on and off an exam table with minimal or no assistance? Yes  If NO, do NOT schedule and route to RN pool    Are you able to roll over and lay on your stomach with minimal or no assistance? Yes  If NO, do NOT schedule and route to RN pool     Any allergies to contrast dye, iodine, shellfish, or numbing and steroid medications? No  If YES, route to RN pool AND add allergy information to appointment notes    Allergies: Codeine; Sulfa drugs; Sulfasalazine; and Sulfate      Has the patient had a flu shot or any other vaccinations within 7 days before or after the procedure.  No     Does patient have an MRI/CT?  Not Applicable  Check Procedure Scheduling Grid to see if required.      Was  the MRI done within the last 3 years?  NA    If yes, where was the MRI done i.e.[facility], [facility], [facility] [facility], [facility] etc?       If no, do not schedule and route to RN pool    If MRI was not done at [facility], [facility] or [facility] do NOT schedule and route to RN pool.      If pt has an imaging disc, the injection MAY be scheduled but pt has to bring disc to appt.     If they show up without the disc the injection cannot be done    Procedure Specific Instructions:      If celiac plexus block, informed patient NPO for 6 hours and that it is okay to take medications with sips of water, especially blood pressure medications  Not Applicable         If this is for a cervical procedure, informed patient that aspirin needs to be held for 6 days.   Not Applicable      If IV needed:    Do not schedule procedures requiring IV placement in the first appointment of the day or first appointment after lunch. Do NOT schedule at 0745, 0815 or 1245.     Instructed pt to arrive 30 minutes early for IV start if required. (Check Procedure Scheduling Grid)  Not Applicable    Reminders:      If you are started on any steroids or antibiotics between now and your appointment, you must contact us because the procedure may need to be cancelled.  Yes      For all procedures except radiofrequency ablations (RFAs) and spinal cord stimulator (SCS) trials, informed patient:    IV sedation is not provided for this procedure.  If you feel that an oral anti-anxiety medication is needed, you can discuss this further with your referring provider or primary care provider.  The Pain Clinic provider will discuss specifics of what the procedure includes at your appointment.  Most procedures last 10-20 minutes.  We use numbing medications to help with any discomfort during the procedure.  Not Applicable      For patients 85 or older we recommend having an adult stay w/ them for the remainder of the day.       Does the patient have any  questions?  NO  Janine Haskins  [facility]

## 2020-08-14 ENCOUNTER — TELEPHONE (OUTPATIENT)
Dept: PALLIATIVE MEDICINE | Facility: CLINIC | Age: 85
End: 2020-08-14

## 2020-08-14 NOTE — TELEPHONE ENCOUNTER
LVM reminded patient of date, time and location of appointment.     Requested patient to contact clinic to reschedule if had fever or coughing in the last 14 days.   Reminded patient to bring and wear mask during their visit.    Reminded patient they will need a  for this appointment and requested that the  stays out of the clinic unless its medically necessary.      Mónica Higgins [facility]  [place]

## 2020-08-17 ENCOUNTER — RADIOLOGY INJECTION OFFICE VISIT (OUTPATIENT)
Dept: PALLIATIVE MEDICINE | Facility: CLINIC | Age: 85
End: 2020-08-17
Payer: MEDICARE

## 2020-08-17 ENCOUNTER — ANCILLARY PROCEDURE (OUTPATIENT)
Dept: GENERAL RADIOLOGY | Facility: CLINIC | Age: 85
End: 2020-08-17
Attending: PHYSICAL MEDICINE & REHABILITATION
Payer: MEDICARE

## 2020-08-17 VITALS — OXYGEN SATURATION: 98 % | SYSTOLIC BLOOD PRESSURE: 128 MMHG | DIASTOLIC BLOOD PRESSURE: 64 MMHG | HEART RATE: 76 BPM

## 2020-08-17 DIAGNOSIS — M53.3 SACROILIAC JOINT PAIN: ICD-10-CM

## 2020-08-17 DIAGNOSIS — M53.3 SACROILIAC JOINT PAIN: Primary | ICD-10-CM

## 2020-08-17 PROCEDURE — 27096 INJECT SACROILIAC JOINT: CPT | Mod: 50 | Performed by: PHYSICAL MEDICINE & REHABILITATION

## 2020-08-17 NOTE — PROGRESS NOTES
[facility] - Procedure Note    Date of Service: 8/17/2020  Procedure performed: Bilateral sacroiliac joint injection  Diagnosis: Sacroiliac joint pain  : Frieda Novak MD  Anesthesia: None    Indications: Kerri Rocha is a 84 year old female who is Parrish Arizmendi MD for a sacroiliac joint injection. The patient describes bilateral low back/buttock pain. Exam shows full strength and sensation in both lower extremities, tenderness of the sacroiliac (SI) joint, and positive yeoman's bilaterally. The patient reports minimal improvement with conservative treatment, including PT and medications.    This is a repeat injection. Last one completed on 12/13/2019 which provided at least 3-4 months of relief.    Imaging:  MRI completed on 4/13/2019 showed:  Findings: Regarding numbering convention, there are 5 lumbar-type  vertebrae assumed for the purposes of this dictation.  The tip of the  conus medullaris is at  L1.  Regarding alignment, there is grade 1  anterolisthesis of L4 on L5 and of L5 on S1. Postsurgical changes of  instrumented spinal fusion of L4-5 with transpedicular screws,  interconnecting fusion rods and interbody fusion spacer. There is an  interbody fusion spacer at T11-12. There is multilevel disc height  narrowing and disc desiccation, most pronounced at L3-4 with mild disc  height loss.  Regarding bone marrow signal intensity, there is T1 and  T2 hyperintense signal scattered throughout the lumbar vertebral  bodies and the sacrum which drops out on the STIR images, consistent  with focal fatty deposition or possibly cavernous hemangiomas. There  is a superior endplate compression deformity of L1.     On a level by level basis:     L1-2: Posterior disc bulge. Facet arthropathy bilaterally. Mild neural  foraminal stenosis bilaterally. Spinal canal is patent..     L2-3: Posterior disc bulge. Facet arthropathy bilaterally. Ligamentum  flavum hypertrophy. Moderate  left and mild right neural foraminal  stenosis. Mild spinal canal stenosis..     L3-4: Small posterior disc bulge. Facet arthropathy bilaterally. Left  laminectomy. Moderate to severe left and mild to moderate right neural  foraminal stenosis. Spinal canal is patent..     L4-5: Small posterior osteophytic spurring. Facet arthropathy  bilaterally. Decompressive laminectomy. No spinal canal or neural  foraminal stenosis.     L5-S1: Posterior disc bulge. Facet arthropathy bilaterally. Right  sided synovial cyst measures 5 x 5 x 6 mm narrows the right lateral  recess and likely impinges upon the traversing right S1 nerve root.  Mild neural foraminal stenosis bilaterally. Spinal canal is patent..     Contrast-enhanced images demonstrate no abnormal enhancement in the  visualized paraspinous tissues anteriorly or in the spinal canal or  vertebra.                                                                      Impression:   1. Multilevel lumbar spondylosis, most pronounced at L3-4 with  moderate severe left and mild to moderate right neural foraminal  stenosis.  2. Synovial cyst within the right neural foramen of L5-S1 narrows the  right lateral recess and likely impinges upon the traversing right S1  nerve root.  3. Postsurgical changes of instrumented fusion of L4-5.    Allergies:      Allergies   Allergen Reactions     Codeine      Sulfa Drugs      Sulfasalazine Other (See Comments)     Sulfate Rash        Vitals:  /64   Pulse 76   SpO2 98%     Options/alternatives, benefits and risks were discussed with the patient including bleeding, infection, tissue trauma, exposure to radiation, reaction to medications including seizure, nerve injury, weakness, and numbness.  Questions were answered to her satisfaction and she agrees to proceed. Voluntary informed consent was obtained and signed.     Procedure:  After getting informed consent, patient was brought into the procedure suite and was placed in a prone  position on the procedure table.   A Pause for the Cause was performed.  Patient was prepped and draped in sterile fashion.     After identifying the right SI joint, the C-arm was rotated to a obliquely to obtain the best view of the inferior angle of the joint.  A total of 2 ml of Lidocaine 1%  was used to anesthetize the skin at a skin entry site coaxial with the fluoroscopy beam at this location.  A 22 gauge 3.5 inch needle was advanced under intermittent fluoroscopy until it was felt to enter the SI joint.    The same procedure was repeated on the left side.     A total of 1 ml of Omnipaque-300 was injected, confirming appropriate position, with spread into the intraarticular space, with no intravascular uptake noted.  9 ml of Omnipaque-300 was wasted. Location was verified in lateral view.    4.5 ml of 0.25% bupivacaine with 60 mg of kenalog was injected equally between both sides..  The needle was removed. Hemostasis was achieved, the area was cleaned, and bandaids were placed when appropriate.  The patient tolerated the procedure well, and was taken to the recovery room.    Images were saved to PACS.    Pre-procedure pain score: 7/10  Post-procedure pain score: 0/10    Assessment/Plan: Kerri Rocha is a 84 year old female s/p bilateral SIJ injection due to low back/buttock pain.    1. Following today's procedure, the patient was advised to contact the [facility] for any of the following:   Fever, chills, or night sweats   New onset of pain, numbness, or weakness   Any questions/concerns regarding the procedure  If unable to contact the Pain Center, the patient was instructed to go to a local Emergency Room for any complications.   2. The patient will receive a follow-up call in 1 week.  3. The patient should follow-up with referring provider.         Frieda Novak MD  [facility] [place]

## 2020-08-17 NOTE — PATIENT INSTRUCTIONS
[facility] Procedure Discharge Instructions    Today you saw:Dr. Frieda Novak    Your procedure:   Sacro-iliac joint injection     Medications used:  Lidocaine (anesthetic)  Bupivacaine (anesthetic)  Kenalog (steroid)  Omnipaque (contrast)             Be cautious when walking as numbness and/or weakness in the legs may occur up to 6-8 hours after the procedure due to effect of the local anesthetic    Do not drive for 6 hours. The effect of the local anesthetic could slow your reflexes.     Avoid strenuous activity for the first 24 hours. You may resume your regular activities after that.     You may shower, however avoid swimming, tub baths or hot tubs for 24 hours following your procedure    You may have a mild to moderate increase in pain for several days following the injection.      You may use ice packs for 10-15 minutes, 3 to 4 times a day at the injection site for comfort    Do not use heat to painful areas for 6 to 8 hours. This will give the local anesthetic time to wear off and prevent you from accidentally burning your skin.    Unless you have been directed to avoid the use of anti-inflammatory medications (NSAIDS-ibuprofen, Aleve, Motrin), you may use these medications or Tylenol for pain control if needed.     With diabetes, check your blood sugar more frequently than usual as your blood sugar may be higher than normal for 10-14 days following a steroid injection. Contact your doctor who manages your diabetes if your blood sugar is higher than usual    Possible side effects of steroids that you may experience include flushing, elevated blood pressure, increased appetite, mild headaches and restlessness.  All of these symptoms will get better with time.    It may take up to 14 days for the steroid medication to start working although you may feel the effect as early as a few days after the procedure.     Follow up with your referring provider in 2-3 weeks      If you experience any of  the following, call the pain center line during work hours at [phone] or on-call physician after hours at [phone]:  -Fever over 100 degree F  -Swelling, bleeding, redness, drainage, warmth at the injection site  -Progressive weakness or numbness in your legs   -Loss of bowel or bladder function  -Unusual headache that is not relieved by Tylenol or your regular headache medication  -Unusual new onset of pain that is not improving

## 2020-08-17 NOTE — NURSING NOTE
Discharge Information    IV Discontiued Time:  NA    Amount of Fluid Infused:  NA    Discharge Criteria = When patient returns to baseline or as per MD order    Consciousness:  Pt is fully awake    Circulation:  BP +/- 20% of pre-procedure level    Respiration:  Patient is able to breathe deeply    O2 Sat:  Patient is able to maintain O2 Sat >92% on room air    Activity:  Moves 4 extremities on command    Ambulation:  Patient is able to stand and walk or stand and pivot into wheelchair    Dressing:  Clean/dry or No Dressing    Notes:   Discharge instructions and AVS given to patient    Patient meets criteria for discharge?  YES    Admitted to PCU?  No    Responsible adult present to accompany patient home?  Yes    Signature/Title:    Carmencita Payton RN  RN Care Coordinator  [facility] [place]

## 2020-08-28 ENCOUNTER — ANCILLARY PROCEDURE (OUTPATIENT)
Dept: BONE DENSITY | Facility: CLINIC | Age: 85
End: 2020-08-28
Attending: INTERNAL MEDICINE
Payer: MEDICARE

## 2020-08-28 DIAGNOSIS — M81.6 LOCALIZED OSTEOPOROSIS WITHOUT CURRENT PATHOLOGICAL FRACTURE: ICD-10-CM

## 2020-08-28 DIAGNOSIS — M81.6 LOCALIZED OSTEOPOROSIS OF LEQUESNE: ICD-10-CM

## 2020-08-28 PROCEDURE — 77081 DXA BONE DENSITY APPENDICULR: CPT | Mod: 59 | Performed by: FAMILY MEDICINE

## 2020-08-28 PROCEDURE — 77080 DXA BONE DENSITY AXIAL: CPT | Performed by: FAMILY MEDICINE

## 2020-09-03 DIAGNOSIS — M81.0 AGE RELATED OSTEOPOROSIS, UNSPECIFIED PATHOLOGICAL FRACTURE PRESENCE: ICD-10-CM

## 2020-09-03 DIAGNOSIS — G62.9 NEUROPATHY: ICD-10-CM

## 2020-09-03 RX ORDER — GABAPENTIN 400 MG/1
CAPSULE ORAL
Qty: 300 CAPSULE | Refills: 2 | Status: SHIPPED | OUTPATIENT
Start: 2020-09-03 | End: 2021-01-13

## 2020-09-03 NOTE — TELEPHONE ENCOUNTER
gabapentin (NEURONTIN) 400 MG capsule       Last Written Prescription Date:  3/25/2020  Last Fill Quantity: 300,   # refills: 2  Last Office Visit: 8/10/2020  Future Office visit:       Routing refill request to provider for review/approval because:  Drug not on the [facility], [facility] or [facility] refill protocol or controlled substance.    Shira Morgan RN

## 2020-10-09 ENCOUNTER — OFFICE VISIT (OUTPATIENT)
Dept: NEUROLOGY | Facility: CLINIC | Age: 85
End: 2020-10-09
Attending: INTERNAL MEDICINE
Payer: MEDICARE

## 2020-10-09 VITALS
HEART RATE: 75 BPM | DIASTOLIC BLOOD PRESSURE: 69 MMHG | HEIGHT: 59 IN | OXYGEN SATURATION: 97 % | BODY MASS INDEX: 25.12 KG/M2 | WEIGHT: 124.6 LBS | SYSTOLIC BLOOD PRESSURE: 126 MMHG

## 2020-10-09 DIAGNOSIS — R79.9 ABNORMAL FINDING OF BLOOD CHEMISTRY, UNSPECIFIED: ICD-10-CM

## 2020-10-09 DIAGNOSIS — G60.9 IDIOPATHIC PERIPHERAL NEUROPATHY: ICD-10-CM

## 2020-10-09 DIAGNOSIS — R29.898 WEAKNESS OF BOTH LOWER EXTREMITIES: ICD-10-CM

## 2020-10-09 DIAGNOSIS — R26.9 ABNORMAL GAIT: Primary | ICD-10-CM

## 2020-10-09 LAB
CRP SERPL-MCNC: <2.9 MG/L (ref 0–8)
ERYTHROCYTE [SEDIMENTATION RATE] IN BLOOD BY WESTERGREN METHOD: 10 MM/H (ref 0–30)
HBA1C MFR BLD: 5.6 % (ref 0–5.6)
TSH SERPL DL<=0.005 MIU/L-ACNC: 2.65 MU/L (ref 0.4–4)

## 2020-10-09 PROCEDURE — 83883 ASSAY NEPHELOMETRY NOT SPEC: CPT | Performed by: INTERNAL MEDICINE

## 2020-10-09 PROCEDURE — 36415 COLL VENOUS BLD VENIPUNCTURE: CPT | Performed by: PATHOLOGY

## 2020-10-09 PROCEDURE — 86140 C-REACTIVE PROTEIN: CPT | Performed by: PATHOLOGY

## 2020-10-09 PROCEDURE — 83036 HEMOGLOBIN GLYCOSYLATED A1C: CPT | Performed by: PATHOLOGY

## 2020-10-09 PROCEDURE — 86334 IMMUNOFIX E-PHORESIS SERUM: CPT | Mod: 90 | Performed by: PATHOLOGY

## 2020-10-09 PROCEDURE — 82784 ASSAY IGA/IGD/IGG/IGM EACH: CPT | Performed by: PATHOLOGY

## 2020-10-09 PROCEDURE — 999N001036 HC STATISTIC TOTAL PROTEIN: Performed by: INTERNAL MEDICINE

## 2020-10-09 PROCEDURE — 84165 PROTEIN E-PHORESIS SERUM: CPT | Mod: 26 | Performed by: STUDENT IN AN ORGANIZED HEALTH CARE EDUCATION/TRAINING PROGRAM

## 2020-10-09 PROCEDURE — 85652 RBC SED RATE AUTOMATED: CPT | Performed by: PATHOLOGY

## 2020-10-09 PROCEDURE — 84443 ASSAY THYROID STIM HORMONE: CPT | Performed by: PATHOLOGY

## 2020-10-09 PROCEDURE — 99214 OFFICE O/P EST MOD 30 MIN: CPT | Performed by: INTERNAL MEDICINE

## 2020-10-09 ASSESSMENT — ENCOUNTER SYMPTOMS
MEMORY LOSS: 0
MUSCLE WEAKNESS: 1
SEIZURES: 0
BACK PAIN: 1
LOSS OF CONSCIOUSNESS: 0
TINGLING: 1
PARALYSIS: 0
STIFFNESS: 1
SPEECH CHANGE: 0
MYALGIAS: 1
WEAKNESS: 1
TREMORS: 0
DISTURBANCES IN COORDINATION: 1
HEADACHES: 0
DIZZINESS: 1
MUSCLE CRAMPS: 1
ARTHRALGIAS: 0
NECK PAIN: 1
NUMBNESS: 1

## 2020-10-09 ASSESSMENT — PAIN SCALES - GENERAL: PAINLEVEL: MILD PAIN (3)

## 2020-10-09 ASSESSMENT — MIFFLIN-ST. JEOR: SCORE: 920.81

## 2020-10-09 NOTE — LETTER
10/9/2020       RE: Kerri Rocha  [address]  [facility] [address]     Dear Colleague,    Thank you for referring your patient, Kerri Rocha, to the [facility] [place] at [facility]. Please see a copy of my visit note below.    [facility] Neurology Consultation    Kerri Rocha MRN# [id number]   Age: 85 year old YOB: 1935     Requesting physician: Parrish Gamino     Reason for Consultation: balance problems      History of Presenting Symptoms:   Kerri Rocha is a 85 year old female who presents today for evaluation of balance problems.    Patient previously followed with Dr Solorio. She was last seen via telephone visit in 12/2019. Patient continues to have balance problems today. It is described as feeling of wanting feet to go one way and then have them go somewhere else. Her last fall was about 3 years ago. She thinks things continue to get worse compared to phone visit with Dr Solorio in 12/2019.     Patient was first examined for neuoropathy and diagnosed with EMG roughly 20 years ago. She has constant tingling in the feet that goes up to the 1/3 shin level. She has no tingling in the hands. Tingling can get pain, usually at night. Gabapentin keeps the pain at bay. Tingling has been stable for many years now, but balance has seemed to worsen over the last 4 years or so.     In 2016 she had severe low back pain with significant radiating component. She had lumbar decompression surgery and this helped a lot with the pain.     Previously she participated in physical therapy. The in person sessions stopped with the pandemic, but she continues to be active with her exercises. She also goes on many walks and keeps active with gardening.     Patients also has osteoarthritis in the hips and is wondering if this is contributing to balance problems. She doesn't have significant pain  in the hips.     She has been getting low back injections every 3 months for back pain and this seems to help with pain. Currently she has no radicular pain down the leg and her lower back is no significantly bothering her.     Her dizziness has been much better since stopping her vitamin B12 a few months.     No other active health problems.         Past Medical History:     Patient Active Problem List   Diagnosis     Essential hypertension     Idiopathic peripheral neuropathy     Pseudophakia of both eyes     Regular astigmatism of both eyes     Localized osteoporosis without current pathological fracture - 2020 waiting for dental work to start fosamax.      Mixed hyperlipidemia     Gait disorder     Vertigo     Past Medical History:   Diagnosis Date     HLD (hyperlipidemia)      HTN (hypertension)      Idiopathic neuropathy      Vitamin D deficiency         Past Surgical History:     Past Surgical History:   Procedure Laterality Date     APPENDECTOMY       BREAST BIOPSY, CORE RT/LT       CARPAL TUNNEL RELEASE RT/LT       CATARACT IOL, RT/LT        SECTION      x2     HYSTERECTOMY TOTAL ABDOMINAL, BILATERAL SALPINGO-OOPHORECTOMY, COMBINED       LAMINECTOMY LUMBAR THREE+ LEVELS  2016    L3-L5     TONSILLECTOMY & ADENOIDECTOMY          Social History:     Social History     Tobacco Use     Smoking status: Never Smoker     Smokeless tobacco: Never Used   Substance Use Topics     Alcohol use: No     Alcohol/week: 1.0 standard drinks     Comment: rare wine     Drug use: No        Family History:     Family History   Problem Relation Age of Onset     Hypertension Mother      Heart Disease Father      Hypertension Father      Coronary Artery Disease Father      Hypertension Sister      Heart Disease Brother      Hypertension Brother      Diabetes No family hx of      Cancer No family hx of         Medications:     Current Outpatient Medications   Medication Sig     amLODIPine (NORVASC) 10 MG tablet Take 1  "tablet (10 mg) by mouth daily     ASPIRIN PO Take 81 mg by mouth daily     atorvastatin (LIPITOR) 20 MG tablet Take 1 tablet (20 mg) by mouth At Bedtime     calcium carbonate (OS-CAMMIE 600 MG [place]. [place]) 600 MG tablet Take 1,200 mg by mouth daily      Cholecalciferol (VITAMIN D3) 2000 UNITS CAPS 5,000 Int'l Units daily      Cyanocobalamin (VITAMIN B-12 PO) Take by mouth daily     Diphenhydramine-APAP, sleep, (TYLENOL PM EXTRA STRENGTH PO) Take by mouth nightly as needed     gabapentin (NEURONTIN) 400 MG capsule TAKE 2 CAPSULES IN THE MORNING, 2 CAPSULES AT NOON AND 2 CAPSULES IN THE EVENING     lisinopril (ZESTRIL) 10 MG tablet Take 1 tablet (10 mg) by mouth daily     omega 3 1000 MG CAPS Take 2 g by mouth     No current facility-administered medications for this visit.         Allergies:     Allergies   Allergen Reactions     Codeine      Sulfa Drugs      Sulfasalazine Other (See Comments)     Sulfate Rash        Review of Systems:   A comprehensive 10 point review of systems (constitutional, ENT, cardiac, peripheral vascular, lymphatic, respiratory, GI, , Musculoskeletal, skin, Neurological) was performed and found to be negative except as described in this note.      Physical Exam:   Vitals: /69 (BP Location: Left arm, Patient Position: Chair, Cuff Size: Adult Regular)   Pulse 75   Ht 1.499 m (4' 11\")   Wt 56.5 kg (124 lb 9.6 oz)   SpO2 97%   BMI 25.17 kg/m     General: Seated comfortably in no acute distress.  HEENT: Neck supple with normal range of motion. No paracervical muscle tenderness or tightness.  Optic discs sharp and vasculature normal on funduscopic exam.   Heart: Regular rate  Lungs: breathing comfortably  Extremities: no edema  Skin: No rashes  Neurologic:     Mental Status: Fully alert, attentive and oriented. Normal memory and fund of knowledge. Language normal, speech clear and fluent, no paraphasic errors.      Cranial Nerves: Visual fields intact. PERRL. EOMI with normal smooth " pursuit. Facial sensation intact/symmetric. Facial movements symmetric. Hearing not formally tested but intact to conversation. Palate elevation symmetric, uvula midline. No dysarthria. Shoulder shrug strong bilaterally. Tongue protrusion midline.     Motor: No tremors or other abnormal movements observed. Muscle tone normal throughout. No pronator drift. Normal/symmetric rapid finger tapping. Strength 5/5 throughout upper and lower extremities.     Deep Tendon Reflexes: 2+/symmetric throughout upper and lower extremities with exception of trace ankle jerks. Toes downgoing bilaterally.     Sensory: Decreased sensation to light touch and temperature up to 1/3 shin level, otherwise intact. Vibration is moderately diminished at the great toes, mildly to moderately at the ankles, and intact in the knees and hands. Proprioception is intact throughout upper and lower extremities. Negative Romberg.      Coordination: Finger-nose-finger and heel-shin intact without dysmetria. Rapid alternating movements intact/symmetric with normal speed and rhythm.     Gait: Wide based, but steady casual gait. Able to walk on toes, heels and tandem with moderate difficulty.  Negative straight leg raise.         Data: Pertinent prior to visit   Imaging:  MRI lumbar 4/13/2019  Impression:   1. Multilevel lumbar spondylosis, most pronounced at L3-4 with  moderate severe left and mild to moderate right neural foraminal  stenosis.  2. Synovial cyst within the right neural foramen of L5-S1 narrows the  right lateral recess and likely impinges upon the traversing right S1  nerve root.  3. Postsurgical changes of instrumented fusion of L4-5.    MRI cervical spine 1/2019  IMPRESSION: Mild multilevel degenerative changes with multilevel  neural foraminal stenoses without significant canal stenosis. No  myelopathy signal changes.     MRI brain 11/2018  Impression:    Normal brain for age with attention to the vestibular and auditory  structures. No  specific finding to explain the patient's symptoms.    Procedures:  EMG 4/2019  Interpretation:  This is an abnormal EMG.  Findings are consistent with a severe length-dependent axonal sensorimotor polyneuropathy that appears to have advanced from prior EMG in 2003.  There is not clear evidence for overlying lumbosacral radiculopathy but in light of the severe peripheral neuropathy these may be masked and clinical correlation is advised.    Laboratory:  B12 1705 (8/2020)  TSH normal 2017  Per chart review, SPEP and A1c many years back was reportedly normal         Assessment and Plan:   Assessment:  Kerri Rocha is a 85 year old female who presents today for evaluation of balance problems. 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 She also has lumbar stenosis and had decompression surgery in 2016. Over the past 4 years tingling in the lower extremities has been stable, but balance has significantly worsened. On examination she has wide based ataxic gait, trace ankle jerks, and distal lower extremity vibration / light touch / temperature deficits. Previous work-up reviewed as above. Of note she had an EMG 4/2019 which showed worsening neuropathy compared to previous, but no clear evidence of radiculopathy. MRI lumbar spine reviewed with post surgical changes, and residual stenosis, most prominent at bilateral L3-4 segments. Brain and cervical spine MRI did not reveal clear etiology of balance problems.     Discussed with patient that most likely etiology of worsening balance problems is progression of idiopathic neuropathy. We discussed repeating common neuropathy etiology blood work-up today. Patient has not had thoracic spine imaging. Although thoracic myelopathy is generally uncommon, given brisk 2+ patella reflexes, we will evaluate with thoracic MRI. Discussed that it is possible that hip osteoarthritis is contributing, but I would expect her to  have more hip pain. In addition residual lumbar radiculopathy is possible, but I would expect her to have more radicular pain and would have expected to see findings on EMG to support this.      Plan:  - Labs today; ESR/CRP, SPEP/immunofixation, light chains, A1c, TSH  - MRI lumbar spine  - Could consider repeat EMG in the future or additional blood work if symptoms continue to progress  - Continue physical therapy exercises    Follow up in Neurology clinic in 6 months or earlier as needed should new concerns arise.    Jason Gillette MD   of Neurology  [facility]              Again, thank you for allowing me to participate in the care of your patient.      Sincerely,    Jason Gillette MD

## 2020-10-09 NOTE — LETTER
10/9/2020       RE: Kerri Rocha  [address]  [facility] [address]     Dear Colleague,    Thank you for referring your patient, Kerri Rocha, to the [facility]UROLOGY [facility]IN[facility] [place] at [facility]. Please see a copy of my visit note below.    [facility] Neurology Consultation    Kerri Rocha MRN# [id number]   Age: 85 year old YOB: 1935     Requesting physician: Parrish Gamino     Reason for Consultation: balance problems      History of Presenting Symptoms:   Kerri Rocha is a 85 year old female who presents today for evaluation of balance problems.    Patient previously followed with Dr Solorio. She was last seen via telephone visit in 12/2019. Patient continues to have balance problems today. It is described as feeling of wanting feet to go one way and then have them go somewhere else. Her last fall was about 3 years ago. She thinks things continue to get worse compared to phone visit with Dr Solorio in 12/2019.     Patient was first examined for neuoropathy and diagnosed with EMG roughly 20 years ago. She has constant tingling in the feet that goes up to the 1/3 shin level. She has no tingling in the hands. Tingling can get pain, usually at night. Gabapentin keeps the pain at bay. Tingling has been stable for many years now, but balance has seemed to worsen over the last 4 years or so.     In 2016 she had severe low back pain with significant radiating component. She had lumbar decompression surgery and this helped a lot with the pain.     Previously she participated in physical therapy. The in person sessions stopped with the pandemic, but she continues to be active with her exercises. She also goes on many walks and keeps active with gardening.     Patients also has osteoarthritis in the hips and is wondering if this is contributing to balance problems. She doesn't have significant pain  in the hips.     She has been getting low back injections every 3 months for back pain and this seems to help with pain. Currently she has no radicular pain down the leg and her lower back is no significantly bothering her.     Her dizziness has been much better since stopping her vitamin B12 a few months.     No other active health problems.         Past Medical History:     Patient Active Problem List   Diagnosis     Essential hypertension     Idiopathic peripheral neuropathy     Pseudophakia of both eyes     Regular astigmatism of both eyes     Localized osteoporosis without current pathological fracture - 2020 waiting for dental work to start fosamax.      Mixed hyperlipidemia     Gait disorder     Vertigo     Past Medical History:   Diagnosis Date     HLD (hyperlipidemia)      HTN (hypertension)      Idiopathic neuropathy      Vitamin D deficiency         Past Surgical History:     Past Surgical History:   Procedure Laterality Date     APPENDECTOMY       BREAST BIOPSY, CORE RT/LT       CARPAL TUNNEL RELEASE RT/LT       CATARACT IOL, RT/LT        SECTION      x2     HYSTERECTOMY TOTAL ABDOMINAL, BILATERAL SALPINGO-OOPHORECTOMY, COMBINED       LAMINECTOMY LUMBAR THREE+ LEVELS  2016    L3-L5     TONSILLECTOMY & ADENOIDECTOMY          Social History:     Social History     Tobacco Use     Smoking status: Never Smoker     Smokeless tobacco: Never Used   Substance Use Topics     Alcohol use: No     Alcohol/week: 1.0 standard drinks     Comment: rare wine     Drug use: No        Family History:     Family History   Problem Relation Age of Onset     Hypertension Mother      Heart Disease Father      Hypertension Father      Coronary Artery Disease Father      Hypertension Sister      Heart Disease Brother      Hypertension Brother      Diabetes No family hx of      Cancer No family hx of         Medications:     Current Outpatient Medications   Medication Sig     amLODIPine (NORVASC) 10 MG tablet Take 1  "tablet (10 mg) by mouth daily     ASPIRIN PO Take 81 mg by mouth daily     atorvastatin (LIPITOR) 20 MG tablet Take 1 tablet (20 mg) by mouth At Bedtime     calcium carbonate (OS-CAMMIE 600 MG [place]. [place]) 600 MG tablet Take 1,200 mg by mouth daily      Cholecalciferol (VITAMIN D3) 2000 UNITS CAPS 5,000 Int'l Units daily      Cyanocobalamin (VITAMIN B-12 PO) Take by mouth daily     Diphenhydramine-APAP, sleep, (TYLENOL PM EXTRA STRENGTH PO) Take by mouth nightly as needed     gabapentin (NEURONTIN) 400 MG capsule TAKE 2 CAPSULES IN THE MORNING, 2 CAPSULES AT NOON AND 2 CAPSULES IN THE EVENING     lisinopril (ZESTRIL) 10 MG tablet Take 1 tablet (10 mg) by mouth daily     omega 3 1000 MG CAPS Take 2 g by mouth     No current facility-administered medications for this visit.         Allergies:     Allergies   Allergen Reactions     Codeine      Sulfa Drugs      Sulfasalazine Other (See Comments)     Sulfate Rash        Review of Systems:   A comprehensive 10 point review of systems (constitutional, ENT, cardiac, peripheral vascular, lymphatic, respiratory, GI, , Musculoskeletal, skin, Neurological) was performed and found to be negative except as described in this note.      Physical Exam:   Vitals: /69 (BP Location: Left arm, Patient Position: Chair, Cuff Size: Adult Regular)   Pulse 75   Ht 1.499 m (4' 11\")   Wt 56.5 kg (124 lb 9.6 oz)   SpO2 97%   BMI 25.17 kg/m     General: Seated comfortably in no acute distress.  HEENT: Neck supple with normal range of motion. No paracervical muscle tenderness or tightness.  Optic discs sharp and vasculature normal on funduscopic exam.   Heart: Regular rate  Lungs: breathing comfortably  Extremities: no edema  Skin: No rashes    Neurologic:     Mental Status: Fully alert, attentive and oriented. Normal memory and fund of knowledge. Language normal, speech clear and fluent, no paraphasic errors.      Cranial Nerves: Visual fields intact. PERRL. EOMI with normal smooth " pursuit. Facial sensation intact/symmetric. Facial movements symmetric. Hearing not formally tested but intact to conversation. Palate elevation symmetric, uvula midline. No dysarthria. Shoulder shrug strong bilaterally. Tongue protrusion midline.     Motor: No tremors or other abnormal movements observed. Muscle tone normal throughout. No pronator drift. Normal/symmetric rapid finger tapping. Strength 5/5 throughout upper and lower extremities.     Deep Tendon Reflexes: 2+/symmetric throughout upper and lower extremities with exception of trace ankle jerks. Toes downgoing bilaterally.     Sensory: Decreased sensation to light touch and temperature up to 1/3 shin level, otherwise intact. Vibration is moderately diminished at the great toes, mildly to moderately at the ankles, and intact in the knees and hands. Proprioception is intact throughout upper and lower extremities. Negative Romberg.      Coordination: Finger-nose-finger and heel-shin intact without dysmetria. Rapid alternating movements intact/symmetric with normal speed and rhythm.     Gait: Wide based, but steady casual gait. Able to walk on toes, heels and tandem with moderate difficulty.  Negative straight leg raise.         Data: Pertinent prior to visit   Imaging:  MRI lumbar 4/13/2019  Impression:   1. Multilevel lumbar spondylosis, most pronounced at L3-4 with  moderate severe left and mild to moderate right neural foraminal  stenosis.  2. Synovial cyst within the right neural foramen of L5-S1 narrows the  right lateral recess and likely impinges upon the traversing right S1  nerve root.  3. Postsurgical changes of instrumented fusion of L4-5.    MRI cervical spine 1/2019  IMPRESSION: Mild multilevel degenerative changes with multilevel  neural foraminal stenoses without significant canal stenosis. No  myelopathy signal changes.     MRI brain 11/2018  Impression:    Normal brain for age with attention to the vestibular and auditory  structures. No  specific finding to explain the patient's symptoms.    Procedures:  EMG 4/2019  Interpretation:  This is an abnormal EMG.  Findings are consistent with a severe length-dependent axonal sensorimotor polyneuropathy that appears to have advanced from prior EMG in 2003.  There is not clear evidence for overlying lumbosacral radiculopathy but in light of the severe peripheral neuropathy these may be masked and clinical correlation is advised.    Laboratory:  B12 1705 (8/2020)  TSH normal 2017  Per chart review, SPEP and A1c many years back was reportedly normal         Assessment and Plan:   Assessment:  Kerri Rocha is a 85 year old female who presents today for evaluation of balance problems. 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 She also has lumbar stenosis and had decompression surgery in 2016. Over the past 4 years tingling in the lower extremities has been stable, but balance has significantly worsened. On examination she has wide based ataxic gait, trace ankle jerks, and distal lower extremity vibration / light touch / temperature deficits. Previous work-up reviewed as above. Of note she had an EMG 4/2019 which showed worsening neuropathy compared to previous, but no clear evidence of radiculopathy. MRI lumbar spine reviewed with post surgical changes, and residual stenosis, most prominent at bilateral L3-4 segments. Brain and cervical spine MRI did not reveal clear etiology of balance problems.     Discussed with patient that most likely etiology of worsening balance problems is progression of idiopathic neuropathy. We discussed repeating common neuropathy etiology blood work-up today. Patient has not had thoracic spine imaging. Although thoracic myelopathy is generally uncommon, given brisk 2+ patella reflexes, we will evaluate with thoracic MRI. Discussed that it is possible that hip osteoarthritis is contributing, but I would expect her to  have more hip pain. In addition residual lumbar radiculopathy is possible, but I would expect her to have more radicular pain and would have expected to see findings on EMG to support this.      Plan:  - Labs today; ESR/CRP, SPEP/immunofixation, light chains, A1c, TSH  - MRI lumbar spine  - Could consider repeat EMG in the future or additional blood work if symptoms continue to progress  - Continue physical therapy exercises    Follow up in Neurology clinic in 6 months or earlier as needed should new concerns arise.      Again, thank you for allowing me to participate in the care of your patient.  Sincerely,    Jason Gillette MD   of Neurology  [facility]

## 2020-10-12 LAB
ALBUMIN SERPL ELPH-MCNC: 4.4 G/DL (ref 3.7–5.1)
ALPHA1 GLOB SERPL ELPH-MCNC: 0.3 G/DL (ref 0.2–0.4)
ALPHA2 GLOB SERPL ELPH-MCNC: 0.8 G/DL (ref 0.5–0.9)
B-GLOBULIN SERPL ELPH-MCNC: 0.8 G/DL (ref 0.6–1)
GAMMA GLOB SERPL ELPH-MCNC: 1.1 G/DL (ref 0.7–1.6)
IGA SERPL-MCNC: 224 MG/DL (ref 84–499)
IGG SERPL-MCNC: 1103 MG/DL (ref 610–1616)
IGM SERPL-MCNC: 84 MG/DL (ref 35–242)
KAPPA LC UR-MCNC: 2.79 MG/DL (ref 0.33–1.94)
KAPPA LC/LAMBDA SER: 1.48 {RATIO} (ref 0.26–1.65)
LAMBDA LC SERPL-MCNC: 1.89 MG/DL (ref 0.57–2.63)
M PROTEIN SERPL ELPH-MCNC: 0 G/DL
PROT PATTERN SERPL ELPH-IMP: NORMAL
PROT PATTERN SERPL IFE-IMP: NORMAL

## 2020-10-28 ENCOUNTER — HOSPITAL ENCOUNTER (OUTPATIENT)
Dept: MRI IMAGING | Facility: CLINIC | Age: 85
Discharge: HOME | End: 2020-10-28
Attending: INTERNAL MEDICINE | Admitting: INTERNAL MEDICINE
Payer: MEDICARE

## 2020-10-28 DIAGNOSIS — R26.9 ABNORMAL GAIT: ICD-10-CM

## 2020-10-28 DIAGNOSIS — R29.898 WEAKNESS OF BOTH LOWER EXTREMITIES: ICD-10-CM

## 2020-10-28 PROCEDURE — 72146 MRI CHEST SPINE W/O DYE: CPT

## 2020-12-27 ENCOUNTER — HEALTH MAINTENANCE LETTER (OUTPATIENT)
Age: 85
End: 2020-12-27

## 2021-01-12 DIAGNOSIS — M81.0 AGE RELATED OSTEOPOROSIS, UNSPECIFIED PATHOLOGICAL FRACTURE PRESENCE: ICD-10-CM

## 2021-01-12 DIAGNOSIS — G62.9 NEUROPATHY: ICD-10-CM

## 2021-01-12 NOTE — TELEPHONE ENCOUNTER
Routing refill request to provider for review/approval because:  Drug not on the [facility] refill protocol     Brianda Lyn RN   [facility] -- Triage Nurse

## 2021-01-13 RX ORDER — GABAPENTIN 400 MG/1
CAPSULE ORAL
Qty: 360 CAPSULE | Refills: 2 | Status: SHIPPED | OUTPATIENT
Start: 2021-01-13 | End: 2021-04-23

## 2021-02-15 ENCOUNTER — IMMUNIZATION (OUTPATIENT)
Dept: NURSING | Facility: CLINIC | Age: 86
End: 2021-02-15
Payer: MEDICARE

## 2021-02-15 PROCEDURE — 91300 PR COVID VAC PFIZER DIL RECON 30 MCG/0.3 ML IM: CPT

## 2021-02-15 PROCEDURE — 0001A PR COVID VAC PFIZER DIL RECON 30 MCG/0.3 ML IM: CPT

## 2021-03-08 ENCOUNTER — IMMUNIZATION (OUTPATIENT)
Dept: NURSING | Facility: CLINIC | Age: 86
End: 2021-03-08
Attending: INTERNAL MEDICINE
Payer: MEDICARE

## 2021-03-08 PROCEDURE — 0002A PR COVID VAC PFIZER DIL RECON 30 MCG/0.3 ML IM: CPT

## 2021-03-08 PROCEDURE — 91300 PR COVID VAC PFIZER DIL RECON 30 MCG/0.3 ML IM: CPT

## 2021-04-02 ASSESSMENT — ENCOUNTER SYMPTOMS
NECK PAIN: 1
SINUS PAIN: 0
WEAKNESS: 1
STIFFNESS: 1
PARALYSIS: 0
MYALGIAS: 1
TREMORS: 0
DIZZINESS: 1
TROUBLE SWALLOWING: 0
DISTURBANCES IN COORDINATION: 1
LOSS OF CONSCIOUSNESS: 0
SEIZURES: 0
HEADACHES: 0
MEMORY LOSS: 0
SINUS CONGESTION: 0
HOARSE VOICE: 1
TINGLING: 1
SPEECH CHANGE: 0
ARTHRALGIAS: 1
JOINT SWELLING: 0
BACK PAIN: 1
MUSCLE WEAKNESS: 1
TASTE DISTURBANCE: 0
SORE THROAT: 1
MUSCLE CRAMPS: 0
SMELL DISTURBANCE: 0
NUMBNESS: 0
NECK MASS: 0

## 2021-04-09 ENCOUNTER — OFFICE VISIT (OUTPATIENT)
Dept: NEUROLOGY | Facility: CLINIC | Age: 86
End: 2021-04-09
Payer: MEDICARE

## 2021-04-09 VITALS
SYSTOLIC BLOOD PRESSURE: 144 MMHG | HEART RATE: 67 BPM | HEIGHT: 59 IN | DIASTOLIC BLOOD PRESSURE: 80 MMHG | BODY MASS INDEX: 25.8 KG/M2 | WEIGHT: 128 LBS | RESPIRATION RATE: 16 BRPM | OXYGEN SATURATION: 98 %

## 2021-04-09 DIAGNOSIS — G60.9 IDIOPATHIC PERIPHERAL NEUROPATHY: ICD-10-CM

## 2021-04-09 DIAGNOSIS — R26.9 ABNORMAL GAIT: Primary | ICD-10-CM

## 2021-04-09 DIAGNOSIS — M54.16 LUMBAR RADICULOPATHY: ICD-10-CM

## 2021-04-09 PROCEDURE — 99215 OFFICE O/P EST HI 40 MIN: CPT | Performed by: INTERNAL MEDICINE

## 2021-04-09 ASSESSMENT — PAIN SCALES - GENERAL: PAINLEVEL: MODERATE PAIN (5)

## 2021-04-09 ASSESSMENT — MIFFLIN-ST. JEOR: SCORE: 936.23

## 2021-04-09 NOTE — LETTER
4/9/2021       RE: Kerri Rocha  [address]  [facility] [place] [address]     Dear Colleague,    Thank you for referring your patient, Kerri Rocha, to the [facility] NEUROLOGY CLINIC [facility]. Please see a copy of my visit note below.    [facility] Neurology Follow Up Visit    Kerri Rocha MRN# [id number]   Age: 85 year old YOB: 1935     Brief history of symptoms: The patient was initially seen in neurologic consultation on 10/9/2020 for evaluation of balance difficulties. Please see the comprehensive neurologic consultation note from that date in the Epic records for details.     Interval history:  Overall her balance seems worse compared to 6 months. She hasn't fallen at all. She thinks balance problems are due both to a feeling of unsteadiness with walking and back pain while walking. She doesn't have significant back pain when she isn't walking. Back pain is in the lower middle back with radiation into the right hip and upper thigh.    She had two separate injections in low back. The first one worked and the second one didn't work. Last injection was in August 2020.    Three years ago she had a hairline fracture in the right hip and wonders if this contributes to the pain.     She has an intermittent feeling of weakness in the arms.     Some days she is only able to take baby steps. She has a cane but doesn't like to use it.     She did physical therapy last about a year ago. She continues to do the exercises twice per day.     Numbness in the feet seems about the same and is stable. She still drives and can feel the pedals out. She denies any numbness in the hands.      Past Medical History:     Patient Active Problem List   Diagnosis     Essential hypertension     Idiopathic peripheral neuropathy     Pseudophakia of both eyes     Regular astigmatism of both eyes     Localized osteoporosis without current  pathological fracture - 2020 waiting for dental work to start fosamax.      Mixed hyperlipidemia     Gait disorder     Vertigo     Past Medical History:   Diagnosis Date     HLD (hyperlipidemia)      HTN (hypertension)      Idiopathic neuropathy      Vitamin D deficiency         Past Surgical History:     Past Surgical History:   Procedure Laterality Date     APPENDECTOMY       BREAST BIOPSY, CORE RT/LT       CARPAL TUNNEL RELEASE RT/LT       CATARACT IOL, RT/LT        SECTION      x2     HYSTERECTOMY TOTAL ABDOMINAL, BILATERAL SALPINGO-OOPHORECTOMY, COMBINED       LAMINECTOMY LUMBAR THREE+ LEVELS  2016    L3-L5     TONSILLECTOMY & ADENOIDECTOMY          Social History:     Social History     Tobacco Use     Smoking status: Never Smoker     Smokeless tobacco: Never Used   Substance Use Topics     Alcohol use: No     Alcohol/week: 1.0 standard drinks     Comment: rare wine     Drug use: No        Family History:     Family History   Problem Relation Age of Onset     Hypertension Mother      Heart Disease Father      Hypertension Father      Coronary Artery Disease Father      Hypertension Sister      Heart Disease Brother      Hypertension Brother      Diabetes No family hx of      Cancer No family hx of         Medications:     Current Outpatient Medications   Medication Sig     amLODIPine (NORVASC) 10 MG tablet Take 1 tablet (10 mg) by mouth daily     ASPIRIN PO Take 81 mg by mouth daily     atorvastatin (LIPITOR) 20 MG tablet Take 1 tablet (20 mg) by mouth At Bedtime     calcium carbonate (OS-CAMMIE 600 MG [place]. [place]) 600 MG tablet Take 1,200 mg by mouth daily      Cholecalciferol (VITAMIN D3) 2000 UNITS CAPS 5,000 Int'l Units daily      Cyanocobalamin (VITAMIN B-12 PO) Take by mouth daily     Diphenhydramine-APAP, sleep, (TYLENOL PM EXTRA STRENGTH PO) Take by mouth nightly as needed     gabapentin (NEURONTIN) 400 MG capsule TAKE 2 CAPSULES IN THE MORNING, 2 CAPSULES AT NOON AND 2 CAPSULES IN THE  "EVENING     lisinopril (ZESTRIL) 10 MG tablet Take 1 tablet (10 mg) by mouth daily     omega 3 1000 MG CAPS Take 2 g by mouth     No current facility-administered medications for this visit.         Allergies:     Allergies   Allergen Reactions     Codeine      Sulfa Drugs      Sulfasalazine Other (See Comments)     Sulfate Rash        Review of Systems:   As above     Physical Exam:   Vitals: BP (!) 144/80   Pulse 67   Resp 16   Ht 1.499 m (4' 11\")   Wt 58.1 kg (128 lb)   SpO2 98%   BMI 25.85 kg/m     General: Seated comfortably in no acute distress.  HEENT: Neck supple with normal range of motion.  Heart: Regular rate  Lungs: breathing comfortably  Extremities: no edema  Skin: No rashes  Neurologic:     Mental Status: Fully alert, attentive and oriented. Normal memory and fund of knowledge. Language normal, speech clear and fluent, no paraphasic errors.    Cranial Nerves: Visual fields intact. EOMI with normal smooth pursuit. Facial sensation intact/symmetric. Facial movements symmetric. Hearing not formally tested but intact to conversation. Palate elevation symmetric, uvula midline. No dysarthria. Shoulder shrug strong bilaterally. Tongue protrusion midline.     Motor: No tremors or other abnormal movements observed. Muscle tone normal throughout. Normal/symmetric rapid finger tapping. Strength 5/5 throughout upper and lower extremities.     Deep Tendon Reflexes: 2+/symmetric throughout upper extremities, 3+ patella, and 1+ to trace ankle jerks. Toes downgoing bilaterally.     Sensory: Vibration is 1 second in the right great toe, 4 seconds in the left great toe, 6 seconds in the right ankle, 9 seconds in the left ankle, and 20 seconds in the bilateral hands. Decreased sensation to temperature in the feet compared to hands.  Intact/symmetric to proprioception throughout upper and lower extremities. Negative Romberg.      Coordination: Finger-nose-finger without dysmetria. Rapid alternating movements " intact/symmetric with normal speed and rhythm.     Gait: Mildly wide based gait but steady. Some mild unsteadiness with heel and toe gait. Needs to hold on to examiner for support during tandem gait, but can take 1-2 steps on own.     Data reviewed on previous visits    Imaging:  MRI lumbar 4/13/2019  Impression:   1. Multilevel lumbar spondylosis, most pronounced at L3-4 with  moderate severe left and mild to moderate right neural foraminal  stenosis.  2. Synovial cyst within the right neural foramen of L5-S1 narrows the  right lateral recess and likely impinges upon the traversing right S1  nerve root.  3. Postsurgical changes of instrumented fusion of L4-5.     MRI cervical spine 1/2019  IMPRESSION: Mild multilevel degenerative changes with multilevel  neural foraminal stenoses without significant canal stenosis. No  myelopathy signal changes.      MRI brain 11/2018  Impression:    Normal brain for age with attention to the vestibular and auditory  structures. No specific finding to explain the patient's symptoms.     Procedures:  EMG 4/2019  Interpretation:  This is an abnormal EMG.  Findings are consistent with a severe length-dependent axonal sensorimotor polyneuropathy that appears to have advanced from prior EMG in 2003.  There is not clear evidence for overlying lumbosacral radiculopathy but in light of the severe peripheral neuropathy these may be masked and clinical correlation is advised.    Laboratory:  B12 1705 (8/2020)  TSH normal 2017  Per chart review, SPEP and A1c many years back was reportedly normal    Pertinent Investigations since last visit:   MRI thoracic 10/28/2020  IMPRESSION:  1. Small posterior disc herniation at the T7-T8 level that mildly  deforms the anterior surface of the thoracic spinal cord.  2. Otherwise, normal thoracic spine MRI. No spinal canal or neural  foraminal stenosis. No evidence for fracture.    Labs 10/2020 - unremarkable ESR/CRP, SPEP/immunofixation, light chains,  A1c, TSH         Assessment and Plan:   Assessment:  Kerri Rocha is a 85 year old female who presents today for follow-up of balance problems. 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 She also has lumbar stenosis and had decompression surgery in 2016. Over the past 4-5 years tingling in the lower extremities has been stable, but balance has significantly worsened. On examination she has mildly wide based gait, trace ankle jerks, and distal lower extremity vibration / light touch / temperature deficits. Previous work-up reviewed as above. Of note she had an EMG 4/2019 which showed worsening neuropathy compared to previous, but no clear evidence of radiculopathy. MRI lumbar spine in 4/2019 with post surgical changes, and residual stenosis, most prominent at bilateral L3-4 segments. Brain and cervical spine MRI did not reveal clear etiology of balance problems. MRI thoracic spine checked last fall and did not show etiology of worsening balance.     We discussed repeating EMG today given reported worsening in balance, to evaluate for further progression in neuropathy and/or active radiculopathy. She is bothered by back pain radiating to the right hip and thigh when she is walking. She would also benefit from PT referral and pain clinic evaluation for discussion of management of pain symptoms.      Plan:  - Repeat EMG bilateral lower extremities  - PT referral   - Pain clinic referral    Follow up in Neurology clinic after EMG to discuss results    Jsaon Gillette MD   of Neurology  [facility]    The total time of this encounter amounted to 46 minutes. This time included time spent with the patient, prep work, ordering tests, and performing post visit documentation.

## 2021-04-09 NOTE — NURSING NOTE
Chief Complaint   Patient presents with     RECHECK     [facility] Return - 6 mo f/u     [place]

## 2021-04-09 NOTE — PROGRESS NOTES
[facility] Neurology Follow Up Visit    Kerri Rocha MRN# [id number]   Age: 85 year old YOB: 1935     Brief history of symptoms: The patient was initially seen in neurologic consultation on 10/9/2020 for evaluation of balance difficulties. Please see the comprehensive neurologic consultation note from that date in the Epic records for details.     Interval history:  Overall her balance seems worse compared to 6 months. She hasn't fallen at all. She thinks balance problems are due both to a feeling of unsteadiness with walking and back pain while walking. She doesn't have significant back pain when she isn't walking. Back pain is in the lower middle back with radiation into the right hip and upper thigh.    She had two separate injections in low back. The first one worked and the second one didn't work. Last injection was in August 2020.    Three years ago she had a hairline fracture in the right hip and wonders if this contributes to the pain.     She has an intermittent feeling of weakness in the arms.     Some days she is only able to take baby steps. She has a cane but doesn't like to use it.     She did physical therapy last about a year ago. She continues to do the exercises twice per day.     Numbness in the feet seems about the same and is stable. She still drives and can feel the pedals out. She denies any numbness in the hands.      Past Medical History:     Patient Active Problem List   Diagnosis     Essential hypertension     Idiopathic peripheral neuropathy     Pseudophakia of both eyes     Regular astigmatism of both eyes     Localized osteoporosis without current pathological fracture - 8/2020 waiting for dental work to start fosamax.      Mixed hyperlipidemia     Gait disorder     Vertigo     Past Medical History:   Diagnosis Date     HLD (hyperlipidemia)      HTN (hypertension)      Idiopathic neuropathy      Vitamin D deficiency         Past Surgical History:     Past Surgical History:    Procedure Laterality Date     APPENDECTOMY       BREAST BIOPSY, CORE RT/LT       CARPAL TUNNEL RELEASE RT/LT       CATARACT IOL, RT/LT        SECTION      x2     HYSTERECTOMY TOTAL ABDOMINAL, BILATERAL SALPINGO-OOPHORECTOMY, COMBINED       LAMINECTOMY LUMBAR THREE+ LEVELS  2016    L3-L5     TONSILLECTOMY & ADENOIDECTOMY          Social History:     Social History     Tobacco Use     Smoking status: Never Smoker     Smokeless tobacco: Never Used   Substance Use Topics     Alcohol use: No     Alcohol/week: 1.0 standard drinks     Comment: rare wine     Drug use: No        Family History:     Family History   Problem Relation Age of Onset     Hypertension Mother      Heart Disease Father      Hypertension Father      Coronary Artery Disease Father      Hypertension Sister      Heart Disease Brother      Hypertension Brother      Diabetes No family hx of      Cancer No family hx of         Medications:     Current Outpatient Medications   Medication Sig     amLODIPine (NORVASC) 10 MG tablet Take 1 tablet (10 mg) by mouth daily     ASPIRIN PO Take 81 mg by mouth daily     atorvastatin (LIPITOR) 20 MG tablet Take 1 tablet (20 mg) by mouth At Bedtime     calcium carbonate (OS-CAMMIE 600 MG [place]. [place]) 600 MG tablet Take 1,200 mg by mouth daily      Cholecalciferol (VITAMIN D3) 2000 UNITS CAPS 5,000 Int'l Units daily      Cyanocobalamin (VITAMIN B-12 PO) Take by mouth daily     Diphenhydramine-APAP, sleep, (TYLENOL PM EXTRA STRENGTH PO) Take by mouth nightly as needed     gabapentin (NEURONTIN) 400 MG capsule TAKE 2 CAPSULES IN THE MORNING, 2 CAPSULES AT NOON AND 2 CAPSULES IN THE EVENING     lisinopril (ZESTRIL) 10 MG tablet Take 1 tablet (10 mg) by mouth daily     omega 3 1000 MG CAPS Take 2 g by mouth     No current facility-administered medications for this visit.         Allergies:     Allergies   Allergen Reactions     Codeine      Sulfa Drugs      Sulfasalazine Other (See Comments)     Sulfate Rash         "Review of Systems:   As above     Physical Exam:   Vitals: BP (!) 144/80   Pulse 67   Resp 16   Ht 1.499 m (4' 11\")   Wt 58.1 kg (128 lb)   SpO2 98%   BMI 25.85 kg/m     General: Seated comfortably in no acute distress.  HEENT: Neck supple with normal range of motion.  Heart: Regular rate  Lungs: breathing comfortably  Extremities: no edema  Skin: No rashes  Neurologic:     Mental Status: Fully alert, attentive and oriented. Normal memory and fund of knowledge. Language normal, speech clear and fluent, no paraphasic errors.    Cranial Nerves: Visual fields intact. EOMI with normal smooth pursuit. Facial sensation intact/symmetric. Facial movements symmetric. Hearing not formally tested but intact to conversation. Palate elevation symmetric, uvula midline. No dysarthria. Shoulder shrug strong bilaterally. Tongue protrusion midline.     Motor: No tremors or other abnormal movements observed. Muscle tone normal throughout. Normal/symmetric rapid finger tapping. Strength 5/5 throughout upper and lower extremities.     Deep Tendon Reflexes: 2+/symmetric throughout upper extremities, 3+ patella, and 1+ to trace ankle jerks. Toes downgoing bilaterally.     Sensory: Vibration is 1 second in the right great toe, 4 seconds in the left great toe, 6 seconds in the right ankle, 9 seconds in the left ankle, and 20 seconds in the bilateral hands. Decreased sensation to temperature in the feet compared to hands.  Intact/symmetric to proprioception throughout upper and lower extremities. Negative Romberg.      Coordination: Finger-nose-finger without dysmetria. Rapid alternating movements intact/symmetric with normal speed and rhythm.     Gait: Mildly wide based gait but steady. Some mild unsteadiness with heel and toe gait. Needs to hold on to examiner for support during tandem gait, but can take 1-2 steps on own.     Data reviewed on previous visits    Imaging:  MRI lumbar 4/13/2019  Impression:   1. Multilevel lumbar " spondylosis, most pronounced at L3-4 with  moderate severe left and mild to moderate right neural foraminal  stenosis.  2. Synovial cyst within the right neural foramen of L5-S1 narrows the  right lateral recess and likely impinges upon the traversing right S1  nerve root.  3. Postsurgical changes of instrumented fusion of L4-5.     MRI cervical spine 1/2019  IMPRESSION: Mild multilevel degenerative changes with multilevel  neural foraminal stenoses without significant canal stenosis. No  myelopathy signal changes.      MRI brain 11/2018  Impression:    Normal brain for age with attention to the vestibular and auditory  structures. No specific finding to explain the patient's symptoms.     Procedures:  EMG 4/2019  Interpretation:  This is an abnormal EMG.  Findings are consistent with a severe length-dependent axonal sensorimotor polyneuropathy that appears to have advanced from prior EMG in 2003.  There is not clear evidence for overlying lumbosacral radiculopathy but in light of the severe peripheral neuropathy these may be masked and clinical correlation is advised.    Laboratory:  B12 1705 (8/2020)  TSH normal 2017  Per chart review, SPEP and A1c many years back was reportedly normal    Pertinent Investigations since last visit:   MRI thoracic 10/28/2020  IMPRESSION:  1. Small posterior disc herniation at the T7-T8 level that mildly  deforms the anterior surface of the thoracic spinal cord.  2. Otherwise, normal thoracic spine MRI. No spinal canal or neural  foraminal stenosis. No evidence for fracture.    Labs 10/2020 - unremarkable ESR/CRP, SPEP/immunofixation, light chains, A1c, TSH         Assessment and Plan:   Assessment:  Kerri Rocha is a 85 year old female who presents today for follow-up of balance problems. 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 She also has lumbar stenosis and had decompression surgery in 2016. Over the past 4-5  years tingling in the lower extremities has been stable, but balance has significantly worsened. On examination she has mildly wide based gait, trace ankle jerks, and distal lower extremity vibration / light touch / temperature deficits. Previous work-up reviewed as above. Of note she had an EMG 4/2019 which showed worsening neuropathy compared to previous, but no clear evidence of radiculopathy. MRI lumbar spine in 4/2019 with post surgical changes, and residual stenosis, most prominent at bilateral L3-4 segments. Brain and cervical spine MRI did not reveal clear etiology of balance problems. MRI thoracic spine checked last fall and did not show etiology of worsening balance.     We discussed repeating EMG today given reported worsening in balance, to evaluate for further progression in neuropathy and/or active radiculopathy. She is bothered by back pain radiating to the right hip and thigh when she is walking. She would also benefit from PT referral and pain clinic evaluation for discussion of management of pain symptoms.      Plan:  - Repeat EMG bilateral lower extremities  - PT referral   - Pain clinic referral    Follow up in Neurology clinic after EMG to discuss results    Jason Gillette MD   of Neurology  [facility]    The total time of this encounter amounted to 46 minutes. This time included time spent with the patient, prep work, ordering tests, and performing post visit documentation.

## 2021-04-21 DIAGNOSIS — G62.9 NEUROPATHY: ICD-10-CM

## 2021-04-21 DIAGNOSIS — M81.0 AGE RELATED OSTEOPOROSIS, UNSPECIFIED PATHOLOGICAL FRACTURE PRESENCE: ICD-10-CM

## 2021-04-22 ENCOUNTER — VIRTUAL VISIT (OUTPATIENT)
Dept: PALLIATIVE MEDICINE | Facility: CLINIC | Age: 86
End: 2021-04-22
Attending: INTERNAL MEDICINE
Payer: MEDICARE

## 2021-04-22 DIAGNOSIS — Z98.1 S/P LUMBAR FUSION: ICD-10-CM

## 2021-04-22 DIAGNOSIS — M53.3 SACROILIAC JOINT PAIN: Primary | ICD-10-CM

## 2021-04-22 DIAGNOSIS — M47.816 SPONDYLOSIS OF LUMBAR REGION WITHOUT MYELOPATHY OR RADICULOPATHY: ICD-10-CM

## 2021-04-22 PROCEDURE — 99443 PR PHYSICIAN TELEPHONE EVALUATION 21-30 MIN: CPT | Mod: 95 | Performed by: PHYSICAL MEDICINE & REHABILITATION

## 2021-04-22 ASSESSMENT — PAIN SCALES - GENERAL: PAINLEVEL: EXTREME PAIN (8)

## 2021-04-22 NOTE — PATIENT INSTRUCTIONS
1. Order placed for repeat SI joint injections. You will be called to schedule.   2. Start PT for back pain.   3. You can use Tylenol 1,000 mg twice daily in addition to Tylenol PM at night.   4. Continue to utilize topical medications is helpful.   5. Continue utilizing heat if helpful.     Frieda Novak MD  [facility]   ----------------------------------------------------------------  Clinic Number:  [phone]     Call with any questions about your care and for scheduling assistance.     Calls are returned Monday through Friday between 8 AM and 4:30 PM. We usually get back to you within 2 business days depending on the issue/request.    If we are prescribing your medications:    For opioid medication refills, call the clinic or send a Flaviar message 7 days in advance.  Please include:    Name of requested medication    Name of the pharmacy.    For non-opioid medications, call your pharmacy directly to request a refill. Please allow 3-4 days to be processed.     Per [place] State Law:    All controlled substance prescriptions must be filled within 30 days of being written.      For those controlled substances allowing refills, pickup must occur within 30 days of last fill.      We believe regular attendance is key to your success in our program!      Any time you are unable to keep your appointment we ask that you call us at least 24 hours in advance to cancel.This will allow us to offer the appointment time to another patient.     Multiple missed appointments may lead to dismissal from the clinic.

## 2021-04-22 NOTE — PROGRESS NOTES
Kerri is a 85 year old who is being evaluated via a billable telephone visit.      What phone number would you like to be contacted at? [phone]    How would you like to obtain your AVS? [facility]   Pain Management Center    [facility] Consultation    Date of visit: 4/22/2021    Assessment:  Kerri Rocha is a 84 year old female with a past medical history significant for idiopathic peripheral neuropathy, s/p L4-5 lumbar fusion, HLD, HTN  who presents with complaints of axial low back pain.      1. Chronic low back pain: Etiology likely due to bilateral SI joint dysfunction. On previous exam she has positive yeoman's, pain with palpation of PSIS, positive yeoman's, pain with sacral compression and palpation of SI joints. There could also be a component of facet arthropathy below her fusion at L5-S1. MRI shows moderate to severe left L3-4 foraminal stenosis and potentially right S1 nerve root impingement but she has no symptoms correlating with these findings.       2. S/p L4-5 lumbar fusion     3. Hx of idiopathic peripheral neuropathy     Plan:  The following recommendations were given to the patient. Diagnosis, treatment options, risks, benefits, and alternatives were discussed, and all questions were answered. The patient expressed understanding of the plan for management.     I am recommending a multidisciplinary treatment plan to help this patient better manage her pain.  This includes:     1. Therapies: start PT for back pain  2. Clinical Health Pain Psychologist: Therapy can help reduce physical and psychosocial triggers or reinforcers of pain by adapting thoughts, feelings and behaviors to reduce symptoms and increase quality of life.  Evidence indicates that the practice of relaxation, meditation, and mindfulness techniques can significantly affect pain levels and overall well being. Not at this time.   3. Self Care Recommendations: continue  "utilizing heat if helpful  4. Diagnostic Studies: None  5. Medication Management:  1. Use tylenol 1,000 mg BID during the day in addition to what she is using at night  2. Continue OTC topical medications      6. Further procedures recommended: Order placed to repeat SI joint injection  7. Follow up: injections    Reason for consultation:    Primary Care Provider is Parrish Arizmendi.    Kerri Rocha is a 85 year old female who I was asked to see in consultation by Jason Gillette  for evaluation of back pain    Consultation and Evaluation for: back pain    Review of [place] Prescription Monitoring Program (): Today I have also reviewed the patient's history of controlled substance use, as provided by [place] licensed pharmacies and prescriber dispensers.     Review of Electronic Chart: Today I have also reviewed available medical information in the patient's medical record at [facility] [place] ([facility] including relevant provider notes, laboratory work, and imaging.     Chief Complaint:    Chief Complaint   Patient presents with     Pain       Pain history:  Kerri Rocha is a 85 year old female with a PMH significant for idiopathic peripheral neuropathy, HLD, HTN who first started having problems with back pain several years ago. She has previously been seen by me in clinic for this issue.     HPI from that visit is as follows: \"She had a L4-5 lumbar fusion in 2016. She had good relief of pain after the fusion. Over the last 1.5 years back pain has been increasing gradually over time. The pain is fairly constant but varies in severity. It is aggravated by prolonged sitting or standing in one spot, getting into the out of the car. It is somewhat relieved with heat. Mainly located in bilateral low back/buttock. No radiating symptoms into the legs. Mostly aching in quality. On average pain is 6-7/10 in severity. Denies red flags including: bowel or bladder symptoms, fever, chills, saddle anesthesia, profound " "motor loss, history of cancer, history of immune compromise, weight loss.      She has a hx of peripheral neuropathy. States she has difficulty walking intially during the day and by the middle of the afternoon she feels she can walk normally. She has impaired balance. Had an EMG in April 2019 which also showed evidence of a chronic polyradiculopathy of L3,L5 and S1 myotomes (this report is not available to review).  There was no evidence for ongoing denervation. She had an MRI which showed moderate to severe left L3-4 foraminal stenosis and potentially right S1 nerve root impingement.     She has been working with PT mainly for dizziness and balance.\"     Today she reports pain is in the low back and radiates into the sides of the hips. No further radiation. Feels similar to before. Pain is constant. Seems to be worse than before. Aggravated by sitting, walking, prolonged standing. Only thing that helps is sleeping on the sides.  Severity/Intensity: 9/10 at worst, 6/10 at best    Medications:       Current pain medications:  Gabapentin 400 mg TID - [facility] for back pain  Advil 200 mg 2 tabs TID - H  Tylenol PM - SW  Icy/hot patches - [facility]    Past Pain Treatments:  PT: Yes - states she went for at least a year - ?  TENs Unit: No   Injections: Bilateral SI joint injections - second one didn't help. First one 100% relief for 3 months.   Self-care:   Yes - heat - [facility]    Diagnostic tests:  MRI of lumbar spine on 4/13/2019 showed:    Findings: Regarding numbering convention, there are 5 lumbar-type  vertebrae assumed for the purposes of this dictation.  The tip of the  conus medullaris is at  L1.  Regarding alignment, there is grade 1  anterolisthesis of L4 on L5 and of L5 on S1. Postsurgical changes of  instrumented spinal fusion of L4-5 with transpedicular screws,  interconnecting fusion rods and interbody fusion spacer. There is an  interbody fusion spacer at T11-12. There is multilevel disc height  narrowing and disc " desiccation, most pronounced at L3-4 with mild disc  height loss.  Regarding bone marrow signal intensity, there is T1 and  T2 hyperintense signal scattered throughout the lumbar vertebral  bodies and the sacrum which drops out on the STIR images, consistent  with focal fatty deposition or possibly cavernous hemangiomas. There  is a superior endplate compression deformity of L1.     On a level by level basis:     L1-2: Posterior disc bulge. Facet arthropathy bilaterally. Mild neural  foraminal stenosis bilaterally. Spinal canal is patent..     L2-3: Posterior disc bulge. Facet arthropathy bilaterally. Ligamentum  flavum hypertrophy. Moderate left and mild right neural foraminal  stenosis. Mild spinal canal stenosis..     L3-4: Small posterior disc bulge. Facet arthropathy bilaterally. Left  laminectomy. Moderate to severe left and mild to moderate right neural  foraminal stenosis. Spinal canal is patent..     L4-5: Small posterior osteophytic spurring. Facet arthropathy  bilaterally. Decompressive laminectomy. No spinal canal or neural  foraminal stenosis.     L5-S1: Posterior disc bulge. Facet arthropathy bilaterally. Right  sided synovial cyst measures 5 x 5 x 6 mm narrows the right lateral  recess and likely impinges upon the traversing right S1 nerve root.  Mild neural foraminal stenosis bilaterally. Spinal canal is patent..     Contrast-enhanced images demonstrate no abnormal enhancement in the  visualized paraspinous tissues anteriorly or in the spinal canal or  vertebra.                                                                      Impression:   1. Multilevel lumbar spondylosis, most pronounced at L3-4 with  moderate severe left and mild to moderate right neural foraminal  stenosis.  2. Synovial cyst within the right neural foramen of L5-S1 narrows the  right lateral recess and likely impinges upon the traversing right S1  nerve root.  3. Postsurgical changes of instrumented fusion of L4-5.    Past  Medical History:  Past Medical History:   Diagnosis Date     HLD (hyperlipidemia)      HTN (hypertension)      Idiopathic neuropathy      Vitamin D deficiency        Past Surgical History:  Past Surgical History:   Procedure Laterality Date     APPENDECTOMY       BREAST BIOPSY, CORE RT/LT       CARPAL TUNNEL RELEASE RT/LT       CATARACT IOL, RT/LT        SECTION      x2     HYSTERECTOMY TOTAL ABDOMINAL, BILATERAL SALPINGO-OOPHORECTOMY, COMBINED       LAMINECTOMY LUMBAR THREE+ LEVELS  2016    L3-L5     TONSILLECTOMY & ADENOIDECTOMY         Medications:  Current Outpatient Medications   Medication Sig Dispense Refill     amLODIPine (NORVASC) 10 MG tablet Take 1 tablet (10 mg) by mouth daily 90 tablet 3     ASPIRIN PO Take 81 mg by mouth daily       atorvastatin (LIPITOR) 20 MG tablet Take 1 tablet (20 mg) by mouth At Bedtime 90 tablet 3     calcium carbonate (OS-CAMMIE 600 MG [place]. CA) 600 MG tablet Take 1,200 mg by mouth daily        Cholecalciferol (VITAMIN D3) 2000 UNITS CAPS 5,000 Int'l Units daily        Cyanocobalamin (VITAMIN B-12 PO) Take by mouth daily       Diphenhydramine-APAP, sleep, (TYLENOL PM EXTRA STRENGTH PO) Take by mouth nightly as needed       gabapentin (NEURONTIN) 400 MG capsule TAKE 2 CAPSULES IN THE MORNING, 2 CAPSULES AT NOON AND 2 CAPSULES IN THE EVENING 360 capsule 2     lisinopril (ZESTRIL) 10 MG tablet Take 1 tablet (10 mg) by mouth daily 90 tablet 3     omega 3 1000 MG CAPS Take 2 g by mouth         Allergies:     Allergies   Allergen Reactions     Codeine      Sulfa Drugs      Sulfasalazine Other (See Comments)     Sulfate Rash       Family history:  Family History   Problem Relation Age of Onset     Hypertension Mother      Heart Disease Father      Hypertension Father      Coronary Artery Disease Father      Hypertension Sister      Heart Disease Brother      Hypertension Brother      Diabetes No family hx of      Cancer No family hx of      Social History:  Home situation:  Lives in [place], [place] with .   Occupation/Schooling: Retired teacher  Tobacco use: None  Alcohol use: None  Drug use: None    Physical Exam:  Unable to complete since this was a telephone visit.       Frieda Novak MD  [facility]       BILLING TIME DOCUMENTATION:   The total TIME spent on this patient on the date of the encounter/appointment was 20 minutes.      TOTAL TIME includes:   Time spent preparing to see the patient (reviewing records and tests)   Time spent face to face (or over the phone) with the patient   Time spent ordering tests, medications, procedures and referrals   Time spent documenting clinical information in Epic           Phone call duration: 15 minutes

## 2021-04-23 ENCOUNTER — TELEPHONE (OUTPATIENT)
Dept: PALLIATIVE MEDICINE | Facility: CLINIC | Age: 86
End: 2021-04-23

## 2021-04-23 RX ORDER — GABAPENTIN 400 MG/1
CAPSULE ORAL
Qty: 360 CAPSULE | Refills: 2 | Status: SHIPPED | OUTPATIENT
Start: 2021-04-23 | End: 2021-09-22

## 2021-04-23 NOTE — TELEPHONE ENCOUNTER
Screening Questions for Radiology Injections:    Injection to be done at which interventional clinic site? [facility]    If [facility] location, tell patient that this procedure requires a COVID-19 lab test be done within 4 days of the procedure. Would you still like to move forward with scheduling the procedure?  Not Applicable   If YES, let patient know that someone will call them to schedule the COVID-19 test and that they will only receive a call back if the result is positive. Route to nursing to enter order.     Instruct patient to arrive as directed prior to the scheduled appointment time:    [place] minutes before      [place]: 30 minutes before; if IV needed 1 hour before     Procedure ordered by Scarlet    Procedure ordered? Repeat bilateral sacroiliac joint injections       Transforaminal Cervical MAGUE - no pain provider currently performing    As a reminder, receiving steroids can decrease your body's ability to fight infection.   Would you still like to move forward with scheduling the injection?  Yes    What insurance would patient like us to bill for this procedure? Medicare      Worker's comp or MVA (motor vehicle accident) -Any injection DO NOT SCHEDULE and route to Rozina Desouza.      [employer] insurance - For SI joint injections, DO NOT SCHEDULE and route Rozina Desouza.       ALL BCBS, [employer] and [employer]-Route to Rozina for review DO NOT SCHEDULE      IF SCHEDULING IN [place] AND NEEDS A PA, IT IS OKAY TO SCHEDULE. [place] HANDLES THEIR OWN PA'S AFTER THE PATIENT IS SCHEDULED. PLEASE SCHEDULE AT LEAST 1 WEEK OUT SO A PA CAN BE OBTAINED.    Any chance of pregnancy? NO   If YES, do NOT schedule and route to RN pool    Is an  needed? No     Patient has a drive home? (mandatory) YES: INFORMED    Is patient taking any blood thinners (i.e. plavix, coumadin, jantoven, warfarin, heparin, pradaxa or dabigatran, etc)? No   If hold needed, do NOT schedule, route  to RN pool     Is patient taking any aspirin products (includes Excedrin and Fiorinal)? Yes - Pt takes 81mg daily; instructed to hold 0 day(s) prior to procedure.      If more than 325mg/day, OK to schedule; Instruct pt to decrease to less than 325 mg for 7 days AND route to RN pool    For CERVICAL procedures, hold all aspirin products for 6 days.     Tell pt that if aspirin product is not held for 6 days, the procedure WILL BE cancelled.      Does the patient have a bleeding or clotting disorder? No     If YES, okay to schedule AND route to RN nurse pool    For any patients with platelet count <100, must be forwarded to provider    Any allergies to contrast dye, iodine, shellfish, or numbing and steroid medications? No    If YES, route to RN pool AND add allergy information to appointment notes    Allergies: Codeine, Sulfa drugs, Sulfasalazine, and Sulfate     Is patient diabetic?  No  If YES, instruct them to bring their glucometer.    Does patient have an active infection or treated for one within the past week? No     Is patient currently taking any antibiotics?  No     For patients on chronic, preventative, or prophylactic antibiotics, procedures may be scheduled.     For patients on antibiotics for active or recent infection:antibiotic course must have been completed for 4 days    Is patient currently taking any steroid medications? (i.e. Prednisone, Medrol)  No     For patients on steroid medications, course must have been completed for 4 days    Is patient actively being treated for cancer or immunocompromised? No  If YES, do NOT schedule and route to RN pool     Are you able to get on and off an exam table with minimal or no assistance? Yes  If NO, do NOT schedule and route to RN pool    Are you able to roll over and lay on your stomach with minimal or no assistance? Yes  If NO, do NOT schedule and route to RN pool     Has the patient had a flu shot or any other vaccinations within 7 days before or after  the procedure.  No     Have you recently had a COVID vaccine or have plans to get it in the near future? No    If yes, explain that for the vaccine to work best they need to:       wait 1 week before and 1 week after getting Vaccine #1    wait 1 week before and 2 weeks after getting Vaccine #2    If patient has concerns about the timing, send to RN pool     Does patient have an MRI/CT?  Not Applicable  Check Procedure Scheduling Grid to see if required.      Was the MRI done within the last 3 years?  NA    If yes, where was the MRI done i.e.[facility], [facility], [place], [facility] etc?       If no, do not schedule and route to RN pool    If MRI was not done at [place], [facility] or [facility] do NOT schedule and route to RN pool.      If pt has an imaging disc, the injection MAY be scheduled but pt has to bring disc to appt.     If they show up without the disc the injection cannot be done    Procedure Specific Instructions:      If celiac plexus block, informed patient NPO for 6 hours and that it is okay to take medications with sips of water, especially blood pressure medications  Not Applicable         If this is for a cervical procedure, informed patient that aspirin needs to be held for 6 days.   Not Applicable      If IV needed:    Do not schedule procedures requiring IV placement in the first appointment of the day or first appointment after lunch. Do NOT schedule at 0745, 0815 or 1245.     Instructed pt to arrive 30 minutes early for IV start if required. (Check Procedure Scheduling Grid)  Not Applicable    Reminders:      If you are started on any steroids or antibiotics between now and your appointment, you must contact us because the procedure may need to be cancelled.  Yes      For all procedures except radiofrequency ablations (RFAs) and spinal cord stimulator (SCS) trials, informed patient:    IV sedation is not provided for this procedure.  If you feel that an oral anti-anxiety medication  is needed, you can discuss this further with your referring provider or primary care provider.  The Pain Clinic provider will discuss specifics of what the procedure includes at your appointment.  Most procedures last 10-20 minutes.  We use numbing medications to help with any discomfort during the procedure.  Not Applicable      For patients 85 or older we recommend having an adult stay w/ them for the remainder of the day.       Does the patient have any questions?  NO  Janine Haskins  [facility]

## 2021-04-24 DIAGNOSIS — M81.0 AGE RELATED OSTEOPOROSIS, UNSPECIFIED PATHOLOGICAL FRACTURE PRESENCE: ICD-10-CM

## 2021-04-24 DIAGNOSIS — G62.9 NEUROPATHY: ICD-10-CM

## 2021-04-26 RX ORDER — GABAPENTIN 400 MG/1
CAPSULE ORAL
Qty: 360 CAPSULE | Refills: 2 | OUTPATIENT
Start: 2021-04-26

## 2021-04-27 ENCOUNTER — RADIOLOGY INJECTION OFFICE VISIT (OUTPATIENT)
Dept: PALLIATIVE MEDICINE | Facility: CLINIC | Age: 86
End: 2021-04-27
Payer: MEDICARE

## 2021-04-27 VITALS — OXYGEN SATURATION: 98 % | SYSTOLIC BLOOD PRESSURE: 134 MMHG | HEART RATE: 84 BPM | DIASTOLIC BLOOD PRESSURE: 67 MMHG

## 2021-04-27 DIAGNOSIS — M53.3 SACROILIAC JOINT PAIN: ICD-10-CM

## 2021-04-27 PROCEDURE — 27096 INJECT SACROILIAC JOINT: CPT | Mod: 50 | Performed by: PHYSICAL MEDICINE & REHABILITATION

## 2021-04-27 NOTE — PATIENT INSTRUCTIONS
[facility] Procedure Discharge Instructions    Today you saw:    Dr. Frieda Novak       Your procedure:     Sacro-iliac joint injection       Medications used:  Lidocaine (anesthetic)  Bupivacaine (anesthetic)       Kenalog (steroid)  Omnipaque (contrast)                 Be cautious when walking as numbness and/or weakness in the legs may occur up to 6-8 hours after the procedure due to effect of the local anesthetic    Do not drive for 6 hours. The effect of the local anesthetic could slow your reflexes.     Avoid strenuous activity for the first 24 hours. You may resume your regular activities after that.     You may shower, however avoid swimming, tub baths or hot tubs for 24 hours following your procedure    You may have a mild to moderate increase in pain for several days following the injection.      You may use ice packs for 10-15 minutes, 3 to 4 times a day at the injection site for comfort    Do not use heat to painful areas for 6 to 8 hours. This will give the local anesthetic time to wear off and prevent you from accidentally burning your skin.    Unless you have been directed to avoid the use of anti-inflammatory medications (NSAIDS-ibuprofen, Aleve, Motrin), you may use these medications or Tylenol for pain control if needed.     With diabetes, check your blood sugar more frequently than usual as your blood sugar may be higher than normal for 10-14 days following a steroid injection. Contact your doctor who manages your diabetes if your blood sugar is higher than usual    Possible side effects of steroids that you may experience include flushing, elevated blood pressure, increased appetite, mild headaches and restlessness.  All of these symptoms will get better with time.    It may take up to 14 days for the steroid medication to start working although you may feel the effect as early as a few days after the procedure.     Follow up with your referring provider in 2-3 weeks      If  you experience any of the following, call the pain center line during work hours at [phone] or on-call physician after hours at [phone]:  -Fever over 100 degree F  -Swelling, bleeding, redness, drainage, warmth at the injection site  -Progressive weakness or numbness in your legs   -Loss of bowel or bladder function  -Unusual headache that is not relieved by Tylenol or your regular headache medication  -Unusual new onset of pain that is not improving

## 2021-04-27 NOTE — NURSING NOTE
Discharge Information    IV Discontiued Time:  NA    Amount of Fluid Infused:  NA    Discharge Criteria = When patient returns to baseline or as per MD order    Consciousness:  Pt is fully awake    Circulation:  BP +/- 20% of pre-procedure level    Respiration:  Patient is able to breathe deeply    O2 Sat:  Patient is able to maintain O2 Sat >92% on room air    Activity:  Moves 4 extremities on command    Ambulation:  Patient is able to stand and walk or stand and pivot into wheelchair    Dressing:  Clean/dry or No Dressing    Notes:   Discharge instructions and AVS given to patient    Patient meets criteria for discharge?  YES    Admitted to PCU?  No    Responsible adult present to accompany patient home?  Yes    Signature/Title:    Belen De La Paz RN Care Coordinator  [facility] [place]

## 2021-04-27 NOTE — NURSING NOTE
Pre-procedure Intake    Have you been fasting? No    If yes, for how long?     Are you taking a prescribed blood thinner such as coumadin, Plavix, Xarelto?    No    If yes, when did you take your last dose?     Do you take aspirin?   YES: 81 mg    If cervical procedure, have you held aspirin for 6 days?   NA    Do you have any allergies to contrast dye, iodine, steroid and/or numbing medications?  NO    Are you currently taking antibiotics or have an active infection?  NO    Have you had a fever/elevated temperature within the past week? NO    Are you currently taking oral steroids? NO    Do you have a ? Yes       Are you pregnant or breastfeeding? NA    Have you received the COVID-19 vaccine? Yes     If yes, was it your 1st, 2nd or only dose needed? 2nd dose  on March 8th    Are the vital signs normal?  Yes

## 2021-04-27 NOTE — PROGRESS NOTES
[facility] - Procedure Note    Date of Service: 4/27/2021  Procedure performed: Bilateral sacroiliac joint injection  Diagnosis: Sacroiliac joint pain  : Frieda Novak MD  Anesthesia: None  Complications: None    Indications: Kerri Rocha is an 84 year old female who was seen by me in clinic who presents today for sacroiliac joint injections. The patient describes bilateral low back/buttock pain. Exam shows full strength and sensation in both lower extremities, tenderness of the sacroiliac (SI) joints. The patient reports minimal improvement with conservative treatment, including PT and medications.    This is a repeat injection. First one completed on 8/17/2020 which provided at least 3-4 months of relief. The second one completed on 8/17/2020 was not as helpful and lasted a few weeks only.     Imaging:  MRI completed on 4/13/2019 showed:  Findings: Regarding numbering convention, there are 5 lumbar-type  vertebrae assumed for the purposes of this dictation.  The tip of the  conus medullaris is at  L1.  Regarding alignment, there is grade 1  anterolisthesis of L4 on L5 and of L5 on S1. Postsurgical changes of  instrumented spinal fusion of L4-5 with transpedicular screws,  interconnecting fusion rods and interbody fusion spacer. There is an  interbody fusion spacer at T11-12. There is multilevel disc height  narrowing and disc desiccation, most pronounced at L3-4 with mild disc  height loss.  Regarding bone marrow signal intensity, there is T1 and  T2 hyperintense signal scattered throughout the lumbar vertebral  bodies and the sacrum which drops out on the STIR images, consistent  with focal fatty deposition or possibly cavernous hemangiomas. There  is a superior endplate compression deformity of L1.     On a level by level basis:     L1-2: Posterior disc bulge. Facet arthropathy bilaterally. Mild neural  foraminal stenosis bilaterally. Spinal canal is patent..     L2-3:  Posterior disc bulge. Facet arthropathy bilaterally. Ligamentum  flavum hypertrophy. Moderate left and mild right neural foraminal  stenosis. Mild spinal canal stenosis..     L3-4: Small posterior disc bulge. Facet arthropathy bilaterally. Left  laminectomy. Moderate to severe left and mild to moderate right neural  foraminal stenosis. Spinal canal is patent..     L4-5: Small posterior osteophytic spurring. Facet arthropathy  bilaterally. Decompressive laminectomy. No spinal canal or neural  foraminal stenosis.     L5-S1: Posterior disc bulge. Facet arthropathy bilaterally. Right  sided synovial cyst measures 5 x 5 x 6 mm narrows the right lateral  recess and likely impinges upon the traversing right S1 nerve root.  Mild neural foraminal stenosis bilaterally. Spinal canal is patent..     Contrast-enhanced images demonstrate no abnormal enhancement in the  visualized paraspinous tissues anteriorly or in the spinal canal or  vertebra.                                                                      Impression:   1. Multilevel lumbar spondylosis, most pronounced at L3-4 with  moderate severe left and mild to moderate right neural foraminal  stenosis.  2. Synovial cyst within the right neural foramen of L5-S1 narrows the  right lateral recess and likely impinges upon the traversing right S1  nerve root.  3. Postsurgical changes of instrumented fusion of L4-5.    Allergies:      Allergies   Allergen Reactions     Codeine      Sulfa Drugs      Sulfasalazine Other (See Comments)     Sulfate Rash        Vitals:  /67   Pulse 70   SpO2 98%     Options/alternatives, benefits and risks were discussed with the patient including bleeding, infection, tissue trauma, exposure to radiation, reaction to medications including seizure, nerve injury, weakness, and numbness.  Questions were answered to her satisfaction and she agrees to proceed. Voluntary informed consent was obtained and signed.     Procedure:  After getting  informed consent, patient was brought into the procedure suite and was placed in a prone position on the procedure table.   A Pause for the Cause was performed.  Patient was prepped and draped in sterile fashion.     After identifying the right SI joint, the C-arm was rotated to a obliquely to obtain the best view of the inferior angle of the joint.  A total of 2 ml of Lidocaine 1%  was used to anesthetize the skin at a skin entry site coaxial with the fluoroscopy beam at this location.  A 22 gauge 3.5 inch needle was advanced under intermittent fluoroscopy until it was felt to enter the SI joint.    The same procedure was repeated on the left side.     A total of 1 ml of Omnipaque-300 was injected, confirming appropriate position, with spread into the intraarticular space, with no intravascular uptake noted.  9 ml of Omnipaque-300 was wasted. Location was verified in lateral view.    4.5 ml of 0.25% bupivacaine with 60 mg of kenalog was injected equally between both sides..  The needle was removed. Hemostasis was achieved, the area was cleaned, and bandaids were placed when appropriate.  The patient tolerated the procedure well, and was taken to the recovery room.    Images were saved to PACS.    Pre-procedure pain score: 6/10  Post-procedure pain score: 0/10    Assessment/Plan: Kerri Rocha is a 84 year old female s/p bilateral SIJ injection due to low back/buttock pain.    1. Following today's procedure, the patient was advised to contact the [facility] for any of the following:   Fever, chills, or night sweats   New onset of pain, numbness, or weakness   Any questions/concerns regarding the procedure  If unable to contact the Pain Center, the patient was instructed to go to a local Emergency Room for any complications.   2. The patient should follow-up with me in 2-3 weeks.       Frieda Novak MD  [facility] [place]

## 2021-04-28 ENCOUNTER — OFFICE VISIT (OUTPATIENT)
Dept: NEUROLOGY | Facility: CLINIC | Age: 86
End: 2021-04-28
Payer: MEDICARE

## 2021-04-28 DIAGNOSIS — G60.9 HEREDITARY AND IDIOPATHIC PERIPHERAL NEUROPATHY: Primary | ICD-10-CM

## 2021-04-28 PROCEDURE — 95913 NRV CNDJ TEST 13/> STUDIES: CPT | Performed by: PSYCHIATRY & NEUROLOGY

## 2021-04-28 PROCEDURE — 95885 MUSC TST DONE W/NERV TST LIM: CPT | Mod: 59 | Performed by: PSYCHIATRY & NEUROLOGY

## 2021-04-28 PROCEDURE — 95886 MUSC TEST DONE W/N TEST COMP: CPT | Performed by: PSYCHIATRY & NEUROLOGY

## 2021-04-28 NOTE — PROGRESS NOTES
[facility]  Electrodiagnostic Laboratory    Nerve Conduction & EMG Report          Patient:       Kerri Rocha  Patient ID:    [id number]  Gender:        Female  YOB: 1935  Age:           85 Years 11 Months        History & Examination:  Kerri Rocha is referred by Dr Gillette for a repeat EMG. Her last EMG was in 2019 and since then she feels her balance has become worse and she notes both knees will buckle form time to time.     Techniques: Motor and sensory conduction studies were done with surface recording electrodes.Temperature was monitored and recorded throughout the study. Upper extremities were maintained at a temperature of 32 degrees Centigrade or higher.  Lower extremities were maintained at a temperature of 31degrees Centigrade or higher. EMG was done with a concentric needle electrode.        Results:  Sensory Conduction Studies:  No response recorded with stimulation of bilateral sural nerves and the right superficial peroneal nerve. Right median antidromic study reveals a low amplitude response. Right ulnar antidromic study is within normal limits.     Motor Nerve Conduction Studies:  Deep peroneal motor study recording over the EDB elicits very low amplitude responses bilaterally with normal distal latencies and conduction velocities. Improved responses recording over tibialis anterior. Right tibial study is low in amplitude with normal distal latency and conduction velocity. Right median is also low in amplitude with normal distal latency and conduction velocity. Right ulnar study is within normal limits.     Needle Examination:  Chronic denervation changes seen in distal musculature bilaterally. In the right lower extremity additional changes are seen in the right vastus lateralis and adductor longus.    Interpretation:    The EMG is abnormal. The findings are consistent with the followin. 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 Site Onset Peak NP Amp Ref. PP Amp Dist Micha Ref. Temp     ms ms  V  V  V cm m/s m/s  C   R MEDIAN - Dig II Anti      Wrist Dig II 2.50 3.33 7.4 10.0 7.7 14 56.0 48.0 32.6   R ULNAR - Dig V Anti      Wrist Dig V 2.50 3.33 10.6 8.0 12.0 12.5 50.0 48.0 33   R RADIAL - Snuff      Forearm Snuff 2.08 2.92 8.3 15.0 12.3 10.5 50.4 48.0 33   R SURAL - Lat Mall 60      Calf Ankle NR NR NR 5.0 NR 14 NR 38.0 31.3   L SURAL - Lat Mall 60      Calf Ankle NR NR NR 5.0 NR 14 NR 38.0 33   R SUP PERONEAL      Lat Leg Thao NR NR NR  NR 12.5 NR 38.0 31       Motor NCS      Nerve / Sites Rec. Site Lat Ref. Amp Ref. Rel Amp Dist Micha Ref. Dur. Area Temp.     ms ms mV mV % cm m/s m/s ms %  C   R MEDIAN - APB      Wrist APB 3.80 4.40 3.9 5.0 100 8   5.57 100 32.6      Elbow APB 7.92  3.5  88.1 22 53.5 48.0 5.94 88.7 32.2   R ULNAR - ADM      Wrist ADM 2.66 3.50 7.4 5.0 100 8   6.77 100 31.7      B.Elbow ADM 5.99  6.1  82.7 20 60.0 48.0 6.61 92 31.7      A.Elbow ADM 7.45  5.9  79.9 9 61.7 48.0 6.56 94.5 31.7   R DEEP PERONEAL - EDB 60      Ankle EDB 4.58 6.00 0.6 2.0 100 8   4.32 100 31.3      FibHead EDB 10.94  0.6  97 31 48.8 38.0 6.56 223 31.3      Pop Fos EDB 13.02  0.6  94.5 10 48.0 38.0 4.32 78.4 31.3   L DEEP PERONEAL - EDB 60      Ankle EDB 4.79 6.00 0.2 2.0 100 8   2.92 100 33   R TIBIAL - AH      Ankle AH 5.00 6.00 1.8 4.0 100 8   5.78 100 31.3      Pop Fos AH 14.69  1.0  53.5 40 41.3 38.0 6.56 79.4 31.2   R PERONEAL - Tib Ant      Fib Head Tib Ant 3.23  4.3  100 10   11.41 100 31.3      Knee Tib Ant 4.74  4.4  104 10 66.2  11.51 97.9 31.3   L PERONEAL - Tib Ant      Fib Head Tib Ant 4.01  3.8  100 10   9.74 100 32.9      Knee Tib Ant 5.47  3.8  99 8 54.9  9.64 186 32.9       EMG Summary Table     Spontaneous Recruitment MUAP    IA Fib PSW Fasc H.F.  Pattern Amp Dur. PPP   R. TIB ANTERIOR N None None None None Reduced 2+ 2+ 2+   R. GASTROCN (MED) N None None None None Reduced 2+ 2+ N   R. VAST LATERALIS N None None None None Reduced 1+ 1+ N   R. T FASCIA STEVEN N None None None None N N N N   R. GLUTEUS MAX N None None None None N N N N   L. TIB ANTERIOR N None None None None Reduced 2+ 2+ 1+   L. GASTROCN (MED) N None None None None Reduced 2+ 2+ N   L. VAST LATERALIS N None None None None N N N N   L. ADD LONGUS N None None None None N N N N   R. ADD LONGUS N None None None None Reduced 1+ 1+ N

## 2021-04-28 NOTE — LETTER
4/28/2021       RE: Kerri Rocha  [address]  [facility] [address]     Dear Colleague,    Thank you for referring your patient, Kerri Rocha, to the [facility] EMG CLINIC [place] at [facility]. Please see a copy of my visit note below.        [facility]  Electrodiagnostic Laboratory    Nerve Conduction & EMG Report          Patient:       Kerri Rocha  Patient ID:    [id number]  Gender:        Female  YOB: 1935  Age:           85 Years 11 Months        History & Examination:  Kerri Rocha is referred by Dr Gillette for a repeat EMG. Her last EMG was in April 2019 and since then she feels her balance has become worse and she notes both knees will buckle form time to time.     Techniques: Motor and sensory conduction studies were done with surface recording electrodes.Temperature was monitored and recorded throughout the study. Upper extremities were maintained at a temperature of 32 degrees Centigrade or higher.  Lower extremities were maintained at a temperature of 31degrees Centigrade or higher. EMG was done with a concentric needle electrode.        Results:  Sensory Conduction Studies:  No response recorded with stimulation of bilateral sural nerves and the right superficial peroneal nerve. Right median antidromic study reveals a low amplitude response. Right ulnar antidromic study is within normal limits.     Motor Nerve Conduction Studies:  Deep peroneal motor study recording over the EDB elicits very low amplitude responses bilaterally with normal distal latencies and conduction velocities. Improved responses recording over tibialis anterior. Right tibial study is low in amplitude with normal distal latency and conduction velocity. Right median is also low in amplitude with normal distal latency and conduction velocity. Right ulnar study is within normal limits.     Needle Examination:  Chronic  denervation changes seen in distal musculature bilaterally. In the right lower extremity additional changes are seen in the right vastus lateralis and adductor longus.    Interpretation:    The EMG is abnormal. The findings are consistent with the followin. A severe distal symmetric axonal peripheral neuropathy that has progressed since last EMG 2019  2. 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 Site Onset Peak NP Amp Ref. PP Amp Dist Micha Ref. Temp     ms ms  V  V  V cm m/s m/s  C   R MEDIAN - Dig II Anti      Wrist Dig II 2.50 3.33 7.4 10.0 7.7 14 56.0 48.0 32.6   R ULNAR - Dig V Anti      Wrist Dig V 2.50 3.33 10.6 8.0 12.0 12.5 50.0 48.0 33   R RADIAL - Snuff      Forearm Snuff 2.08 2.92 8.3 15.0 12.3 10.5 50.4 48.0 33   R SURAL - Lat Mall 60      Calf Ankle NR NR NR 5.0 NR 14 NR 38.0 31.3   L SURAL - Lat Mall 60      Calf Ankle NR NR NR 5.0 NR 14 NR 38.0 33   R SUP PERONEAL      Lat Leg Thao NR NR NR  NR 12.5 NR 38.0 31       Motor NCS      Nerve / Sites Rec. Site Lat Ref. Amp Ref. Rel Amp Dist Micha Ref. Dur. Area Temp.     ms ms mV mV % cm m/s m/s ms %  C   R MEDIAN - APB      Wrist APB 3.80 4.40 3.9 5.0 100 8   5.57 100 32.6      Elbow APB 7.92  3.5  88.1 22 53.5 48.0 5.94 88.7 32.2   R ULNAR - ADM      Wrist ADM 2.66 3.50 7.4 5.0 100 8   6.77 100 31.7      B.Elbow ADM 5.99  6.1  82.7 20 60.0 48.0 6.61 92 31.7      A.Elbow ADM 7.45  5.9  79.9 9 61.7 48.0 6.56 94.5 31.7   R DEEP PERONEAL - EDB 60      Ankle EDB 4.58 6.00 0.6 2.0 100 8   4.32 100 31.3      FibHead EDB 10.94  0.6  97 31 48.8 38.0 6.56 223 31.3      Pop Fos EDB 13.02  0.6  94.5 10 48.0 38.0 4.32 78.4 31.3   L DEEP PERONEAL - EDB 60      Ankle EDB 4.79 6.00 0.2 2.0 100 8   2.92 100 33   R TIBIAL - AH      Ankle AH 5.00 6.00 1.8 4.0 100 8   5.78 100 31.3      Pop Fos AH 14.69  1.0  53.5 40 41.3 38.0 6.56 79.4 31.2   R PERONEAL - Tib Ant       Fib Head Tib Ant 3.23  4.3  100 10   11.41 100 31.3      Knee Tib Ant 4.74  4.4  104 10 66.2  11.51 97.9 31.3   L PERONEAL - Tib Ant      Fib Head Tib Ant 4.01  3.8  100 10   9.74 100 32.9      Knee Tib Ant 5.47  3.8  99 8 54.9  9.64 186 32.9       EMG Summary Table     Spontaneous Recruitment MUAP    IA Fib PSW Fasc H.F. Pattern Amp Dur. PPP   R. TIB ANTERIOR N None None None None Reduced 2+ 2+ 2+   R. GASTROCN (MED) N None None None None Reduced 2+ 2+ N   R. VAST LATERALIS N None None None None Reduced 1+ 1+ N   R. T FASCIA STEVEN N None None None None N N N N   R. GLUTEUS MAX N None None None None N N N N   L. TIB ANTERIOR N None None None None Reduced 2+ 2+ 1+   L. GASTROCN (MED) N None None None None Reduced 2+ 2+ N   L. VAST LATERALIS N None None None None N N N N   L. ADD LONGUS N None None None None N N N N   R. ADD LONGUS N None None None None Reduced 1+ 1+ N                                   Again, thank you for allowing me to participate in the care of your patient.      Sincerely,    Janine Maher MD

## 2021-05-03 ENCOUNTER — VIRTUAL VISIT (OUTPATIENT)
Dept: NEUROLOGY | Facility: CLINIC | Age: 86
End: 2021-05-03
Payer: MEDICARE

## 2021-05-03 DIAGNOSIS — R27.0 ATAXIA, UNSPECIFIED: ICD-10-CM

## 2021-05-03 DIAGNOSIS — G60.9 IDIOPATHIC PERIPHERAL NEUROPATHY: Primary | ICD-10-CM

## 2021-05-03 DIAGNOSIS — R74.8 ABNORMAL LEVELS OF OTHER SERUM ENZYMES: ICD-10-CM

## 2021-05-03 DIAGNOSIS — T56.5X4A TOXIC EFFECT OF ZINC AND ITS COMPOUNDS, UNDETERMINED, INITIAL ENCOUNTER: ICD-10-CM

## 2021-05-03 PROCEDURE — 99441 PR PHYSICIAN TELEPHONE EVALUATION 5-10 MIN: CPT | Performed by: INTERNAL MEDICINE

## 2021-05-03 NOTE — PROGRESS NOTES
[facility] Neurology Follow Up Visit    Kerri Rocha MRN# [id number]   Age: 85 year old YOB: 1935     Brief history of symptoms: The patient was initially seen in neurologic consultation on 10/9/2020 for evaluation of balance difficulties. Please see the comprehensive neurologic consultation note from that date in the Epic records for details.     Interval history:  Patient recently had SI joint injections at the pain clinic. She notes mild relief, but still has significant pain. She is wondering if she should also see a spinal surgeon.     EMG recently completed which showed mild worsening in comparison to study 2 years ago. Balance continues to be an issue. She is very careful so she doesn't fall. She continues to do exercises. She will consider doing more physical therapy soon in the future.       Past Medical History:     Patient Active Problem List   Diagnosis     Essential hypertension     Idiopathic peripheral neuropathy     Pseudophakia of both eyes     Regular astigmatism of both eyes     Localized osteoporosis without current pathological fracture - 2020 waiting for dental work to start fosamax.      Mixed hyperlipidemia     Gait disorder     Vertigo     Past Medical History:   Diagnosis Date     HLD (hyperlipidemia)      HTN (hypertension)      Idiopathic neuropathy      Vitamin D deficiency         Past Surgical History:     Past Surgical History:   Procedure Laterality Date     APPENDECTOMY       BREAST BIOPSY, CORE RT/LT       CARPAL TUNNEL RELEASE RT/LT       CATARACT IOL, RT/LT        SECTION      x2     HYSTERECTOMY TOTAL ABDOMINAL, BILATERAL SALPINGO-OOPHORECTOMY, COMBINED       LAMINECTOMY LUMBAR THREE+ LEVELS  2016    L3-L5     TONSILLECTOMY & ADENOIDECTOMY          Social History:     Social History     Tobacco Use     Smoking status: Never Smoker     Smokeless tobacco: Never Used   Substance Use Topics     Alcohol use: No     Alcohol/week: 1.0 standard drinks      Comment: rare wine     Drug use: No        Family History:     Family History   Problem Relation Age of Onset     Hypertension Mother      Heart Disease Father      Hypertension Father      Coronary Artery Disease Father      Hypertension Sister      Heart Disease Brother      Hypertension Brother      Diabetes No family hx of      Cancer No family hx of         Medications:     Current Outpatient Medications   Medication Sig     amLODIPine (NORVASC) 10 MG tablet Take 1 tablet (10 mg) by mouth daily     ASPIRIN PO Take 81 mg by mouth daily     atorvastatin (LIPITOR) 20 MG tablet Take 1 tablet (20 mg) by mouth At Bedtime     calcium carbonate (OS-CAMMIE 600 MG [address]. [place]) 600 MG tablet Take 1,200 mg by mouth daily      Cholecalciferol (VITAMIN D3) 2000 UNITS CAPS 5,000 Int'l Units daily      Cyanocobalamin (VITAMIN B-12 PO) Take by mouth daily     Diphenhydramine-APAP, sleep, (TYLENOL PM EXTRA STRENGTH PO) Take by mouth nightly as needed     gabapentin (NEURONTIN) 400 MG capsule TAKE 2 CAPSULES IN THE MORNING, 2 CAPSULES AT NOON AND 2 CAPSULES IN THE EVENING     lisinopril (ZESTRIL) 10 MG tablet Take 1 tablet (10 mg) by mouth daily     omega 3 1000 MG CAPS Take 2 g by mouth     No current facility-administered medications for this visit.         Allergies:     Allergies   Allergen Reactions     Codeine      Sulfa Drugs      Sulfasalazine Other (See Comments)     Sulfate Rash        Review of Systems:   As above     Physical Exam:   Vitals: There were no vitals taken for this visit.   No examination given nature of telephone visit.      Data reviewed on previous visits    Imaging:  MRI lumbar 4/13/2019  Impression:   1. Multilevel lumbar spondylosis, most pronounced at L3-4 with  moderate severe left and mild to moderate right neural foraminal  stenosis.  2. Synovial cyst within the right neural foramen of L5-S1 narrows the  right lateral recess and likely impinges upon the traversing right S1  nerve root.  3. Postsurgical  changes of instrumented fusion of L4-5.     MRI cervical spine 1/2019  IMPRESSION: Mild multilevel degenerative changes with multilevel  neural foraminal stenoses without significant canal stenosis. No  myelopathy signal changes.      MRI brain 11/2018  Impression:    Normal brain for age with attention to the vestibular and auditory  structures. No specific finding to explain the patient's symptoms.     Procedures:  EMG 4/2019  Interpretation:  This is an abnormal EMG.  Findings are consistent with a severe length-dependent axonal sensorimotor polyneuropathy that appears to have advanced from prior EMG in 2003.  There is not clear evidence for overlying lumbosacral radiculopathy but in light of the severe peripheral neuropathy these may be masked and clinical correlation is advised.    Laboratory:  B12 1705 (8/2020)  TSH normal 2017  Per chart review, SPEP and A1c many years back was reportedly normal    Pertinent Investigations since last visit:   MRI thoracic 10/28/2020  IMPRESSION:  1. Small posterior disc herniation at the T7-T8 level that mildly  deforms the anterior surface of the thoracic spinal cord.  2. Otherwise, normal thoracic spine MRI. No spinal canal or neural  foraminal stenosis. No evidence for fracture.    Labs 10/2020 - unremarkable ESR/CRP, SPEP/immunofixation, light chains, A1c, TSH         Assessment and Plan:   Assessment:  Kerri Rocha is a 85 year old female who presents today for follow-up of balance problems. 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 She also has lumbar stenosis and had decompression surgery in 2016. Over the past 4-5 years tingling in the lower extremities has been stable, but balance has significantly worsened. On examination at prior visits she has mildly wide based gait, trace ankle jerks, and distal lower extremity vibration / light touch / temperature deficits. Previous work-up reviewed as above.  MRI lumbar spine in 4/2019 with post surgical changes, and residual stenosis, most prominent at bilateral L3-4 segments. Brain and cervical spine MRI did not reveal clear etiology of balance problems. MRI thoracic spine checked last fall and did not show etiology of worsening balance. Patient had EMG last week which showed mild worsening of neuropathy compared to previous study 2 years ago, and no evidence of active lumbosacral radiculopathy. Neuropathy work-up as above has been unrevealing. We discussed checking additional lab work today as below. She denies any family history of neuropathy.     Plan:  - Labs - Copper, Zinc, Vitamin E, heavy metal screen, B1, B6, LOYDA, SSA/SSB - will call with lab results  - Consider PT referral  - Continue to follow-up in pain clinic for low back pain    Follow up in Neurology clinic in 6 months or sooner if new issues arise    Jason Gillette MD   of Neurology  [facility]

## 2021-05-03 NOTE — PROGRESS NOTES
Kerri is a 85 year old who is being evaluated via a billable telephone visit.      What phone number would you like to be contacted at? [phone]  How would you like to obtain your AVS? Sofie  Phone call duration: 10 minutes

## 2021-05-03 NOTE — LETTER
5/3/2021       RE: Kerri Rocha  [address]  [facility] [address]     Dear Colleague,    Thank you for referring your patient, Kerri Rocha, to the [facility] NEUROLOGY CLINIC [facility]. Please see a copy of my visit note below.    [facility] Neurology Follow Up Visit    Kerri Rocha MRN# [id number]   Age: 85 year old YOB: 1935     Brief history of symptoms: The patient was initially seen in neurologic consultation on 10/9/2020 for evaluation of balance difficulties. Please see the comprehensive neurologic consultation note from that date in the Epic records for details.     Interval history:  Patient recently had SI joint injections at the pain clinic. She notes mild relief, but still has significant pain. She is wondering if she should also see a spinal surgeon.     EMG recently completed which showed mild worsening in comparison to study 2 years ago. Balance continues to be an issue. She is very careful so she doesn't fall. She continues to do exercises. She will consider doing more physical therapy soon in the future.       Past Medical History:     Patient Active Problem List   Diagnosis     Essential hypertension     Idiopathic peripheral neuropathy     Pseudophakia of both eyes     Regular astigmatism of both eyes     Localized osteoporosis without current pathological fracture - 2020 waiting for dental work to start fosamax.      Mixed hyperlipidemia     Gait disorder     Vertigo     Past Medical History:   Diagnosis Date     HLD (hyperlipidemia)      HTN (hypertension)      Idiopathic neuropathy      Vitamin D deficiency         Past Surgical History:     Past Surgical History:   Procedure Laterality Date     APPENDECTOMY       BREAST BIOPSY, CORE RT/LT       CARPAL TUNNEL RELEASE RT/LT       CATARACT IOL, RT/LT        SECTION      x2     HYSTERECTOMY TOTAL ABDOMINAL, BILATERAL  SALPINGO-OOPHORECTOMY, COMBINED       LAMINECTOMY LUMBAR THREE+ LEVELS  03/22/2016    L3-L5     TONSILLECTOMY & ADENOIDECTOMY          Social History:     Social History     Tobacco Use     Smoking status: Never Smoker     Smokeless tobacco: Never Used   Substance Use Topics     Alcohol use: No     Alcohol/week: 1.0 standard drinks     Comment: rare wine     Drug use: No        Family History:     Family History   Problem Relation Age of Onset     Hypertension Mother      Heart Disease Father      Hypertension Father      Coronary Artery Disease Father      Hypertension Sister      Heart Disease Brother      Hypertension Brother      Diabetes No family hx of      Cancer No family hx of         Medications:     Current Outpatient Medications   Medication Sig     amLODIPine (NORVASC) 10 MG tablet Take 1 tablet (10 mg) by mouth daily     ASPIRIN PO Take 81 mg by mouth daily     atorvastatin (LIPITOR) 20 MG tablet Take 1 tablet (20 mg) by mouth At Bedtime     calcium carbonate (OS-CAMMIE 600 MG [place]. CA) 600 MG tablet Take 1,200 mg by mouth daily      Cholecalciferol (VITAMIN D3) 2000 UNITS CAPS 5,000 Int'l Units daily      Cyanocobalamin (VITAMIN B-12 PO) Take by mouth daily     Diphenhydramine-APAP, sleep, (TYLENOL PM EXTRA STRENGTH PO) Take by mouth nightly as needed     gabapentin (NEURONTIN) 400 MG capsule TAKE 2 CAPSULES IN THE MORNING, 2 CAPSULES AT NOON AND 2 CAPSULES IN THE EVENING     lisinopril (ZESTRIL) 10 MG tablet Take 1 tablet (10 mg) by mouth daily     omega 3 1000 MG CAPS Take 2 g by mouth     No current facility-administered medications for this visit.         Allergies:     Allergies   Allergen Reactions     Codeine      Sulfa Drugs      Sulfasalazine Other (See Comments)     Sulfate Rash        Review of Systems:   As above     Physical Exam:   Vitals: There were no vitals taken for this visit.   No examination given nature of telephone visit.      Data reviewed on previous visits    Imaging:  MRI  lumbar 4/13/2019  Impression:   1. Multilevel lumbar spondylosis, most pronounced at L3-4 with  moderate severe left and mild to moderate right neural foraminal  stenosis.  2. Synovial cyst within the right neural foramen of L5-S1 narrows the  right lateral recess and likely impinges upon the traversing right S1  nerve root.  3. Postsurgical changes of instrumented fusion of L4-5.     MRI cervical spine 1/2019  IMPRESSION: Mild multilevel degenerative changes with multilevel  neural foraminal stenoses without significant canal stenosis. No  myelopathy signal changes.      MRI brain 11/2018  Impression:    Normal brain for age with attention to the vestibular and auditory  structures. No specific finding to explain the patient's symptoms.     Procedures:  EMG 4/2019  Interpretation:  This is an abnormal EMG.  Findings are consistent with a severe length-dependent axonal sensorimotor polyneuropathy that appears to have advanced from prior EMG in 2003.  There is not clear evidence for overlying lumbosacral radiculopathy but in light of the severe peripheral neuropathy these may be masked and clinical correlation is advised.    Laboratory:  B12 1705 (8/2020)  TSH normal 2017  Per chart review, SPEP and A1c many years back was reportedly normal    Pertinent Investigations since last visit:   MRI thoracic 10/28/2020  IMPRESSION:  1. Small posterior disc herniation at the T7-T8 level that mildly  deforms the anterior surface of the thoracic spinal cord.  2. Otherwise, normal thoracic spine MRI. No spinal canal or neural  foraminal stenosis. No evidence for fracture.    Labs 10/2020 - unremarkable ESR/CRP, SPEP/immunofixation, light chains, A1c, TSH         Assessment and Plan:   Assessment:  Kerri Rocha is a 85 year old female who presents today for follow-up of balance problems. 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 She also has lumbar stenosis and  had decompression surgery in 2016. Over the past 4-5 years tingling in the lower extremities has been stable, but balance has significantly worsened. On examination at prior visits she has mildly wide based gait, trace ankle jerks, and distal lower extremity vibration / light touch / temperature deficits. Previous work-up reviewed as above. MRI lumbar spine in 4/2019 with post surgical changes, and residual stenosis, most prominent at bilateral L3-4 segments. Brain and cervical spine MRI did not reveal clear etiology of balance problems. MRI thoracic spine checked last fall and did not show etiology of worsening balance. Patient had EMG last week which showed mild worsening of neuropathy compared to previous study 2 years ago, and no evidence of active lumbosacral radiculopathy. Neuropathy work-up as above has been unrevealing. We discussed checking additional lab work today as below. She denies any family history of neuropathy.     Plan:  - Labs - Copper, Zinc, Vitamin E, heavy metal screen, B1, B6, LOYDA, SSA/SSB - will call with lab results  - Consider PT referral  - Continue to follow-up in pain clinic for low back pain    Follow up in Neurology clinic in 6 months or sooner if new issues arise    Jason Gillette MD   of Neurology  [facility]      Kerri is a 85 year old who is being evaluated via a billable telephone visit.      What phone number would you like to be contacted at? [phone]  How would you like to obtain your AVS? Sofie  Phone call duration: 10 minutes

## 2021-05-14 DIAGNOSIS — G60.9 IDIOPATHIC PERIPHERAL NEUROPATHY: ICD-10-CM

## 2021-05-14 DIAGNOSIS — R74.8 ABNORMAL LEVELS OF OTHER SERUM ENZYMES: ICD-10-CM

## 2021-05-14 DIAGNOSIS — T56.5X4A TOXIC EFFECT OF ZINC AND ITS COMPOUNDS, UNDETERMINED, INITIAL ENCOUNTER: ICD-10-CM

## 2021-05-14 DIAGNOSIS — R27.0 ATAXIA, UNSPECIFIED: ICD-10-CM

## 2021-05-14 PROCEDURE — 86235 NUCLEAR ANTIGEN ANTIBODY: CPT | Performed by: INTERNAL MEDICINE

## 2021-05-14 PROCEDURE — 36415 COLL VENOUS BLD VENIPUNCTURE: CPT | Performed by: INTERNAL MEDICINE

## 2021-05-14 PROCEDURE — 84446 ASSAY OF VITAMIN E: CPT | Mod: 90 | Performed by: INTERNAL MEDICINE

## 2021-05-14 PROCEDURE — 99000 SPECIMEN HANDLING OFFICE-LAB: CPT | Performed by: INTERNAL MEDICINE

## 2021-05-14 PROCEDURE — 84425 ASSAY OF VITAMIN B-1: CPT | Mod: 90 | Performed by: INTERNAL MEDICINE

## 2021-05-14 PROCEDURE — 86039 ANTINUCLEAR ANTIBODIES (ANA): CPT | Performed by: INTERNAL MEDICINE

## 2021-05-14 PROCEDURE — 84630 ASSAY OF ZINC: CPT | Mod: 90 | Performed by: INTERNAL MEDICINE

## 2021-05-14 PROCEDURE — 84207 ASSAY OF VITAMIN B-6: CPT | Mod: 90 | Performed by: INTERNAL MEDICINE

## 2021-05-14 PROCEDURE — 82525 ASSAY OF COPPER: CPT | Mod: 90 | Performed by: INTERNAL MEDICINE

## 2021-05-14 PROCEDURE — 86038 ANTINUCLEAR ANTIBODIES: CPT | Performed by: INTERNAL MEDICINE

## 2021-05-14 PROCEDURE — 86235 NUCLEAR ANTIGEN ANTIBODY: CPT | Mod: 59 | Performed by: INTERNAL MEDICINE

## 2021-05-16 LAB
COPPER SERPL-MCNC: 95.3 UG/DL (ref 80–155)
ZINC SERPL-MCNC: 71.4 UG/DL (ref 60–120)

## 2021-05-17 LAB
A-TOCOPHEROL VIT E SERPL-MCNC: 15.9 MG/L (ref 5.5–18)
ANA PAT SER IF-IMP: ABNORMAL
ANA SER QL IF: POSITIVE
ANA TITR SER IF: ABNORMAL {TITER}
BETA+GAMMA TOCOPHEROL SERPL-MCNC: 0.4 MG/L (ref 0–6)
ENA SS-A IGG SER IA-ACNC: <0.2 AI (ref 0–0.9)
ENA SS-B IGG SER IA-ACNC: 0.2 AI (ref 0–0.9)
VIT B6 SERPL-MCNC: 198.8 NMOL/L (ref 20–125)

## 2021-05-18 LAB — VIT B1 BLD-MCNC: 214 NMOL/L (ref 70–180)

## 2021-05-19 ENCOUNTER — TELEPHONE (OUTPATIENT)
Dept: NEUROLOGY | Facility: CLINIC | Age: 86
End: 2021-05-19

## 2021-05-19 DIAGNOSIS — R76.8 POSITIVE ANA (ANTINUCLEAR ANTIBODY): ICD-10-CM

## 2021-05-19 DIAGNOSIS — R26.9 ABNORMAL GAIT: ICD-10-CM

## 2021-05-19 DIAGNOSIS — G60.9 IDIOPATHIC PERIPHERAL NEUROPATHY: Primary | ICD-10-CM

## 2021-05-19 NOTE — TELEPHONE ENCOUNTER
Called Kerri about lab results. LOYDA was elevated to titer of 1:1280. Unclear clinical significance of elevation and it's potential contribution to chronic neuropathy. Patient has some chronic joint pains, but denies any swelling of the joints. She denies any skin rashes. We discussed referral to rheumatology to inquire if additional lab work is required for specific autoimmune diseases.     Patient also interested in PT referral for gait/balance training.     Jason Gillette MD

## 2021-05-21 DIAGNOSIS — R27.0 ATAXIA, UNSPECIFIED: ICD-10-CM

## 2021-05-21 DIAGNOSIS — G60.9 IDIOPATHIC PERIPHERAL NEUROPATHY: ICD-10-CM

## 2021-05-21 PROCEDURE — 83655 ASSAY OF LEAD: CPT | Mod: 90 | Performed by: INTERNAL MEDICINE

## 2021-05-21 PROCEDURE — 82175 ASSAY OF ARSENIC: CPT | Mod: 90 | Performed by: INTERNAL MEDICINE

## 2021-05-21 PROCEDURE — 36415 COLL VENOUS BLD VENIPUNCTURE: CPT | Performed by: INTERNAL MEDICINE

## 2021-05-21 PROCEDURE — 83825 ASSAY OF MERCURY: CPT | Mod: 90 | Performed by: INTERNAL MEDICINE

## 2021-05-21 PROCEDURE — 99000 SPECIMEN HANDLING OFFICE-LAB: CPT | Performed by: INTERNAL MEDICINE

## 2021-05-23 LAB
ARSENIC BLD-MCNC: <10 UG/L
LEAD BLDV-MCNC: <2 UG/DL
MERCURY BLD-MCNC: <2.5 UG/L

## 2021-07-05 ENCOUNTER — HOSPITAL ENCOUNTER (OUTPATIENT)
Dept: PHYSICAL THERAPY | Facility: CLINIC | Age: 86
End: 2021-07-05
Attending: INTERNAL MEDICINE
Payer: MEDICARE

## 2021-07-05 DIAGNOSIS — R26.9 ABNORMAL GAIT: ICD-10-CM

## 2021-07-05 DIAGNOSIS — G60.9 IDIOPATHIC PERIPHERAL NEUROPATHY: ICD-10-CM

## 2021-07-05 DIAGNOSIS — M54.50 CHRONIC LOW BACK PAIN, UNSPECIFIED BACK PAIN LATERALITY, UNSPECIFIED WHETHER SCIATICA PRESENT: ICD-10-CM

## 2021-07-05 DIAGNOSIS — G89.29 CHRONIC LOW BACK PAIN, UNSPECIFIED BACK PAIN LATERALITY, UNSPECIFIED WHETHER SCIATICA PRESENT: ICD-10-CM

## 2021-07-05 DIAGNOSIS — R53.81 PHYSICAL DECONDITIONING: Primary | ICD-10-CM

## 2021-07-05 DIAGNOSIS — R26.89 IMPAIRMENT OF BALANCE: ICD-10-CM

## 2021-07-05 PROCEDURE — 97162 PT EVAL MOD COMPLEX 30 MIN: CPT | Mod: GP | Performed by: PHYSICAL THERAPIST

## 2021-07-05 PROCEDURE — 97110 THERAPEUTIC EXERCISES: CPT | Mod: GP | Performed by: PHYSICAL THERAPIST

## 2021-07-05 NOTE — PROGRESS NOTES
07/05/21 1300   Quick Adds   Type of Visit Initial OP PT Evaluation   General Information   Start of Care Date 07/05/21   Referring Physician Dr. Jason Gillette   Orders Evaluate and Treat as Indicated   Order Date 05/19/21   Medical Diagnosis Idiopathic peripheral neuropathy; gait abnormality   Onset of illness/injury or Date of Surgery 05/19/21   Pertinent history of current problem (include personal factors and/or comorbidities that impact the POC) Patient presenting with history of neuropathy and long standing back pain. Back pain has changed and progressed from tail bone to B hips and sides as well. Also notes changes in walking and stability. Managing pain with advil and pain patches. Did have an injection for low back pain, though didn't feel that was helpful. Injections occurred at tailbone per patient reproting. Seeing Dr. Novak for low back pain. Functionally, patient reporting difficulty getting up off the floor for gardening. Reporting stairs to be a challenge. 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 Feels that weakness can be a limiting factor. Pain presentation is daily. Heat can be helpful to relieve pain for back. Would like to get back to gardening. Noting that Dizziness has improved significantly since discontinuing a medication. PMH: neuropathy x 20 years, lumbar stenosis and decompression surgery in 2016, history of dizziness (though this quite improved).    Living environment House/townhome   Home/Community Accessibility Comments Stairs, rails bilaterally. Assists with all home cares.    Assistive Devices Comments Is not using an AD   Patient/Family Goals Statement Would like to address low back, balance and gait. Would like to get back to gardenning and would like to feel more confident with movements.    General Information Comments Patient noting that she feels she has limited control over movement. Is fearful of falling.    Fall Risk Screen   Fall screen completed by PT   Have you  fallen 2 or more times in the past year? No   Have you fallen and had an injury in the past year? No   Is patient a fall risk? Yes   Fall screen comments Inherently at elevated risk of falling due to neuropathy. See balance scoring below- 30s STS, RHomberg and Sh Rhomberg   Abuse Screen (yes response referral indicated)   Feels Unsafe at Home or Work/School no   Feels Threatened by Someone no   Does Anyone Try to Keep You From Having Contact with Others or Doing Things Outside Your Home? no   Physical Signs of Abuse Present no   Pain   Pain comments LBP and sacral pain; does have neuropathy pain- pins and needle sensation. Notice symptoms most when laying in bed at night.  Pain is a severe ache. Will generally get worse with activity. Also notes that drastic changes in weather will increase pain.    Cognitive Status Examination   Orientation orientation to person, place and time   Level of Consciousness alert   Follows Commands and Answers Questions 100% of the time   Personal Safety and Judgment intact   Memory intact   Posture   Posture Forward head position;Protracted shoulders;Kyphosis   Range of Motion (ROM)   ROM Comment B LEs functionally assessed through general mobility screening. Very tight hip IR/ER- as observed through tailor sit position. Lumbar screening indicates mobility restriction with extensiona nd R side bending. Asymmetry noted in side bending with greatest restriction occuring to the R. Flexion is within normative ranges. Extension proving less pain provoking than flexion.    Strength   Manual Muscle Testing Quick Adds MMT: Ankle;MMT: Knee;MMT: Hip   Strength Comments Functional deficits noted in strength per 30s STS and HR testing.    MMT: Hip, Rehab Eval   Hip Flexion - Left Side (4-/5) good minus, left   Hip ABduction - Left Side (4/5) good, left   Hip ADduction - Left Side (4/5) good, left   Hip Flexion - Right Side (4-/5) good minus, right   Hip ABduction - Right Side (4/5) good, right    Hip ADduction - Right Side (4/5) good, right   MMT: Knee, Rehab Eval   Knee Flexion - Left Side (4/5) good, left   Knee Extension - Left Side (4/5) good, left   Knee Flexion - Right Side (4/5) good, right   Knee Extension - Right Side (4/5) good, right   MMT: Ankle, Rehab Eval   Ankle Dorsiflexion - Left Side (4-/5) good minus, left   Ankle Dorsiflexion - Right Side (4-/5) good minus, right   Transfer Skills   Transfer Comments IND but preferring to use UE support for performance of transfer.    Gait   Gait Comments Shortened stepping length, slowed bebeto and limitations in postural stability noted.    Balance   Balance Comments Deficits in static and dyanmic balance- see rhomberg screening below.   Balance Special Tests   Balance Special Tests Sit to stand reps;Romberg;Sharpened Romberg   Balance Special Tests Romberg   Comments EO: 30s; EC: 10 with ecessive sway   Balance Special Tests Sharpened Romberg   Comments EO: unable   Balance Special Tests Sit to Stand Reps in 30 Seconds   Reps in 30 seconds 10   Height 18 inch   Comments Socirng indicates patient to be at elevated risk of falling.    Sensory Examination   Sensory Perception Comments Neuopathic changes- see commetns above. Longstanding and unchanged.    Planned Therapy Interventions   Planned Therapy Interventions balance training;gait training;neuromuscular re-education;ROM;strengthening;stretching;manual therapy   Clinical Impression   Criteria for Skilled Therapeutic Interventions Met yes, treatment indicated   PT Diagnosis decreased safe functional mobility and activity tolerance   Influenced by the following impairments Deficits in strength (particualrly proximal groups), endurance, and balance; limitations in mobility and length tension relationships of supportive postural muscles contributing to back pain.    Functional limitations due to impairments decreased safe functional mobility and activity tolerance   Clinical Presentation  Evolving/Changing   Clinical Decision Making (Complexity) Moderate complexity   Therapy Frequency 1 time/week   Predicted Duration of Therapy Intervention (days/wks) 8-12 weeks   Risk & Benefits of therapy have been explained Yes   Patient, Family & other staff in agreement with plan of care Yes   GOALS   PT Eval Goals 1;2;3;4   Goal 1   Goal Identifier 30s STS   Goal Description The patietn will be able to perform 15 STS transfers in 30 seconds to demonstrate a significant improvement in proximal LE strength to facilitate improaved stability and functional mobility allowing for greater particiaption in transfers, floor transfers, and gardenning tasks.    Target Date 10/02/21   Goal 2   Goal Identifier Postural stability   Goal Description The patient will be able to maintain rhomberg and sharpened rhomberg conditioning with EO x 30 secodns while demonstarting a normalized degree of sway to demonstrate a significant improvement in postural stability and balance.    Target Date 10/02/21   Goal 3   Goal Identifier Spencer   Goal Description This goal to be completed next session following baseline assessment of Spencer. This tool will be used to assess subjective changes in back pain symptoms.   Target Date 10/02/21   Goal 4   Goal Identifier HEP   Goal Description The patietn will be IND in performance of HEP to facilitate gains in strength, endurance, and mobility as means to address asymmetries in length/tension relationships and strength of musculature contributing to LBP.    Target Date 10/02/21   Total Evaluation Time   PT [place], Moderate Complexity Minutes (49553) 50

## 2021-07-29 DIAGNOSIS — I10 ESSENTIAL HYPERTENSION: ICD-10-CM

## 2021-07-29 DIAGNOSIS — E78.2 MIXED HYPERLIPIDEMIA: ICD-10-CM

## 2021-07-30 RX ORDER — ATORVASTATIN CALCIUM 20 MG/1
20 TABLET, FILM COATED ORAL AT BEDTIME
Qty: 90 TABLET | Refills: 0 | Status: SHIPPED | OUTPATIENT
Start: 2021-07-30 | End: 2021-09-22

## 2021-07-30 RX ORDER — AMLODIPINE BESYLATE 10 MG/1
10 TABLET ORAL DAILY
Qty: 90 TABLET | Refills: 0 | Status: SHIPPED | OUTPATIENT
Start: 2021-07-30 | End: 2021-09-22

## 2021-07-30 RX ORDER — LISINOPRIL 10 MG/1
10 TABLET ORAL DAILY
Qty: 90 TABLET | Refills: 0 | Status: SHIPPED | OUTPATIENT
Start: 2021-07-30 | End: 2021-09-22

## 2021-07-30 NOTE — TELEPHONE ENCOUNTER
Prescription approved per [facility] Refill Protocol.    Felicita Richardson RN

## 2021-08-02 ENCOUNTER — HOSPITAL ENCOUNTER (OUTPATIENT)
Dept: PHYSICAL THERAPY | Facility: CLINIC | Age: 86
End: 2021-08-02
Payer: MEDICARE

## 2021-08-02 DIAGNOSIS — M54.50 CHRONIC LOW BACK PAIN, UNSPECIFIED BACK PAIN LATERALITY, UNSPECIFIED WHETHER SCIATICA PRESENT: ICD-10-CM

## 2021-08-02 DIAGNOSIS — G60.9 IDIOPATHIC PERIPHERAL NEUROPATHY: ICD-10-CM

## 2021-08-02 DIAGNOSIS — R26.89 IMPAIRMENT OF BALANCE: ICD-10-CM

## 2021-08-02 DIAGNOSIS — R26.9 ABNORMAL GAIT: ICD-10-CM

## 2021-08-02 DIAGNOSIS — G89.29 CHRONIC LOW BACK PAIN, UNSPECIFIED BACK PAIN LATERALITY, UNSPECIFIED WHETHER SCIATICA PRESENT: ICD-10-CM

## 2021-08-02 DIAGNOSIS — R53.81 PHYSICAL DECONDITIONING: Primary | ICD-10-CM

## 2021-08-02 PROCEDURE — 97110 THERAPEUTIC EXERCISES: CPT | Mod: GP | Performed by: PHYSICAL THERAPIST

## 2021-08-10 NOTE — TELEPHONE ENCOUNTER
NOTES Status Details   OFFICE NOTE from referring provider Internal 05.19.2021 Jason Gillette MD   OFFICE NOTE from other specialist Internal 12.03.2019 Raymundo Solorio MD    01.16.2019 Freya Dumont APRN CNP   DISCHARGE SUMMARY from hospital N/A    DISCHARGE REPORT from the ER N/A    MEDICATION LIST Internal    LABS (Any and all labs)      Internal    Biopsy/pathology (Anything related to diagnoses I.e. fluid aspirations, lip biopsy, muscle biopsy)      Internal 05.14.2021 Anti Nuclear Jaelyn IgG by IFA with Reflex    N/A    Imaging (All imaging related to diagnoses)     Echo N/A    HRCT N/A    CXR N/A    EMG N/A                   Scleroderma/Dermatomyositis diagnoses     Previous Cardiology notes  N/A    Previous Pulmonary notes N/A    Previous Dermatology notes N/A    Previous GI notes N/A    Lupus diagnoses     Previous Nephrology notes N/A    Previous Dermatology notes N/A    Previous Cardiology notes N/A

## 2021-08-11 ENCOUNTER — OFFICE VISIT (OUTPATIENT)
Dept: RHEUMATOLOGY | Facility: CLINIC | Age: 86
End: 2021-08-11
Attending: INTERNAL MEDICINE
Payer: MEDICARE

## 2021-08-11 ENCOUNTER — PRE VISIT (OUTPATIENT)
Dept: RHEUMATOLOGY | Facility: CLINIC | Age: 86
End: 2021-08-11

## 2021-08-11 VITALS
DIASTOLIC BLOOD PRESSURE: 79 MMHG | OXYGEN SATURATION: 96 % | WEIGHT: 127.5 LBS | BODY MASS INDEX: 25.75 KG/M2 | HEART RATE: 73 BPM | TEMPERATURE: 97.6 F | SYSTOLIC BLOOD PRESSURE: 137 MMHG

## 2021-08-11 DIAGNOSIS — R76.8 POSITIVE ANA (ANTINUCLEAR ANTIBODY): ICD-10-CM

## 2021-08-11 DIAGNOSIS — M54.16 LUMBAR BACK PAIN WITH RADICULOPATHY AFFECTING RIGHT LOWER EXTREMITY: Primary | ICD-10-CM

## 2021-08-11 DIAGNOSIS — G60.9 IDIOPATHIC PERIPHERAL NEUROPATHY: ICD-10-CM

## 2021-08-11 PROCEDURE — 99205 OFFICE O/P NEW HI 60 MIN: CPT | Performed by: INTERNAL MEDICINE

## 2021-08-11 ASSESSMENT — PAIN SCALES - GENERAL: PAINLEVEL: MODERATE PAIN (4)

## 2021-08-11 NOTE — PROGRESS NOTES
Outpatient Rheumatology Consultation    Name: Kerri Rocha    MRN [id number]   Today's date: 8/11/2021         Reason for consult: LOYDA 1:1280 nucleolar pattern/ chronic peripheral neuropathy/ back and hip pain   Requesting physician: Jason Gillette MD        Assessment & Plan:   Very pleasant 86 year old female with chronic peripheral numbness and paresthesias followed by neurology is referred to rheumatology for evaluation of these symptoms in the context of LOYDA 1:1280 nucleolar pattern. In the absence of dry eyes, dry mouth, cough, inflammatory arthritis, negative SSA, SSB the likelihood of underlying sjogrens syndrome is unlikely the cause of her neurologic symptoms. I was not able to elicit signs or symptoms of SLE, vasculitis, myositis, scleroderma, rheumatoid arthritis or other connective tissue disease associated with an LOYDA. Discussed this at length with the patient. In regards to her actual complaints today surrounding her 'hip' symptoms, I think this is likely multifactorial though driven mostly by her lumbar spine as her symptoms are reproduced with straight leg raise. She may have some small contribution by greater trochanteric bursitis, though I think small contribution if any. We discussed that her history/exam being consistent with non inflammatory disease and she wishes to discuss with non-operative sports medicine to discuss options moving forward. Referral placed. She will let me know if she wishes to pursue pain management referral after discussion with sports medicine/ the outcome of that visit. Will communicate via mychart and prn in clinic at this point given lack of findings consistent with inflammatory/rheumatologic disease.     Molina Rock MD  Rheumatology     I spent 60 minutes on the date of service on chart review, patient encounter, , documentation.      Subjective:   Had seen multiple neurologists for peripheral neuropathy and balance issues. If standing for long  periods (more than 10 minutes), can only walk in small 'baby' steps. In terms of pain, has hip pain. Has neuropathy in her feet at baseline. Described as pin pricks.     Neuropathy: Has numbness all day some above the ankle bilaterally. No progression. Hardly any pins/pricks during the day. In bed when laying down will start to have pins and needles of whole foot. Thinks they look normal. Has happened every night for years. Has been taking gabapentin 800mg TID. With even one missed dose has return of symptoms during the day. Has previously been on higher, though no side effects from higher dose but brought down to this without any worse control.     Hip pain: started 4 years ago. Did have a fall around that time did have hairline fracture in spine, wore back brace for short time. Does still have return of back pain when doing any level of activity daily. When outside, she uses a walking sticks. Other than ibuprofen, has not taken anything else for back. Takes ibuprofen once in the AM and then occasionally once in the afternoon at 4pm. At rest, pain is a 4/10. When severe, up to close to 10/10. Tries heat during those times which is some helpful. Will last the rest of the day until next day when back to baseline. Right>left hip pain. Pain is lateral. Bending forward makes the hip pain worse. Bending from her hip pain worse. Ibuprofen also helpful for hip pain. If gardening, this can become worse. Gets up to above 5/10. Has chronic OA in knees s/p in steroid injection some years ago with resolution.    No new or persistent headache.  No new hair loss, has has slow gradual thinning with progressive age.  No change in vision or hearing.  No inflammatory eye disease history. No history of blood clots.  No dry eyes or dry mouth.  No facial rash.  Burns easily, though none on the face.  No nasal or oral ulcers.  No recurrent epistaxis or hemoptysis.  No recurrent sinusitis or recurrent pneumonia.  No chest pain or  shortness of breath.  No abdominal pain, constipation, diarrhea.  No blood in stool or black tarry stools.  No nausea or vomiting.  No foamy or frothy urine.  No bowel or bladder incontinence.  No pitting or brittle nails.  No triphasic color change consistent with Raynaud's.  No skin thickening or tightening consistent with sclerodactyly.  No symptoms consistent with dactylitis.  No red hot or swollen joints. No muscle pain.  No genital ulcers or lesions.  No fevers, chills, weight loss or night sweats.     Past Medical History  Past Medical History:   Diagnosis Date     HLD (hyperlipidemia)      HTN (hypertension)      Idiopathic neuropathy      Vitamin D deficiency      Past Surgical History  Past Surgical History:   Procedure Laterality Date     APPENDECTOMY       BREAST BIOPSY, CORE RT/LT       CARPAL TUNNEL RELEASE RT/LT       CATARACT IOL, RT/LT        SECTION      x2     HYSTERECTOMY TOTAL ABDOMINAL, BILATERAL SALPINGO-OOPHORECTOMY, COMBINED       LAMINECTOMY LUMBAR THREE+ LEVELS  2016    L3-L5     TONSILLECTOMY & ADENOIDECTOMY       Medications  Current Outpatient Medications   Medication     amLODIPine (NORVASC) 10 MG tablet     ASPIRIN PO     atorvastatin (LIPITOR) 20 MG tablet     calcium carbonate (OS-CAMMIE 600 MG [place]. [place]) 600 MG tablet     Cholecalciferol (VITAMIN D3) 2000 UNITS CAPS     Cyanocobalamin (VITAMIN B-12 PO)     Diphenhydramine-APAP, sleep, (TYLENOL PM EXTRA STRENGTH PO)     gabapentin (NEURONTIN) 400 MG capsule     lisinopril (ZESTRIL) 10 MG tablet     omega 3 1000 MG CAPS     No current facility-administered medications for this visit.       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 .      Objective:   Physical exam:  /79   Pulse 73   Temp  97.6  F (36.4  C) (Oral)   Wt 57.8 kg (127 lb 8 oz)   SpO2 96%   BMI 25.75 kg/m     GEN: sitting up unassisted, pleasant, comfortable  HEENT: mild hair thinning, no facial rash, no oral/nasal ulcers, good saliva pool  CV: RRR, no m/r/g  Abdomen: soft, non tender, not distended  Extremities: Full active and passive ROM of bilateral shoulders, elbows, wrists, MCPs, PIPs, DIPs without pain. No synovitis. Ankles, MTPs unremarkable. Mild right>left tenderness over her greater trochanter. Though the pain which she describes as hip pain is reproduced with straight leg raise right>left. Bilateral knees without synovitis.    Labs:  21  LOYDA 1:1280 nucleolar   SSA negative  SSB negative    10/9/2020  ESR 10  TSH 2.65  SPEP no monoclonals  CRP <2.9    Imagin21  MRI lumbar spine  Impression:   1. Multilevel lumbar spondylosis, most pronounced at L3-4 with  moderate severe left and mild to moderate right neural foraminal  stenosis.  2. Synovial cyst within the right neural foramen of L5-S1 narrows the  right lateral recess and likely impinges upon the traversing right S1  nerve root.  3. Postsurgical changes of instrumented fusion of L4-5.

## 2021-08-11 NOTE — NURSING NOTE
Chief Complaint   Patient presents with     New Patient     Positive LOYDA     Blood pressure 137/79, pulse 73, temperature 97.6  F (36.4  C), temperature source Oral, weight 57.8 kg (127 lb 8 oz), SpO2 96 %, not currently breastfeeding.    Amy Rogers, CMA

## 2021-08-11 NOTE — LETTER
8/11/2021       RE: Kerri Rocha  [address]  [facility] [address]     Dear Colleague,    Thank you for referring your patient, Kerri Rocha, to the [facility] [place] at [facility]. Please see a copy of my visit note below.      Outpatient Rheumatology Consultation    Name: Kerri Rocha    MRN [id number]   Today's date: 8/11/2021         Reason for consult: LOYDA 1:1280 nucleolar pattern/ chronic peripheral neuropathy/ back and hip pain   Requesting physician: Jason Gillette MD        Assessment & Plan:   Very pleasant 86 year old female with chronic peripheral numbness and paresthesias followed by neurology is referred to rheumatology for evaluation of these symptoms in the context of LOYDA 1:1280 nucleolar pattern. In the absence of dry eyes, dry mouth, cough, inflammatory arthritis, negative SSA, SSB the likelihood of underlying sjogrens syndrome is unlikely the cause of her neurologic symptoms. I was not able to elicit signs or symptoms of SLE, vasculitis, myositis, scleroderma, rheumatoid arthritis or other connective tissue disease associated with an LOYDA. Discussed this at length with the patient. In regards to her actual complaints today surrounding her 'hip' symptoms, I think this is likely multifactorial though driven mostly by her lumbar spine as her symptoms are reproduced with straight leg raise. She may have some small contribution by greater trochanteric bursitis, though I think small contribution if any. We discussed that her history/exam being consistent with non inflammatory disease and she wishes to discuss with non-operative sports medicine to discuss options moving forward. Referral placed. She will let me know if she wishes to pursue pain management referral after discussion with sports medicine/ the outcome of that visit. Will communicate via mychart and prn in clinic at this point given lack  of findings consistent with inflammatory/rheumatologic disease.     Molina Rock MD  Rheumatology     I spent 60 minutes on the date of service on chart review, patient encounter, , documentation.      Subjective:   Had seen multiple neurologists for peripheral neuropathy and balance issues. If standing for long periods (more than 10 minutes), can only walk in small 'baby' steps. In terms of pain, has hip pain. Has neuropathy in her feet at baseline. Described as pin pricks.     Neuropathy: Has numbness all day some above the ankle bilaterally. No progression. Hardly any pins/pricks during the day. In bed when laying down will start to have pins and needles of whole foot. Thinks they look normal. Has happened every night for years. Has been taking gabapentin 800mg TID. With even one missed dose has return of symptoms during the day. Has previously been on higher, though no side effects from higher dose but brought down to this without any worse control.     Hip pain: started 4 years ago. Did have a fall around that time did have hairline fracture in spine, wore back brace for short time. Does still have return of back pain when doing any level of activity daily. When outside, she uses a walking sticks. Other than ibuprofen, has not taken anything else for back. Takes ibuprofen once in the AM and then occasionally once in the afternoon at 4pm. At rest, pain is a 4/10. When severe, up to close to 10/10. Tries heat during those times which is some helpful. Will last the rest of the day until next day when back to baseline. Right>left hip pain. Pain is lateral. Bending forward makes the hip pain worse. Bending from her hip pain worse. Ibuprofen also helpful for hip pain. If gardening, this can become worse. Gets up to above 5/10. Has chronic OA in knees s/p in steroid injection some years ago with resolution.    No new or persistent headache.  No new hair loss, has has slow gradual thinning with  progressive age.  No change in vision or hearing.  No inflammatory eye disease history. No history of blood clots.  No dry eyes or dry mouth.  No facial rash.  Burns easily, though none on the face.  No nasal or oral ulcers.  No recurrent epistaxis or hemoptysis.  No recurrent sinusitis or recurrent pneumonia.  No chest pain or shortness of breath.  No abdominal pain, constipation, diarrhea.  No blood in stool or black tarry stools.  No nausea or vomiting.  No foamy or frothy urine.  No bowel or bladder incontinence.  No pitting or brittle nails.  No triphasic color change consistent with Raynaud's.  No skin thickening or tightening consistent with sclerodactyly.  No symptoms consistent with dactylitis.  No red hot or swollen joints. No muscle pain.  No genital ulcers or lesions.  No fevers, chills, weight loss or night sweats.     Past Medical History  Past Medical History:   Diagnosis Date     HLD (hyperlipidemia)      HTN (hypertension)      Idiopathic neuropathy      Vitamin D deficiency      Past Surgical History  Past Surgical History:   Procedure Laterality Date     APPENDECTOMY       BREAST BIOPSY, CORE RT/LT       CARPAL TUNNEL RELEASE RT/LT       CATARACT IOL, RT/LT        SECTION      x2     HYSTERECTOMY TOTAL ABDOMINAL, BILATERAL SALPINGO-OOPHORECTOMY, COMBINED       LAMINECTOMY LUMBAR THREE+ LEVELS  2016    L3-L5     TONSILLECTOMY & ADENOIDECTOMY       Medications  Current Outpatient Medications   Medication     amLODIPine (NORVASC) 10 MG tablet     ASPIRIN PO     atorvastatin (LIPITOR) 20 MG tablet     calcium carbonate (OS-CAMMIE 600 MG [place]. [place]) 600 MG tablet     Cholecalciferol (VITAMIN D3) 2000 UNITS CAPS     Cyanocobalamin (VITAMIN B-12 PO)     Diphenhydramine-APAP, sleep, (TYLENOL PM EXTRA STRENGTH PO)     gabapentin (NEURONTIN) 400 MG capsule     lisinopril (ZESTRIL) 10 MG tablet     omega 3 1000 MG CAPS     No current facility-administered medications for this visit.        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 .      Objective:   Physical exam:  /79   Pulse 73   Temp 97.6  F (36.4  C) (Oral)   Wt 57.8 kg (127 lb 8 oz)   SpO2 96%   BMI 25.75 kg/m     GEN: sitting up unassisted, pleasant, comfortable  HEENT: mild hair thinning, no facial rash, no oral/nasal ulcers, good saliva pool  CV: RRR, no m/r/g  Abdomen: soft, non tender, not distended  Extremities: Full active and passive ROM of bilateral shoulders, elbows, wrists, MCPs, PIPs, DIPs without pain. No synovitis. Ankles, MTPs unremarkable. Mild right>left tenderness over her greater trochanter. Though the pain which she describes as hip pain is reproduced with straight leg raise right>left. Bilateral knees without synovitis.    Labs:  21  LOYDA 1:1280 nucleolar   SSA negative  SSB negative    10/9/2020  ESR 10  TSH 2.65  SPEP no monoclonals  CRP <2.9    Imagin21  MRI lumbar spine  Impression:   1. Multilevel lumbar spondylosis, most pronounced at L3-4 with  moderate severe left and mild to moderate right neural foraminal  stenosis.  2. Synovial cyst within the right neural foramen of L5-S1 narrows the  right lateral recess and likely impinges upon the traversing right S1  nerve root.  3. Postsurgical changes of instrumented fusion of L4-5.

## 2021-08-24 ENCOUNTER — HOSPITAL ENCOUNTER (OUTPATIENT)
Dept: PHYSICAL THERAPY | Facility: CLINIC | Age: 86
End: 2021-08-24
Payer: MEDICARE

## 2021-08-24 DIAGNOSIS — R26.89 IMPAIRMENT OF BALANCE: Primary | ICD-10-CM

## 2021-08-24 DIAGNOSIS — G60.9 IDIOPATHIC PERIPHERAL NEUROPATHY: ICD-10-CM

## 2021-08-24 DIAGNOSIS — R26.9 ABNORMAL GAIT: ICD-10-CM

## 2021-08-24 PROCEDURE — 97110 THERAPEUTIC EXERCISES: CPT | Mod: GP | Performed by: PHYSICAL THERAPIST

## 2021-08-24 PROCEDURE — 97140 MANUAL THERAPY 1/> REGIONS: CPT | Mod: GP | Performed by: PHYSICAL THERAPIST

## 2021-08-30 ENCOUNTER — HOSPITAL ENCOUNTER (OUTPATIENT)
Dept: PHYSICAL THERAPY | Facility: CLINIC | Age: 86
End: 2021-08-30
Payer: MEDICARE

## 2021-08-30 DIAGNOSIS — R26.89 IMPAIRMENT OF BALANCE: Primary | ICD-10-CM

## 2021-08-30 DIAGNOSIS — R26.9 ABNORMAL GAIT: ICD-10-CM

## 2021-08-30 DIAGNOSIS — G60.9 IDIOPATHIC PERIPHERAL NEUROPATHY: ICD-10-CM

## 2021-08-30 DIAGNOSIS — R53.81 PHYSICAL DECONDITIONING: ICD-10-CM

## 2021-08-30 DIAGNOSIS — M54.50 CHRONIC LOW BACK PAIN, UNSPECIFIED BACK PAIN LATERALITY, UNSPECIFIED WHETHER SCIATICA PRESENT: ICD-10-CM

## 2021-08-30 DIAGNOSIS — G89.29 CHRONIC LOW BACK PAIN, UNSPECIFIED BACK PAIN LATERALITY, UNSPECIFIED WHETHER SCIATICA PRESENT: ICD-10-CM

## 2021-08-30 PROCEDURE — 97110 THERAPEUTIC EXERCISES: CPT | Mod: GP | Performed by: PHYSICAL THERAPIST

## 2021-09-01 NOTE — ADDENDUM NOTE
Encounter addended by: Mel Blanton, PT on: 9/1/2021 10:28 AM   Actions taken: Clinical Note Signed, Document created, Document edited

## 2021-09-01 NOTE — PROGRESS NOTES
[facility]        OUTPATIENT PHYSICAL THERAPY FUNCTIONAL EVALUATION  PLAN OF TREATMENT FOR OUTPATIENT REHABILITATION  (COMPLETE FOR INITIAL CLAIMS ONLY)  Patient's Last Name, First Name, M.I.  YOB: 1935  Kerri Rocha     Provider's Name   [facility]   Medical Record No.  [id number]     Start of Care Date:  07/05/21   Onset Date:  05/19/21   Type:     _X__PT   ____OT  ____SLP Medical Diagnosis:  Idiopathic peripheral neuropathy; gait abnormality      PT Diagnosis:  decreased safe functional mobility and activity tolerance Visits from SOC:  1                              __________________________________________________________________________________  Plan of Treatment/Functional Goals:  balance training, gait training, neuromuscular re-education, ROM, strengthening, stretching, manual therapy           GOALS  30s STS  The patietn will be able to perform 15 STS transfers in 30 seconds to demonstrate a significant improvement in proximal LE strength to facilitate improaved stability and functional mobility allowing for greater particiaption in transfers, floor transfers, and gardenning tasks.   10/02/21    Postural stability  The patient will be able to maintain rhomberg and sharpened rhomberg conditioning with EO x 30 secodns while demonstarting a normalized degree of sway to demonstrate a significant improvement in postural stability and balance.   10/02/21    Spencer  This goal to be completed next session following baseline assessment of Spencer. This tool will be used to assess subjective changes in back pain symptoms.  10/02/21    HEP  The patietn will be IND in performance of HEP to facilitate gains in strength, endurance, and mobility as means to address asymmetries in length/tension relationships and strength of musculature contributing to LBP.    10/02/21                                                Therapy Frequency:  1 time/week   Predicted Duration of Therapy Intervention:  8-12 weeks    Mel Blanton, PT                                    I CERTIFY THE NEED FOR THESE SERVICES FURNISHED UNDER        THIS PLAN OF TREATMENT AND WHILE UNDER MY CARE     (Physician co-signature of this document indicates review and certification of the therapy plan).                Certification Date From:    7/5/21  Certification Date To:   10/2/21    Referring Provider:  Dr. Jason Gillette    Initial Assessment  See Epic Evaluation- Start of Care Date: 07/05/21

## 2021-09-07 ENCOUNTER — HOSPITAL ENCOUNTER (OUTPATIENT)
Dept: PHYSICAL THERAPY | Facility: CLINIC | Age: 86
End: 2021-09-07
Payer: MEDICARE

## 2021-09-07 DIAGNOSIS — R26.9 ABNORMAL GAIT: ICD-10-CM

## 2021-09-07 DIAGNOSIS — R53.81 PHYSICAL DECONDITIONING: Primary | ICD-10-CM

## 2021-09-07 DIAGNOSIS — G60.9 IDIOPATHIC PERIPHERAL NEUROPATHY: ICD-10-CM

## 2021-09-07 DIAGNOSIS — G89.29 CHRONIC LOW BACK PAIN, UNSPECIFIED BACK PAIN LATERALITY, UNSPECIFIED WHETHER SCIATICA PRESENT: ICD-10-CM

## 2021-09-07 DIAGNOSIS — R26.89 IMPAIRMENT OF BALANCE: ICD-10-CM

## 2021-09-07 DIAGNOSIS — M54.50 CHRONIC LOW BACK PAIN, UNSPECIFIED BACK PAIN LATERALITY, UNSPECIFIED WHETHER SCIATICA PRESENT: ICD-10-CM

## 2021-09-07 PROCEDURE — 97110 THERAPEUTIC EXERCISES: CPT | Mod: GP | Performed by: PHYSICAL THERAPIST

## 2021-09-07 PROCEDURE — 97140 MANUAL THERAPY 1/> REGIONS: CPT | Mod: GP | Performed by: PHYSICAL THERAPIST

## 2021-09-19 ASSESSMENT — ENCOUNTER SYMPTOMS
EYE PAIN: 0
CONSTIPATION: 0
HEADACHES: 0
SORE THROAT: 0
CHILLS: 0
MYALGIAS: 1
HEMATURIA: 0
BREAST MASS: 0
PARESTHESIAS: 0
DIZZINESS: 1
HEARTBURN: 0
HEMATOCHEZIA: 0
FREQUENCY: 0
JOINT SWELLING: 0
ABDOMINAL PAIN: 0
NERVOUS/ANXIOUS: 0
NAUSEA: 0
FEVER: 0
PALPITATIONS: 0
DIARRHEA: 0
DYSURIA: 0
ARTHRALGIAS: 1
SHORTNESS OF BREATH: 0
WEAKNESS: 1
COUGH: 0

## 2021-09-19 ASSESSMENT — ACTIVITIES OF DAILY LIVING (ADL): CURRENT_FUNCTION: TRANSPORTATION REQUIRES ASSISTANCE

## 2021-09-22 ENCOUNTER — OFFICE VISIT (OUTPATIENT)
Dept: PEDIATRICS | Facility: CLINIC | Age: 86
End: 2021-09-22
Payer: MEDICARE

## 2021-09-22 VITALS
WEIGHT: 127.7 LBS | OXYGEN SATURATION: 98 % | DIASTOLIC BLOOD PRESSURE: 54 MMHG | SYSTOLIC BLOOD PRESSURE: 126 MMHG | BODY MASS INDEX: 25.79 KG/M2 | TEMPERATURE: 97.6 F | HEART RATE: 66 BPM

## 2021-09-22 DIAGNOSIS — G62.9 NEUROPATHY: ICD-10-CM

## 2021-09-22 DIAGNOSIS — D22.9 ATYPICAL NEVUS: ICD-10-CM

## 2021-09-22 DIAGNOSIS — M53.3 SACROILIAC JOINT PAIN: ICD-10-CM

## 2021-09-22 DIAGNOSIS — Z23 NEED FOR PROPHYLACTIC VACCINATION AND INOCULATION AGAINST INFLUENZA: ICD-10-CM

## 2021-09-22 DIAGNOSIS — M81.6 LOCALIZED OSTEOPOROSIS WITHOUT CURRENT PATHOLOGICAL FRACTURE: ICD-10-CM

## 2021-09-22 DIAGNOSIS — N18.31 STAGE 3A CHRONIC KIDNEY DISEASE (H): ICD-10-CM

## 2021-09-22 DIAGNOSIS — E78.2 MIXED HYPERLIPIDEMIA: ICD-10-CM

## 2021-09-22 DIAGNOSIS — Z00.00 ENCOUNTER FOR MEDICARE ANNUAL WELLNESS EXAM: Primary | ICD-10-CM

## 2021-09-22 DIAGNOSIS — Z12.31 ENCOUNTER FOR SCREENING MAMMOGRAM FOR BREAST CANCER: ICD-10-CM

## 2021-09-22 DIAGNOSIS — I10 ESSENTIAL HYPERTENSION: ICD-10-CM

## 2021-09-22 PROBLEM — N18.30 CHRONIC KIDNEY DISEASE, STAGE 3 (H): Status: ACTIVE | Noted: 2021-09-22

## 2021-09-22 PROCEDURE — 80061 LIPID PANEL: CPT | Performed by: NURSE PRACTITIONER

## 2021-09-22 PROCEDURE — 99214 OFFICE O/P EST MOD 30 MIN: CPT | Mod: 25 | Performed by: NURSE PRACTITIONER

## 2021-09-22 PROCEDURE — 36415 COLL VENOUS BLD VENIPUNCTURE: CPT | Performed by: NURSE PRACTITIONER

## 2021-09-22 PROCEDURE — G0008 ADMIN INFLUENZA VIRUS VAC: HCPCS | Performed by: NURSE PRACTITIONER

## 2021-09-22 PROCEDURE — 80053 COMPREHEN METABOLIC PANEL: CPT | Performed by: NURSE PRACTITIONER

## 2021-09-22 PROCEDURE — 90662 IIV NO PRSV INCREASED AG IM: CPT | Performed by: NURSE PRACTITIONER

## 2021-09-22 PROCEDURE — G0439 PPPS, SUBSEQ VISIT: HCPCS | Performed by: NURSE PRACTITIONER

## 2021-09-22 RX ORDER — AMLODIPINE BESYLATE 10 MG/1
10 TABLET ORAL DAILY
Qty: 90 TABLET | Refills: 3 | Status: SHIPPED | OUTPATIENT
Start: 2021-09-22 | End: 2021-12-16

## 2021-09-22 RX ORDER — LISINOPRIL 10 MG/1
10 TABLET ORAL DAILY
Qty: 90 TABLET | Refills: 3 | Status: SHIPPED | OUTPATIENT
Start: 2021-09-22 | End: 2021-12-16

## 2021-09-22 RX ORDER — GABAPENTIN 400 MG/1
CAPSULE ORAL
Qty: 360 CAPSULE | Refills: 2 | Status: SHIPPED | OUTPATIENT
Start: 2021-09-22 | End: 2022-05-16

## 2021-09-22 RX ORDER — ATORVASTATIN CALCIUM 20 MG/1
20 TABLET, FILM COATED ORAL AT BEDTIME
Qty: 90 TABLET | Refills: 3 | Status: SHIPPED | OUTPATIENT
Start: 2021-09-22 | End: 2021-12-16

## 2021-09-22 ASSESSMENT — ENCOUNTER SYMPTOMS
COUGH: 0
NERVOUS/ANXIOUS: 0
ABDOMINAL PAIN: 0
HEMATOCHEZIA: 0
CHILLS: 0
ARTHRALGIAS: 1
DYSURIA: 0
HEARTBURN: 0
BREAST MASS: 0
NAUSEA: 0
MYALGIAS: 1
DIARRHEA: 0
HEADACHES: 0
HEMATURIA: 0
WEAKNESS: 1
SORE THROAT: 0
PARESTHESIAS: 0
EYE PAIN: 0
JOINT SWELLING: 0
SHORTNESS OF BREATH: 0
FEVER: 0
CONSTIPATION: 0
DIZZINESS: 1
PALPITATIONS: 0
FREQUENCY: 0

## 2021-09-22 ASSESSMENT — ACTIVITIES OF DAILY LIVING (ADL): CURRENT_FUNCTION: TRANSPORTATION REQUIRES ASSISTANCE

## 2021-09-22 NOTE — PATIENT INSTRUCTIONS
Call insurance about:  -Cologuard test for colon cancer screening  -Mammogram for breast cancer screening  -Tetanus shot  -Dermatology consultants within network?    Patient Education   Personalized Prevention Plan  You are due for the preventive services outlined below.  Your care team is available to assist you in scheduling these services.  If you have already completed any of these items, please share that information with your care team to update in your medical record.  Health Maintenance Due   Topic Date Due     ANNUAL REVIEW OF HM ORDERS  Never done     Zoster (Shingles) Vaccine (1 of 2) Never done     FALL RISK ASSESSMENT  08/10/2021     Flu Vaccine (1) 09/01/2021     Activities of Daily Living    Your Health Risk Assessment indicates you have difficulties with activities of daily living such as housework, bathing, preparing meals, taking medication, etc. Please make a follow up appointment for us to address this issue in more detail.    Urinary Incontinence, Female (Adult)   Urinary incontinence means loss of bladder control. This problem affects many women, especially as they get older. If you have incontinence, you may be embarrassed to ask for help. But know that this problem can be treated.   Types of Incontinence  There are different types of incontinence. Two of the main types are described here. You can have more than one type.     Stress incontinence. With this type, urine leaks when pressure (stress) is put on the bladder. This may happen when you cough, sneeze, or laugh. Stress incontinence most often occurs because the pelvic floor muscles that support the bladder and urethra are weak. This can happen after pregnancy and vaginal childbirth or a hysterectomy. It can also be due to excess body weight or hormone changes.    Urge incontinence (also called overactive bladder). With this type, a sudden urge to urinate is felt often. This may happen even though there may not be much urine in the  bladder. The need to urinate often during the night is common. Urge incontinence most often occurs because of bladder spasms. This may be due to bladder irritation or infection. Damage to bladder nerves or pelvic muscles, constipation, and certain medicines can also lead to urge incontinence.  Treatment depends on the cause. Further evaluation is needed to find the type you have. This will likely include an exam and certain tests. Based on the results, you and your healthcare provider can then plan treatment. Until a diagnosis is made, the home care tips below can help ease symptoms.   Home care    Do pelvic floor muscle exercises, if they are prescribed. The pelvic floor muscles help support the bladder and urethra. Many women find that their symptoms improve when doing special exercises that strengthen these muscles. To do the exercises, contract the muscles you would use to stop your stream of urine. But do this when you re not urinating. Hold for 10 seconds, then relax. Repeat 10 to 20 times in a row, at least 3 times a day. Your healthcare provider may give you other instructions for how to do the exercises and how often.    Keep a bladder diary. This helps track how often and how much you urinate over a set period of time. Bring this diary with you to your next visit with the provider. The information can help your provider learn more about your bladder problem.    Lose weight, if advised to by your provider. Extra weight puts pressure on the bladder. Your provider can help you create a weight-loss plan that s right for you. This may include exercising more and making certain diet changes.    Don't have foods and drinks that may irritate the bladder. These can include alcohol and caffeinated drinks.    Quit smoking. Smoking and other tobacco use can lead to a long-term (chronic) cough that strains the pelvic floor muscles. Smoking may also damage the bladder and urethra. Talk with your provider about  treatments or methods you can use to quit smoking.    If drinking large amounts of fluid makes you have symptoms, you may be advised to limit your fluid intake. You may also be advised to drink most of your fluids during the day and to limit fluids at night.    If you re worried about urine leakage or accidents, you may wear absorbent pads to catch urine. Change the pads often. This helps reduce discomfort. It may also reduce the risk of skin or bladder infections.    Follow-up care  Follow up with your healthcare provider, or as directed. It may take some to find the right treatment for your problem. But healthy lifestyle changes can be made right away. These include such things as exercising on a regular basis, eating a healthy diet, losing weight (if needed), and quitting smoking. Your treatment plan may include special therapies or medicines. Certain procedures or surgery may also be options. Talk about any questions you have with your provider.   When to seek medical advice  Call the healthcare provider right away if any of these occur:    Fever of 100.4 F (38 C) or higher, or as directed by your provider    Bladder pain or fullness    Belly swelling    Nausea or vomiting    Back pain    Weakness, dizziness, or fainting  Marie last reviewed this educational content on 1/1/2020 2000-2021 [employer]. All rights reserved. This information is not intended as a substitute for professional medical care. Always follow your healthcare professional's instructions.

## 2021-09-22 NOTE — PROGRESS NOTES
"SUBJECTIVE:   Kerri Rocha is a 86 year old female who presents for Preventive Visit.    Patient has been advised of split billing requirements and indicates understanding: Yes     Are you in the first 12 months of your Medicare coverage?  No    Healthy Habits:     In general, how would you rate your overall health?  Good    Frequency of exercise:  4-5 days/week    Duration of exercise:  15-30 minutes    Do you usually eat at least 4 servings of fruit and vegetables a day, include whole grains    & fiber and avoid regularly eating high fat or \"junk\" foods?  Yes    Taking medications regularly:  No    Medication side effects:  None    Ability to successfully perform activities of daily living:  Transportation requires assistance    Home Safety:  No safety concerns identified    Hearing Impairment:  No hearing concerns    In the past 6 months, have you been bothered by leaking of urine? Yes    In general, how would you rate your overall mental or emotional health?  Excellent      PHQ-2 Total Score: 0    Additional concerns today:  Yes     Taking ibuprofen 400 mg 1-2x/day for management of SI joint pain. Has previously been receiving joint injections through pain clinic, the last of which was 6 months ago. Reports these injections are no longer working therefore she does not have intention of continuing.      Do you feel safe in your environment? Yes    Have you ever done Advance Care Planning? (For example, a Health Directive, POLST, or a discussion with a medical provider or your loved ones about your wishes): Yes, advance care planning is on file.    Fall risk  Fallen 2 or more times in the past year?: No  Any fall with injury in the past year?: No    Cognitive Screening   1) Repeat 3 items (Leader, Season, Table)    2) Clock draw: NORMAL  3) 3 item recall: Recalls 3 objects  Results: 3 items recalled: COGNITIVE IMPAIRMENT LESS LIKELY, Normal clock    Mini-CogTM Copyright [employer] [place]. Licensed by the author for use " in [facility]; reprinted with permission (trina@[facility]). All rights reserved.      Do you have sleep apnea, excessive snoring or daytime drowsiness?: no    Reviewed and updated as needed this visit by clinical staff                 Reviewed and updated as needed this visit by Provider                Social History     Tobacco Use     Smoking status: Never Smoker     Smokeless tobacco: Never Used   Substance Use Topics     Alcohol use: No     Alcohol/week: 1.0 standard drinks     Comment: rare wine     If you drink alcohol do you typically have >3 drinks per day or >7 drinks per week? No    Alcohol Use 9/19/2021   Prescreen: >3 drinks/day or >7 drinks/week? No   Prescreen: >3 drinks/day or >7 drinks/week? -       Current providers sharing in care for this patient include:   Patient Care Team:  Parrish Arizmendi MD as PCP - General (Internal Medicine)  Raymundo Solorio MD as MD (Neurology)  Parrish Arizmendi MD as Assigned PCP  Jason Gillette MD as Assigned Neuroscience Provider  Molina Rock MD as Assigned Rheumatology Provider  Arabella Purvis MD as Hospitalist (Endocrinology, Diabetes, and Metabolism)    The following health maintenance items are reviewed in Epic and correct as of today:  Health Maintenance Due   Topic Date Due     ZOSTER IMMUNIZATION (1 of 2) Never done     FALL RISK ASSESSMENT  08/10/2021       Mammogram Screening - Patient over age 75, has elected to continue with screening.  Pertinent mammograms are reviewed under the imaging tab.    Review of Systems   Constitutional: Negative for chills and fever.   HENT: Negative for congestion, ear pain, hearing loss and sore throat.    Eyes: Negative for pain and visual disturbance.   Respiratory: Negative for cough and shortness of breath.    Cardiovascular: Negative for chest pain, palpitations and peripheral edema.   Gastrointestinal: Negative for abdominal pain, constipation, diarrhea, heartburn,  "hematochezia and nausea.   Breasts:  Negative for tenderness, breast mass and discharge.   Genitourinary: Negative for dysuria, frequency, genital sores, hematuria, pelvic pain, urgency, vaginal bleeding and vaginal discharge.   Musculoskeletal: Positive for arthralgias and myalgias. Negative for joint swelling.   Skin: Negative for rash.   Neurological: Positive for dizziness and weakness. Negative for headaches and paresthesias.   Psychiatric/Behavioral: Negative for mood changes. The patient is not nervous/anxious.        OBJECTIVE:   /54 (BP Location: Right arm, Patient Position: Sitting, Cuff Size: Adult Regular)   Pulse 66   Temp 97.6  F (36.4  C) (Tympanic)   Wt 57.9 kg (127 lb 11.2 oz)   SpO2 98%   BMI 25.79 kg/m   Estimated body mass index is 25.79 kg/m  as calculated from the following:    Height as of 4/9/21: 1.499 m (4' 11\").    Weight as of this encounter: 57.9 kg (127 lb 11.2 oz).  Physical Exam  Constitutional: appears to be in no acute distress, comfortable, pleasant.   Eyes: anicteric, conjunctiva clear without drainage or erythema. GIOVANNI.   Ears, Nose and Throat: tympanic membranes gray with LR,  nose without nasal discharge. OP: no erythema to posterior pharynx, negative post nasal drainage, tonsils +1 no erythema or exudate.  Neck: supple, thyroid palpable,not enlarged, no nodules   Breast: Exam deferred (deferred after discussion of exam options with patient, no symptoms or concerns).   Cardiovascular: regular rate and rhythm, normal S1 and S2, no murmurs, rubs or gallops, peripheral pulses full and symmetric; negative peripheral edema   Respiratory: Air entry throughout. Breathing pattern unlabored without the use of accessory muscles. Clear to auscultation A and P, no wheezes or crackles, normal breath sounds.    Gastrointestinal: rounded, soft. Positive bowel sounds x4, nontender, no masses.   Genitourinary: Exam deferred (deferred after discussion of exam options with patient, no " symptoms or concerns).   Musculoskeletal: full range of motion, no edema.   Skin: pink, turgor smooth and elastic. Negative for lesions or dryness.  Neurological: normal gait, no tremor.   Psychological: appropriate mood and affect.   Lymphatic: no cervical, axillary, supraclavicular, or infraclavicular lymphadenopathy.    Diagnostic Test Results:  Labs reviewed in Epic    ASSESSMENT / PLAN:   (Z00.00) Encounter for Medicare annual wellness exam  (primary encounter diagnosis)  Age appropriate screening and preventative care have been addressed today. Vaccinations have been updated. Recommend annual vision exams as well as biannual dental exams. They will follow up for annual physical again in one year.   - REVIEW OF HEALTH MAINTENANCE PROTOCOL ORDERS  - Tetanus and Shingrix vaccines - prefers to check with insurance about coverage first. She does intend to obtain the Shingrix vaccine but would like to space this out from her flu shot given today, will present to pharmacy in the event she is ready to pursue Shingrix vaccine.  - Reviewed recommendations for daily intake of calcium and vitamin D  - Discussed colon cancer screening with [facility], she is checking with insurance to see if this screenings is covered given her age and will notify me in the event she would like to pursue screening.     (Z12.31) Encounter for screening mammogram for breast cancer  Discussed risks and benefits of continuing breast cancer screening. Patient has elected to continue screening for breast cancer. However, she plans to first verify with her insurance as to whether or not this is covered.   - *MA Screening Digital Bilateral    (M81.6) Localized osteoporosis without current pathological fracture - 8/2020 waiting for dental work to start fosamax.  DEXA utd. Reports she continues to undergo dental work including root canals. It is unclear when this dental work will be complete. Briefly discussed fosomax and risk for osteonecrosis,  would be ideal to await completion of dental work prior to starting fosomax. She is interested in alternative treatments, therefore referral to endocrinology was placed. In the meantime, she will continue taking calcium and vitamin D.    - Adult Endocrinology Referral    (I10) Essential hypertension  Controlled. Plan to continue medications without changes. Recheck labs.   - Comprehensive metabolic panel (BMP + Alb, Alk Phos, ALT, AST, Total. Bili, TP)  - Albumin Random Urine Quantitative with Creat Ratio  - amLODIPine (NORVASC) 10 MG tablet  - lisinopril (ZESTRIL) 10 MG tablet    (E78.2) Mixed hyperlipidemia  Continue atorvastatin. Continues on baby ASA, instructed to stop given age >70 and taking for primary prevention Recheck FLP.   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   Plan: Adult Dermatology Referral    (N18.31) Stage 3a chronic kidney disease  Stable. Recheck labs.  - Albumin Random Urine Quantitative with Creat Ratio  - Comprehensive metabolic panel (BMP + Alb, Alk Phos, ALT, AST, Total. Bili, TP)    (G62.9) Neuropathy  Stable, continue gabapentin at current dose.   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 Taking ibuprofen 400 mg 1-2x/day for management of SI joint pain. Has previously been receiving joint injections through pain clinic, the last of which was 6 months ago. Reports these injections are no longer working therefore she does not have intention of continuing. Discussed risks of long term NSAID use.           (Z23) Need for prophylactic vaccination and inoculation against influenza  - INFLUENZA, QUAD, HIGH DOSE, PF, 65YR + (FLUZONE HD)  - ADMIN INFLUENZA (For MEDICARE Patients ONLY) []      Follow-up: Lab results pending, will follow-up as indicated after reviewing results.       COUNSELING:  Reviewed preventive health counseling, as reflected  "in patient instructions  Special attention given to:       Healthy diet/nutrition       Dental care       Immunizations    Vaccinated for: Influenza       Aspirin prophylaxis: pt educated to discontinue d/t increased risk for bleeding       Osteoporosis prevention/bone health       Colon cancer screening    Estimated body mass index is 25.79 kg/m  as calculated from the following:    Height as of 4/9/21: 1.499 m (4' 11\").    Weight as of this encounter: 57.9 kg (127 lb 11.2 oz).      She reports that she has never smoked. She has never used smokeless tobacco.      Appropriate preventive services were discussed with this patient, including applicable screening as appropriate for cardiovascular disease, diabetes, osteopenia/osteoporosis, and glaucoma.  As appropriate for age/gender, discussed screening for colorectal cancer, prostate cancer, breast cancer, and cervical cancer. Checklist reviewing preventive services available has been given to the patient.    Reviewed patients plan of care and provided an AVS. The Basic Care Plan (routine screening as documented in Health Maintenance) for Kerri meets the Care Plan requirement. This Care Plan has been established and reviewed with the Patient.    Counseling Resources:  ATP IV Guidelines  Pooled Cohorts Equation Calculator  Breast Cancer Risk Calculator  Breast Cancer: Medication to Reduce Risk  FRAX Risk Assessment  ICSI Preventive Guidelines  Dietary Guidelines for Americans, 2010  [employer]'s My[employer]ate  ASA Prophylaxis  Lung CA Screening    Jose Antonio Villalta RN, FNP Student saw and evaluated the above patient along with myself. Note co-authored with student, I agree with the history, physical exam findings, and the assessment and plan.     CAROLINE Wing CNP  [facility] [place]    "

## 2021-09-23 LAB
ALBUMIN SERPL-MCNC: 4.2 G/DL (ref 3.4–5)
ALP SERPL-CCNC: 66 U/L (ref 40–150)
ALT SERPL W P-5'-P-CCNC: 23 U/L (ref 0–50)
ANION GAP SERPL CALCULATED.3IONS-SCNC: 5 MMOL/L (ref 3–14)
AST SERPL W P-5'-P-CCNC: 29 U/L (ref 0–45)
BILIRUB SERPL-MCNC: 0.6 MG/DL (ref 0.2–1.3)
BUN SERPL-MCNC: 24 MG/DL (ref 7–30)
CALCIUM SERPL-MCNC: 9.4 MG/DL (ref 8.5–10.1)
CHLORIDE BLD-SCNC: 109 MMOL/L (ref 94–109)
CHOLEST SERPL-MCNC: 173 MG/DL
CO2 SERPL-SCNC: 25 MMOL/L (ref 20–32)
CREAT SERPL-MCNC: 1.02 MG/DL (ref 0.52–1.04)
FASTING STATUS PATIENT QL REPORTED: YES
GFR SERPL CREATININE-BSD FRML MDRD: 50 ML/MIN/1.73M2
GLUCOSE BLD-MCNC: 88 MG/DL (ref 70–99)
HDLC SERPL-MCNC: 40 MG/DL
LDLC SERPL CALC-MCNC: 107 MG/DL
NONHDLC SERPL-MCNC: 133 MG/DL
POTASSIUM BLD-SCNC: 4.6 MMOL/L (ref 3.4–5.3)
PROT SERPL-MCNC: 7.3 G/DL (ref 6.8–8.8)
SODIUM SERPL-SCNC: 139 MMOL/L (ref 133–144)
TRIGL SERPL-MCNC: 132 MG/DL

## 2021-09-27 ENCOUNTER — MYC MEDICAL ADVICE (OUTPATIENT)
Dept: PEDIATRICS | Facility: CLINIC | Age: 86
End: 2021-09-27

## 2021-09-27 DIAGNOSIS — Z12.11 SCREENING FOR MALIGNANT NEOPLASM OF COLON: Primary | ICD-10-CM

## 2021-09-30 ENCOUNTER — OFFICE VISIT (OUTPATIENT)
Dept: PHYSICAL MEDICINE AND REHAB | Facility: CLINIC | Age: 86
End: 2021-09-30
Attending: INTERNAL MEDICINE
Payer: MEDICARE

## 2021-09-30 DIAGNOSIS — M47.816 LUMBAR SPONDYLOSIS: Primary | ICD-10-CM

## 2021-09-30 DIAGNOSIS — Z11.59 ENCOUNTER FOR SCREENING FOR OTHER VIRAL DISEASES: ICD-10-CM

## 2021-09-30 DIAGNOSIS — M47.816 LUMBAR FACET ARTHROPATHY: ICD-10-CM

## 2021-09-30 DIAGNOSIS — M54.16 LUMBAR BACK PAIN WITH RADICULOPATHY AFFECTING RIGHT LOWER EXTREMITY: ICD-10-CM

## 2021-09-30 PROCEDURE — 99215 OFFICE O/P EST HI 40 MIN: CPT | Mod: GC | Performed by: PHYSICAL MEDICINE & REHABILITATION

## 2021-09-30 NOTE — LETTER
9/30/2021       RE: Kerri Rocha  [address]  [facility] [id number]     Dear Colleague,    Thank you for referring your patient, Kerri Rocha, to the [facility] PHYSICAL MEDICINE AND REHABILITATION CLINIC [place] at [facility]. Please see a copy of my visit note below.    HISTORY OF PRESENT ILLNESS:  Kerri Rocha is a 86 year old female with history of L4/L5 lumbar fusion who presents to Spine Clinic for evaluation of low back pain. She was seen at the request of [place].    Patient reports low back pain began 7 years ago while she was living in [place]. During that time she localized her pain bilateral low back at the L4 level. No pain in the legs at that time. She underwent L4/L5 lumbar fusion in [place] with significant improvement. No she reports different pain in her bilateral gluteal region with radiation to the posterior thighs with pain on R > L. She denies any shooting pain, or pain past the knee. She states 90% of her pain is axial vs legs She reports morning stiffness when waking up, pain will worsen with activity throughout the day and be worse at night. 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    Of note she has a 20 year history of idiopathic bilateral peripheral neuropathy. Is confirmed by a recent EMG this year. She has significant stocking numbness to the level of the ankles. Occasionally she experiences paresthesia, taking gabapentin for this rather than pain. Denies progressive weakness.    She has followed with Pain Medicine and has received bilateral SI joint injections in the past. The last to injections did not give her any significant improvement. She see physical therapy in [place] which helps. She is planning to continue to see them.     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       PRIOR INJURIES/TREATMENT:   Ice/Heat: Not helpful  Brace: Not  attempted  Physical Therapy:   - Currently following in [place]. Plans to continue     - Current Pain Medications -   Gabapentin 800 mg TID    - Prior/Trailed Pain Medications -   Ibuprofen as needed    Prior Procedures:  Date      Procedure     Improvement (%)  2021   Bilateral SI Joint Injections  No improvement  2020   Bilateral SI Joint Injections  No improvement  2019   Bilateral SI Joint Injection  100% relief for 3-4 months        Prior Related Surgery:   - 4/L5 fusion    Other (acupuncture, OMT, CMM, TENS, DME, etc.): None    Specialists Seen - (with most recent, available notes and clinic visits reviewed)   1. Neurology - Dr Gillette   2. Pain Clinic - Dr Frieda Novak   3. Rheumatology - Dr Rock    IMAGING - reviewed   10/28/2020 MR T spine  IMPRESSION:  1. Small posterior disc herniation at the T7-T8 level that mildly  deforms the anterior surface of the thoracic spinal cord.  2. Otherwise, normal thoracic spine MRI. No spinal canal or neural  foraminal stenosis. No evidence for fracture.    2019 - MR L spine   Impression:   1. Multilevel lumbar spondylosis, most pronounced at L3-4 with  moderate severe left and mild to moderate right neural foraminal  stenosis.  2. Synovial cyst within the right neural foramen of L5-S1 narrows the  right lateral recess and likely impinges upon the traversing right S1  nerve root.  3. Postsurgical changes of instrumented fusion of L4-5.    Review Of Systems:  I am responding to those symptoms which are directly relevant to the specific indication for my consultation. I recommend that the patient follow up with their primary or referring provider to pursue any other symptoms which may be of concern.     Medical History:  She  has a past medical history of HLD (hyperlipidemia), HTN (hypertension), Idiopathic neuropathy, and Vitamin D deficiency.     She  has a past surgical history that includes appendectomy;  section; carpal tunnel release  "rt/lt; breast biopsy, core rt/lt; Hysterectomy total abdominal, bilateral salpingo-oophorectomy, combined; tonsillectomy & adenoidectomy; cataract iol, rt/lt; and Laminectomy lumbar three+ levels (03/22/2016).    Family History  Her family history includes Coronary Artery Disease in her father; Heart Disease in her brother and father; Hypertension in her brother, father, mother, and sister.     Social History:  Work: Retired. Previous   History of any legal related pain issues: None  Current living situation: Lives in house with  who is legally blind  She  reports that she has never smoked. She has never used smokeless tobacco. She reports current alcohol use of about 1.0 standard drinks of alcohol per week. She reports that she does not use drugs.     Current Medications:   She has a current medication list which includes the following prescription(s): amlodipine, atorvastatin, calcium carbonate, vitamin d3, diphenhydramine-apap (sleep), gabapentin, lisinopril, and omega 3.     Allergies:   Allergies   Allergen Reactions     Codeine      Sulfa Drugs      Sulfasalazine Other (See Comments)     Sulfate Rash     OBJECTIVE     VITALS  BP Readings from Last 1 Encounters:   09/22/21 126/54     Pulse Readings from Last 1 Encounters:   09/22/21 66     Wt Readings from Last 1 Encounters:   09/22/21 57.9 kg (127 lb 11.2 oz)     Ht Readings from Last 1 Encounters:   04/09/21 1.499 m (4' 11\")     Estimated body mass index is 25.79 kg/m  as calculated from the following:    Height as of 4/9/21: 1.499 m (4' 11\").    Weight as of 9/22/21: 57.9 kg (127 lb 11.2 oz).    Temp Readings from Last 1 Encounters:   09/22/21 97.6  F (36.4  C) (Tympanic)     PHYSICAL EXAMINATION:  GENERAL: Sitting comfortably in chair. No acute distress.   CARDIAC: No apparent chest discomfort. Well perfused.  PULMONARY: Non-labored. No respiratory distress.    NEURO  Strength   Right  Left  Shoulder abduction  5/5  5/5  Elbow " flexion   5/5  5/5  Elbow Extension  5/5  5/5  Wrist Extension  5/5  5/5  Wrist Flexion   5/5  5/5  Finger Flexion   5/5  5/5  Finger Abduction  5/5  5/5  Hip Flexion   5/5  5/5  Knee Extension  5/5  5/5  Ankle dorsiflexion  5/5  5/5  Extensor Hallicus Longus 5/5  5/5    Sensation  Sensation intact to light touch bilateral. No difference in pin-prick sensation.    Reflexes   Right  Left  Biceps    2+  2+  Triceps   2+  2+  Brachioradialis  2+  2+  Rodriguez's   NEG  NEG  Patellar   2+  2+  Achilles   2+  2+  Clonus    NEG  NEG  Babinski   NEG  NEG    MSK  Inspection  No obvious deformities. No significant pelvic tilt. Normal lordosis of lumbar spine.    Palpation  Tenderness to palpation over lumbar facets R>L or paraspinal muscles or SI joint. Significant pain with palpation of piriformis  Range of motion  Back: Appropriate age match forward flexion and back extension.   Hips: Restricted with bilateral hip flexion, internal and external rotation.    Provocation   Right  Left  Radiculopathy Provocation   Straight leg raise  NEG  NEG  SLUMP   NEG  NEG    SI Joint Provocation    Saumya Finger   NEG  NEG  Distraction   POS  POS  Sacral thrust   POS  POS  Yoman    POS  NEG   Distraction   POS  POS  SANJIV   POS  NEG     IMAGING  MRI, LUMBAR SPINE   Impression:   1. Multilevel lumbar spondylosis, most pronounced at L3-4 with  moderate severe left and mild to moderate right neural foraminal  stenosis.  2. Synovial cyst within the right neural foramen of L5-S1 narrows the  right lateral recess and likely impinges upon the traversing right S1  nerve root.  3. Postsurgical changes of instrumented fusion of L4-5.    NCS/EMG 2021   Interpretation:  The EMG is abnormal. The findings are consistent with the followin. A severe distal symmetric axonal peripheral neuropathy that has progressed since last EMG 2019  2. Possible L3 or L4 chronic inactive radiculopathy on the right    ASSESSMENT:  Kerri Srivastava  [place] is a pleasant 86 year old female who presents with chronic low back pain of unclear etiology. Her history and examination does draw concern for SI joint as primary pain generator. Unfortunately She has now failed SI injections x2 and will likely not benefit from another. We further discussed other targets within her back that could be a pain generating source such as the lumbar facets. Given hardware present the the key levels we decided to go fowrwad with intra-articular facet joint injection. Additionally, she showed concern for myofacial pain within the piriformis.    PLAN:  - No medication changes made today.  - COntinue PT in [place]  - Plan to perform intra-articular facet injection  - May consider piriformis injection in future if she does have symtpom imprvement.  - RTC For procedure    Ready to learn, no apparent learning barriers.  Education provided on treatment plan according to patient's preferred learning style.  Patient verbalizes understanding.   __________________________________  Alfredito Galvan MD  Physical Medicine and Rehabilitation      60 minutes spent on the date of the encounter doing chart review, history and exam, documentation and further activities per the note      I was physically present for the E/M service provided. I agree with the House Officer note and plan, which I have reviewed and edited where appropriate. Patient has a history of L4-5 posterior spinal fusion with ongoing, chronic R>L predominantly axial low back pain. She identifies two areas of pain at the lumbosacral junction and gluteal/buttock region on the R>L. She had one successful SI joint injection followed by 2 that did no provide significant benefit. On examination she is tender along the sacrum and SI joint but also has notable asymmetric gluteal musculature and atrophy on the right. She is tender at GMax and piriformis and near the LS junction. We discussed the possibility of adjacent segment disease and  will start with L5-S1 intra-articular facet joint injections. Depending on her response we may consider US-guided piriformis muscle injection. She will continue with physical therapy in the interim. She is welcome to follow with the pain clinic for injections and overall chronic pain management. At this time, she would like to continue with interventional options through our PM&R Spine Clinic.   ________________________________   Stephanie Momin MD  Department of Physical Medicine & Rehabilitation         Again, thank you for allowing me to participate in the care of your patient.      Sincerely,    Stephanie Momin MD

## 2021-09-30 NOTE — NURSING NOTE
Chief Complaint   Patient presents with     Consult     CONSULT Lumbar sback pain     Jane Gonzalez MA

## 2021-10-01 NOTE — PROGRESS NOTES
HISTORY OF PRESENT ILLNESS:  Kerri Rocha is a 86 year old female with history of L4/L5 lumbar fusion who presents to Spine Clinic for evaluation of low back pain. She was seen at the request of [place].    Patient reports low back pain began 7 years ago while she was living in [place]. During that time she localized her pain bilateral low back at the L4 level. No pain in the legs at that time. She underwent L4/L5 lumbar fusion in [place] with significant improvement. No she reports different pain in her bilateral gluteal region with radiation to the posterior thighs with pain on R > L. She denies any shooting pain, or pain past the knee. She states 90% of her pain is axial vs legs She reports morning stiffness when waking up, pain will worsen with activity throughout the day and be worse at night. 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    Of note she has a 20 year history of idiopathic bilateral peripheral neuropathy. Is confirmed by a recent EMG this year. She has significant stocking numbness to the level of the ankles. Occasionally she experiences paresthesia, taking gabapentin for this rather than pain. Denies progressive weakness.    She has followed with Pain Medicine and has received bilateral SI joint injections in the past. The last to injections did not give her any significant improvement. She see physical therapy in [place] which helps. She is planning to continue to see them.     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       PRIOR INJURIES/TREATMENT:   Ice/Heat: Not helpful  Brace: Not attempted  Physical Therapy:   - Currently following in [place]. Plans to continue     - Current Pain Medications -   Gabapentin 800 mg TID    - Prior/Trailed Pain Medications -   Ibuprofen as needed    Prior Procedures:  Date      Procedure     Improvement (%)  04/27/2021   Bilateral SI Joint Injections  No improvement  08/17/2020   Bilateral SI Joint  Injections  No improvement  2019   Bilateral SI Joint Injection  100% relief for 3-4 months        Prior Related Surgery:   - 4/L5 fusion    Other (acupuncture, OMT, CMM, TENS, DME, etc.): None    Specialists Seen - (with most recent, available notes and clinic visits reviewed)   1. Neurology - Dr Gillette   2. Pain Clinic - Dr Frieda Novak   3. Rheumatology - Dr Rock    IMAGING - reviewed   10/28/2020 MR T spine  IMPRESSION:  1. Small posterior disc herniation at the T7-T8 level that mildly  deforms the anterior surface of the thoracic spinal cord.  2. Otherwise, normal thoracic spine MRI. No spinal canal or neural  foraminal stenosis. No evidence for fracture.    2019 - MR L spine   Impression:   1. Multilevel lumbar spondylosis, most pronounced at L3-4 with  moderate severe left and mild to moderate right neural foraminal  stenosis.  2. Synovial cyst within the right neural foramen of L5-S1 narrows the  right lateral recess and likely impinges upon the traversing right S1  nerve root.  3. Postsurgical changes of instrumented fusion of L4-5.    Review Of Systems:  I am responding to those symptoms which are directly relevant to the specific indication for my consultation. I recommend that the patient follow up with their primary or referring provider to pursue any other symptoms which may be of concern.     Medical History:  She  has a past medical history of HLD (hyperlipidemia), HTN (hypertension), Idiopathic neuropathy, and Vitamin D deficiency.     She  has a past surgical history that includes appendectomy;  section; carpal tunnel release rt/lt; breast biopsy, core rt/lt; Hysterectomy total abdominal, bilateral salpingo-oophorectomy, combined; tonsillectomy & adenoidectomy; cataract iol, rt/lt; and Laminectomy lumbar three+ levels (2016).    Family History  Her family history includes Coronary Artery Disease in her father; Heart Disease in her brother and father; Hypertension in  "her brother, father, mother, and sister.     Social History:  Work: Retired. Previous   History of any legal related pain issues: None  Current living situation: Lives in house with  who is legally blind  She  reports that she has never smoked. She has never used smokeless tobacco. She reports current alcohol use of about 1.0 standard drinks of alcohol per week. She reports that she does not use drugs.     Current Medications:   She has a current medication list which includes the following prescription(s): amlodipine, atorvastatin, calcium carbonate, vitamin d3, diphenhydramine-apap (sleep), gabapentin, lisinopril, and omega 3.     Allergies:   Allergies   Allergen Reactions     Codeine      Sulfa Drugs      Sulfasalazine Other (See Comments)     Sulfate Rash     OBJECTIVE     VITALS  BP Readings from Last 1 Encounters:   09/22/21 126/54     Pulse Readings from Last 1 Encounters:   09/22/21 66     Wt Readings from Last 1 Encounters:   09/22/21 57.9 kg (127 lb 11.2 oz)     Ht Readings from Last 1 Encounters:   04/09/21 1.499 m (4' 11\")     Estimated body mass index is 25.79 kg/m  as calculated from the following:    Height as of 4/9/21: 1.499 m (4' 11\").    Weight as of 9/22/21: 57.9 kg (127 lb 11.2 oz).    Temp Readings from Last 1 Encounters:   09/22/21 97.6  F (36.4  C) (Tympanic)     PHYSICAL EXAMINATION:  GENERAL: Sitting comfortably in chair. No acute distress.   CARDIAC: No apparent chest discomfort. Well perfused.  PULMONARY: Non-labored. No respiratory distress.    NEURO  Strength   Right  Left  Shoulder abduction  5/5  5/5  Elbow flexion   5/5  5/5  Elbow Extension  5/5  5/5  Wrist Extension  5/5  5/5  Wrist Flexion   5/5  5/5  Finger Flexion   5/5  5/5  Finger Abduction  5/5  5/5  Hip Flexion   5/5  5/5  Knee Extension  5/5  5/5  Ankle dorsiflexion  5/5  5/5  Extensor Hallicus Longus 5/5  5/5    Sensation  Sensation intact to light touch bilateral. No difference in pin-prick " sensation.    Reflexes   Right  Left  Biceps    2+  2+  Triceps   2+  2+  Brachioradialis  2+  2+  Rodriguez's   NEG  NEG  Patellar   2+  2+  Achilles   2+  2+  Clonus    NEG  NEG  Babinski   NEG  NEG    MSK  Inspection  No obvious deformities. No significant pelvic tilt. Normal lordosis of lumbar spine.    Palpation  Tenderness to palpation over lumbar facets R>L or paraspinal muscles or SI joint. Significant pain with palpation of piriformis  Range of motion  Back: Appropriate age match forward flexion and back extension.   Hips: Restricted with bilateral hip flexion, internal and external rotation.    Provocation   Right  Left  Radiculopathy Provocation   Straight leg raise  NEG  NEG  SLUMP   NEG  NEG    SI Joint Provocation    Saumya Finger   NEG  NEG  Distraction   POS  POS  Sacral thrust   POS  POS  Yoman    POS  NEG   Distraction   POS  POS  SANJIV   POS  NEG     IMAGING  MRI, LUMBAR SPINE   Impression:   1. Multilevel lumbar spondylosis, most pronounced at L3-4 with  moderate severe left and mild to moderate right neural foraminal  stenosis.  2. Synovial cyst within the right neural foramen of L5-S1 narrows the  right lateral recess and likely impinges upon the traversing right S1  nerve root.  3. Postsurgical changes of instrumented fusion of L4-5.    NCS/EMG 2021   Interpretation:  The EMG is abnormal. The findings are consistent with the followin. A severe distal symmetric axonal peripheral neuropathy that has progressed since last EMG 2019  2. Possible L3 or L4 chronic inactive radiculopathy on the right    ASSESSMENT:  Kerri Rocha is a pleasant 86 year old female who presents with chronic low back pain of unclear etiology. Her history and examination does draw concern for SI joint as primary pain generator. Unfortunately She has now failed SI injections x2 and will likely not benefit from another. We further discussed other targets within her back that could be a pain  generating source such as the lumbar facets. Given hardware present the the key levels we decided to go fowrwad with intra-articular facet joint injection. Additionally, she showed concern for myofacial pain within the piriformis.    PLAN:  - No medication changes made today.  - COntinue PT in [place]  - Plan to perform intra-articular facet injection  - May consider piriformis injection in future if she does have symtpom imprvement.  - RTC For procedure    Ready to learn, no apparent learning barriers.  Education provided on treatment plan according to patient's preferred learning style.  Patient verbalizes understanding.   __________________________________  Alfredito Galvan MD  Physical Medicine and Rehabilitation      60 minutes spent on the date of the encounter doing chart review, history and exam, documentation and further activities per the note      I was physically present for the E/M service provided. I agree with the House Officer note and plan, which I have reviewed and edited where appropriate. Patient has a history of L4-5 posterior spinal fusion with ongoing, chronic R>L predominantly axial low back pain. She identifies two areas of pain at the lumbosacral junction and gluteal/buttock region on the R>L. She had one successful SI joint injection followed by 2 that did no provide significant benefit. On examination she is tender along the sacrum and SI joint but also has notable asymmetric gluteal musculature and atrophy on the right. She is tender at GMax and piriformis and near the LS junction. We discussed the possibility of adjacent segment disease and will start with L5-S1 intra-articular facet joint injections. Depending on her response we may consider US-guided piriformis muscle injection. She will continue with physical therapy in the interim. She is welcome to follow with the pain clinic for injections and overall chronic pain management. At this time, she would like to continue with  interventional options through our PM&R Spine Clinic.   ________________________________   Stephanie Momin MD  Department of Physical Medicine & Rehabilitation

## 2021-10-14 ENCOUNTER — LAB (OUTPATIENT)
Dept: LAB | Facility: CLINIC | Age: 86
End: 2021-10-14
Payer: MEDICARE

## 2021-10-14 DIAGNOSIS — Z11.59 ENCOUNTER FOR SCREENING FOR OTHER VIRAL DISEASES: ICD-10-CM

## 2021-10-14 LAB — SARS-COV-2 RNA RESP QL NAA+PROBE: NEGATIVE

## 2021-10-14 PROCEDURE — U0005 INFEC AGEN DETEC AMPLI PROBE: HCPCS

## 2021-10-14 PROCEDURE — U0003 INFECTIOUS AGENT DETECTION BY NUCLEIC ACID (DNA OR RNA); SEVERE ACUTE RESPIRATORY SYNDROME CORONAVIRUS 2 (SARS-COV-2) (CORONAVIRUS DISEASE [COVID-19]), AMPLIFIED PROBE TECHNIQUE, MAKING USE OF HIGH THROUGHPUT TECHNOLOGIES AS DESCRIBED BY CMS-2020-01-R: HCPCS

## 2021-10-18 ENCOUNTER — ANCILLARY PROCEDURE (OUTPATIENT)
Dept: RADIOLOGY | Facility: AMBULATORY SURGERY CENTER | Age: 86
End: 2021-10-18
Attending: PHYSICAL MEDICINE & REHABILITATION
Payer: MEDICARE

## 2021-10-18 ENCOUNTER — HOSPITAL ENCOUNTER (OUTPATIENT)
Facility: AMBULATORY SURGERY CENTER | Age: 86
Discharge: HOME | End: 2021-10-18
Attending: PHYSICAL MEDICINE & REHABILITATION | Admitting: PHYSICAL MEDICINE & REHABILITATION
Payer: MEDICARE

## 2021-10-18 VITALS
SYSTOLIC BLOOD PRESSURE: 125 MMHG | OXYGEN SATURATION: 96 % | DIASTOLIC BLOOD PRESSURE: 55 MMHG | RESPIRATION RATE: 18 BRPM | HEART RATE: 68 BPM

## 2021-10-18 DIAGNOSIS — M47.816 LUMBAR FACET ARTHROPATHY: ICD-10-CM

## 2021-10-18 DIAGNOSIS — M47.816 LUMBAR SPONDYLOSIS: ICD-10-CM

## 2021-10-18 DIAGNOSIS — R52 PAIN: ICD-10-CM

## 2021-10-18 PROCEDURE — 64493 INJ PARAVERT F JNT L/S 1 LEV: CPT | Mod: RT,LT

## 2021-10-18 RX ORDER — IOPAMIDOL 408 MG/ML
INJECTION, SOLUTION INTRATHECAL PRN
Status: DISCONTINUED | OUTPATIENT
Start: 2021-10-18 | End: 2021-10-18 | Stop reason: HOSPADM

## 2021-10-18 RX ORDER — LIDOCAINE HYDROCHLORIDE 20 MG/ML
INJECTION, SOLUTION INFILTRATION; PERINEURAL PRN
Status: DISCONTINUED | OUTPATIENT
Start: 2021-10-18 | End: 2021-10-18 | Stop reason: HOSPADM

## 2021-10-18 NOTE — PROCEDURES
PROCEDURE NOTE: Lumbar Intra-articular Facet Injection    PROCEDURE DATE: 10/18/2021    NAME: Kerri Rocha  YOB: 1935    ATTENDING PHYSICIAN: Stephanie Momin MD  RESIDENT/FELLOW PHYSICIAN: None    PREOPERATIVE DIAGNOSIS:   Lumbar spondylosis  Lumbar facet arthropathy  POSTOPERATIVE DIAGNOSIS: Same    PROCEDURE PERFORMED: Bilateral Lumbar Intra-Articular Facet Injections at the L5-S1 facet(s)    FLUOROSCOPY WAS USED.     INDICATIONS FOR PROCEDURE:   This is a 86 year old female with a clinical picture consistent with facet arthropathy and facet-mediated pain.    PROCEDURE AND FINDINGS:    Kerri was greeted in the pre-procedure holding area. The risk, benefits and alternatives to the procedure were again reviewed with the patient and written informed consent was placed in the chart. An IV line was not placed.  A 500 mL bag of NS was not connected to the patient. She was taken to the procedure room and positioned prone on the fluoroscopy table.  Routine monitors were applied including blood pressure cuff, and pulse oximetry. Prior to the procedure a time out was completed, verifying correct patient, procedure, site, positioning, and implants and/or special equipment.     An AP fluoroscpic film was taken to identify the vertebral bodies at the corresponding levels. The skin overlying the aforementioned facets was prepped with chlorhexidine and draped in the usual sterile fashion. The skin and subcutaneous tissue overlying the facets was anesthetized using a 25-guage 1-1/2 inch needle with 1% preservative-free lidocaine for a total volume of 2 mls.    After achieving sufficient anesthesia, 22-gauge 3.5-inch needles were advanced, under AP fluoroscopic guidance, into the superior aspect of the facet joints. A lateral view was obtained and confirmed intra-articular placement of the needle tips. Then, after negative aspiration, 0.3mls of Isovue-M 200mg/mL contrast was injected under AP view with live  fluoroscopy and confirmed adequate spread along the facet joint.  There was no evidence of intravascular uptake or spread on imaging.    At this point, after negative aspiration, a total 1.5mL volume of treatment injectate, consisting of 1mL Kenalog (40mg/mL), and 0.5mL of 1% Lidocaine, was divided and injected easily between each facet joint (0.75mL of treatment solution per joint). The needle was then flushed with 0.3 ml of local anesthetic and removed. The needle insertion site was dressed appropriately.    Before the procedure, she reported a pain score of 7/10. After the procedure, she reported a pain score of 0/10.      Kerri was taken to the recovery room where she was monitored for a brief period of time. She tolerated the procedure well and was discharged home in stable condition with post procedural instructions.    Follow-up will be in clinic.     COMPLICATIONS: None    COMMENTS: None

## 2021-10-18 NOTE — DISCHARGE INSTRUCTIONS
"Home Care Instructions after a Facet Joint Injection  The facet joints are located at each level of the spine. They are small joints that help stabilization in the spine, aid in movement, and prevent the vertebrae from rubbing against one another. Certain conditions and injuries, such as arthritis can cause the facet joints to degenerate. When these joints break down, it may result in back pain and decreased mobility.  A facet joint injection is a procedure in which numbing medication is injected into one or more facet joints in order to provide pain relief. Steroids may also be used. The ultimate goal is to relieve back pain, but the procedure can also be diagnostic in nature. If you have significant pain relief after the injection, the facet joints may be the source of your pain.      Activity  -You may resume most normal activity levels with the exception of strenuous activity. It is important for us to know if your pain with normal activity is relieved after this injection.  -DO NOT shower for 24 hours  -DO NOT remove bandaid for 24 hours    Pain  -You may experience soreness at the injection site for one or two days  -You may use an ice pack for 20 minutes every 2 hours for the first 24 hours  -You may use a heating pad after the first 24 hours  -You may use Tylenol (acetaminophen) every 4 hours or other pain medicines as     directed by your physician    You may experience numbness radiating into your legs or arms (depending on the procedure location). This numbness may last several hours. Until sensation returns to normal; please use caution in walking, climbing stairs, and stepping out of your vehicle, etc.        DID YOU RECEIVE STEROIDS TODAY?  {YES / NO:174118::\"Yes\"}    Common side effects of steroids:  Not everyone will experience corticosteroid side effects. If side effects are experienced, they will gradually subside in the 7-10 day period following an injection. Most common side effects " include:  -Flushed face and/or chest  -Feeling of warmth, particularly in the face but could be an overall feeling of warmth  -Increased blood sugar in diabetic patients  -Menstrual irregularities my occur. If taking hormone-based birth control an alternate method of birth control is recommended  -Sleep disturbances and/or mood swings are possible  -Leg cramps      PLEASE KEEP TRACK OF YOUR SYMPTOMS AND NOTE YOUR IMPROVEMENT FOR YOUR DOCTOR.     Please contact us if you have:  -Severe pain  -Fever more than 101.5 degrees Fahrenheit  -Signs of infection at the injection site (redness, swelling, or drainage)    If you have questions, please contact our office at [phone] between the hours of 7:00 am and 3:00 pm Monday through Friday. After office hours you can contact the on call provider by dialing [phone]. If you need immediate attention, we recommend that you go to a hospital emergency room or dial [phone].

## 2021-10-27 ENCOUNTER — ANCILLARY PROCEDURE (OUTPATIENT)
Dept: MAMMOGRAPHY | Facility: CLINIC | Age: 86
End: 2021-10-27
Attending: NURSE PRACTITIONER
Payer: MEDICARE

## 2021-10-27 DIAGNOSIS — Z12.31 ENCOUNTER FOR SCREENING MAMMOGRAM FOR BREAST CANCER: ICD-10-CM

## 2021-10-27 PROCEDURE — 77067 SCR MAMMO BI INCL CAD: CPT | Mod: TC | Performed by: RADIOLOGY

## 2021-10-27 PROCEDURE — 77063 BREAST TOMOSYNTHESIS BI: CPT | Mod: TC | Performed by: RADIOLOGY

## 2021-11-04 ENCOUNTER — HOSPITAL ENCOUNTER (OUTPATIENT)
Dept: PHYSICAL THERAPY | Facility: CLINIC | Age: 86
End: 2021-11-04
Payer: MEDICARE

## 2021-11-04 DIAGNOSIS — G60.9 IDIOPATHIC PERIPHERAL NEUROPATHY: ICD-10-CM

## 2021-11-04 DIAGNOSIS — R26.9 ABNORMAL GAIT: ICD-10-CM

## 2021-11-04 DIAGNOSIS — R53.81 PHYSICAL DECONDITIONING: ICD-10-CM

## 2021-11-04 DIAGNOSIS — R26.89 IMPAIRMENT OF BALANCE: Primary | ICD-10-CM

## 2021-11-04 PROCEDURE — 97112 NEUROMUSCULAR REEDUCATION: CPT | Mod: GP | Performed by: PHYSICAL THERAPIST

## 2021-11-04 PROCEDURE — 97140 MANUAL THERAPY 1/> REGIONS: CPT | Mod: GP | Performed by: PHYSICAL THERAPIST

## 2021-11-04 PROCEDURE — 97110 THERAPEUTIC EXERCISES: CPT | Mod: GP | Performed by: PHYSICAL THERAPIST

## 2021-11-09 ASSESSMENT — ENCOUNTER SYMPTOMS
NUMBNESS: 1
SINUS CONGESTION: 0
DISTURBANCES IN COORDINATION: 1
WEAKNESS: 1
MYALGIAS: 1
BACK PAIN: 1
SMELL DISTURBANCE: 0
STIFFNESS: 1
DIZZINESS: 1
ARTHRALGIAS: 1
SPEECH CHANGE: 0
MEMORY LOSS: 0
TASTE DISTURBANCE: 0
MUSCLE WEAKNESS: 1
LOSS OF CONSCIOUSNESS: 0
SORE THROAT: 0
SINUS PAIN: 0
TINGLING: 1
SEIZURES: 0
HOARSE VOICE: 0
TREMORS: 0
TROUBLE SWALLOWING: 0
NECK PAIN: 1
MUSCLE CRAMPS: 0
PARALYSIS: 0
NECK MASS: 0
JOINT SWELLING: 0
HEADACHES: 0

## 2021-11-12 ENCOUNTER — OFFICE VISIT (OUTPATIENT)
Dept: NEUROLOGY | Facility: CLINIC | Age: 86
End: 2021-11-12
Payer: MEDICARE

## 2021-11-12 VITALS
BODY MASS INDEX: 25.04 KG/M2 | OXYGEN SATURATION: 98 % | WEIGHT: 124 LBS | RESPIRATION RATE: 16 BRPM | HEART RATE: 75 BPM | SYSTOLIC BLOOD PRESSURE: 121 MMHG | DIASTOLIC BLOOD PRESSURE: 77 MMHG

## 2021-11-12 DIAGNOSIS — G60.9 IDIOPATHIC PERIPHERAL NEUROPATHY: Primary | ICD-10-CM

## 2021-11-12 DIAGNOSIS — M54.16 LUMBAR RADICULOPATHY: ICD-10-CM

## 2021-11-12 DIAGNOSIS — R26.9 GAIT DISORDER: ICD-10-CM

## 2021-11-12 PROCEDURE — 99214 OFFICE O/P EST MOD 30 MIN: CPT | Performed by: INTERNAL MEDICINE

## 2021-11-12 ASSESSMENT — PAIN SCALES - GENERAL: PAINLEVEL: NO PAIN (0)

## 2021-11-12 NOTE — PROGRESS NOTES
[facility] Neurology Follow Up Visit    Kerri Rocha MRN# [id number]   Age: 85 year old YOB: 1935     Brief history of symptoms: The patient was initially seen in neurologic consultation on 10/9/2020 for evaluation of balance difficulties. Please see the comprehensive neurologic consultation note from that date in the Epic records for details.     Interval history:  Balance is a little worse compared to before. Her legs feel a little heavier compared to before.     She is occasionally using a cane outsides. She hasn't fallen.     Numbness in the legs is stable.     Patient was seen in rheumatologic clinic for positive LOYDA, which was not thought to be contributory to symptoms.     Patient seen in clinic with Dr Austin and had bilateral L5-S1 facet joint injections, which resulted in improvement in pain for 2 weeks.       Past Medical History:     Patient Active Problem List   Diagnosis     Essential hypertension     Idiopathic peripheral neuropathy     Pseudophakia of both eyes     Regular astigmatism of both eyes     Localized osteoporosis without current pathological fracture - 2020 waiting for dental work to start fosamax.      Mixed hyperlipidemia     Gait disorder     Vertigo     Chronic kidney disease, stage 3 (H)     Lumbar spondylosis     Lumbar facet arthropathy     Past Medical History:   Diagnosis Date     HLD (hyperlipidemia)      HTN (hypertension)      Idiopathic neuropathy      Vitamin D deficiency         Past Surgical History:     Past Surgical History:   Procedure Laterality Date     APPENDECTOMY       BREAST BIOPSY, CORE RT/LT       CARPAL TUNNEL RELEASE RT/LT       CATARACT IOL, RT/LT        SECTION      x2     HYSTERECTOMY TOTAL ABDOMINAL, BILATERAL SALPINGO-OOPHORECTOMY, COMBINED       INJECT BLOCK MEDIAL BRANCH CERVICAL/THORACIC/LUMBAR Bilateral 10/18/2021    Procedure: Bilateral L5-S1 FACET JOINT Injection;  Surgeon: Stephanie Momin MD;  Location: [facility] OR     LAMINECTOMY  LUMBAR THREE+ LEVELS  03/22/2016    L3-L5     TONSILLECTOMY & ADENOIDECTOMY          Social History:     Social History     Tobacco Use     Smoking status: Never Smoker     Smokeless tobacco: Never Used   Substance Use Topics     Alcohol use: Yes     Alcohol/week: 1.0 standard drink     Comment: rare wine     Drug use: No        Family History:     Family History   Problem Relation Age of Onset     Hypertension Mother      Heart Disease Father      Hypertension Father      Coronary Artery Disease Father      Hypertension Sister      Heart Disease Brother      Hypertension Brother      Diabetes No family hx of      Cancer No family hx of         Medications:     Current Outpatient Medications   Medication Sig     amLODIPine (NORVASC) 10 MG tablet Take 1 tablet (10 mg) by mouth daily     atorvastatin (LIPITOR) 20 MG tablet Take 1 tablet (20 mg) by mouth At Bedtime     calcium carbonate (OS-CAMMIE 600 MG [place]. CA) 600 MG tablet Take 1,200 mg by mouth daily      Cholecalciferol (VITAMIN D3) 2000 UNITS CAPS 5,000 Int'l Units daily      Diphenhydramine-APAP, sleep, (TYLENOL PM EXTRA STRENGTH PO) Take by mouth nightly as needed     gabapentin (NEURONTIN) 400 MG capsule TAKE 2 CAPSULES IN THE MORNING, 2 CAPSULES AT NOON AND 2 CAPSULES IN THE EVENING     lisinopril (ZESTRIL) 10 MG tablet Take 1 tablet (10 mg) by mouth daily     omega 3 1000 MG CAPS Take 2 g by mouth     No current facility-administered medications for this visit.        Allergies:     Allergies   Allergen Reactions     Codeine      Sulfa Drugs      Sulfasalazine Other (See Comments)     Sulfate Rash        Review of Systems:   As above     Physical Exam:   Vitals: /77   Pulse 75   Resp 16   Wt 56.2 kg (124 lb)   SpO2 98%   BMI 25.04 kg/m     Neurologic:     Mental Status: Fully alert, attentive. Normal memory and fund of knowledge. Language normal, speech clear and fluent, no paraphasic errors.    Cranial Nerves: No dysarthria.      Motor: No tremors  or other abnormal movements observed. Muscle tone normal throughout. Normal/symmetric rapid finger tapping. Strength 5/5 throughout upper and lower extremities with exception of 4/5 bilateral APB, 5-/5 finger extension / ADM / FDI.     Deep Tendon Reflexes: 2+/symmetric throughout upper extremities, 2+ patella, and 1+ to trace ankle jerks. Toes downgoing bilaterally.     Sensory: Vibration is 1-2 second in the right great toe, 3 seconds in the left great toe, normal in the hands. Decreased sensation to temperature in the feet compared to hands.  Intact/symmetric to proprioception throughout upper and lower extremities. Negative Romberg.      Coordination: Finger-nose-finger without dysmetria. Rapid alternating movements intact/symmetric with normal speed and rhythm.     Gait: Mildly wide based gait but steady. Some mild unsteadiness with heel and toe gait. Needs to hold on to examiner for support during tandem gait, but can take 1-2 steps on own.     Data reviewed on previous visits    Imaging:  MRI lumbar 4/13/2019  Impression:   1. Multilevel lumbar spondylosis, most pronounced at L3-4 with  moderate severe left and mild to moderate right neural foraminal  stenosis.  2. Synovial cyst within the right neural foramen of L5-S1 narrows the  right lateral recess and likely impinges upon the traversing right S1  nerve root.  3. Postsurgical changes of instrumented fusion of L4-5.     MRI cervical spine 1/2019  IMPRESSION: Mild multilevel degenerative changes with multilevel  neural foraminal stenoses without significant canal stenosis. No  myelopathy signal changes.      MRI brain 11/2018  Impression:    Normal brain for age with attention to the vestibular and auditory  structures. No specific finding to explain the patient's symptoms.     Procedures:  EMG 4/2019  Interpretation:  This is an abnormal EMG.  Findings are consistent with a severe length-dependent axonal sensorimotor polyneuropathy that appears to have  advanced from prior EMG in 2003.  There is not clear evidence for overlying lumbosacral radiculopathy but in light of the severe peripheral neuropathy these may be masked and clinical correlation is advised.    Laboratory:  B12 1705 (8/2020)  TSH normal 2017  Per chart review, SPEP and A1c many years back was reportedly normal    MRI thoracic 10/28/2020  IMPRESSION:  1. Small posterior disc herniation at the T7-T8 level that mildly  deforms the anterior surface of the thoracic spinal cord.  2. Otherwise, normal thoracic spine MRI. No spinal canal or neural  foraminal stenosis. No evidence for fracture.    Labs 10/2020 - unremarkable ESR/CRP, SPEP/immunofixation, light chains, A1c, TSH  Pertinent Investigations since last visit:   Labs 5/2021 - With exception of positive LOYDA (1:1250, nucleolar), normal/negative Copper, Zinc, Vitamin E, heavy metal screen, B1, B6, LOYDA, SSA/SSB         Assessment and Plan:   Assessment:  Kerri Rocha is a 85 year old female who presents today for follow-up of balance problems. 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 She also has lumbar stenosis and had decompression surgery in 2016. Over the past 4-5 years tingling in the lower extremities has been stable, but balance has significantly worsened.     MRI lumbar spine in 4/2019 with post surgical changes, and residual stenosis, most prominent at bilateral L3-4 segments. Brain and cervical spine MRI did not reveal clear etiology of balance problems. MRI thoracic spine performed in fall 2020 and did not show etiology of worsening balance. Patient had EMG in April 2021 which showed mild worsening of neuropathy compared to previous study 2 years ago, and no evidence of active lumbosacral radiculopathy. Neuropathy work-up as above has been unrevealing. She denies any family history of neuropathy.     Examination today shows stable mildly wide based gait, trace ankle jerks,  and distal lower extremity vibration / light touch / temperature deficits.     Plan:  - Consider PT excercies  - Discuss with PMR repeating facet joint injections    Follow up in Neurology clinic in 6 months or sooner if new issues arise    Jason Gillette MD   of Neurology  [facility]      The total time of this encounter today amounted to 31 minutes. This time included time spent with the patient, prep work, ordering tests, and performing post visit documentation.

## 2021-11-12 NOTE — LETTER
11/12/2021       RE: Kerri Rocha  [address]  [facility] [place] [address]     Dear Colleague,    Thank you for referring your patient, Kerri Rocha, to the [facility] NEUROLOG[facility] [place] [facility]. Please see a copy of my visit note below.    [facility] Neurology Follow Up Visit    Kerri Rocha MRN# [id number]   Age: 85 year old YOB: 1935     Brief history of symptoms: The patient was initially seen in neurologic consultation on 10/9/2020 for evaluation of balance difficulties. Please see the comprehensive neurologic consultation note from that date in the Epic records for details.     Interval history:  Balance is a little worse compared to before. Her legs feel a little heavier compared to before.     She is occasionally using a cane outsides. She hasn't fallen.     Numbness in the legs is stable.     Patient was seen in rheumatologic clinic for positive LOYDA, which was not thought to be contributory to symptoms.     Patient seen in clinic with Dr Austin and had bilateral L5-S1 facet joint injections, which resulted in improvement in pain for 2 weeks.       Past Medical History:     Patient Active Problem List   Diagnosis     Essential hypertension     Idiopathic peripheral neuropathy     Pseudophakia of both eyes     Regular astigmatism of both eyes     Localized osteoporosis without current pathological fracture - 8/2020 waiting for dental work to start fosamax.      Mixed hyperlipidemia     Gait disorder     Vertigo     Chronic kidney disease, stage 3 (H)     Lumbar spondylosis     Lumbar facet arthropathy     Past Medical History:   Diagnosis Date     HLD (hyperlipidemia)      HTN (hypertension)      Idiopathic neuropathy      Vitamin D deficiency         Past Surgical History:     Past Surgical History:   Procedure Laterality Date     APPENDECTOMY       BREAST BIOPSY, CORE RT/LT       CARPAL TUNNEL RELEASE RT/LT        CATARACT IOL, RT/LT        SECTION      x2     HYSTERECTOMY TOTAL ABDOMINAL, BILATERAL SALPINGO-OOPHORECTOMY, COMBINED       INJECT BLOCK MEDIAL BRANCH CERVICAL/THORACIC/LUMBAR Bilateral 10/18/2021    Procedure: Bilateral L5-S1 FACET JOINT Injection;  Surgeon: Stephanie Momin MD;  Location: [facility] OR     LAMINECTOMY LUMBAR THREE+ LEVELS  2016    L3-L5     TONSILLECTOMY & ADENOIDECTOMY          Social History:     Social History     Tobacco Use     Smoking status: Never Smoker     Smokeless tobacco: Never Used   Substance Use Topics     Alcohol use: Yes     Alcohol/week: 1.0 standard drink     Comment: rare wine     Drug use: No        Family History:     Family History   Problem Relation Age of Onset     Hypertension Mother      Heart Disease Father      Hypertension Father      Coronary Artery Disease Father      Hypertension Sister      Heart Disease Brother      Hypertension Brother      Diabetes No family hx of      Cancer No family hx of         Medications:     Current Outpatient Medications   Medication Sig     amLODIPine (NORVASC) 10 MG tablet Take 1 tablet (10 mg) by mouth daily     atorvastatin (LIPITOR) 20 MG tablet Take 1 tablet (20 mg) by mouth At Bedtime     calcium carbonate (OS-CAMMIE 600 MG [place]. CA) 600 MG tablet Take 1,200 mg by mouth daily      Cholecalciferol (VITAMIN D3) 2000 UNITS CAPS 5,000 Int'l Units daily      Diphenhydramine-APAP, sleep, (TYLENOL PM EXTRA STRENGTH PO) Take by mouth nightly as needed     gabapentin (NEURONTIN) 400 MG capsule TAKE 2 CAPSULES IN THE MORNING, 2 CAPSULES AT NOON AND 2 CAPSULES IN THE EVENING     lisinopril (ZESTRIL) 10 MG tablet Take 1 tablet (10 mg) by mouth daily     omega 3 1000 MG CAPS Take 2 g by mouth     No current facility-administered medications for this visit.        Allergies:     Allergies   Allergen Reactions     Codeine      Sulfa Drugs      Sulfasalazine Other (See Comments)     Sulfate Rash        Review of Systems:   As  above     Physical Exam:   Vitals: /77   Pulse 75   Resp 16   Wt 56.2 kg (124 lb)   SpO2 98%   BMI 25.04 kg/m     Neurologic:     Mental Status: Fully alert, attentive. Normal memory and fund of knowledge. Language normal, speech clear and fluent, no paraphasic errors.    Cranial Nerves: No dysarthria.      Motor: No tremors or other abnormal movements observed. Muscle tone normal throughout. Normal/symmetric rapid finger tapping. Strength 5/5 throughout upper and lower extremities with exception of 4/5 bilateral APB, 5-/5 finger extension / ADM / FDI.     Deep Tendon Reflexes: 2+/symmetric throughout upper extremities, 2+ patella, and 1+ to trace ankle jerks. Toes downgoing bilaterally.     Sensory: Vibration is 1-2 second in the right great toe, 3 seconds in the left great toe, normal in the hands. Decreased sensation to temperature in the feet compared to hands.  Intact/symmetric to proprioception throughout upper and lower extremities. Negative Romberg.      Coordination: Finger-nose-finger without dysmetria. Rapid alternating movements intact/symmetric with normal speed and rhythm.     Gait: Mildly wide based gait but steady. Some mild unsteadiness with heel and toe gait. Needs to hold on to examiner for support during tandem gait, but can take 1-2 steps on own.     Data reviewed on previous visits    Imaging:  MRI lumbar 4/13/2019  Impression:   1. Multilevel lumbar spondylosis, most pronounced at L3-4 with  moderate severe left and mild to moderate right neural foraminal  stenosis.  2. Synovial cyst within the right neural foramen of L5-S1 narrows the  right lateral recess and likely impinges upon the traversing right S1  nerve root.  3. Postsurgical changes of instrumented fusion of L4-5.     MRI cervical spine 1/2019  IMPRESSION: Mild multilevel degenerative changes with multilevel  neural foraminal stenoses without significant canal stenosis. No  myelopathy signal changes.      MRI brain  11/2018  Impression:    Normal brain for age with attention to the vestibular and auditory  structures. No specific finding to explain the patient's symptoms.     Procedures:  EMG 4/2019  Interpretation:  This is an abnormal EMG.  Findings are consistent with a severe length-dependent axonal sensorimotor polyneuropathy that appears to have advanced from prior EMG in 2003.  There is not clear evidence for overlying lumbosacral radiculopathy but in light of the severe peripheral neuropathy these may be masked and clinical correlation is advised.    Laboratory:  B12 1705 (8/2020)  TSH normal 2017  Per chart review, SPEP and A1c many years back was reportedly normal    MRI thoracic 10/28/2020  IMPRESSION:  1. Small posterior disc herniation at the T7-T8 level that mildly  deforms the anterior surface of the thoracic spinal cord.  2. Otherwise, normal thoracic spine MRI. No spinal canal or neural  foraminal stenosis. No evidence for fracture.    Labs 10/2020 - unremarkable ESR/CRP, SPEP/immunofixation, light chains, A1c, TSH  Pertinent Investigations since last visit:   Labs 5/2021 - With exception of positive LOYDA (1:1250, nucleolar), normal/negative Copper, Zinc, Vitamin E, heavy metal screen, B1, B6, LOYDA, SSA/SSB         Assessment and Plan:   Assessment:  Kerri Rocha is a 85 year old female who presents today for follow-up of balance problems. 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 She also has lumbar stenosis and had decompression surgery in 2016. Over the past 4-5 years tingling in the lower extremities has been stable, but balance has significantly worsened.     MRI lumbar spine in 4/2019 with post surgical changes, and residual stenosis, most prominent at bilateral L3-4 segments. Brain and cervical spine MRI did not reveal clear etiology of balance problems. MRI thoracic spine performed in fall 2020 and did not show etiology of worsening  balance. Patient had EMG in April 2021 which showed mild worsening of neuropathy compared to previous study 2 years ago, and no evidence of active lumbosacral radiculopathy. Neuropathy work-up as above has been unrevealing. She denies any family history of neuropathy.     Examination today shows stable mildly wide based gait, trace ankle jerks, and distal lower extremity vibration / light touch / temperature deficits.     Plan:  - Consider PT excercies  - Discuss with PMR repeating facet joint injections    Follow up in Neurology clinic in 6 months or sooner if new issues arise    Jason Gillette MD   of Neurology  [facility]      The total time of this encounter today amounted to 31 minutes. This time included time spent with the patient, prep work, ordering tests, and performing post visit documentation.        Again, thank you for allowing me to participate in the care of your patient.      Sincerely,    Jason Gillette MD

## 2021-11-12 NOTE — NURSING NOTE
Chief Complaint   Patient presents with     RECHECK     [facility] RETURN- 6 MONTH F/U     Magali Pierson

## 2021-11-15 ENCOUNTER — MYC MEDICAL ADVICE (OUTPATIENT)
Dept: PEDIATRICS | Facility: CLINIC | Age: 86
End: 2021-11-15
Payer: MEDICARE

## 2021-11-15 NOTE — TELEPHONE ENCOUNTER
Called patient and gave her address to [facility].     Closing encounter.     Duyen Greenfield, CMA

## 2021-11-26 ENCOUNTER — HOSPITAL ENCOUNTER (OUTPATIENT)
Dept: PHYSICAL THERAPY | Facility: CLINIC | Age: 86
End: 2021-11-26
Payer: MEDICARE

## 2021-11-26 DIAGNOSIS — M54.50 CHRONIC LOW BACK PAIN, UNSPECIFIED BACK PAIN LATERALITY, UNSPECIFIED WHETHER SCIATICA PRESENT: ICD-10-CM

## 2021-11-26 DIAGNOSIS — R42 DIZZINESS: ICD-10-CM

## 2021-11-26 DIAGNOSIS — R53.81 PHYSICAL DECONDITIONING: ICD-10-CM

## 2021-11-26 DIAGNOSIS — R26.9 ABNORMAL GAIT: ICD-10-CM

## 2021-11-26 DIAGNOSIS — R26.89 IMPAIRMENT OF BALANCE: Primary | ICD-10-CM

## 2021-11-26 DIAGNOSIS — G89.29 CHRONIC LOW BACK PAIN, UNSPECIFIED BACK PAIN LATERALITY, UNSPECIFIED WHETHER SCIATICA PRESENT: ICD-10-CM

## 2021-11-26 PROCEDURE — 97112 NEUROMUSCULAR REEDUCATION: CPT | Mod: GP | Performed by: PHYSICAL THERAPIST

## 2021-12-03 ENCOUNTER — HOSPITAL ENCOUNTER (OUTPATIENT)
Dept: PHYSICAL THERAPY | Facility: CLINIC | Age: 86
End: 2021-12-03
Payer: MEDICARE

## 2021-12-03 DIAGNOSIS — R26.9 ABNORMAL GAIT: ICD-10-CM

## 2021-12-03 DIAGNOSIS — R53.81 PHYSICAL DECONDITIONING: ICD-10-CM

## 2021-12-03 DIAGNOSIS — R26.89 IMPAIRMENT OF BALANCE: Primary | ICD-10-CM

## 2021-12-03 PROCEDURE — 97110 THERAPEUTIC EXERCISES: CPT | Mod: GP | Performed by: PHYSICAL THERAPIST

## 2021-12-10 ENCOUNTER — HOSPITAL ENCOUNTER (OUTPATIENT)
Dept: PHYSICAL THERAPY | Facility: CLINIC | Age: 86
End: 2021-12-10
Payer: MEDICARE

## 2021-12-10 DIAGNOSIS — R26.9 ABNORMAL GAIT: ICD-10-CM

## 2021-12-10 DIAGNOSIS — R53.81 PHYSICAL DECONDITIONING: ICD-10-CM

## 2021-12-10 DIAGNOSIS — R26.89 IMPAIRMENT OF BALANCE: Primary | ICD-10-CM

## 2021-12-10 PROCEDURE — 97110 THERAPEUTIC EXERCISES: CPT | Mod: GP | Performed by: PHYSICAL THERAPIST

## 2021-12-10 PROCEDURE — 97112 NEUROMUSCULAR REEDUCATION: CPT | Mod: GP | Performed by: PHYSICAL THERAPIST

## 2021-12-17 ENCOUNTER — HOSPITAL ENCOUNTER (OUTPATIENT)
Dept: PHYSICAL THERAPY | Facility: CLINIC | Age: 86
End: 2021-12-17
Payer: MEDICARE

## 2021-12-17 DIAGNOSIS — R26.9 ABNORMAL GAIT: ICD-10-CM

## 2021-12-17 DIAGNOSIS — R26.89 IMPAIRMENT OF BALANCE: Primary | ICD-10-CM

## 2021-12-17 DIAGNOSIS — R42 DIZZINESS: ICD-10-CM

## 2021-12-17 DIAGNOSIS — R53.81 PHYSICAL DECONDITIONING: ICD-10-CM

## 2021-12-17 PROCEDURE — 97110 THERAPEUTIC EXERCISES: CPT | Mod: GP | Performed by: PHYSICAL THERAPIST

## 2021-12-23 ENCOUNTER — HOSPITAL ENCOUNTER (OUTPATIENT)
Dept: PHYSICAL THERAPY | Facility: CLINIC | Age: 86
End: 2021-12-23
Payer: MEDICARE

## 2021-12-23 DIAGNOSIS — R52 PAIN: Primary | ICD-10-CM

## 2021-12-23 DIAGNOSIS — R53.81 PHYSICAL DECONDITIONING: ICD-10-CM

## 2021-12-23 DIAGNOSIS — M54.50 CHRONIC LOW BACK PAIN, UNSPECIFIED BACK PAIN LATERALITY, UNSPECIFIED WHETHER SCIATICA PRESENT: ICD-10-CM

## 2021-12-23 DIAGNOSIS — R26.9 ABNORMAL GAIT: ICD-10-CM

## 2021-12-23 DIAGNOSIS — R26.89 IMPAIRMENT OF BALANCE: ICD-10-CM

## 2021-12-23 DIAGNOSIS — G89.29 CHRONIC LOW BACK PAIN, UNSPECIFIED BACK PAIN LATERALITY, UNSPECIFIED WHETHER SCIATICA PRESENT: ICD-10-CM

## 2021-12-23 PROCEDURE — 97110 THERAPEUTIC EXERCISES: CPT | Mod: GP | Performed by: PHYSICAL THERAPIST

## 2021-12-30 ENCOUNTER — HOSPITAL ENCOUNTER (OUTPATIENT)
Dept: PHYSICAL THERAPY | Facility: CLINIC | Age: 86
End: 2021-12-30
Payer: MEDICARE

## 2021-12-30 DIAGNOSIS — R26.9 ABNORMAL GAIT: ICD-10-CM

## 2021-12-30 DIAGNOSIS — R53.81 PHYSICAL DECONDITIONING: Primary | ICD-10-CM

## 2021-12-30 DIAGNOSIS — R26.89 IMPAIRMENT OF BALANCE: ICD-10-CM

## 2021-12-30 PROCEDURE — 97110 THERAPEUTIC EXERCISES: CPT | Mod: GP | Performed by: PHYSICAL THERAPIST

## 2022-01-05 NOTE — PROGRESS NOTES
[facility]    OUTPATIENT PHYSICAL THERAPY  PLAN OF TREATMENT FOR OUTPATIENT REHABILITATION AND PROGRESS NOTE           Patient's Last Name, First Name, Kerri Price Date of Birth  1935   Provider's Name  [facility] Medical Record No.  [id number]    Onset Date  7/5/19 Start of Care Date  5/19/21   Type:     _X_PT   ___OT   ___SLP Medical Diagnosis  Idiopathic peripheral neuropathy; gait abnormality       PT Diagnosis  Idiopathic peripheral neuropathy; gait abnormality Plan of Treatment  Frequency/Duration: 1x/week 6 sessions  Certification date from 10/2/21 to 12/29/21, 12/29/21-3/28/22 (NOTE: Utilizing this as 2 certification windows as a very extended time frame passed between evaluation and follow up appts. Initial visits had been sporadic but with more consistent attendance, improvement noted.      Goals:  Goal Identifier 30s STS   Goal Description The patietn will be able to perform 15 STS transfers in 30 seconds to demonstrate a significant improvement in proximal LE strength to facilitate improaved stability and functional mobility allowing for greater particiaption in transfers, floor transfers, and gardenning tasks.    Target Date 02/28/22   Date Met      Progress (detail required for progress note): Eval 10 reps, Today 9 reps but experiencing pain and retrograde LOB; projecting goal time frame out- see commetns below regarding progress.      Goal Identifier Postural stability   Goal Description The patient will be able to maintain rhomberg and sharpened rhomberg conditioning with EO x 30 secodns while demonstarting a normalized degree of sway to demonstrate a significant improvement in postural stability and balance.    Target Date 02/28/22   Date Met      Progress (detail required for progress note): Eval: Rhomerg EO 30 seconds, EC 10  seconds; Today Rhomberg EO 30 sec, EC 30 seconds. Eval Sharpened Rhomberg unable, today 8 seconds. Improvement in postural stability goals noted. Rhomberg goals met. Projected goal out to allow for additional progress towards Sh Rhomberg.     Goal Identifier MET: Pain   Goal Description The patient will report a 50% improvement in overall pain severity to facilitate improved ease with mobility and improve activity tolerance.    Target Date 12/30/21   Date Met  12/30/21   Progress (detail required for progress note): MET     Goal Identifier MET: HEP   Goal Description The patietn will be IND in performance of HEP to facilitate gains in strength, endurance, and mobility as means to address asymmetries in length/tension relationships and strength of musculature contributing to LBP.    Target Date 12/30/21   Date Met  12/30/21   Progress (detail required for progress note): MET     Goal Identifier     Goal Description     Target Date     Date Met      Progress (detail required for progress note):       Goal Identifier     Goal Description     Target Date     Date Met      Progress (detail required for progress note):       Goal Identifier     Goal Description     Target Date     Date Met      Progress (detail required for progress note):       Goal Identifier     Goal Description     Target Date     Date Met      Progress (detail required for progress note):         Beginning/End Dates of Progress Note Reporting Period:  7/5/21 to 12/30/21    Progress Toward Goals:   Progress this reporting period: Patient has met 2/4 goals.  Patient had experienced an extended break in therapy between time of evaluation and follow-up appointments.  Initial 4-5 appointments were quite sporadic, however, with the recent increased consistency and attending sessions much improvement noted in pain and balance.  Patient's pain goal has been met and she has demonstrated great compliance with home programming.  Requesting additional sessions  to prioritize emphasis on building strength and to continue improvement towards additional balance goals.    Client Self (Subjective) Report for Progress Note Reporting Period: Patient present reporting that her sciatica is almost gone.  Pain is now centered over low back and tailbone.  With discussions regarding pain, patient does feel that pain is 50 to 60% improved    Outcome Measures (Most Recent Score):    See goal comments    Objective Measurements:   See goal comments         I CERTIFY THE NEED FOR THESE SERVICES FURNISHED UNDER        THIS PLAN OF TREATMENT AND WHILE UNDER MY CARE     (Physician co-signature of this document indicates review and certification of the therapy plan).                Referring Provider: Dr. Jason Blanton, PT

## 2022-01-13 ENCOUNTER — HOSPITAL ENCOUNTER (OUTPATIENT)
Dept: PHYSICAL THERAPY | Facility: CLINIC | Age: 87
End: 2022-01-13
Payer: MEDICARE

## 2022-01-13 DIAGNOSIS — R26.9 ABNORMAL GAIT: ICD-10-CM

## 2022-01-13 DIAGNOSIS — R26.89 IMPAIRMENT OF BALANCE: ICD-10-CM

## 2022-01-13 DIAGNOSIS — R53.81 PHYSICAL DECONDITIONING: Primary | ICD-10-CM

## 2022-01-13 PROCEDURE — 97140 MANUAL THERAPY 1/> REGIONS: CPT | Mod: GP | Performed by: PHYSICAL THERAPIST

## 2022-01-13 PROCEDURE — 97112 NEUROMUSCULAR REEDUCATION: CPT | Mod: GP | Performed by: PHYSICAL THERAPIST

## 2022-01-13 PROCEDURE — 97110 THERAPEUTIC EXERCISES: CPT | Mod: GP | Performed by: PHYSICAL THERAPIST

## 2022-01-19 ENCOUNTER — TELEPHONE (OUTPATIENT)
Dept: NURSING | Facility: CLINIC | Age: 87
End: 2022-01-19
Payer: MEDICARE

## 2022-01-19 NOTE — TELEPHONE ENCOUNTER
Telephone call    Patient called to report  She tested positive yesterday with the rapid test and she wants to get a PCR test done at [facility].  She stated her symptoms started 3 days ago and she is feeling pretty good.  Talked to her about the monoclonal antibodies she wants to get a PCR test first.  Transferred to the  to make a appointment.    Jessica Vera RN   [facility] Nurse Advisor  2:05 PM 1/19/2022

## 2022-01-25 ENCOUNTER — LAB (OUTPATIENT)
Dept: FAMILY MEDICINE | Facility: CLINIC | Age: 87
End: 2022-01-25
Attending: FAMILY MEDICINE
Payer: MEDICARE

## 2022-01-25 DIAGNOSIS — Z20.822 SUSPECTED COVID-19 VIRUS INFECTION: ICD-10-CM

## 2022-01-25 PROCEDURE — U0003 INFECTIOUS AGENT DETECTION BY NUCLEIC ACID (DNA OR RNA); SEVERE ACUTE RESPIRATORY SYNDROME CORONAVIRUS 2 (SARS-COV-2) (CORONAVIRUS DISEASE [COVID-19]), AMPLIFIED PROBE TECHNIQUE, MAKING USE OF HIGH THROUGHPUT TECHNOLOGIES AS DESCRIBED BY CMS-2020-01-R: HCPCS

## 2022-01-25 PROCEDURE — U0005 INFEC AGEN DETEC AMPLI PROBE: HCPCS

## 2022-01-26 ENCOUNTER — TELEPHONE (OUTPATIENT)
Dept: FAMILY MEDICINE | Facility: CLINIC | Age: 87
End: 2022-01-26
Payer: MEDICARE

## 2022-01-26 LAB — SARS-COV-2 RNA RESP QL NAA+PROBE: POSITIVE

## 2022-01-26 NOTE — TELEPHONE ENCOUNTER
"Coronavirus (COVID-19) Notification    Caller Name (Patient, parent, daughter/son, grandparent, etc)  patient    Reason for call  Notify of Positive Coronavirus (COVID-19) lab results, assess symptoms,  review  [facility] [place] recommendations    Lab Result    Lab test:  2019-nCoV rRt-PCR or SARS-CoV-2 PCR    Oropharyngeal AND/OR nasopharyngeal swabs is POSITIVE for 2019-nCoV RNA/SARS-COV-2 PCR (COVID-19 virus)    RN Recommendations/Instructions per [facility] Coronavirus COVID-19 recommendations    Brief introduction script  Introduce self then review script:  \"I am calling on behalf of [employer].  We were notified that your Coronavirus test (COVID-19) for was POSITIVE for the virus.  I have some information to relay to you but first I wanted to mention that the [place] Dept of Health will be contacting you shortly [it's possible MD already called Patient] to talk to you more about how you are feeling and other people you have had contact with who might now also have the virus.  Also,  [employer] [place] is Partnering with the [facility] for Covid-19 research, you may be contacted directly by research staff.\"    Patient reports she is fully vaccinated for Covid with Pfizer.    Assessment (Inquire about Patient's current symptoms)   Assessment   Current Symptoms at time of phone call: (if no symptoms, document No symptoms] No symptoms   Symptoms onset (if applicable) 1/19/22     If at time of call, Patients symptoms hare worsened, the Patient should contact 911 or have someone drive them to Emergency Dept promptly:      If Patient calling 911, inform 911 personal that you have tested positive for the Coronavirus (COVID-19).  Place mask on and await 911 to arrive.    If Emergency Dept, If possible, please have another adult drive you to the Emergency Dept but you need to wear mask when in contact with other people.          Treatment Options:   Patient classified as COVID treatment eligible by [facility] " risk algorithm: Yes  Is the patient symptomatic at the time of result notification? No    Review information with Patient    Your result was positive. This means you have COVID-19 (coronavirus).  We have sent you a letter that reviews the information that I'll be reviewing with you now.    How can I protect others?    If you have symptoms: stay home and away from others (self-isolate) until:    You've had no fever--and no medicine that reduces fever--for 1 full day (24 hours). And       Your other symptoms have gotten better. For example, your cough or breathing has improved. And     At least 10 days have passed since your symptoms started. (If you've been told by a doctor that you have a weak immune system, wait 20 days.)     If you don't have symptoms: Stay home and away from others (self-isolate) until at least 10 days have passed since your first positive COVID-19 test. (Date test collected)    During this time:    Stay in your own room, including for meals. Use your own bathroom if you can.    Stay away from others in your home. No hugging, kissing or shaking hands. No visitors.     Don't go to work, school or anywhere else.     Clean  high touch  surfaces often (doorknobs, counters, handles, etc.). Use a household cleaning spray or wipes. You'll find a full list on the EPA website at www.epa.gov/pesticide-registration/list-n-disinfectants-use-against-sars-cov-2.     Cover your mouth and nose with a mask, tissue or other face covering to avoid spreading germs.    Wash your hands and face often with soap and water.    Make a list of people you have been in close contact with recently, even if either of you wore a face covering.   - Start your list from 2 days before you became ill or had a positive test.  - Include anyone that was within 6 feet of you for a cumulative total of 15 minutes or more in 24 hours. (Example: if you sat next to Raymundo for 5 minutes in the morning and 10 minutes in the afternoon, then you  were in close contact for 15 minutes total that day. Raymundo would be added to your list.)    A public health worker will call or text you. It is important that you answer. They will ask you questions about possible exposures to COVID-19, such as people you have been in direct contact with and places you have visited.    Tell the people on your list that you have COVID-19; they should stay away from others for 14 days starting from the last time they were in contact with you (unless you are told something different from a public health worker).     Caregivers in these groups are at risk for severe illness due to COVID-19:  o People 65 years and older  o People who live in a nursing home or long-term care facility  o People with chronic disease (lung, heart, cancer, diabetes, kidney, liver, immunologic)  o People who have a weakened immune system, including those who:  - Are in cancer treatment  - Take medicine that weakens the immune system, such as corticosteroids  - Had a bone marrow or organ transplant  - Have an immune deficiency  - Have poorly controlled HIV or AIDS  - Are obese (body mass index of 40 or higher)  - Smoke regularly    Caregivers should wear gloves while washing dishes, handling laundry and cleaning bedrooms and bathrooms.    Wash and dry laundry with special caution. Don't shake dirty laundry, and use the warmest water setting you can.    If you have a weakened immune system, ask your doctor about other actions you should take.    For more tips, go to www.cdc.gov/coronavirus/2019-ncov/downloads/10Things.pdf.    You should not go back to work until you meet the guidelines above for ending your home isolation. You don't need to be retested for COVID-19 before going back to work--studies show that you won't spread the virus if it's been at least 10 days since your symptoms started (or 20 days, if you have a weak immune system).    Employers: This document serves as formal notice of your employee's  medical guidelines for going back to work. They must meet the above guidelines before going back to work in person.    How can I take care of myself?    1. Get lots of rest. Drink extra fluids (unless a doctor has told you not to).    2. Take Tylenol (acetaminophen) for fever or pain. If you have liver or kidney problems, ask your family doctor if it's okay to take Tylenol.     Take either:     650 mg (two 325 mg pills) every 4 to 6 hours, or     1,000 mg (two 500 mg pills) every 8 hours as needed.     Note: Don't take more than 3,000 mg in one day. Acetaminophen is found in many medicines (both prescribed and over-the-counter medicines). Read all labels to be sure you don't take too much.    For children, check the Tylenol bottle for the right dose (based on their age or weight).    3. If you have other health problems (like cancer, heart failure, an organ transplant or severe kidney disease): Call your specialty clinic if you don't feel better in the next 2 days.    4. Know when to call 911: Emergency warning signs include:    Trouble breathing or shortness of breath    Pain or pressure in the chest that doesn't go away    Feeling confused like you haven't felt before, or not being able to wake up    Bluish-colored lips or face    5. Sign up for [employer]. We know it's scary to hear that you have COVID-19. We want to track your symptoms to make sure you're okay over the next 2 weeks. Please look for an email from [employer]--this is a free, online program that we'll use to keep in touch. To sign up, follow the link in the email. Learn more at [email or web address].    Where can I get more information?     [facility] [place]: [email or web address]    Coronavirus Basics: www.health.[facility].[place]/diseases/coronavirus/basics.html    What to Do If You're Sick: www.cdc.gov/coronavirus/2019-ncov/about/steps-when-sick.html    Ending Home Isolation:  www.cdc.gov/coronavirus/2019-ncov/hcp/disposition-in-home-patients.html     Caring for Someone with COVID-19: www.cdc.gov/coronavirus/2019-ncov/if-you-are-sick/care-for-someone.html     [facility] clinical trials (COVID-19 research studies): [email or web address].[facility]-clinical-trials     A Positive COVID-19 letter will be sent via [employer] or the mail. (Exception, no letters sent to Presurgerical/Preprocedure Patients)    Lala Gonzalez LPN

## 2022-03-01 ENCOUNTER — HOSPITAL ENCOUNTER (OUTPATIENT)
Dept: PHYSICAL THERAPY | Facility: CLINIC | Age: 87
End: 2022-03-01
Payer: MEDICARE

## 2022-03-01 DIAGNOSIS — R26.89 IMPAIRMENT OF BALANCE: ICD-10-CM

## 2022-03-01 DIAGNOSIS — R52 PAIN: ICD-10-CM

## 2022-03-01 DIAGNOSIS — R26.9 ABNORMAL GAIT: ICD-10-CM

## 2022-03-01 DIAGNOSIS — R53.81 PHYSICAL DECONDITIONING: Primary | ICD-10-CM

## 2022-03-01 PROCEDURE — 97750 PHYSICAL PERFORMANCE TEST: CPT | Mod: GP | Performed by: PHYSICAL THERAPIST

## 2022-03-01 PROCEDURE — 97110 THERAPEUTIC EXERCISES: CPT | Mod: GP | Performed by: PHYSICAL THERAPIST

## 2022-03-01 NOTE — PROGRESS NOTES
[facility]    OUTPATIENT PHYSICAL THERAPY  PLAN OF TREATMENT FOR OUTPATIENT REHABILITATION AND PROGRESS NOTE           Patient's Last Name, First Name, Kerri Price Date of Birth  1935   Provider's Name  [facility] Medical Record No.  [id number]    Onset Date  5/19/2021 Start of Care Date  7/5/2021   Type:     _X_PT   ___OT   ___SLP Medical Diagnosis   Idiopathic peripheral neuropathy; gait abnormality       PT Diagnosis  decreased safe functional mobility and activity tolerance Plan of Treatment  Frequency/Duration: 1x/week, 6 visists  Certification date from 3/1/2022 to 5/29/2022     Goals:  Goal Identifier 30s STS   Goal Description The patietn will be able to perform 15 STS transfers in 30 seconds to demonstrate a significant improvement in proximal LE strength to facilitate improaved stability and functional mobility allowing for greater particiaption in transfers, floor transfers, and gardenning tasks.    Target Date 04/30/22   Date Met      Progress (detail required for progress note): Eval 10; 1/13 9reps but performance limited by pain; Today 12 reps without UE support.      Goal Identifier mCTSIB   Goal Description Patient will be able to maintain balance in first 3 conditions x 30 seconds and final condition x 20 secodns to demonstarte a significant improvement in postural stability under varied conditions.    Target Date 04/30/22   Date Met      Progress (detail required for progress note): Modifying postural stability goal to performance on mCTSIB- see progress ntoe for more detail. Maintaining Rhomberg EO/EC x 30 seconds, Rhomberg EO on foam x 30 seconds, Rhomberg EC on foam x 11 seconds.      Goal Identifier MET: Pain   Goal Description The patient will report a 50% improvement in overall pain severity to facilitate improved ease with  mobility and improve activity tolerance.    Target Date 12/30/21   Date Met  12/30/21   Progress (detail required for progress note): MET     Goal Identifier MET: HEP   Goal Description The patietn will be IND in performance of HEP to facilitate gains in strength, endurance, and mobility as means to address asymmetries in length/tension relationships and strength of musculature contributing to LBP.   Target Date 12/30/21   Date Met  12/30/21   Progress (detail required for progress note): MET     Goal Identifier     Goal Description     Target Date     Date Met      Progress (detail required for progress note):       Goal Identifier     Goal Description     Target Date     Date Met      Progress (detail required for progress note):       Goal Identifier     Goal Description     Target Date     Date Met      Progress (detail required for progress note):       Goal Identifier     Goal Description     Target Date     Date Met      Progress (detail required for progress note):         Beginning/End Dates of Progress Note Reporting Period:  12/30/21 to 3/1/22    Progress Toward Goals:    Progress this reporting period: Particiapting in review of remaining goals as related to balance and strength. Improved scoring in 30s STS noted with patient eprforming 12 reps this date. Patient scoring similarly on rhomberg EO/EC, maintaining stance x 30 seconds. Trialed sharpened rhomberg and patient maintaining x 9 seconds. Slight, but minimial improvement. Modified postural stability target to mCTSIB as I do feel that back pain could be limiting performance in sharpened rhomberg stance. Updated goal accordingly. Progressing well in strength despite set back with COVID. Would like to recommend additional sessions- 5 of 6 previously recommended sessions were cancelled due to COVID exposure. Having patient schedule remaining sessions.       Client Self (Subjective) Report for Progress Note Reporting Period: Patient seen today for first  time in 1.5 months due to COVID. Notes that she feels she has recovered from COVID. Had increased pain, but feels this is improving and close to where we had left off in therapy. Patient has returned to pain management programming with mobility drills, use of heat and occasional use of theracane. Is continuing to work on exercises and has returned to participation in strength drills as well . Notes that balance drill is still quite challenging. Does feel as though strength programming has gotten easier.     Outcome Measures (Most Recent Score):    See goals    Objective Measurements:    See goal comments                                             I CERTIFY THE NEED FOR THESE SERVICES FURNISHED UNDER        THIS PLAN OF TREATMENT AND WHILE UNDER MY CARE     (Physician co-signature of this document indicates review and certification of the therapy plan).                Referring Provider: Jason Gillette MD Jennifer Ihbe-Eberts, PT

## 2022-03-10 ENCOUNTER — HOSPITAL ENCOUNTER (OUTPATIENT)
Dept: PHYSICAL THERAPY | Facility: CLINIC | Age: 87
Discharge: HOME | End: 2022-03-10
Payer: MEDICARE

## 2022-03-10 DIAGNOSIS — R52 PAIN: ICD-10-CM

## 2022-03-10 DIAGNOSIS — R26.9 ABNORMAL GAIT: ICD-10-CM

## 2022-03-10 DIAGNOSIS — R26.89 IMPAIRMENT OF BALANCE: Primary | ICD-10-CM

## 2022-03-10 DIAGNOSIS — R53.81 PHYSICAL DECONDITIONING: ICD-10-CM

## 2022-03-10 PROCEDURE — 97110 THERAPEUTIC EXERCISES: CPT | Mod: GP | Performed by: PHYSICAL THERAPIST

## 2022-03-10 PROCEDURE — 97112 NEUROMUSCULAR REEDUCATION: CPT | Mod: GP | Performed by: PHYSICAL THERAPIST

## 2022-03-10 PROCEDURE — 97530 THERAPEUTIC ACTIVITIES: CPT | Mod: GP | Performed by: PHYSICAL THERAPIST

## 2022-03-17 ENCOUNTER — HOSPITAL ENCOUNTER (OUTPATIENT)
Dept: PHYSICAL THERAPY | Facility: CLINIC | Age: 87
Discharge: HOME | End: 2022-03-17
Payer: MEDICARE

## 2022-03-17 DIAGNOSIS — R53.81 PHYSICAL DECONDITIONING: ICD-10-CM

## 2022-03-17 DIAGNOSIS — R26.9 ABNORMAL GAIT: ICD-10-CM

## 2022-03-17 DIAGNOSIS — R26.89 IMPAIRMENT OF BALANCE: ICD-10-CM

## 2022-03-17 DIAGNOSIS — R52 PAIN: Primary | ICD-10-CM

## 2022-03-17 PROCEDURE — 97140 MANUAL THERAPY 1/> REGIONS: CPT | Mod: GP | Performed by: PHYSICAL THERAPIST

## 2022-03-17 PROCEDURE — 97110 THERAPEUTIC EXERCISES: CPT | Mod: GP | Performed by: PHYSICAL THERAPIST

## 2022-03-24 ENCOUNTER — HOSPITAL ENCOUNTER (OUTPATIENT)
Dept: PHYSICAL THERAPY | Facility: CLINIC | Age: 87
Discharge: HOME | End: 2022-03-24
Payer: MEDICARE

## 2022-03-24 DIAGNOSIS — R53.81 PHYSICAL DECONDITIONING: ICD-10-CM

## 2022-03-24 DIAGNOSIS — G60.9 IDIOPATHIC PERIPHERAL NEUROPATHY: ICD-10-CM

## 2022-03-24 DIAGNOSIS — G89.29 CHRONIC LOW BACK PAIN, UNSPECIFIED BACK PAIN LATERALITY, UNSPECIFIED WHETHER SCIATICA PRESENT: ICD-10-CM

## 2022-03-24 DIAGNOSIS — R52 PAIN: ICD-10-CM

## 2022-03-24 DIAGNOSIS — M54.50 CHRONIC LOW BACK PAIN, UNSPECIFIED BACK PAIN LATERALITY, UNSPECIFIED WHETHER SCIATICA PRESENT: ICD-10-CM

## 2022-03-24 DIAGNOSIS — R26.9 ABNORMAL GAIT: Primary | ICD-10-CM

## 2022-03-24 DIAGNOSIS — R26.89 IMPAIRMENT OF BALANCE: ICD-10-CM

## 2022-03-24 PROCEDURE — 97110 THERAPEUTIC EXERCISES: CPT | Mod: GP | Performed by: PHYSICAL THERAPIST

## 2022-03-24 PROCEDURE — 97112 NEUROMUSCULAR REEDUCATION: CPT | Mod: GP | Performed by: PHYSICAL THERAPIST

## 2022-04-01 ENCOUNTER — HOSPITAL ENCOUNTER (OUTPATIENT)
Dept: PHYSICAL THERAPY | Facility: CLINIC | Age: 87
Discharge: HOME | End: 2022-04-01
Payer: MEDICARE

## 2022-04-01 DIAGNOSIS — G89.29 CHRONIC LOW BACK PAIN, UNSPECIFIED BACK PAIN LATERALITY, UNSPECIFIED WHETHER SCIATICA PRESENT: ICD-10-CM

## 2022-04-01 DIAGNOSIS — M54.50 CHRONIC LOW BACK PAIN, UNSPECIFIED BACK PAIN LATERALITY, UNSPECIFIED WHETHER SCIATICA PRESENT: ICD-10-CM

## 2022-04-01 DIAGNOSIS — R26.89 IMPAIRMENT OF BALANCE: ICD-10-CM

## 2022-04-01 DIAGNOSIS — G60.9 IDIOPATHIC PERIPHERAL NEUROPATHY: ICD-10-CM

## 2022-04-01 DIAGNOSIS — R26.9 ABNORMAL GAIT: Primary | ICD-10-CM

## 2022-04-01 DIAGNOSIS — R52 PAIN: ICD-10-CM

## 2022-04-01 DIAGNOSIS — R53.81 PHYSICAL DECONDITIONING: ICD-10-CM

## 2022-04-01 PROCEDURE — 97112 NEUROMUSCULAR REEDUCATION: CPT | Mod: GP | Performed by: PHYSICAL THERAPIST

## 2022-04-01 PROCEDURE — 97110 THERAPEUTIC EXERCISES: CPT | Mod: GP | Performed by: PHYSICAL THERAPIST

## 2022-04-08 ENCOUNTER — HOSPITAL ENCOUNTER (OUTPATIENT)
Dept: PHYSICAL THERAPY | Facility: CLINIC | Age: 87
Discharge: HOME | End: 2022-04-08
Payer: MEDICARE

## 2022-04-08 DIAGNOSIS — R26.9 ABNORMAL GAIT: Primary | ICD-10-CM

## 2022-04-08 DIAGNOSIS — G60.9 IDIOPATHIC PERIPHERAL NEUROPATHY: ICD-10-CM

## 2022-04-08 DIAGNOSIS — R52 PAIN: ICD-10-CM

## 2022-04-08 DIAGNOSIS — R26.89 IMPAIRMENT OF BALANCE: ICD-10-CM

## 2022-04-08 DIAGNOSIS — R53.81 PHYSICAL DECONDITIONING: ICD-10-CM

## 2022-04-08 DIAGNOSIS — G89.29 CHRONIC LOW BACK PAIN, UNSPECIFIED BACK PAIN LATERALITY, UNSPECIFIED WHETHER SCIATICA PRESENT: ICD-10-CM

## 2022-04-08 DIAGNOSIS — M54.50 CHRONIC LOW BACK PAIN, UNSPECIFIED BACK PAIN LATERALITY, UNSPECIFIED WHETHER SCIATICA PRESENT: ICD-10-CM

## 2022-04-08 PROCEDURE — 97112 NEUROMUSCULAR REEDUCATION: CPT | Mod: GP | Performed by: PHYSICAL THERAPIST

## 2022-04-08 PROCEDURE — 97110 THERAPEUTIC EXERCISES: CPT | Mod: GP | Performed by: PHYSICAL THERAPIST

## 2022-04-22 ENCOUNTER — HOSPITAL ENCOUNTER (OUTPATIENT)
Dept: PHYSICAL THERAPY | Facility: CLINIC | Age: 87
Discharge: HOME | End: 2022-04-22
Payer: MEDICARE

## 2022-04-22 DIAGNOSIS — R52 PAIN: ICD-10-CM

## 2022-04-22 DIAGNOSIS — R53.81 PHYSICAL DECONDITIONING: ICD-10-CM

## 2022-04-22 DIAGNOSIS — R26.9 ABNORMAL GAIT: Primary | ICD-10-CM

## 2022-04-22 DIAGNOSIS — M54.50 CHRONIC LOW BACK PAIN, UNSPECIFIED BACK PAIN LATERALITY, UNSPECIFIED WHETHER SCIATICA PRESENT: ICD-10-CM

## 2022-04-22 DIAGNOSIS — G60.9 IDIOPATHIC PERIPHERAL NEUROPATHY: ICD-10-CM

## 2022-04-22 DIAGNOSIS — G89.29 CHRONIC LOW BACK PAIN, UNSPECIFIED BACK PAIN LATERALITY, UNSPECIFIED WHETHER SCIATICA PRESENT: ICD-10-CM

## 2022-04-22 DIAGNOSIS — R26.89 IMPAIRMENT OF BALANCE: ICD-10-CM

## 2022-04-22 PROCEDURE — 97112 NEUROMUSCULAR REEDUCATION: CPT | Mod: GP | Performed by: PHYSICAL THERAPIST

## 2022-04-22 PROCEDURE — 97110 THERAPEUTIC EXERCISES: CPT | Mod: GP | Performed by: PHYSICAL THERAPIST

## 2022-04-29 ENCOUNTER — HOSPITAL ENCOUNTER (OUTPATIENT)
Dept: PHYSICAL THERAPY | Facility: CLINIC | Age: 87
Discharge: HOME | End: 2022-04-29
Payer: MEDICARE

## 2022-04-29 DIAGNOSIS — R26.9 ABNORMAL GAIT: Primary | ICD-10-CM

## 2022-04-29 DIAGNOSIS — R53.81 PHYSICAL DECONDITIONING: ICD-10-CM

## 2022-04-29 DIAGNOSIS — R52 PAIN: ICD-10-CM

## 2022-04-29 DIAGNOSIS — G89.29 CHRONIC LOW BACK PAIN, UNSPECIFIED BACK PAIN LATERALITY, UNSPECIFIED WHETHER SCIATICA PRESENT: ICD-10-CM

## 2022-04-29 DIAGNOSIS — M54.50 CHRONIC LOW BACK PAIN, UNSPECIFIED BACK PAIN LATERALITY, UNSPECIFIED WHETHER SCIATICA PRESENT: ICD-10-CM

## 2022-04-29 DIAGNOSIS — G60.9 IDIOPATHIC PERIPHERAL NEUROPATHY: ICD-10-CM

## 2022-04-29 DIAGNOSIS — R26.89 IMPAIRMENT OF BALANCE: ICD-10-CM

## 2022-04-29 PROCEDURE — 97112 NEUROMUSCULAR REEDUCATION: CPT | Mod: GP | Performed by: PHYSICAL THERAPIST

## 2022-04-29 PROCEDURE — 97110 THERAPEUTIC EXERCISES: CPT | Mod: GP | Performed by: PHYSICAL THERAPIST

## 2022-04-29 NOTE — TELEPHONE ENCOUNTER
Called patient and scheduled for appointment with Vanessa on Monday.     Closing encounter.     Duyen Greenfield CMA

## 2022-05-02 ENCOUNTER — OFFICE VISIT (OUTPATIENT)
Dept: PEDIATRICS | Facility: CLINIC | Age: 87
End: 2022-05-02
Payer: MEDICARE

## 2022-05-02 VITALS
WEIGHT: 126 LBS | HEART RATE: 72 BPM | BODY MASS INDEX: 25.4 KG/M2 | SYSTOLIC BLOOD PRESSURE: 124 MMHG | RESPIRATION RATE: 16 BRPM | OXYGEN SATURATION: 100 % | DIASTOLIC BLOOD PRESSURE: 60 MMHG | TEMPERATURE: 97.5 F | HEIGHT: 59 IN

## 2022-05-02 DIAGNOSIS — L98.9 SKIN LESION: Primary | ICD-10-CM

## 2022-05-02 PROCEDURE — 99213 OFFICE O/P EST LOW 20 MIN: CPT | Performed by: NURSE PRACTITIONER

## 2022-05-02 RX ORDER — TRIAMCINOLONE ACETONIDE 1 MG/G
OINTMENT TOPICAL 2 TIMES DAILY
Qty: 15 G | Refills: 0 | Status: SHIPPED | OUTPATIENT
Start: 2022-05-02 | End: 2022-05-12

## 2022-05-02 NOTE — PATIENT INSTRUCTIONS
It was nice seeing you today.    Follow-up if not better in 1 week.    Please let me know if you have any questions regarding today's visit!    Take care,    ADDY Chicas, GHASSAN  Family Medicine

## 2022-05-02 NOTE — PROGRESS NOTES
"Assessment & Plan   (L98.9) Skin lesion  (primary encounter diagnosis)  Comment: No signs and symptoms of infection.  Possible dyshidrotic dermatitis.  Follow-up as needed.  Monitor for S&S of infection.  Plan: triamcinolone (KENALOG) 0.1 % external ointment      Follow Up  Return if symptoms worsen or fail to improve.    Vanessa Chicas NP        Yue Manning is a 86 year old who presents for the following health issues     History of Present Illness       Reason for visit:  Growth on finger  Symptom onset:  More than a month  Symptoms include:  Growth on right pointer finger  Symptom intensity:  Mild  Symptom progression:  Worsening  Had these symptoms before:  No  What makes it worse:  Pressure on finger, bumping it  What makes it better:  No    She eats 2-3 servings of fruits and vegetables daily.She consumes 0 sweetened beverage(s) daily.She exercises with enough effort to increase her heart rate 30 to 60 minutes per day.  She exercises with enough effort to increase her heart rate 7 days per week.   She is taking medications regularly.     No redness, swelling, or warmth.  Has used antibiotic cream with no relief.    Review of Systems   Constitutional, HEENT, cardiovascular, pulmonary, gi and gu systems are negative, except as otherwise noted.      Objective    /60 (Cuff Size: Adult Regular)   Pulse 72   Temp 97.5  F (36.4  C) (Tympanic)   Resp 16   Ht 1.499 m (4' 11\")   Wt 57.2 kg (126 lb)   SpO2 100%   BMI 25.45 kg/m    Body mass index is 25.45 kg/m .       Physical Exam   GENERAL: healthy, alert and no distress  RESP: lungs clear to auscultation - no rales, rhonchi or wheezes  CV: regular rate and rhythm, normal S1 S2, no S3 or S4, no murmur, click or rub, no peripheral edema and peripheral pulses strong  SKIN: no suspicious lesions or rashes and papule - right point finger  Tenderness to the touch.  PSYCH: mentation appears normal, affect normal/bright        "

## 2022-05-09 ENCOUNTER — APPOINTMENT (OUTPATIENT)
Dept: URGENT CARE | Facility: CLINIC | Age: 87
End: 2022-05-09
Payer: MEDICARE

## 2022-05-09 ENCOUNTER — MYC MEDICAL ADVICE (OUTPATIENT)
Dept: PEDIATRICS | Facility: CLINIC | Age: 87
End: 2022-05-09
Payer: MEDICARE

## 2022-05-09 NOTE — TELEPHONE ENCOUNTER
Please see patient MyChart message and advise. Patient was seen for OV w/ BOB 5/2/22. RN does not see notes on alternative medication in OV notes. Was there another recommendation?     Avi BETTENCOURT RN

## 2022-05-12 ENCOUNTER — OFFICE VISIT (OUTPATIENT)
Dept: PEDIATRICS | Facility: CLINIC | Age: 87
End: 2022-05-12
Payer: MEDICARE

## 2022-05-12 VITALS
HEIGHT: 59 IN | DIASTOLIC BLOOD PRESSURE: 74 MMHG | WEIGHT: 121 LBS | HEART RATE: 74 BPM | SYSTOLIC BLOOD PRESSURE: 122 MMHG | OXYGEN SATURATION: 97 % | BODY MASS INDEX: 24.39 KG/M2 | RESPIRATION RATE: 14 BRPM | TEMPERATURE: 97.7 F

## 2022-05-12 DIAGNOSIS — L03.011 PARONYCHIA OF FINGER, RIGHT: Primary | ICD-10-CM

## 2022-05-12 DIAGNOSIS — N18.31 STAGE 3A CHRONIC KIDNEY DISEASE (H): ICD-10-CM

## 2022-05-12 PROCEDURE — 99213 OFFICE O/P EST LOW 20 MIN: CPT | Performed by: NURSE PRACTITIONER

## 2022-05-12 RX ORDER — CEPHALEXIN 500 MG/1
500 CAPSULE ORAL 3 TIMES DAILY
Qty: 21 CAPSULE | Refills: 0 | Status: SHIPPED | OUTPATIENT
Start: 2022-05-12 | End: 2022-05-19

## 2022-05-12 RX ORDER — MUPIROCIN 20 MG/G
OINTMENT TOPICAL 3 TIMES DAILY
Qty: 30 G | Refills: 0 | Status: SHIPPED | OUTPATIENT
Start: 2022-05-12 | End: 2022-05-19

## 2022-05-12 NOTE — PROGRESS NOTES
"  Assessment & Plan     Paronychia of finger, right  Begin soaks and topical abx, if not improving in the next 3-4 days to start PO abx. No area to drain and no foreign body visualized.  - mupirocin (BACTROBAN) 2 % external ointment; Apply topically 3 times daily for 7 days  - cephALEXin (KEFLEX) 500 MG capsule; Take 1 capsule (500 mg) by mouth 3 times daily for 7 days    Stage 3a chronic kidney disease (H)  No need for abx dose adjustment  Creatinine   Date Value Ref Range Status   09/22/2021 1.02 0.52 - 1.04 mg/dL Final   08/10/2020 0.97 0.52 - 1.04 mg/dL Final        Patient Instructions   STOP the cream you were given at the last visit    I want you to start soaking your finger in warm water 3-4x per day and then cover the area with an antibiotic ointment I prescribed today called mupirocin    If the area becomes more red, painful, swollen or you develop fever or chills then I want you to start the antibiotic pills (this is called Cephalexin, you will need to call [facility] pharmacy to fill this but it is available for you!)    Otherwise start antibiotic pills if not improving with the cream in the next 3-4 days          Return in about 1 week (around 5/19/2022), or if symptoms worsen or fail to improve.    Carlee Chris NP  [facility] SHANTELLE Manning is a 86 year old who presents for the following health issues     HPI   F/U skin lesion:  Growth on R pointer finger  Stayed the same  Cream didn't do anything    Seen 5/5/22 given triamcinolone for possible dyshidrotic eczema? Not improving but not worsening  Doesn't recall any injury to finger  Denies fever, chills, feels well otherwise  No drainage    Some pain  No itching    Review of Systems   Otherwise ROS is negative except as stated above.        Objective    /74   Pulse 74   Temp 97.7  F (36.5  C)   Resp 14   Ht 1.499 m (4' 11\")   Wt 54.9 kg (121 lb)   SpO2 97%   BMI 24.44 kg/m    Body mass index is 24.44 " kg/m .  Physical Exam   GENERAL: healthy, alert and no distress  SKIN: right 2nd finger with localized erythema and small amount of swelling to medial nail fold, some thickened skin with white underneath although doesn't look like pustule or abscess. No foreign body visualized. Slightly tender to touch.

## 2022-05-12 NOTE — PATIENT INSTRUCTIONS
STOP the cream you were given at the last visit    I want you to start soaking your finger in warm water 3-4x per day and then cover the area with an antibiotic ointment I prescribed today called mupirocin    If the area becomes more red, painful, swollen or you develop fever or chills then I want you to start the antibiotic pills (this is called Cephalexin, you will need to call [facility] to fill this but it is available for you!)    Otherwise start antibiotic pills if not improving with the cream in the next 3-4 days

## 2022-05-13 DIAGNOSIS — G62.9 NEUROPATHY: ICD-10-CM

## 2022-05-16 RX ORDER — GABAPENTIN 400 MG/1
CAPSULE ORAL
Qty: 360 CAPSULE | Refills: 2 | Status: SHIPPED | OUTPATIENT
Start: 2022-05-16 | End: 2022-09-29

## 2022-05-23 ENCOUNTER — TELEPHONE (OUTPATIENT)
Dept: PEDIATRICS | Facility: CLINIC | Age: 87
End: 2022-05-23
Payer: MEDICARE

## 2022-05-23 DIAGNOSIS — R19.5 POSITIVE OCCULT STOOL BLOOD TEST: Primary | ICD-10-CM

## 2022-05-23 NOTE — TELEPHONE ENCOUNTER
Please call Kerri and inform her that her cologuard test was positive. This was the stool test she completed for colon cancer screening. Given this positive result, the recommendation would be to do a diagnostic colonoscopy. If she would like to again discuss the risks and benefits of performing the colonoscopy, I would be happy to do a telephone visit with her. Otherwise if she would like to pursue the colonoscopy, I will order if for her.     Thanks,   Zoraida Castillo, DNP, APRN, CNP

## 2022-05-23 NOTE — TELEPHONE ENCOUNTER
Called and informed patient of Zoraida Jonathan message below. Patient would like to move forward with colonoscopy. She has had one before and is aware of what to expect.     Carolee Machado RN on 5/23/2022 at 3:43 PM]

## 2022-05-27 ENCOUNTER — HOSPITAL ENCOUNTER (OUTPATIENT)
Dept: PHYSICAL THERAPY | Facility: CLINIC | Age: 87
Discharge: HOME | End: 2022-05-27
Payer: MEDICARE

## 2022-05-27 DIAGNOSIS — R26.9 ABNORMAL GAIT: Primary | ICD-10-CM

## 2022-05-27 DIAGNOSIS — R52 PAIN: ICD-10-CM

## 2022-05-27 DIAGNOSIS — R53.81 PHYSICAL DECONDITIONING: ICD-10-CM

## 2022-05-27 DIAGNOSIS — G60.9 IDIOPATHIC PERIPHERAL NEUROPATHY: ICD-10-CM

## 2022-05-27 DIAGNOSIS — M54.50 CHRONIC LOW BACK PAIN, UNSPECIFIED BACK PAIN LATERALITY, UNSPECIFIED WHETHER SCIATICA PRESENT: ICD-10-CM

## 2022-05-27 DIAGNOSIS — R26.89 IMPAIRMENT OF BALANCE: ICD-10-CM

## 2022-05-27 DIAGNOSIS — G89.29 CHRONIC LOW BACK PAIN, UNSPECIFIED BACK PAIN LATERALITY, UNSPECIFIED WHETHER SCIATICA PRESENT: ICD-10-CM

## 2022-05-27 PROCEDURE — 97110 THERAPEUTIC EXERCISES: CPT | Mod: GP | Performed by: PHYSICAL THERAPIST

## 2022-05-27 PROCEDURE — 97112 NEUROMUSCULAR REEDUCATION: CPT | Mod: GP | Performed by: PHYSICAL THERAPIST

## 2022-05-27 NOTE — PROGRESS NOTES
"[facility] Rehabilitation Service    Outpatient Physical Therapy Discharge Note  Patient: Kerri Rocha  : 1935    Beginning/End Dates of Reporting Period:  2022 to 2022; 2 sessions since last progress note but 22 visits since start of care 2021    Therapy Diagnosis: impaired balance with elevated risk of falls, decreased strength and endurance, chronic back pain     Client Self Report: to dept stating performing HEP consistently; has not noted any sciatica but continues to c/o daily low back aching but stable.  sees Dr. Marissa Pulliam..    Objective Measurements:  Objective Measure: 30 STS  Details: 14 (increased 4 reps)        Objective Measure: mCTSIB  Details: 30\" first 3 positions; position 4 =20\"\"  Objective Measure: sharpened rhomberg  Details: 30\" (increased 30\")     Goals:  Goal Identifier 30s STS   Goal Description The patietn will be able to perform 15 STS transfers in 30 seconds to demonstrate a significant improvement in proximal LE strength to facilitate improaved stability and functional mobility allowing for greater particiaption in transfers, floor transfers, and gardenning tasks.    Target Date 22 (goal date extended)   Date Met  22   Progress (detail required for progress note): 14 reps; nearing goal of 15 and sufficient for discharge     Goal Identifier MET mCTSIB   Goal Description Patient will be able to maintain balance in first 3 conditions x 30 seconds and final condition x 20 secodns to demonstarte a significant improvement in postural stability under varied conditions.    Target Date 22 (goal date extended due to back and sciatic flare)   Date Met  22   Progress (detail required for progress note): met     Goal Identifier MET: Pain   Goal Description The patient will report a 50% improvement in overall pain severity to facilitate improved ease with mobility and " improve activity tolerance.    Target Date 12/30/21   Date Met  12/30/21   Progress (detail required for progress note): MET     Goal Identifier HEP   Goal Description The patietn will be IND in performance of HEP to facilitate gains in strength, endurance, and mobility as means to address asymmetries in length/tension relationships and strength of musculature contributing to LBP.   Target Date 05/27/22 (goal update for modified HEP)   Date Met  05/27/22   Progress (detail required for progress note): met       Plan:  Discharge from therapy.    Discharge:    Reason for Discharge: Patient has met all goals.    Equipment Issued: owns thercane, and SEC    Discharge Plan: Patient to continue home program.

## 2022-05-31 ENCOUNTER — HOSPITAL ENCOUNTER (OUTPATIENT)
Facility: CLINIC | Age: 87
End: 2022-05-31
Attending: INTERNAL MEDICINE | Admitting: INTERNAL MEDICINE
Payer: MEDICARE

## 2022-05-31 DIAGNOSIS — R19.5 POSITIVE OCCULT STOOL BLOOD TEST: Primary | ICD-10-CM

## 2022-06-03 ENCOUNTER — OFFICE VISIT (OUTPATIENT)
Dept: PEDIATRICS | Facility: CLINIC | Age: 87
End: 2022-06-03
Payer: MEDICARE

## 2022-06-03 VITALS
HEART RATE: 71 BPM | OXYGEN SATURATION: 97 % | HEIGHT: 59 IN | WEIGHT: 124.1 LBS | DIASTOLIC BLOOD PRESSURE: 62 MMHG | SYSTOLIC BLOOD PRESSURE: 106 MMHG | TEMPERATURE: 97.8 F | RESPIRATION RATE: 16 BRPM | BODY MASS INDEX: 25.02 KG/M2

## 2022-06-03 DIAGNOSIS — M79.644 PAIN OF FINGER OF RIGHT HAND: Primary | ICD-10-CM

## 2022-06-03 DIAGNOSIS — L98.9 SKIN LESION: ICD-10-CM

## 2022-06-03 PROCEDURE — 99213 OFFICE O/P EST LOW 20 MIN: CPT | Performed by: NURSE PRACTITIONER

## 2022-06-03 ASSESSMENT — PAIN SCALES - GENERAL: PAINLEVEL: MILD PAIN (2)

## 2022-06-03 NOTE — PATIENT INSTRUCTIONS
Schedule with Dermatology  Other clinics to look at outside of [place] include [facility] and [facility] in [place]

## 2022-06-03 NOTE — PROGRESS NOTES
"  Assessment & Plan     Pain of finger of right hand  Skin lesion  Not c/w herpetic kat. No improvement with abx + steroid cream, likely needs removal per derm  - Adult Dermatology Referral; Future      Patient Instructions   Schedule with Dermatology  Other clinics to look at outside of [place] include [facility] and [facility] in [place]      Return if symptoms worsen or fail to improve.    Carlee Chris NP  [facility] SHANTELLE Manning is a 87 year old who presents for the following health issues     History of Present Illness       Reason for visit:  Follow up for growth on finger    She eats 2-3 servings of fruits and vegetables daily.She consumes 1 sweetened beverage(s) daily.She exercises with enough effort to increase her heart rate 30 to 60 minutes per day.  She exercises with enough effort to increase her heart rate 7 days per week.   She is taking medications regularly.     States finger lesion is not doing any better - last seen on 5/12/22 by DEVENDRA Chris for issue  Denies fever, chills, sweats, pain only when bumping  Tried triamcinolone, cephalexin, and mupirocin without improvement      Review of Systems   Constitutional, HEENT, cardiovascular, pulmonary, gi and gu systems are negative, except as otherwise noted.      Objective    /62 (BP Location: Right arm, Cuff Size: Adult Regular)   Pulse 71   Temp 97.8  F (36.6  C) (Tympanic)   Resp 16   Ht 1.499 m (4' 11\")   Wt 56.3 kg (124 lb 1.6 oz)   LMP  (LMP Unknown)   SpO2 97%   BMI 25.07 kg/m    Body mass index is 25.07 kg/m .  Physical Exam   GENERAL: healthy, alert and no distress  SKIN: firm tender nodule white colored to distal medial finger see photo below                    "

## 2022-07-06 ENCOUNTER — MYC MEDICAL ADVICE (OUTPATIENT)
Dept: PEDIATRICS | Facility: CLINIC | Age: 87
End: 2022-07-06

## 2022-07-07 ENCOUNTER — TELEPHONE (OUTPATIENT)
Dept: GASTROENTEROLOGY | Facility: CLINIC | Age: 87
End: 2022-07-07

## 2022-07-07 NOTE — TELEPHONE ENCOUNTER
Pt calling to reschedule canceled colonoscopy @[facility] location. Transferred her to [facility] scheduling & gave her their number.

## 2022-07-15 ENCOUNTER — OFFICE VISIT (OUTPATIENT)
Dept: NEUROLOGY | Facility: CLINIC | Age: 87
End: 2022-07-15

## 2022-07-15 VITALS — HEART RATE: 69 BPM | DIASTOLIC BLOOD PRESSURE: 73 MMHG | SYSTOLIC BLOOD PRESSURE: 132 MMHG | OXYGEN SATURATION: 98 %

## 2022-07-15 DIAGNOSIS — M54.16 LUMBAR RADICULOPATHY: ICD-10-CM

## 2022-07-15 DIAGNOSIS — G60.9 IDIOPATHIC PERIPHERAL NEUROPATHY: ICD-10-CM

## 2022-07-15 DIAGNOSIS — R26.9 GAIT DISORDER: Primary | ICD-10-CM

## 2022-07-15 PROCEDURE — 99214 OFFICE O/P EST MOD 30 MIN: CPT | Performed by: INTERNAL MEDICINE

## 2022-07-15 ASSESSMENT — PAIN SCALES - GENERAL: PAINLEVEL: MODERATE PAIN (5)

## 2022-07-15 NOTE — PROGRESS NOTES
"[facility] Neurology Follow Up Visit    Kerri Rocha MRN# [id number]   Age: 85 year old YOB: 1935     Brief history of symptoms: The patient was initially seen in neurologic consultation on 10/9/2020 for evaluation of balance difficulties. Please see the comprehensive neurologic consultation note from that date in the Epic records for details.     Interval history:  Things are a little worse compared to last visit. She did physical therapy recently and finished it up last month. At home when she walks she has to hold onto things a bit more. She denies any falls. She feels like her steps are smaller compared to before. She has a cane and walking stick that she sometimes uses.     She still has back pain. She has physical therapy exercises, which can help with the back.     She sometimes feels a \"strange feeling\" in the upper body, which is described as a \"weak\" feeling in back and head.       Past Medical History:     Patient Active Problem List   Diagnosis     Essential hypertension     Idiopathic peripheral neuropathy     Pseudophakia of both eyes     Regular astigmatism of both eyes     Localized osteoporosis without current pathological fracture - 2020 waiting for dental work to start fosamax.      Mixed hyperlipidemia     Gait disorder     Vertigo     Chronic kidney disease, stage 3 (H)     Lumbar spondylosis     Lumbar facet arthropathy     Past Medical History:   Diagnosis Date     HLD (hyperlipidemia)      HTN (hypertension)      Idiopathic neuropathy      Vitamin D deficiency         Past Surgical History:     Past Surgical History:   Procedure Laterality Date     APPENDECTOMY       BREAST BIOPSY, CORE RT/LT       CARPAL TUNNEL RELEASE RT/LT       CATARACT IOL, RT/LT        SECTION      x2     HYSTERECTOMY TOTAL ABDOMINAL, BILATERAL SALPINGO-OOPHORECTOMY, COMBINED       INJECT BLOCK MEDIAL BRANCH CERVICAL/THORACIC/LUMBAR Bilateral 10/18/2021    Procedure: Bilateral L5-S1 FACET JOINT " Injection;  Surgeon: Stephanie Momin MD;  Location: [facility] OR     LAMINECTOMY LUMBAR THREE+ LEVELS  03/22/2016    L3-L5     TONSILLECTOMY & ADENOIDECTOMY          Social History:     Social History     Tobacco Use     Smoking status: Never Smoker     Smokeless tobacco: Never Used   Vaping Use     Vaping Use: Never used   Substance Use Topics     Alcohol use: Yes     Alcohol/week: 1.0 standard drink     Comment: rare wine     Drug use: No        Family History:     Family History   Problem Relation Age of Onset     Hypertension Mother      Heart Disease Father      Hypertension Father      Coronary Artery Disease Father      Hypertension Sister      Heart Disease Brother      Hypertension Brother      Diabetes No family hx of      Cancer No family hx of         Medications:     Current Outpatient Medications   Medication Sig     amLODIPine (NORVASC) 10 MG tablet Take 1 tablet (10 mg) by mouth daily     atorvastatin (LIPITOR) 20 MG tablet Take 1 tablet (20 mg) by mouth At Bedtime     calcium carbonate (OS-CAMMIE 600 MG [place]. [place]) 600 MG tablet Take 1,200 mg by mouth daily      Cholecalciferol (VITAMIN D3) 2000 UNITS CAPS 5,000 Int'l Units daily      Diphenhydramine-APAP, sleep, (TYLENOL PM EXTRA STRENGTH PO) Take by mouth nightly as needed     gabapentin (NEURONTIN) 400 MG capsule TAKE 2 CAPSULES IN THE MORNING, 2 CAPSULES AT NOON AND 2 CAPSULES IN THE EVENING     lisinopril (ZESTRIL) 10 MG tablet Take 1 tablet (10 mg) by mouth daily     omega 3 1000 MG CAPS Take 2 g by mouth     No current facility-administered medications for this visit.        Allergies:     Allergies   Allergen Reactions     Codeine      Sulfa Drugs      Sulfasalazine Other (See Comments)     Sulfate Rash        Review of Systems:   As above     Physical Exam:   Vitals: /73   Pulse 69   LMP  (LMP Unknown)   SpO2 98%    Neurologic:     Mental Status: Fully alert, attentive. Normal memory and fund of knowledge. Language normal, speech clear and  fluent, no paraphasic errors.    Cranial Nerves: No dysarthria.      Motor: No tremors or other abnormal movements observed. Muscle tone normal throughout. Normal/symmetric rapid finger tapping. Strength 5/5 throughout upper and lower extremities with exception of 4/5 bilateral APB, 5-/5 finger extension / ADM / FDI, and 5-/5 left foot dorsiflexion/eversion/inversion.     Deep Tendon Reflexes: 2+/symmetric throughout upper extremities, 2+ patella, and 1+ to trace ankle jerks. Toes downgoing bilaterally.     Sensory: Vibration is absent the right great toe, 3 seconds in the left great toe, 3 seconds in the right ankle, 5 seconds in the left hand, normal in the hands. Decreased sensation to light touch in the feet compared to hands.  Intact/symmetric to proprioception throughout upper and lower extremities. Negative Romberg.      Coordination: Finger-nose-finger without dysmetria. Rapid alternating movements intact/symmetric with normal speed and rhythm.     Gait: Mildly wide based casual gait, sometimes veers to the left. Some mild unsteadiness with toe gait. Not able to do heel gait. Needs to hold on to examiner for support during tandem gait, but can take 1 step on own before holding on.     Data reviewed on previous visits    Imaging:  MRI lumbar 4/13/2019  Impression:   1. Multilevel lumbar spondylosis, most pronounced at L3-4 with  moderate severe left and mild to moderate right neural foraminal  stenosis.  2. Synovial cyst within the right neural foramen of L5-S1 narrows the  right lateral recess and likely impinges upon the traversing right S1  nerve root.  3. Postsurgical changes of instrumented fusion of L4-5.     MRI cervical spine 1/2019  IMPRESSION: Mild multilevel degenerative changes with multilevel  neural foraminal stenoses without significant canal stenosis. No  myelopathy signal changes.      MRI brain 11/2018  Impression:    Normal brain for age with attention to the vestibular and  auditory  structures. No specific finding to explain the patient's symptoms.     Procedures:  EMG 4/2019  Interpretation:  This is an abnormal EMG.  Findings are consistent with a severe length-dependent axonal sensorimotor polyneuropathy that appears to have advanced from prior EMG in 2003.  There is not clear evidence for overlying lumbosacral radiculopathy but in light of the severe peripheral neuropathy these may be masked and clinical correlation is advised.    Laboratory:  B12 1705 (8/2020)  TSH normal 2017  Per chart review, SPEP and A1c many years back was reportedly normal    MRI thoracic 10/28/2020  IMPRESSION:  1. Small posterior disc herniation at the T7-T8 level that mildly  deforms the anterior surface of the thoracic spinal cord.  2. Otherwise, normal thoracic spine MRI. No spinal canal or neural  foraminal stenosis. No evidence for fracture.    Labs 10/2020 - unremarkable ESR/CRP, SPEP/immunofixation, light chains, A1c, TSH  Labs 5/2021 - With exception of positive LOYDA (1:1250, nucleolar), normal/negative Copper, Zinc, Vitamin E, heavy metal screen, B1, B6, LOYDA, SSA/SSB    Pertinent Investigations since last visit:   None         Assessment and Plan:   Assessment:  Kerri Rocha is a 85 year old female who presents today for follow-up of balance problems. 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 She also has lumbar stenosis and had decompression surgery in 2016. Over the past 5 years tingling in the lower extremities has been stable, but balance has significantly worsened.     Examination today some mild worsening in gait and sensation as described above and possible mild new left foot weakness. MRI lumbar spine reordered today to evaluate for worsening stenosis.     Plan:  - Continue PT excercies  - Repeat MRI lumbar spine    Follow up in Neurology clinic in 6 months or sooner if new issues arise    Jason Gillette MD  Assistant  Professor of Neurology  [facility]      The total time of this encounter today amounted to 37 minutes. This time included time spent with the patient, prep work, ordering tests, and performing post visit documentation.

## 2022-07-15 NOTE — LETTER
"7/15/2022     RE: Kerri Rocha  [address]  [facility] [place] [address]     Dear Colleague,    Thank you for referring your patient, Kerri Rocha, to the [facility]. Please see a copy of my visit note below.    [facility] Neurology Follow Up Visit    Kerri Rocha MRN# [id number]   Age: 85 year old YOB: 1935     Brief history of symptoms: The patient was initially seen in neurologic consultation on 10/9/2020 for evaluation of balance difficulties. Please see the comprehensive neurologic consultation note from that date in the Epic records for details.     Interval history:  Things are a little worse compared to last visit. She did physical therapy recently and finished it up last month. At home when she walks she has to hold onto things a bit more. She denies any falls. She feels like her steps are smaller compared to before. She has a cane and walking stick that she sometimes uses.     She still has back pain. She has physical therapy exercises, which can help with the back.     She sometimes feels a \"strange feeling\" in the upper body, which is described as a \"weak\" feeling in back and head.       Past Medical History:     Patient Active Problem List   Diagnosis     Essential hypertension     Idiopathic peripheral neuropathy     Pseudophakia of both eyes     Regular astigmatism of both eyes     Localized osteoporosis without current pathological fracture - 8/2020 waiting for dental work to start fosamax.      Mixed hyperlipidemia     Gait disorder     Vertigo     Chronic kidney disease, stage 3 (H)     Lumbar spondylosis     Lumbar facet arthropathy     Past Medical History:   Diagnosis Date     HLD (hyperlipidemia)      HTN (hypertension)      Idiopathic neuropathy      Vitamin D deficiency         Past Surgical History:     Past Surgical History:   Procedure Laterality Date     APPENDECTOMY "       BREAST BIOPSY, CORE RT/LT       CARPAL TUNNEL RELEASE RT/LT       CATARACT IOL, RT/LT        SECTION      x2     HYSTERECTOMY TOTAL ABDOMINAL, BILATERAL SALPINGO-OOPHORECTOMY, COMBINED       INJECT BLOCK MEDIAL BRANCH CERVICAL/THORACIC/LUMBAR Bilateral 10/18/2021    Procedure: Bilateral L5-S1 FACET JOINT Injection;  Surgeon: Stephanie Momin MD;  Location: [facility] OR     LAMINECTOMY LUMBAR THREE+ LEVELS  2016    L3-L5     TONSILLECTOMY & ADENOIDECTOMY          Social History:     Social History     Tobacco Use     Smoking status: Never Smoker     Smokeless tobacco: Never Used   Vaping Use     Vaping Use: Never used   Substance Use Topics     Alcohol use: Yes     Alcohol/week: 1.0 standard drink     Comment: rare wine     Drug use: No        Family History:     Family History   Problem Relation Age of Onset     Hypertension Mother      Heart Disease Father      Hypertension Father      Coronary Artery Disease Father      Hypertension Sister      Heart Disease Brother      Hypertension Brother      Diabetes No family hx of      Cancer No family hx of         Medications:     Current Outpatient Medications   Medication Sig     amLODIPine (NORVASC) 10 MG tablet Take 1 tablet (10 mg) by mouth daily     atorvastatin (LIPITOR) 20 MG tablet Take 1 tablet (20 mg) by mouth At Bedtime     calcium carbonate (OS-CAMMIE 600 MG [place]. [place]) 600 MG tablet Take 1,200 mg by mouth daily      Cholecalciferol (VITAMIN D3) 2000 UNITS CAPS 5,000 Int'l Units daily      Diphenhydramine-APAP, sleep, (TYLENOL PM EXTRA STRENGTH PO) Take by mouth nightly as needed     gabapentin (NEURONTIN) 400 MG capsule TAKE 2 CAPSULES IN THE MORNING, 2 CAPSULES AT NOON AND 2 CAPSULES IN THE EVENING     lisinopril (ZESTRIL) 10 MG tablet Take 1 tablet (10 mg) by mouth daily     omega 3 1000 MG CAPS Take 2 g by mouth     No current facility-administered medications for this visit.        Allergies:     Allergies   Allergen Reactions     Codeine       Sulfa Drugs      Sulfasalazine Other (See Comments)     Sulfate Rash        Review of Systems:   As above     Physical Exam:   Vitals: /73   Pulse 69   LMP  (LMP Unknown)   SpO2 98%    Neurologic:     Mental Status: Fully alert, attentive. Normal memory and fund of knowledge. Language normal, speech clear and fluent, no paraphasic errors.    Cranial Nerves: No dysarthria.      Motor: No tremors or other abnormal movements observed. Muscle tone normal throughout. Normal/symmetric rapid finger tapping. Strength 5/5 throughout upper and lower extremities with exception of 4/5 bilateral APB, 5-/5 finger extension / ADM / FDI, and 5-/5 left foot dorsiflexion/eversion/inversion.     Deep Tendon Reflexes: 2+/symmetric throughout upper extremities, 2+ patella, and 1+ to trace ankle jerks. Toes downgoing bilaterally.     Sensory: Vibration is absent the right great toe, 3 seconds in the left great toe, 3 seconds in the right ankle, 5 seconds in the left hand, normal in the hands. Decreased sensation to light touch in the feet compared to hands.  Intact/symmetric to proprioception throughout upper and lower extremities. Negative Romberg.      Coordination: Finger-nose-finger without dysmetria. Rapid alternating movements intact/symmetric with normal speed and rhythm.     Gait: Mildly wide based casual gait, sometimes veers to the left. Some mild unsteadiness with toe gait. Not able to do heel gait. Needs to hold on to examiner for support during tandem gait, but can take 1 step on own before holding on.     Data reviewed on previous visits    Imaging:  MRI lumbar 4/13/2019  Impression:   1. Multilevel lumbar spondylosis, most pronounced at L3-4 with  moderate severe left and mild to moderate right neural foraminal  stenosis.  2. Synovial cyst within the right neural foramen of L5-S1 narrows the  right lateral recess and likely impinges upon the traversing right S1  nerve root.  3. Postsurgical changes of instrumented  fusion of L4-5.     MRI cervical spine 1/2019  IMPRESSION: Mild multilevel degenerative changes with multilevel  neural foraminal stenoses without significant canal stenosis. No  myelopathy signal changes.      MRI brain 11/2018  Impression:    Normal brain for age with attention to the vestibular and auditory  structures. No specific finding to explain the patient's symptoms.     Procedures:  EMG 4/2019  Interpretation:  This is an abnormal EMG.  Findings are consistent with a severe length-dependent axonal sensorimotor polyneuropathy that appears to have advanced from prior EMG in 2003.  There is not clear evidence for overlying lumbosacral radiculopathy but in light of the severe peripheral neuropathy these may be masked and clinical correlation is advised.    Laboratory:  B12 1705 (8/2020)  TSH normal 2017  Per chart review, SPEP and A1c many years back was reportedly normal    MRI thoracic 10/28/2020  IMPRESSION:  1. Small posterior disc herniation at the T7-T8 level that mildly  deforms the anterior surface of the thoracic spinal cord.  2. Otherwise, normal thoracic spine MRI. No spinal canal or neural  foraminal stenosis. No evidence for fracture.    Labs 10/2020 - unremarkable ESR/CRP, SPEP/immunofixation, light chains, A1c, TSH  Labs 5/2021 - With exception of positive LOYDA (1:1250, nucleolar), normal/negative Copper, Zinc, Vitamin E, heavy metal screen, B1, B6, LOYDA, SSA/SSB    Pertinent Investigations since last visit:   None         Assessment and Plan:   Assessment:  Kerri Rocha is a 85 year old female who presents today for follow-up of balance problems. 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 She also has lumbar stenosis and had decompression surgery in 2016. Over the past 5 years tingling in the lower extremities has been stable, but balance has significantly worsened.     Examination today some mild worsening in gait and sensation  as described above and possible mild new left foot weakness. MRI lumbar spine reordered today to evaluate for worsening stenosis.     Plan:  - Continue PT excercies  - Repeat MRI lumbar spine    Follow up in Neurology clinic in 6 months or sooner if new issues arise    Jason Gillette MD   of Neurology  [facility]    The total time of this encounter today amounted to 37 minutes. This time included time spent with the patient, prep work, ordering tests, and performing post visit documentation.

## 2022-07-27 RX ORDER — BISACODYL 5 MG
TABLET, DELAYED RELEASE (ENTERIC COATED) ORAL
Qty: 4 TABLET | Refills: 0 | Status: SHIPPED | OUTPATIENT
Start: 2022-07-27 | End: 2022-09-06

## 2022-07-31 ENCOUNTER — ANCILLARY PROCEDURE (OUTPATIENT)
Dept: MRI IMAGING | Facility: CLINIC | Age: 87
End: 2022-07-31
Attending: INTERNAL MEDICINE
Payer: MEDICARE

## 2022-07-31 DIAGNOSIS — R26.9 GAIT DISORDER: ICD-10-CM

## 2022-07-31 DIAGNOSIS — M54.16 LUMBAR RADICULOPATHY: ICD-10-CM

## 2022-07-31 PROCEDURE — G1010 CDSM STANSON: HCPCS | Mod: GC | Performed by: RADIOLOGY

## 2022-07-31 PROCEDURE — 72158 MRI LUMBAR SPINE W/O & W/DYE: CPT | Mod: MF | Performed by: RADIOLOGY

## 2022-07-31 PROCEDURE — A9585 GADOBUTROL INJECTION: HCPCS | Performed by: RADIOLOGY

## 2022-07-31 RX ORDER — GADOBUTROL 604.72 MG/ML
5 INJECTION INTRAVENOUS ONCE
Status: COMPLETED | OUTPATIENT
Start: 2022-07-31 | End: 2022-07-31

## 2022-07-31 RX ADMIN — GADOBUTROL 5 ML: 604.72 INJECTION INTRAVENOUS at 14:19

## 2022-07-31 NOTE — DISCHARGE INSTRUCTIONS

## 2022-08-10 ENCOUNTER — HOSPITAL ENCOUNTER (OUTPATIENT)
Facility: CLINIC | Age: 87
Discharge: HOME | End: 2022-08-10
Attending: INTERNAL MEDICINE | Admitting: INTERNAL MEDICINE
Payer: MEDICARE

## 2022-08-10 VITALS
RESPIRATION RATE: 16 BRPM | OXYGEN SATURATION: 98 % | BODY MASS INDEX: 25 KG/M2 | WEIGHT: 124 LBS | DIASTOLIC BLOOD PRESSURE: 52 MMHG | HEART RATE: 61 BPM | TEMPERATURE: 98.4 F | SYSTOLIC BLOOD PRESSURE: 106 MMHG | HEIGHT: 59 IN

## 2022-08-10 DIAGNOSIS — R19.5 POSITIVE FECAL OCCULT BLOOD TEST: Primary | ICD-10-CM

## 2022-08-10 LAB — COLONOSCOPY: NORMAL

## 2022-08-10 PROCEDURE — 250N000011 HC RX IP 250 OP 636: Performed by: INTERNAL MEDICINE

## 2022-08-10 PROCEDURE — 45378 DIAGNOSTIC COLONOSCOPY: CPT | Performed by: INTERNAL MEDICINE

## 2022-08-10 PROCEDURE — G0500 MOD SEDAT ENDO SERVICE >5YRS: HCPCS | Performed by: INTERNAL MEDICINE

## 2022-08-10 RX ORDER — NALOXONE HYDROCHLORIDE 0.4 MG/ML
0.4 INJECTION, SOLUTION INTRAMUSCULAR; INTRAVENOUS; SUBCUTANEOUS
Status: DISCONTINUED | OUTPATIENT
Start: 2022-08-10 | End: 2022-08-10 | Stop reason: HOSPADM

## 2022-08-10 RX ORDER — FENTANYL CITRATE 0.05 MG/ML
50-100 INJECTION, SOLUTION INTRAMUSCULAR; INTRAVENOUS EVERY 5 MIN PRN
Status: DISCONTINUED | OUTPATIENT
Start: 2022-08-10 | End: 2022-08-10 | Stop reason: HOSPADM

## 2022-08-10 RX ORDER — LIDOCAINE 40 MG/G
CREAM TOPICAL
Status: DISCONTINUED | OUTPATIENT
Start: 2022-08-10 | End: 2022-08-10 | Stop reason: HOSPADM

## 2022-08-10 RX ORDER — PROCHLORPERAZINE MALEATE 5 MG
5 TABLET ORAL EVERY 6 HOURS PRN
Status: DISCONTINUED | OUTPATIENT
Start: 2022-08-10 | End: 2022-08-10 | Stop reason: HOSPADM

## 2022-08-10 RX ORDER — FLUMAZENIL 0.1 MG/ML
0.2 INJECTION, SOLUTION INTRAVENOUS
Status: DISCONTINUED | OUTPATIENT
Start: 2022-08-10 | End: 2022-08-10 | Stop reason: HOSPADM

## 2022-08-10 RX ORDER — ONDANSETRON 2 MG/ML
4 INJECTION INTRAMUSCULAR; INTRAVENOUS EVERY 6 HOURS PRN
Status: DISCONTINUED | OUTPATIENT
Start: 2022-08-10 | End: 2022-08-10 | Stop reason: HOSPADM

## 2022-08-10 RX ORDER — ONDANSETRON 4 MG/1
4 TABLET, ORALLY DISINTEGRATING ORAL EVERY 6 HOURS PRN
Status: DISCONTINUED | OUTPATIENT
Start: 2022-08-10 | End: 2022-08-10 | Stop reason: HOSPADM

## 2022-08-10 RX ORDER — DIPHENHYDRAMINE HYDROCHLORIDE 50 MG/ML
25-50 INJECTION INTRAMUSCULAR; INTRAVENOUS
Status: DISCONTINUED | OUTPATIENT
Start: 2022-08-10 | End: 2022-08-10 | Stop reason: HOSPADM

## 2022-08-10 RX ORDER — NALOXONE HYDROCHLORIDE 0.4 MG/ML
0.2 INJECTION, SOLUTION INTRAMUSCULAR; INTRAVENOUS; SUBCUTANEOUS
Status: DISCONTINUED | OUTPATIENT
Start: 2022-08-10 | End: 2022-08-10 | Stop reason: HOSPADM

## 2022-08-10 RX ORDER — EPINEPHRINE 1 MG/ML
0.1 INJECTION, SOLUTION INTRAMUSCULAR; SUBCUTANEOUS
Status: DISCONTINUED | OUTPATIENT
Start: 2022-08-10 | End: 2022-08-10 | Stop reason: HOSPADM

## 2022-08-10 RX ORDER — ATROPINE SULFATE 0.1 MG/ML
1 INJECTION INTRAVENOUS
Status: DISCONTINUED | OUTPATIENT
Start: 2022-08-10 | End: 2022-08-10 | Stop reason: HOSPADM

## 2022-08-10 RX ORDER — SIMETHICONE 40MG/0.6ML
133 SUSPENSION, DROPS(FINAL DOSAGE FORM)(ML) ORAL
Status: DISCONTINUED | OUTPATIENT
Start: 2022-08-10 | End: 2022-08-10 | Stop reason: HOSPADM

## 2022-08-10 RX ORDER — ONDANSETRON 2 MG/ML
4 INJECTION INTRAMUSCULAR; INTRAVENOUS
Status: DISCONTINUED | OUTPATIENT
Start: 2022-08-10 | End: 2022-08-10 | Stop reason: HOSPADM

## 2022-08-10 RX ADMIN — MIDAZOLAM HYDROCHLORIDE 1 MG: 1 INJECTION, SOLUTION INTRAMUSCULAR; INTRAVENOUS at 11:52

## 2022-08-10 RX ADMIN — FENTANYL CITRATE 50 MCG: 50 INJECTION INTRAMUSCULAR; INTRAVENOUS at 11:52

## 2022-08-10 RX ADMIN — MIDAZOLAM HYDROCHLORIDE 1 MG: 1 INJECTION, SOLUTION INTRAMUSCULAR; INTRAVENOUS at 11:46

## 2022-08-10 RX ADMIN — FENTANYL CITRATE 50 MCG: 50 INJECTION INTRAMUSCULAR; INTRAVENOUS at 11:46

## 2022-08-10 ASSESSMENT — ACTIVITIES OF DAILY LIVING (ADL): ADLS_ACUITY_SCORE: 35

## 2022-08-10 NOTE — H&P
Pre-Endoscopy History and Physical     Kerri Rocha MRN# [id number]   YOB: 1935 Age: 87 year old     Date of Procedure: 8/10/2022  Primary care provider: Parrish Arizmendi  Type of Endoscopy: Colonoscopy with possible biopsy, possible polypectomy  Reason for Procedure: positive cologuard  Type of Anesthesia Anticipated: Conscious Sedation    HPI:    Kerri is a 87 year old female who will be undergoing the above procedure.      A history and physical has been performed. The patient's medications and allergies have been reviewed. The risks and benefits of the procedure and the sedation options and risks were discussed with the patient.  All questions were answered and informed consent was obtained.      She denies a personal or family history of anesthesia complications or bleeding disorders.     Patient Active Problem List   Diagnosis     Essential hypertension     Idiopathic peripheral neuropathy     Pseudophakia of both eyes     Regular astigmatism of both eyes     Localized osteoporosis without current pathological fracture - 2020 waiting for dental work to start fosamax.      Mixed hyperlipidemia     Gait disorder     Vertigo     Chronic kidney disease, stage 3 (H)     Lumbar spondylosis     Lumbar facet arthropathy        Past Medical History:   Diagnosis Date     HLD (hyperlipidemia)      HTN (hypertension)      Idiopathic neuropathy      Vitamin D deficiency         Past Surgical History:   Procedure Laterality Date     APPENDECTOMY       BREAST BIOPSY, CORE RT/LT       CARPAL TUNNEL RELEASE RT/LT       CATARACT IOL, RT/LT        SECTION      x2     HYSTERECTOMY TOTAL ABDOMINAL, BILATERAL SALPINGO-OOPHORECTOMY, COMBINED       INJECT BLOCK MEDIAL BRANCH CERVICAL/THORACIC/LUMBAR Bilateral 10/18/2021    Procedure: Bilateral L5-S1 FACET JOINT Injection;  Surgeon: Stephanie Momin MD;  Location: [facility] OR     LAMINECTOMY LUMBAR THREE+ LEVELS  2016    L3-L5     TONSILLECTOMY &  ADENOIDECTOMY         Social History     Tobacco Use     Smoking status: Never Smoker     Smokeless tobacco: Never Used   Substance Use Topics     Alcohol use: Yes     Alcohol/week: 1.0 standard drink     Comment: rare wine       Family History   Problem Relation Age of Onset     Hypertension Mother      Colon Cancer Father      Heart Disease Father      Hypertension Father      Coronary Artery Disease Father      Heart Disease Brother      Hypertension Brother      Hypertension Sister      Diabetes No family hx of      Cancer No family hx of        Prior to Admission medications    Medication Sig Start Date End Date Taking? Authorizing Provider   amLODIPine (NORVASC) 10 MG tablet Take 1 tablet (10 mg) by mouth daily 12/16/21  Yes Parrish Arizmendi MD   atorvastatin (LIPITOR) 20 MG tablet Take 1 tablet (20 mg) by mouth At Bedtime 12/16/21  Yes Parrish Arizmendi MD   bisacodyl (DULCOLAX) 5 MG EC tablet Take two (2) tablet at 4 pm the day before your procedure.  If your procedure is before 11 am, take two (2) additional tablets at 8 pm.  If your procedure is after 11 am, take two (2) additional tablets at 6 am. For additional instructions refer to your colonoscopy prep instructions. 7/27/22  Yes Constantin Anh MD   calcium carbonate (OS-CAMMIE 600 MG [place]. CA) 600 MG tablet Take 1,200 mg by mouth daily    Yes Reported, Patient   Cholecalciferol (VITAMIN D3) 2000 UNITS CAPS 5,000 Int'l Units daily    Yes Reported, Patient   Diphenhydramine-APAP, sleep, (TYLENOL PM EXTRA STRENGTH PO) Take by mouth nightly as needed   Yes Reported, Patient   gabapentin (NEURONTIN) 400 MG capsule TAKE 2 CAPSULES IN THE MORNING, 2 CAPSULES AT NOON AND 2 CAPSULES IN THE EVENING 5/16/22  Yes Parrish Arizmendi MD   lisinopril (ZESTRIL) 10 MG tablet Take 1 tablet (10 mg) by mouth daily 12/16/21  Yes Parrish Arizmendi MD   omega 3 1000 MG CAPS Take 2 g by mouth   Yes Reported, Patient   polyethylene glycol  "(GOLYTELY) 236 g suspension The night before the exam: at 6 pm start drinking an 8-ounce glass every 15 minutes until the jug is half empty (about 8 glasses). Day of exam: 6 hours before your exam check-in Drink the other half of the jug. You should finish the prep 4 hours before the exam. For additional instructions refer to your colonoscopy prep instructions. 7/27/22  Yes Constantin Ahn MD   polyethylene glycol (GOLYTELY) 236 g suspension Take 4,000 mLs by mouth See Admin Instructions 7/26/22  Yes Stewart Mcbride MD       Allergies   Allergen Reactions     Codeine      Sulfa Drugs      Sulfasalazine Other (See Comments)     Sulfate Rash        REVIEW OF SYSTEMS:   5 point ROS negative except as noted above in HPI, including Gen., Resp., CV, GI &  system review.    PHYSICAL EXAM:   LMP  (LMP Unknown)  Estimated body mass index is 25.07 kg/m  as calculated from the following:    Height as of 6/3/22: 1.499 m (4' 11\").    Weight as of 6/3/22: 56.3 kg (124 lb 1.6 oz).   GENERAL APPEARANCE: alert, and oriented  MENTAL STATUS: alert  AIRWAY EXAM: Mallampatti Class I (visualization of the soft palate, fauces, uvula, anterior and posterior pillars)  RESP: lungs clear to auscultation - no rales, rhonchi or wheezes  CV: regular rates and rhythm  DIAGNOSTICS:    Not indicated    IMPRESSION   ASA Class 2 - Mild systemic disease    PLAN:   Plan for Colonoscopy with possible biopsy, possible polypectomy. We discussed the risks, benefits and alternatives and the patient wished to proceed.    The above has been forwarded to the consulting provider.      Signed Electronically by: Constantin Ahn MD  August 10, 2022          "

## 2022-08-10 NOTE — LETTER
[facility] ENDOSCOPY [place]    [address] [place] [address]  Phone: [phone]  Fax: [phone]       July 26, 2022                      Kerri Rocha  [address] [facility] [address]          Dear Kerri Rocha,        Thank you for choosing [facility]. You are scheduled for the following service(s).   Please be aware that coverage of these services is subject to the terms and limitations of your health insurance plan.  Call member services at your health plan with any benefit or coverage questions.    Scheduled Endoscopy Procedure(s): Colonoscopy     Date: 8/10/22  Scheduled MD: Dr. Constantin Ahn          Arrival Time:   11:15 AM  *Enter and check in at the Main Hospital Entrance  Procedure Time:  12:00 PM       Location:   [facility]        Endoscopy Department, First Floor *         [address] [place], [place] [address] [phone] () or [phone] to reschedule      STANDARD Golytely (Colyte, Nulytely)  Prep Instructions for your Colonoscopy  Please read these instructions carefully at least 7 days prior to your colonoscopy procedure. Be sure to follow all directions completely. The inside of your colon must be clean to allow for a complete examination for the presence of any growths, polyps, and/or abnormalities, as well as their biopsy or removal. A number of tips are included in order to make this part of the procedure as comfortable as possible.    Getting ready:     A nurse will call you approximately 1 week before your exam to prescribe your bowel prep and review the prep instructions with you.    You must arrange for an adult to drive you home after your exam. Your colonoscopy cannot be done unless you have a ride. If you need to use public transportation, someone must ride with you and stay with you for a minimum of 6-24 hours.    Check with your insurance company to be sure  they will cover this exam.    7 days before the exam:    Talk to your doctor:  If you take blood-thinners (such as Coumadin, Plavix, Xarelto), your prescription or schedule may need to change before the test.    Stop taking fiber supplements, multi-vitamins with iron, and medicines that contain iron.    Continue taking prescribed aspirin; talk to your prescribing doctor with any concerns.    Stop eating corn, nuts and foods with seeds.  These can stay in the colon for days.    If you have diabetes:  Ask to have your exam early in the morning.  Also, ask your doctor if you should change your diet or medicines.    3 days before the exam:    Begin a low-fiber diet: No raw fruits or vegetables, whole wheat, seeds, nuts, popcorn or other high-fiber foods (see list on page). No binding agents: (bran, Metamucil, Fibercon) and no Olestra (a fat substitute).    One day before the exam:    You can have a light, low-fiber breakfast. But drink only clear liquids after 9 a.m. (see list below). Drink at least 8 to 10 full glasses of clear liquids during the day.     Fill the jug that contains the Golytely powder with warm water. Cover and shake until well mixed. Use a full gallon of water. Chill for 3 hours, but do not add ice.    You will start drinking half of the Golytely solution at 6 p.m. You should drink the other half about 6 hours before your exam. So, the timing of Step 2 will depend on your exam time.     After you start drinking the solution, stay near a toilet. You may have watery stools (diarrhea), mild cramping, bloating , and nausea.     Step One:  o At 3 p.m., take 2 tablets of Dulcolax (bisacodyl).  o At 6 p.m. start drinking the Golytely solution. Drink an 8-ounce glass every 15 minutes until the jug is half empty. Drink each glass quickly.     Step Two:   If you arrive before 11 AM:  At 11 p.m. on the day before your exam:    Take 2 Dulcolax (Bisacodyl) tablets.     Start drinking the other half of the Golytely  jug. Drink one 8-ounce glass every 15 minutes until the jug is empty. Drink each glass quickly.  If you arrive after 11 AM:  At 6 AM on the day of the exam:    Take 2 tablets of Dulcolax (Bisacodyl).     Drink the other half of the Golytyel jug.     Drink one 8-ounce glass every 15 minutes until the jug is empty.     Drink each glass quickly.     You should finish the prep 4 hours before the exam.      Day of exam:      You may drink clear liquids only up until 4 hours before your exam.    Do not drink anything 4 hours before your exam, not even water. (If you must take medicine, you can take it with a sip of water.)     Do not chew or swallow anything including water or gum for at least 4 hours before your exam. If you do, we may cancel the exam for your safety.     Do not take diabetes medicine by mouth until after your exam.    If you have asthma, bring your inhaler.    Arrive with an adult who will take you home after your test. The medicine used will make you sleepy. If you don't have someone to take you home, we will cancel your test.       CLEAR LIQUIDS   You may have:    Water, tea, coffee (no milk or cream)    Soda pop, Gatorade (not red or purple)    Jell-O, Popsicles (no milk or fruit pieces - not red or purple)    Fat-free soup broth or bouillon    Plain hard candy, such as clear life savers (not red or purple)    Clear juices and fruit-flavored drinks, such as apple juice, white grape juice, Hi-C, and Amish-Aid (not red or purple)   Do not have:    Milk or milk products such as ice cream, malts or shakes, or coffee creamer    Red or purple drinks of any kind such as cranberry juice or grape juice. Avoid red or purple Jell-O, Popsicles, Amish-Aid, sorbet, sherbet and candy    Juices with pulp such as orange, grapefruit, pineapple or tomato juice    Cream soups of any kind    Alcohol and beer    Protein drinks or protein powder     LOW FIBER DIET   You may have:      Starches: White bread, rolls, biscuits,  croissants, [place] toast, white flour tortillas, waffles, pancakes, Wallisian toast; white rice, noodles, pasta, macaroni; cooked and peeled potatoes; plain crackers, saltines; cooked farina or cream of rice; puffed rice, corn flakes, Rice Krispies, Special K     Vegetables: tender cooked and canned, vegetable broths    Fruits and fruit juices: Strained fruit juice, canned fruit without seeds or skin (not pineapple), applesauce, pear sauce, ripe bananas, melons (not watermelon)     Milk products: Milk (plain or flavored), cheese, cottage cheese, yogurt (no berries), custard, ice cream      Proteins: Tender, well-cooked ground beef, lamb, veal, ham, pork, chicken, turkey, fish or organ meats; eggs; creamy peanut butter     Fats and condiments:  Margarine, butter, oils, mayonnaise, sour cream, salad dressing, plain gravy; spices, cooked herbs; sugar, clear jelly, honey, syrup     Snacks, sweets and drinks: Pretzels, hard candy; plain cakes and cookies (no nuts or seeds); gelatin, plain pudding, sherbet, Popsicles; coffee, tea, carbonated ( fizzy ) drinks Do not have:      Starches: Breads or rolls that contain nuts, seeds or fruit; whole wheat or whole grain breads that contain more than 1 gram of fiber per slice; cornbread; corn or whole wheat tortillas; potatoes with skin; brown rice, wild rice, kasha (buckwheat), and oatmeal     Vegetables: Any raw or steamed vegetables; vegetables with seeds; corn in any form     Fruits and fruit juices: Prunes, prune juice, raisins and other dried fruits, berries and other fruits with seeds, canned pineapple juices with pulp such as orange, grapefruit, pineapple or tomato juice    Milk products: Any yogurt with nuts, seeds or berries     Proteins: Tough, fibrous meats with gristle; cooked dried beans, peas or lentils; crunchy peanut butter    Fats and condiments: Pickles, olives, relish, horseradish; jam, marmalade, preserves     Snacks, sweets and drinks: Popcorn, nuts, seeds,  granola, coconut, candies made with nuts or seeds; all desserts that contain nuts, seeds, raisins and other dried fruits, coconut, whole grains or bran.        FAQ:      How do you know if your colon is cleaned out?   o After completing the bowel prep, your bowel movements should be all liquid and yellow. Your bowel movements will look similar to urine in the toilet. If there are pieces of stool (poop) in the toilet, or if you can't see to the bottom of the toilet, please call our office for advice. Call [phone] and ask to speak with a nurse.     Why do you need a responsible  to take you home and stay with you?  o We require a responsible adult to take you home for your safety. The sedation medicines used to relax you during the procedure can impair your judgement and reaction time, make you forgetful and possible a little unsteady. Do not drive, make any important decisions, or sign any legal documents for 24 hours after your procedure.     It is normal to feel bloated and gassy after your procedure. Walking will help move the air through your colon. You can take non-aspirin pain relievers that contain acetaminophen (Tylenol).     When can you eat after your procedure?  o You may resume your normal diet when you feel ready, unless advised otherwise by the doctor performing your procedure. Do not drink alcohol for 24 hours after your procedure.     You many resume normal activities (work, exercise, etc.) after 24 hours.     When will you get test results?  o You should have your procedure results and any lab results (if applicable) by letter, [employer] message, or phone call within 2 weeks. If you have any questions, please call the doctor that referred you for the procedure.       Thank you for choosing [facility], for your procedure. If you are sent a survey regarding your care, please take the time to complete the questionnaire. We value your feedback!             Updated: 6/22/2022

## 2022-09-06 ENCOUNTER — VIRTUAL VISIT (OUTPATIENT)
Dept: PHYSICAL MEDICINE AND REHAB | Facility: CLINIC | Age: 87
End: 2022-09-06
Payer: MEDICARE

## 2022-09-06 DIAGNOSIS — M48.07 NEURAL FORAMINAL STENOSIS OF LUMBOSACRAL SPINE: ICD-10-CM

## 2022-09-06 DIAGNOSIS — M47.816 LUMBAR FACET ARTHROPATHY: ICD-10-CM

## 2022-09-06 DIAGNOSIS — M47.816 LUMBAR SPONDYLOSIS: Primary | ICD-10-CM

## 2022-09-06 DIAGNOSIS — Z98.1 S/P LUMBAR SPINAL FUSION: ICD-10-CM

## 2022-09-06 PROCEDURE — 99213 OFFICE O/P EST LOW 20 MIN: CPT | Mod: 95 | Performed by: PHYSICAL MEDICINE & REHABILITATION

## 2022-09-06 NOTE — LETTER
9/6/2022         RE: Kerri Rocha  [address]  [facility] [place] [address]        Dear Colleague,    Thank you for referring your patient, Kerri Rocha, to the [facility]. Please see a copy of my visit note below.    PM&R Follow-Up Visit -     Date of Initial Visit: 09/30/2021  LOV: 10/18/2021 (procedure)  TD: 9/6/2022     Recall: Kerri Rocha is a 87 year old female s/p L4-5 fusion who follows up for axial low back pain     INTERVAL HISTORY:  Patient was last seen for procedure October 18, 2021.  At that visit, she underwent bilateral L5-S1 intra-articular facet steroid injection fluoroscopic guidance.  She reports good relief following the injection, albeit, for short period.  She had continued physical therapy and her home exercise program routinely.  Unfortunately, follow-up and additional recommendations were somewhat delayed due to her distance needed to travel and lack of transportation for clinic visits. Furthermore, she has followed up with my colleague in neurology, Dr. Jason Gillette, in the interim and noted increased radicular symptoms affecting her left lower limb and ongoing back pain.  She did have updated MRI of her lumbar spine performed July 31, 2022 (see below) which shows stable surgical hardware and otherwise relatively stable multilevel lumbar spondylosis.    Since last time, she notes worsening pain low back pain on the bilateral sides (midline) and radiating to the tailbone. In fact,  to me that the majority of her symptoms are in the sacral and coccyx area.  Symptoms are slightly worse when seated and particularly in the morning and throughout the course of the day.  Symptoms tend to resolve or improved with relative rest.  She states that her overall quality of life and function has been significantly impacted by her pain limitations.  He does not endorse significant radicular pain to me today.    She plans to resume some additional physical  therapy in the fall.    RECALL HISTORY OF PRESENT ILLNESS:  Kerri Rocha is a 87 year old female with history of L4/L5 lumbar fusion who presents to [facility] for evaluation of low back pain. She was seen at the request of [place].    Patient reports low back pain began 7 years ago while she was living in [place]. During that time she localized her pain bilateral low back at the L4 level. No pain in the legs at that time. She underwent L4/L5 lumbar fusion in [place] with significant improvement. No she reports different pain in her bilateral gluteal region with radiation to the posterior thighs with pain on R > L. She denies any shooting pain, or pain past the knee. She states 90% of her pain is axial vs legs She reports morning stiffness when waking up, pain will worsen with activity throughout the day and be worse at night. 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    Of note she has a 20 year history of idiopathic bilateral peripheral neuropathy. Is confirmed by a recent EMG this year. She has significant stocking numbness to the level of the ankles. Occasionally she experiences paresthesia, taking gabapentin for this rather than pain. Denies progressive weakness.    She has followed with Pain Medicine and has received bilateral SI joint injections in the past. The last to injections did not give her any significant improvement. She see physical therapy in [place] which helps. She is planning to continue to see them.     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       PRIOR INJURIES/TREATMENT:   Ice/Heat: Not helpful  Brace: Not attempted  Physical Therapy:   Completed PT in [place] for Spine PT and transitioned to HEP.      - Current Pain Medications -   Gabapentin 800 mg TID    - Prior/Trailed Pain Medications -   Ibuprofen as needed    Prior Procedures:  Date    Procedure     Improvement (%)  04/27/2021 Bilateral SI Joint Injections  No  improvement  08/17/2020 Bilateral SI Joint Injections  No improvement  12/13/2019 Bilateral SI Joint Injection  100% relief for 3-4 months  10/18/2021 B L5-S1 IAF CSI inj    Good relief xweeks      Prior Related Surgery:   S/p L4-5 fusion     Other (acupuncture, OMT, CMM, TENS, DME, etc.): None    Specialists Seen - (with most recent, available notes and clinic visits reviewed)   1. Neurology - Dr Gillette   2. Pain Clinic - Dr Frieda Novak   3. Rheumatology - Dr Rock    IMAGING - reviewed   07/31/2022 MR Lumbar spine  FINDINGS:  There are 5 type lumbar vertebrae, counting from C2. Conus tip at  approximately L1. Stable surgical changes at L4-5 posterior  instrumented fusion with interbody spacer and partial bony fusion.  Stepwise trace retrolisthesis from T12 through L3. Grade 1  anterolisthesis at L4-5 and L5-S1.     On a level by level basis, the findings are as follows:     L1-2: Disc bulge. Bilateral facet arthropathy. Moderate left and mild  right neural foraminal stenosis. No canal stenosis.   L2-3: Disc bulge. Bilateral facet arthropathy and thickening of the  ligamentum flavum. Mild right and moderate left neural foraminal  stenosis. Mild spinal canal stenosis.   L3-4: Disc bulge. Posterior decompression. Mild-to-moderate bilateral  neural foraminal stenosis.   L4-5: Grade 1 anterolisthesis. Disc bulge. Posterior decompression.  Mild bilateral neural foraminal stenosis.   L5-S1: Grade 1 anterolisthesis. Disc bulge. Bilateral facet  arthropathy and ligamentum flavum thickening. Mild-to-moderate  bilateral neural foraminal stenosis. Mild spinal canal stenosis.  Moderate narrowing of the right lateral recess with abutment and  likely impingement of the descending right S1 nerve root.     The visualized paraspinous soft tissues are within normal limits.                                                                     IMPRESSION:  Stable surgical changes of posterior instrumented fusion and  interbody  prosthesis at L4-5. Relatively stable multilevel lumbar spondylosis,  as detailed above.    10/28/2020 MR T spine  IMPRESSION:  1. Small posterior disc herniation at the T7-T8 level that mildly  deforms the anterior surface of the thoracic spinal cord.  2. Otherwise, normal thoracic spine MRI. No spinal canal or neural  foraminal stenosis. No evidence for fracture.     2021 NCS/EMG    Interpretation:  The EMG is abnormal. The findings are consistent with the followin. A severe distal symmetric axonal peripheral neuropathy that has progressed since last EMG 2019  2. Possible L3 or L4 chronic inactive radiculopathy on the right    Medical History:  She  has a past medical history of HLD (hyperlipidemia), HTN (hypertension), Idiopathic neuropathy, and Vitamin D deficiency.     She  has a past surgical history that includes appendectomy;  section; carpal tunnel release rt/lt; breast biopsy, core rt/lt; Hysterectomy total abdominal, bilateral salpingo-oophorectomy, combined; tonsillectomy & adenoidectomy; cataract iol, rt/lt; Laminectomy lumbar three+ levels (2016); Inject block medial branch cervical/thoracic/lumbar (Bilateral, 10/18/2021); and Colonoscopy (N/A, 8/10/2022).    Family History  Her family history includes Colon Cancer in her father; Coronary Artery Disease in her father; Heart Disease in her brother and father; Hypertension in her brother, father, mother, and sister.     Social History:  Work: Retired. Previous   History of any legal related pain issues: None  Current living situation: Lives in house with  who is legally blind  She  reports that she has never smoked. She has never used smokeless tobacco. She reports current alcohol use of about 1.0 standard drink of alcohol per week. She reports that she does not use drugs.     Current Medications:   She has a current medication list which includes the following prescription(s):  amlodipine, atorvastatin, dulcolax, calcium carbonate, vitamin d3, diphenhydramine-apap (sleep), gabapentin, lisinopril, omega 3, polyethylene glycol, and polyethylene glycol.     Allergies:   Allergies   Allergen Reactions     Codeine      Sulfa Drugs      Sulfasalazine Other (See Comments)     Sulfate Rash     OBJECTIVE   PHYSICAL EXAMINATION:  -Limited due to nature of virtual visit (phone visit)       ASSESSMENT:  Kerri Rocha is a pleasant 87 year old female who presents:    #. Lumbar spondylosis s/p L4-5 lumbar spinal fusion.   #. Lumbar facet arthropathy   #. Sacroiliac pain  #. Chronic sacral pain     Complicating co-morbidities include:   #.  CKD stage III  #.  Idiopathic peripheral polyneuropathy  #.  Osteoporosis    PLAN:  -Refer for fluoroscopically guided caudal epidural steroid injection  -PT as scheduled.  Continue HEP.  -Continue gabapentin 800 mg 3 times daily  -RTC For procedure    Ready to learn, no apparent learning barriers.  Education provided on treatment plan according to patient's preferred learning style.  Patient verbalizes understanding.   __________________________________  Stephanie Momin MD   Physical Medicine and Rehabilitation    20 minutes spent on the date of the encounter doing chart review, history and exam, documentation and further activities per the note       Again, thank you for allowing me to participate in the care of your patient.        Sincerely,        Stephanie Momin MD

## 2022-09-06 NOTE — PATIENT INSTRUCTIONS
Preparing for Spine Injection Therapy              Why Do I Need Spine Injection Therapy?  Your Health Care Provider is recommending spine injection therapy to  help relieve your back and neck pain. This will be in addition to other therapies such as medications and physical therapy. The purpose of these injections is to reduce the amount of inflammation (swelling, pain, heat, redness, loss of body function) around the nerves thus reducing the amount of pain.     The medications you will receive with the injections will include:   Anesthetic - medication to numb the painful area.    Steroid - medication that prevents or reduces swelling and pain (anti-inflammatory).   To reduce your discomfort during the injection procedure, you will receive a numbing medication injection prior to the placement of the needles. You will be lying on your stomach during the injection procedure. We will use a low-dose x-ray (fluoroscopy) to help guide needle placement.  You must have a  for this procedure. We will need to reschedule your injection if you do not have a  with you.      What are the different types of injections and procedure?  Below, is a brief description of the different types of injections we use to deliver pain medication as close as possible to the nerves in the painful area:    Caudal Epidural injection: (picture on the right) Epidural injections place 2 medications in your epidural space.  This is the space alongside your spinal canal (not inside of it). Nerves from your spinal cord pass through this space. The medications will bathe those nerves.           What Should I Expect if I Receive Sedation? THIS IS ORDERED BY THE PHYSICIAN  This is conscious sedation.  The medications we give to you will help you relax and reduce your anxiety.  You will still be awake for the procedure so we can ask you questions and hear your answers.     What Are My Responsibilities With Sedation?  You must stop eating and  drinking 8 (eight) hours before your procedure. You can have clear fluids (water, coffee/tea without milk) up to 2 hours prior to the injections. Nothing by mouth for 2 hours prior to injection.  This includes gum, mints, or chew.  Take your morning medications with a small sip of water.    You must have a  who will check-in with you, and stay in the building while the procedure is underway.  If you do not have a  your sedation will be cancelled.  We will monitor you for at least 30 minutes after the procedure before being discharged home.      What Are the Risks and Complications For This Procedure?  Risks and complication are rare, but can still occur.  You should understand, discuss, and accept these risks before agreeing to the procedure. They include, but are not limited to:  infection  nerve damage   paralysis  injection failure or a need for further injections or additional procedures  continued or worsening of symptoms/pain,   medication reaction,  dural leak (into the hole covering around the spinal cord. This may cause a a spinal headache)  leak of the medication into the spinal canal, nerves, or blood vessel.  death       What do I need to do before the procedure?   If you do not follow these instructions your procedure may be cancelled.  Tell us if you are on major blood thinners such as Coumadin, Xarelto , Plavix, Eliquis , Pradaxa , or others.    Contact the doctor who prescribed your blood thinner to ask for permission to stop taking it before you have the injection.    Schedule your pain injection procedure after your doctor gave their permission.   We will notify you when to stop and re-start your blood thinner.  Tell us if you have any allergies to contrast dye.  If you do, we may give additional medications to take before the procedure.  Tell us if you are pregnant, or possibly pregnant.  If so, you cannot receive steroid medications or be exposed to fluoroscopic X-rays.  Tell us if  you have been sick during the 10 days before the procedure. This includes:   colds   gastrointestinal illness or discomfort  dental sores,   skin infection, or any other type of infection.  Tell us if you have taken antibiotics during the 10 days prior to the procedure.  Do not drink alcohol the night before or on the day of the procedure.  You must shower the night before and on the day of your procedure.  Wear comfortable, clean clothing.  If you have an outside MRI (Magnetic Resonance Imaging photo), please bring it with you.    What Will Happen After the Procedure?  If you did not receive sedation we will monitor you for 15 minutes after the procedure. If you received sedation, we will monitor you for at least 30 minutes after the procedure     How soon can I expect pain relief?  You have received 2 types of medications with your injection:    Anesthetic - numbing medication which only acts for a few hours    Steroid which may take 3-14 days to be effective.   You can expect to feel your normal pain after the anesthetic wears off, until the steroid becomes effective.    How should I care for myself at home?  Get plenty of rest and avoid twisting, bending movements, heavy lifting, or strenuous activity for the first 24 hours. This will help the steroid be more effective. Medial Branch Block injections will have different discharge instructions.  Those will be discussed at that injection appointment.  Resume your pain medications  Apply ice packs (on for 20 minutes at a time), every 2-3 hours to your injection area for the first 2-3 days to help with pain control.    Avoid heat (pads or water bottles), which can cause the veins to open up, making the steroid less effective. You can use heat after 48 hours.  Take showers only for the first 48 hours.  No baths, hot tubs, swimming, or soaking for 48 hours to reduce the risk of infection.     When should I call the doctor?  Call us if you have any of the following:    Fever more than 100.5 degrees Fahrenheit   Signs of infection   Severe headache   Severe back pain   Increased numbness or weakness in your legs or arms   Loss of bladder or bowel control  Nausea   Other concerns    What is the contact information?  During business hours Monday-Friday 8a-5p call [phone].   After business hours, on weekends and holidays call [phone] and ask for the PM&R doctor on call

## 2022-09-07 PROBLEM — M48.07 NEURAL FORAMINAL STENOSIS OF LUMBOSACRAL SPINE: Status: ACTIVE | Noted: 2022-09-07

## 2022-09-07 PROBLEM — M47.816 LUMBAR SPONDYLOSIS: Status: ACTIVE | Noted: 2021-09-30

## 2022-09-07 PROBLEM — Z98.1 S/P LUMBAR SPINAL FUSION: Status: ACTIVE | Noted: 2022-09-07

## 2022-09-08 DIAGNOSIS — E78.2 MIXED HYPERLIPIDEMIA: ICD-10-CM

## 2022-09-08 DIAGNOSIS — I10 ESSENTIAL HYPERTENSION: ICD-10-CM

## 2022-09-09 RX ORDER — LISINOPRIL 10 MG/1
10 TABLET ORAL DAILY
Qty: 90 TABLET | Refills: 0 | Status: SHIPPED | OUTPATIENT
Start: 2022-09-09 | End: 2022-09-29

## 2022-09-09 RX ORDER — AMLODIPINE BESYLATE 10 MG/1
10 TABLET ORAL DAILY
Qty: 90 TABLET | Refills: 0 | Status: SHIPPED | OUTPATIENT
Start: 2022-09-09 | End: 2022-09-29

## 2022-09-09 NOTE — TELEPHONE ENCOUNTER
Patient has upcoming appointment. Prescription approved per [facility] Refill Protocol. Further refills to be discussed at upcoming appointment.     Avi BETTENCOURT RN

## 2022-09-11 ENCOUNTER — HEALTH MAINTENANCE LETTER (OUTPATIENT)
Age: 87
End: 2022-09-11

## 2022-09-14 ENCOUNTER — HOSPITAL ENCOUNTER (OUTPATIENT)
Facility: AMBULATORY SURGERY CENTER | Age: 87
Discharge: HOME | End: 2022-09-14
Attending: PHYSICAL MEDICINE & REHABILITATION | Admitting: PHYSICAL MEDICINE & REHABILITATION
Payer: MEDICARE

## 2022-09-14 ENCOUNTER — ANCILLARY PROCEDURE (OUTPATIENT)
Dept: RADIOLOGY | Facility: AMBULATORY SURGERY CENTER | Age: 87
End: 2022-09-14
Attending: PHYSICAL MEDICINE & REHABILITATION
Payer: MEDICARE

## 2022-09-14 VITALS
DIASTOLIC BLOOD PRESSURE: 62 MMHG | SYSTOLIC BLOOD PRESSURE: 146 MMHG | HEIGHT: 59 IN | TEMPERATURE: 97.3 F | BODY MASS INDEX: 25 KG/M2 | RESPIRATION RATE: 16 BRPM | HEART RATE: 64 BPM | OXYGEN SATURATION: 98 % | WEIGHT: 124 LBS

## 2022-09-14 DIAGNOSIS — R52 PAIN: ICD-10-CM

## 2022-09-14 DIAGNOSIS — Z98.1 S/P LUMBAR SPINAL FUSION: ICD-10-CM

## 2022-09-14 DIAGNOSIS — M48.07 NEURAL FORAMINAL STENOSIS OF LUMBOSACRAL SPINE: ICD-10-CM

## 2022-09-14 PROCEDURE — 62323 NJX INTERLAMINAR LMBR/SAC: CPT

## 2022-09-14 RX ORDER — IOPAMIDOL 408 MG/ML
INJECTION, SOLUTION INTRATHECAL PRN
Status: DISCONTINUED | OUTPATIENT
Start: 2022-09-14 | End: 2022-09-14 | Stop reason: HOSPADM

## 2022-09-14 RX ORDER — LIDOCAINE HYDROCHLORIDE 10 MG/ML
INJECTION, SOLUTION EPIDURAL; INFILTRATION; INTRACAUDAL; PERINEURAL PRN
Status: DISCONTINUED | OUTPATIENT
Start: 2022-09-14 | End: 2022-09-14 | Stop reason: HOSPADM

## 2022-09-14 RX ORDER — ASPIRIN 81 MG/1
81 TABLET ORAL DAILY
COMMUNITY

## 2022-09-14 RX ORDER — TRIAMCINOLONE ACETONIDE 40 MG/ML
INJECTION, SUSPENSION INTRA-ARTICULAR; INTRAMUSCULAR PRN
Status: DISCONTINUED | OUTPATIENT
Start: 2022-09-14 | End: 2022-09-14 | Stop reason: HOSPADM

## 2022-09-14 NOTE — DISCHARGE INSTRUCTIONS
Caudal Epidural Pain Injection  This procedure can be used to treat a variety of conditions that result in lower back and lower extremity pain.    Activity  -You may resume most normal activity levels with the exception of strenuous activity. It is important for us to know if your pain with normal activity is relieved after this injection.  -DO NOT shower for 24 hours  -DO NOT remove bandaid for 24 hours    Pain  -You may experience soreness at the injection site for one or two days  -You may use an ice pack for 20 minutes every 2 hours for the first 24 hours  -You may use a heating pad after the first 24 hours  -You may use Tylenol (acetaminophen) every 4 hours or other pain medicines as     directed by your physician    You may experience numbness radiating into your legs or arms (depending on the procedure location). This numbness may last several hours. Until sensation returns to normal; please use caution in walking, climbing stairs, and stepping out of your vehicle, etc.        DID YOU RECEIVE STEROIDS TODAY?  Yes    Common side effects of steroids:  Not everyone will experience corticosteroid side effects. If side effects are experienced, they will gradually subside in the 7-10 day period following an injection. Most common side effects include:  -Flushed face and/or chest  -Feeling of warmth, particularly in the face but could be an overall feeling of warmth  -Increased blood sugar in diabetic patients  -Menstrual irregularities my occur. If taking hormone-based birth control an alternate method of birth control is recommended  -Sleep disturbances and/or mood swings are possible  -Leg cramps      PLEASE KEEP TRACK OF YOUR SYMPTOMS AND NOTE YOUR IMPROVEMENT FOR YOUR DOCTOR.     Please contact us if you have:  -Severe pain  -Fever more than 101.5 degrees Fahrenheit  -Signs of infection at the injection site (redness, swelling, or drainage)    If you have questions, please contact our office at [phone]  between the hours of 7:00 am and 3:00 pm Monday through Friday. After office hours you can contact the on call provider by dialing [phone]. If you need immediate attention, we recommend that you go to a hospital emergency room or dial [phone].

## 2022-09-14 NOTE — PROCEDURES
PROCEDURE NOTE: Caudal Epidural Steroid Injection    PROCEDURE DATE: 9/14/2022    NAME: Kerri Rocha  YOB: 1935    ATTENDING PHYSICIAN: Stephanie Momin MD  RESIDENT/FELLOW PHYSICIAN: None    PREPROCEDURE DIAGNOSIS:   Neural foraminal stenosis of lumbosacral spine  S/P lumbar spinal fusion   POSTPROCEDURE DIAGNOSIS: Same    PROCEDURE PERFORMED: Caudal Epidural Steroid Injection    IV SEDATION None    ESTIMATED BLOOD LOSS:  None    FLUOROSCOPY WAS USED.    INDICATIONS FOR PROCEDURE:   Kerri Rocha is a 87 year old female with a clinical picture consistent with the above-mentioned diagnosis, consistent with low back pain with radicular pain in the bilateral lower extremities .     PROCEDURE AND FINDINGS:    Kerri was greeted in the pre-procedure holding area. The risk, benefits and alternatives to the procedure were again reviewed with the patient and written informed consent was placed in the chart. An IV line was not placed.  A 500 mL bag of NS was not connected to the patient. She was taken to the procedure room and positioned prone on the fluoroscopy table.  Routine monitors were applied including blood pressure cuff, and pulse oximetry. Prior to the procedure a time out was completed, verifying correct patient, procedure, site, positioning, and implants and/or special equipment.     The skin over the caudal area was prepped with chlorhexidine and draped in the usual sterile fashion. The sacral cornua and sacral hiatus were identified with manual palpation. This location was then confirmed on fluoroscopy with AP and lateral views. The skin and subcutaneous tissue overlying this level was anesthetized using a 27-gauge 1-1/4-inch needle with 1% preservative-free lidocaine for a total volume of 3ml. Under fluoroscopic guidance, using an AP and lateral views, a 22 G 3-1/2 inch Quinke spinal needle was inserted into the sacral hiatus and into the caudal epidural space, to the level of S3.     After  negative aspiration, 1mls of isovue-M contrast was injected under AP view with live fluoroscopy and confirmed adequate spread along the nerve root and in the epidural space.  There was no evidence of intravascular uptake or intrathecal spread on imaging. A lateral view was also taken confirming adequate epidural spread.       At this point, after negative aspiration, a total 8mL volume of treatment injectate, consisting of 1mL of 40mg/mL Triamcinolone, 4mL of PFNS, and 3mL of 1% Lidocaine was injected easily.  The needle was then flushed with 1 ml of local anesthetic, restyletted and removed. The needle insertion site was dressed appropriately.     she was taken to the recovery room where she was monitored for a brief period of time. She tolerated the procedure well and was discharged home in stable condition with post procedural instructions.    Before the procedure, she reported a pain score of 6/10.   After the procedure, she reported a pain score of 0/10.    Follow-up will be a clinic visit      COMPLICATIONS: None

## 2022-09-28 SDOH — HEALTH STABILITY: PHYSICAL HEALTH: ON AVERAGE, HOW MANY MINUTES DO YOU ENGAGE IN EXERCISE AT THIS LEVEL?: 30 MIN

## 2022-09-28 SDOH — ECONOMIC STABILITY: FOOD INSECURITY: WITHIN THE PAST 12 MONTHS, YOU WORRIED THAT YOUR FOOD WOULD RUN OUT BEFORE YOU GOT MONEY TO BUY MORE.: NEVER TRUE

## 2022-09-28 SDOH — ECONOMIC STABILITY: TRANSPORTATION INSECURITY
IN THE PAST 12 MONTHS, HAS THE LACK OF TRANSPORTATION KEPT YOU FROM MEDICAL APPOINTMENTS OR FROM GETTING MEDICATIONS?: NO

## 2022-09-28 SDOH — ECONOMIC STABILITY: TRANSPORTATION INSECURITY
IN THE PAST 12 MONTHS, HAS LACK OF TRANSPORTATION KEPT YOU FROM MEETINGS, WORK, OR FROM GETTING THINGS NEEDED FOR DAILY LIVING?: NO

## 2022-09-28 SDOH — ECONOMIC STABILITY: FOOD INSECURITY: WITHIN THE PAST 12 MONTHS, THE FOOD YOU BOUGHT JUST DIDN'T LAST AND YOU DIDN'T HAVE MONEY TO GET MORE.: NEVER TRUE

## 2022-09-28 SDOH — ECONOMIC STABILITY: INCOME INSECURITY: HOW HARD IS IT FOR YOU TO PAY FOR THE VERY BASICS LIKE FOOD, HOUSING, MEDICAL CARE, AND HEATING?: NOT HARD AT ALL

## 2022-09-28 SDOH — HEALTH STABILITY: PHYSICAL HEALTH: ON AVERAGE, HOW MANY DAYS PER WEEK DO YOU ENGAGE IN MODERATE TO STRENUOUS EXERCISE (LIKE A BRISK WALK)?: 7 DAYS

## 2022-09-28 SDOH — ECONOMIC STABILITY: INCOME INSECURITY: IN THE LAST 12 MONTHS, WAS THERE A TIME WHEN YOU WERE NOT ABLE TO PAY THE MORTGAGE OR RENT ON TIME?: NO

## 2022-09-28 ASSESSMENT — ENCOUNTER SYMPTOMS
PARESTHESIAS: 0
COUGH: 0
ABDOMINAL PAIN: 0
DIZZINESS: 1
JOINT SWELLING: 0
NERVOUS/ANXIOUS: 0
NAUSEA: 0
ARTHRALGIAS: 1
DYSURIA: 0
FEVER: 0
HEMATURIA: 0
WEAKNESS: 1
HEADACHES: 1
DIARRHEA: 0
HEMATOCHEZIA: 0
BREAST MASS: 0
CONSTIPATION: 0
HEARTBURN: 0
PALPITATIONS: 0
SHORTNESS OF BREATH: 0
CHILLS: 0
SORE THROAT: 0
FREQUENCY: 0
MYALGIAS: 1

## 2022-09-28 ASSESSMENT — LIFESTYLE VARIABLES
HOW MANY STANDARD DRINKS CONTAINING ALCOHOL DO YOU HAVE ON A TYPICAL DAY: 1 OR 2
HOW OFTEN DO YOU HAVE SIX OR MORE DRINKS ON ONE OCCASION: NEVER
AUDIT-C TOTAL SCORE: 2
SKIP TO QUESTIONS 9-10: 1
HOW OFTEN DO YOU HAVE A DRINK CONTAINING ALCOHOL: 2-4 TIMES A MONTH

## 2022-09-28 ASSESSMENT — SOCIAL DETERMINANTS OF HEALTH (SDOH)
IN A TYPICAL WEEK, HOW MANY TIMES DO YOU TALK ON THE PHONE WITH FAMILY, FRIENDS, OR NEIGHBORS?: MORE THAN THREE TIMES A WEEK
DO YOU BELONG TO ANY CLUBS OR ORGANIZATIONS SUCH AS CHURCH GROUPS UNIONS, FRATERNAL OR ATHLETIC GROUPS, OR SCHOOL GROUPS?: NO
HOW OFTEN DO YOU ATTEND CHURCH OR RELIGIOUS SERVICES?: NEVER
HOW OFTEN DO YOU GET TOGETHER WITH FRIENDS OR RELATIVES?: MORE THAN THREE TIMES A WEEK

## 2022-09-28 ASSESSMENT — ACTIVITIES OF DAILY LIVING (ADL): CURRENT_FUNCTION: NO ASSISTANCE NEEDED

## 2022-09-28 NOTE — PROGRESS NOTES
# HTN  - amlodipine 10  - lisinopril 10  BP Readings from Last 6 Encounters:   09/14/22 (!) 146/62   08/10/22 106/52   07/15/22 132/73   06/03/22 106/62   05/12/22 122/74   05/02/22 124/60     # Idiopathic peripheral neuropathy  - gabapentin 800/800/800    # Osteoporosis - 8/2020  - still waiting on dental work to consider fosamax  - ca/vitd  Vitamin D Deficiency Screening Results:  Lab Results   Component Value Date    VITDT 70 08/10/2020    VITDT 66 08/06/2019    VITDT 61 04/03/2017     # HLD  - atorvastatin 20  - asa 81    # CKD3   Latest Reference Range & Units 09/22/21 09:47   Creatinine 0.52 - 1.04 mg/dL 1.02   GFR Estimate >60 mL/min/1.73m2 50 (L)   (L): Data is abnormally low    # Lumbar spondylosis  # Lumbar facet arthropathy  # Sacroiliac pain  # Chronic sacral pain  # Neural foraminal stenosis of lumbosacral spine  # s/p lumbar spinal fusion  - PM&R9/2022  -> Refer for fluoroscopically guided caudal epidural steroid injection  -PT as scheduled.  Continue HEP.  -Continue gabapentin 800 mg 3 times daily  - Neuro, Dr. Gillette in January 2023     # HCM  - no repeat colonoscopy  - Mammo/pap no longer indicated   Latest Reference Range & Units 09/22/21 09:47   Cholesterol <200 mg/dL 173   Patient Fasting > 8hrs?  Yes   HDL Cholesterol >=50 mg/dL 40 (L)   LDL Cholesterol Calculated <=100 mg/dL 107 (H)   Non HDL Cholesterol <130 mg/dL 133 (H)   Triglycerides <150 mg/dL 132   Glucose 70 - 99 mg/dL 88   (L): Data is abnormally low  (H): Data is abnormally high  - due covid bivalent, flu, Td, shingles

## 2022-09-29 ENCOUNTER — OFFICE VISIT (OUTPATIENT)
Dept: PEDIATRICS | Facility: CLINIC | Age: 87
End: 2022-09-29
Payer: MEDICARE

## 2022-09-29 ENCOUNTER — TELEPHONE (OUTPATIENT)
Dept: PEDIATRICS | Facility: CLINIC | Age: 87
End: 2022-09-29

## 2022-09-29 VITALS
OXYGEN SATURATION: 98 % | HEIGHT: 59 IN | BODY MASS INDEX: 23.48 KG/M2 | TEMPERATURE: 97.7 F | HEART RATE: 68 BPM | WEIGHT: 116.5 LBS | RESPIRATION RATE: 18 BRPM | DIASTOLIC BLOOD PRESSURE: 73 MMHG | SYSTOLIC BLOOD PRESSURE: 125 MMHG

## 2022-09-29 DIAGNOSIS — M81.6 LOCALIZED OSTEOPOROSIS WITHOUT CURRENT PATHOLOGICAL FRACTURE: ICD-10-CM

## 2022-09-29 DIAGNOSIS — E78.2 MIXED HYPERLIPIDEMIA: ICD-10-CM

## 2022-09-29 DIAGNOSIS — N18.31 STAGE 3A CHRONIC KIDNEY DISEASE (H): ICD-10-CM

## 2022-09-29 DIAGNOSIS — R68.89 FEELING OFF-COLOR: ICD-10-CM

## 2022-09-29 DIAGNOSIS — F51.01 PRIMARY INSOMNIA: ICD-10-CM

## 2022-09-29 DIAGNOSIS — I10 ESSENTIAL HYPERTENSION: ICD-10-CM

## 2022-09-29 DIAGNOSIS — G62.9 NEUROPATHY: ICD-10-CM

## 2022-09-29 DIAGNOSIS — G60.9 IDIOPATHIC PERIPHERAL NEUROPATHY: ICD-10-CM

## 2022-09-29 DIAGNOSIS — Z00.00 ENCOUNTER FOR MEDICARE ANNUAL WELLNESS EXAM: Primary | ICD-10-CM

## 2022-09-29 LAB
ALBUMIN SERPL-MCNC: 4.1 G/DL (ref 3.4–5)
ALP SERPL-CCNC: 63 U/L (ref 40–150)
ALT SERPL W P-5'-P-CCNC: 27 U/L (ref 0–50)
ANION GAP SERPL CALCULATED.3IONS-SCNC: 4 MMOL/L (ref 3–14)
AST SERPL W P-5'-P-CCNC: 25 U/L (ref 0–45)
BILIRUB SERPL-MCNC: 0.5 MG/DL (ref 0.2–1.3)
BUN SERPL-MCNC: 25 MG/DL (ref 7–30)
CALCIUM SERPL-MCNC: 9.8 MG/DL (ref 8.5–10.1)
CHLORIDE BLD-SCNC: 106 MMOL/L (ref 94–109)
CHOLEST SERPL-MCNC: 153 MG/DL
CO2 SERPL-SCNC: 27 MMOL/L (ref 20–32)
CREAT SERPL-MCNC: 0.93 MG/DL (ref 0.52–1.04)
CREAT UR-MCNC: 102 MG/DL
FASTING STATUS PATIENT QL REPORTED: YES
GFR SERPL CREATININE-BSD FRML MDRD: 60 ML/MIN/1.73M2
GLUCOSE BLD-MCNC: 108 MG/DL (ref 70–99)
HDLC SERPL-MCNC: 57 MG/DL
LDLC SERPL CALC-MCNC: 72 MG/DL
MICROALBUMIN UR-MCNC: 11 MG/L
MICROALBUMIN/CREAT UR: 10.78 MG/G CR (ref 0–25)
NONHDLC SERPL-MCNC: 96 MG/DL
POTASSIUM BLD-SCNC: 4.5 MMOL/L (ref 3.4–5.3)
PROT SERPL-MCNC: 7.3 G/DL (ref 6.8–8.8)
SODIUM SERPL-SCNC: 137 MMOL/L (ref 133–144)
TRIGL SERPL-MCNC: 120 MG/DL

## 2022-09-29 PROCEDURE — 99214 OFFICE O/P EST MOD 30 MIN: CPT | Mod: 25 | Performed by: INTERNAL MEDICINE

## 2022-09-29 PROCEDURE — G0438 PPPS, INITIAL VISIT: HCPCS | Performed by: INTERNAL MEDICINE

## 2022-09-29 PROCEDURE — 36415 COLL VENOUS BLD VENIPUNCTURE: CPT | Performed by: INTERNAL MEDICINE

## 2022-09-29 PROCEDURE — 82043 UR ALBUMIN QUANTITATIVE: CPT | Performed by: INTERNAL MEDICINE

## 2022-09-29 PROCEDURE — 80053 COMPREHEN METABOLIC PANEL: CPT | Performed by: INTERNAL MEDICINE

## 2022-09-29 PROCEDURE — 80061 LIPID PANEL: CPT | Performed by: INTERNAL MEDICINE

## 2022-09-29 RX ORDER — GABAPENTIN 400 MG/1
CAPSULE ORAL
Qty: 360 CAPSULE | Refills: 3 | Status: SHIPPED | OUTPATIENT
Start: 2022-09-29 | End: 2022-10-24

## 2022-09-29 RX ORDER — AMLODIPINE BESYLATE 10 MG/1
10 TABLET ORAL DAILY
Qty: 90 TABLET | Refills: 3 | Status: SHIPPED | OUTPATIENT
Start: 2022-09-29 | End: 2022-12-19

## 2022-09-29 RX ORDER — TRAZODONE HYDROCHLORIDE 50 MG/1
25-50 TABLET, FILM COATED ORAL AT BEDTIME
Qty: 90 TABLET | Refills: 0 | Status: SHIPPED | OUTPATIENT
Start: 2022-09-29 | End: 2022-11-29

## 2022-09-29 RX ORDER — ATORVASTATIN CALCIUM 20 MG/1
20 TABLET, FILM COATED ORAL AT BEDTIME
Qty: 90 TABLET | Refills: 3 | Status: SHIPPED | OUTPATIENT
Start: 2022-09-29 | End: 2022-12-19

## 2022-09-29 RX ORDER — LISINOPRIL 10 MG/1
10 TABLET ORAL DAILY
Qty: 90 TABLET | Refills: 3 | Status: SHIPPED | OUTPATIENT
Start: 2022-09-29 | End: 2022-12-14

## 2022-09-29 ASSESSMENT — ENCOUNTER SYMPTOMS
DYSURIA: 0
NERVOUS/ANXIOUS: 0
DIARRHEA: 0
HEMATURIA: 0
COUGH: 0
SORE THROAT: 0
ARTHRALGIAS: 1
CHILLS: 0
NAUSEA: 0
SHORTNESS OF BREATH: 0
HEARTBURN: 0
DIZZINESS: 1
FEVER: 0
HEADACHES: 1
PALPITATIONS: 0
MYALGIAS: 1
HEMATOCHEZIA: 0
WEAKNESS: 1
PARESTHESIAS: 0
FREQUENCY: 0
CONSTIPATION: 0
JOINT SWELLING: 0
ABDOMINAL PAIN: 0
BREAST MASS: 0

## 2022-09-29 ASSESSMENT — PAIN SCALES - GENERAL: PAINLEVEL: NO PAIN (0)

## 2022-09-29 ASSESSMENT — ACTIVITIES OF DAILY LIVING (ADL): CURRENT_FUNCTION: NO ASSISTANCE NEEDED

## 2022-09-29 NOTE — PROGRESS NOTES
"SUBJECTIVE:   Kerri is a 87 year old who presents for Preventive Visit.      Patient has been advised of split billing requirements and indicates understanding: Yes  Are you in the first 12 months of your Medicare coverage?  No    Healthy Habits:     In general, how would you rate your overall health?  Good    Frequency of exercise:  6-7 days/week    Do you usually eat at least 4 servings of fruit and vegetables a day, include whole grains    & fiber and avoid regularly eating high fat or \"junk\" foods?  Yes    Taking medications regularly:  Yes    Medication side effects:  No significant flushing    Ability to successfully perform activities of daily living:  No assistance needed    Home Safety:  No safety concerns identified    Hearing Impairment:  No hearing concerns    In the past 6 months, have you been bothered by leaking of urine? Yes    In general, how would you rate your overall mental or emotional health?  Excellent      PHQ-2 Total Score: 0    Additional concerns today:  Yes    # When she gets up in the morning until about noon can a feeling of being off  - more susceptible to falling  - balance is worse  - Dr. Gillette doesn't know  - has done dizzy clinic and physical therapy -> didn't really help sensation of being off  - sleeps w/ Tylenol PM  - no snoring  - wakes up at 5/530 and goes to bathroom    # Neck   - painful in the morning  - better later in the day  - wondering about arhtritis  - also wonders about her pillows    # Traveling  - dede to [place] a few years ago  - neuropathy was really bad -> felt drunk  - when she got off neuropathy was really bad -> legs just didn't work    # HTN  - amlodipine 10  - lisinopril 10  BP Readings from Last 6 Encounters:   09/14/22 (!) 146/62   08/10/22 106/52   07/15/22 132/73   06/03/22 106/62   05/12/22 122/74   05/02/22 124/60     # Idiopathic peripheral neuropathy  - gabapentin 800/800/800    # Osteoporosis - 8/2020  - still waiting on dental work to consider " fosamax  - ca/vitd  Vitamin D Deficiency Screening Results:  Lab Results   Component Value Date    VITDT 70 08/10/2020    VITDT 66 08/06/2019    VITDT 61 04/03/2017     # HLD  - atorvastatin 20  - asa 81    # CKD3   Latest Reference Range & Units 09/22/21 09:47   Creatinine 0.52 - 1.04 mg/dL 1.02   GFR Estimate >60 mL/min/1.73m2 50 (L)   (L): Data is abnormally low    # Lumbar spondylosis  # Lumbar facet arthropathy  # Sacroiliac pain  # Chronic sacral pain  # Neural foraminal stenosis of lumbosacral spine  # s/p lumbar spinal fusion  - PM&R9/2022  -> Refer for fluoroscopically guided caudal epidural steroid injection  -PT as scheduled.  Continue HEP.  -Continue gabapentin 800 mg 3 times daily  - Neuro, Dr. Gillette in January 2023     # HCM  - no repeat colonoscopy  - Mammo/pap no longer indicated   Latest Reference Range & Units 09/22/21 09:47   Cholesterol <200 mg/dL 173   Patient Fasting > 8hrs?  Yes   HDL Cholesterol >=50 mg/dL 40 (L)   LDL Cholesterol Calculated <=100 mg/dL 107 (H)   Non HDL Cholesterol <130 mg/dL 133 (H)   Triglycerides <150 mg/dL 132   Glucose 70 - 99 mg/dL 88   (L): Data is abnormally low  (H): Data is abnormally high  - due covid bivalent, flu, Td, shingles        Information on urinary incontinence and treatment options given to patient.      Do you feel safe in your environment? Yes    Have you ever done Advance Care Planning? (For example, a Health Directive, POLST, or a discussion with a medical provider or your loved ones about your wishes): Yes, patient states has an Advance Care Planning document and will bring a copy to the clinic.     Fall risk  Fallen 2 or more times in the past year?: No  Any fall with injury in the past year?: No    Cognitive Screening   1) Repeat 3 items (Leader, Season, Table)    2) Clock draw: NORMAL  3) 3 item recall: Recalls 3 objects  Results: 3 items recalled: COGNITIVE IMPAIRMENT LESS LIKELY    Mini-CogTM Copyright [place]. Licensed by the author for use  in [facility]; reprinted with permission ([email or web address]). All rights reserved.      Do you have sleep apnea, excessive snoring or daytime drowsiness?: no    Reviewed and updated as needed this visit by clinical staff   Tobacco  Allergies    Med Hx  Surg Hx  Fam Hx  Soc Hx          Reviewed and updated as needed this visit by Provider                   Social History     Tobacco Use     Smoking status: Never Smoker     Smokeless tobacco: Never Used   Substance Use Topics     Alcohol use: Yes     Alcohol/week: 1.0 standard drink     Comment: rare wine     If you drink alcohol do you typically have >3 drinks per day or >7 drinks per week? No    Alcohol Use 9/28/2022   Prescreen: >3 drinks/day or >7 drinks/week? No   Prescreen: >3 drinks/day or >7 drinks/week? -           Current providers sharing in care for this patient include:   Patient Care Team:  Parrish Arizmendi MD as PCP - General (Internal Medicine)  Raymundo Solorio MD as MD (Neurology)  Jason Gillette MD as Assigned Neuroscience Provider  Molina Rock MD as Assigned Rheumatology Provider  Arabella Purvis MD as Hospitalist (Endocrinology, Diabetes, and Metabolism)  Zoraida Castillo, APRN CNP as Assigned PCP  Jason Gillette MD as MD (Neurology)    The following health maintenance items are reviewed in Epic and correct as of today:  Health Maintenance   Topic Date Due     HEMOGLOBIN  Never done     URINALYSIS  Never done     ZOSTER IMMUNIZATION (1 of 2) Never done     MICROALBUMIN  08/10/2021     COVID-19 Vaccine (5 - Booster for Pfizer series) 06/21/2022     DTAP/TDAP/TD IMMUNIZATION (4 - Td or Tdap) 08/15/2022     INFLUENZA VACCINE (1) 09/01/2022     BMP  09/22/2022     LIPID  09/22/2022     ANNUAL REVIEW OF HM ORDERS  09/22/2022     MEDICARE ANNUAL WELLNESS VISIT  09/22/2022     FALL RISK ASSESSMENT  09/29/2023     ADVANCE CARE PLANNING  09/22/2026     PHQ-2 (once per calendar year)  Completed     Pneumococcal  "Vaccine: 65+ Years  Completed     IPV IMMUNIZATION  Aged Out     MENINGITIS IMMUNIZATION  Aged Out     HEPATITIS B IMMUNIZATION  Aged Out     Pertinent mammograms are reviewed under the imaging tab.    Review of Systems   Constitutional: Negative for chills and fever.   HENT: Negative for congestion, ear pain, hearing loss and sore throat.    Eyes: Negative for visual disturbance.   Respiratory: Negative for cough and shortness of breath.    Cardiovascular: Negative for chest pain, palpitations and peripheral edema.   Gastrointestinal: Negative for abdominal pain, constipation, diarrhea, heartburn, hematochezia and nausea.   Breasts:  Negative for tenderness, breast mass and discharge.   Genitourinary: Negative for dysuria, frequency, genital sores, hematuria, pelvic pain, urgency, vaginal bleeding and vaginal discharge.   Musculoskeletal: Positive for arthralgias and myalgias. Negative for joint swelling.   Skin: Negative for rash.   Neurological: Positive for dizziness, weakness and headaches. Negative for paresthesias.   Psychiatric/Behavioral: Negative for mood changes. The patient is not nervous/anxious.      OBJECTIVE:   /73 (BP Location: Right arm, Patient Position: Sitting, Cuff Size: Adult Regular)   Pulse 68   Temp 97.7  F (36.5  C) (Temporal)   Resp 18   Ht 1.509 m (4' 11.41\")   Wt 52.8 kg (116 lb 8 oz)   LMP  (LMP Unknown)   SpO2 98%   BMI 23.21 kg/m   Estimated body mass index is 23.21 kg/m  as calculated from the following:    Height as of this encounter: 1.509 m (4' 11.41\").    Weight as of this encounter: 52.8 kg (116 lb 8 oz).  Physical Exam  GENERAL: healthy, alert and no distress  EYES: Eyes grossly normal to inspection, PERRL and conjunctivae and sclerae normal  HENT: ear canals and TM's normal, nose and mouth without ulcers or lesions  NECK: no adenopathy, no asymmetry, masses, or scars and thyroid normal to palpation  RESP: lungs clear to auscultation - no rales, rhonchi or " wheezes  CV: regular rate and rhythm, normal S1 S2, no S3 or S4, no murmur, click or rub, no peripheral edema and peripheral pulses strong  ABDOMEN: soft, nontender, no hepatosplenomegaly, no masses and bowel sounds normal  MS: no gross musculoskeletal defects noted, no edema  SKIN: no suspicious lesions or rashes  NEURO: Normal strength and tone, mentation intact and speech normal  PSYCH: mentation appears normal, affect normal/bright    Diagnostic Test Results:  Labs reviewed in Epic    ASSESSMENT / PLAN:   (Z00.00) Encounter for Medicare annual wellness exam  (primary encounter diagnosis)  - shingles and td in     (R68.89) Feeling off-color  Comment: I wonder if this is related to meds since mostly in the AM and then clears - no snoring.   - see PI - gabapentin, tylenol PM possible. Doesn't think she can sleep w/ out meds.    (I10) Essential hypertension  BP Readings from Last 6 Encounters:   09/29/22 125/73   09/14/22 (!) 146/62   08/10/22 106/52   07/15/22 132/73   06/03/22 106/62   05/12/22 122/74     Plan: Albumin Random Urine Quantitative with Creat         Ratio, amLODIPine (NORVASC) 10 MG tablet,         lisinopril (ZESTRIL) 10 MG tablet,         Comprehensive metabolic panel (BMP + Alb, Alk         Phos, ALT, AST, Total. Bili, TP)    (E78.2) Mixed hyperlipidemia  Plan: atorvastatin (LIPITOR) 20 MG tablet,         Comprehensive metabolic panel (BMP + Alb, Alk         Phos, ALT, AST, Total. Bili, TP)    (G62.9) Neuropathy  Comment: Following with Neuro and PM&R. Recent injection was really helpful.  Plan: gabapentin (NEURONTIN) 400 MG capsule    (N18.31) Stage 3a chronic kidney disease (H)  Explained CKD 3 - stable and monitoring.  Plan: Albumin Random Urine Quantitative with Creat         Ratio, Comprehensive metabolic panel (BMP +         Alb, Alk Phos, ALT, AST, Total. Bili, TP)    (F51.01) Primary insomnia  Comment: See PI.  Plan: traZODone (DESYREL) 50 MG tablet    (M81.6) Localized osteoporosis  "without current pathological fracture - 8/2020 waiting for dental work to start fosamax.   Comment: Still doing dental work. Discussed age and starting a medication - will defer to next year.  ------  PATIENT INSTRUCTIONS  Neck  - since it's mostly mornings -> try different pillows, sleeping positions,   - when you wake up can do heat and stretch  - can mention to Dr. Momin as well  - keep me updated    Feeling Off  - I wonder if this is a medication effect   - can try decreasing your morning gabapentin dose to 200 (200/400/400) - try this for 2 weeks.   - could also try decreasing gabapentin across the board to 200/200/200  - do not do this all at once but can drop 200 mg every 3-5 days. If no better or neuropathy worse would go back to 400/400/400.  - then next thing we can try is a different medication than Tylenol PM  - would stop the Tylenol PM and try trazodone 25. If not helpful with sleeping can increase the trazodone to 50 mg at night. Given this a few weeks.   - Could consider a sleep study but I'm not jumping to this.    Bones  - plan on dexa next year   - if way way way worse we'll think about a medication but if not maybe we hold off     Airplane  - move your legs  - see if you can arrange for wheelchair to take away any anxiety or fall risk.    I'll have a nurse call you in 2 months to see how things are going - especially with the mornings.     Shingles vaccine and Tetanus vaccine in the pharmacy.  -----    COUNSELING:  Reviewed preventive health counseling, as reflected in patient instructions    Estimated body mass index is 23.21 kg/m  as calculated from the following:    Height as of this encounter: 1.509 m (4' 11.41\").    Weight as of this encounter: 52.8 kg (116 lb 8 oz).        She reports that she has never smoked. She has never used smokeless tobacco.      Appropriate preventive services were discussed with this patient, including applicable screening as appropriate for cardiovascular " disease, diabetes, osteopenia/osteoporosis, and glaucoma.  As appropriate for age/gender, discussed screening for colorectal cancer, prostate cancer, breast cancer, and cervical cancer. Checklist reviewing preventive services available has been given to the patient.    Reviewed patients plan of care and provided an AVS. The Intermediate Care Plan ( asthma action plan, low back pain action plan, and migraine action plan) for Kerri meets the Care Plan requirement. This Care Plan has been established and reviewed with the Patient.    Counseling Resources:  ATP IV Guidelines  Pooled Cohorts Equation Calculator  Breast Cancer Risk Calculator  Breast Cancer: Medication to Reduce Risk  FRAX Risk Assessment  ICSI Preventive Guidelines  Dietary Guidelines for Americans, 2010  [employer] MyPlate  ASA Prophylaxis  Lung CA Screening    Parrish Arizmendi MD  [facility]    Identified Health Risks:

## 2022-09-29 NOTE — PATIENT INSTRUCTIONS
Neck  - since it's mostly mornings -> try different pillows, sleeping positions,   - when you wake up can do heat and stretch  - can mention to Dr. Momin as well  - keep me updated    Feeling Off  - I wonder if this is a medication effect   - can try decreasing your morning gabapentin dose to 200 (200/400/400) - try this for 2 weeks.   - could also try decreasing gabapentin across the board to 200/200/200  - do not do this all at once but can drop 200 mg every 3-5 days. If no better or neuropathy worse would go back to 400/400/400.  - then next thing we can try is a different medication than Tylenol PM  - would stop the Tylenol PM and try trazodone 25. If not helpful with sleeping can increase the trazodone to 50 mg at night. Given this a few weeks.   - Could consider a sleep study but I'm not jumping to this.    Bones  - plan on dexa next year   - if way way way worse we'll think about a medication but if not maybe we hold off     Airplane  - move your legs  - see if you can arrange for wheelchair to take away any anxiety or fall risk.    I'll have a nurse call you in 2 months to see how things are going - especially with the mornings.     Shingles vaccine and Tetanus vaccine in the pharmacy.    Patient Education   Personalized Prevention Plan  You are due for the preventive services outlined below.  Your care team is available to assist you in scheduling these services.  If you have already completed any of these items, please share that information with your care team to update in your medical record.  Health Maintenance Due   Topic Date Due    Hemoglobin  Never done    Urine Test  Never done    Zoster (Shingles) Vaccine (1 of 2) Never done    Kidney Microalbumin Urine Test  08/10/2021    COVID-19 Vaccine (5 - Booster for Pfizer series) 06/21/2022    Diptheria Tetanus Pertussis (DTAP/TDAP/TD) Vaccine (4 - Td or Tdap) 08/15/2022    Flu Vaccine (1) 09/01/2022    Basic Metabolic Panel  09/22/2022    Cholesterol Lab   09/22/2022    ANNUAL REVIEW OF  ORDERS  09/22/2022       Urinary Incontinence, Female (Adult)   Urinary incontinence means loss of bladder control. This problem affects many women, especially as they get older. If you have incontinence, you may be embarrassed to ask for help. But know that this problem can be treated.   Types of Incontinence  There are different types of incontinence. Two of the main types are described here. You can have more than one type.   Stress incontinence. With this type, urine leaks when pressure (stress) is put on the bladder. This may happen when you cough, sneeze, or laugh. Stress incontinence most often occurs because the pelvic floor muscles that support the bladder and urethra are weak. This can happen after pregnancy and vaginal childbirth or a hysterectomy. It can also be due to excess body weight or hormone changes.  Urge incontinence (also called overactive bladder). With this type, a sudden urge to urinate is felt often. This may happen even though there may not be much urine in the bladder. The need to urinate often during the night is common. Urge incontinence most often occurs because of bladder spasms. This may be due to bladder irritation or infection. Damage to bladder nerves or pelvic muscles, constipation, and certain medicines can also lead to urge incontinence.  Treatment depends on the cause. Further evaluation is needed to find the type you have. This will likely include an exam and certain tests. Based on the results, you and your healthcare provider can then plan treatment. Until a diagnosis is made, the home care tips below can help ease symptoms.   Home care  Do pelvic floor muscle exercises, if they are prescribed. The pelvic floor muscles help support the bladder and urethra. Many women find that their symptoms improve when doing special exercises that strengthen these muscles. To do the exercises, contract the muscles you would use to stop your stream of  urine. But do this when you re not urinating. Hold for 10 seconds, then relax. Repeat 10 to 20 times in a row, at least 3 times a day. Your healthcare provider may give you other instructions for how to do the exercises and how often.  Keep a bladder diary. This helps track how often and how much you urinate over a set period of time. Bring this diary with you to your next visit with the provider. The information can help your provider learn more about your bladder problem.  Lose weight, if advised to by your provider. Extra weight puts pressure on the bladder. Your provider can help you create a weight-loss plan that s right for you. This may include exercising more and making certain diet changes.  Don't have foods and drinks that may irritate the bladder. These can include alcohol and caffeinated drinks.  Quit smoking. Smoking and other tobacco use can lead to a long-term (chronic) cough that strains the pelvic floor muscles. Smoking may also damage the bladder and urethra. Talk with your provider about treatments or methods you can use to quit smoking.  If drinking large amounts of fluid makes you have symptoms, you may be advised to limit your fluid intake. You may also be advised to drink most of your fluids during the day and to limit fluids at night.  If you re worried about urine leakage or accidents, you may wear absorbent pads to catch urine. Change the pads often. This helps reduce discomfort. It may also reduce the risk of skin or bladder infections.    Follow-up care  Follow up with your healthcare provider, or as directed. It may take some to find the right treatment for your problem. But healthy lifestyle changes can be made right away. These include such things as exercising on a regular basis, eating a healthy diet, losing weight (if needed), and quitting smoking. Your treatment plan may include special therapies or medicines. Certain procedures or surgery may also be options. Talk about any  questions you have with your provider.   When to seek medical advice  Call the healthcare provider right away if any of these occur:  Fever of 100.4 F (38 C) or higher, or as directed by your provider  Bladder pain or fullness  Belly swelling  Nausea or vomiting  Back pain  Weakness, dizziness, or fainting  Marie last reviewed this educational content on 1/1/2020 2000-2021 [employer]. All rights reserved. This information is not intended as a substitute for professional medical care. Always follow your healthcare professional's instructions.

## 2022-09-29 NOTE — TELEPHONE ENCOUNTER
Postpone for 2 months and then call patient. Review PI from [facility] prior.    How is she doing?  Did she try adjusting the gabapentin PM dose? Other gabapentin doses?  Did she end up trying the trazodone instead of tylenol PM?    Did anything help her symptoms? Make them worse?    IVIS Arizmendi MD  Internal Medicine-Pediatrics

## 2022-10-09 DIAGNOSIS — E78.2 MIXED HYPERLIPIDEMIA: ICD-10-CM

## 2022-10-11 RX ORDER — ATORVASTATIN CALCIUM 20 MG/1
20 TABLET, FILM COATED ORAL AT BEDTIME
Qty: 90 TABLET | Refills: 3 | OUTPATIENT
Start: 2022-10-11

## 2022-10-11 NOTE — TELEPHONE ENCOUNTER
Note to pharmacy- current RX at [facility] 9/29/22, transfer if pt requested.  Felicita Richardson RN

## 2022-10-26 ENCOUNTER — MYC MEDICAL ADVICE (OUTPATIENT)
Dept: PEDIATRICS | Facility: CLINIC | Age: 87
End: 2022-10-26

## 2022-10-26 DIAGNOSIS — M53.3 SACROILIAC JOINT PAIN: Primary | ICD-10-CM

## 2022-11-17 ENCOUNTER — HOSPITAL ENCOUNTER (OUTPATIENT)
Dept: PHYSICAL THERAPY | Facility: CLINIC | Age: 87
Discharge: HOME | End: 2022-11-17
Payer: MEDICARE

## 2022-11-17 DIAGNOSIS — G60.9 IDIOPATHIC PERIPHERAL NEUROPATHY: ICD-10-CM

## 2022-11-17 DIAGNOSIS — M62.81 MUSCLE WEAKNESS (GENERALIZED): ICD-10-CM

## 2022-11-17 DIAGNOSIS — R26.89 IMPAIRMENT OF BALANCE: Primary | ICD-10-CM

## 2022-11-17 DIAGNOSIS — M47.816 LUMBAR SPONDYLOSIS: ICD-10-CM

## 2022-11-17 DIAGNOSIS — R26.9 GAIT DISORDER: ICD-10-CM

## 2022-11-17 DIAGNOSIS — R53.81 PHYSICAL DECONDITIONING: ICD-10-CM

## 2022-11-17 PROCEDURE — 97161 PT EVAL LOW COMPLEX 20 MIN: CPT | Mod: GP

## 2022-11-17 PROCEDURE — 97112 NEUROMUSCULAR REEDUCATION: CPT | Mod: GP

## 2022-11-17 NOTE — PROGRESS NOTES
11/17/22 1400   Quick Adds   Quick Adds Certification   Type of Visit Initial OP PT Evaluation   General Information   Start of Care Date 11/17/22   Referring Physician Parrish Alvarez MD   Orders Evaluate and Treat as Indicated   Order Date 11/01/22   Medical Diagnosis Idiopathic peripheral neuropathy; abnormal gait   Onset of illness/injury or Date of Surgery 11/01/22  (order date)   Surgical/Medical history reviewed Yes   Pertinent history of current problem (include personal factors and/or comorbidities that impact the POC) Pt is an 88 y/o F, presenting to PT on 11/17/2022 for evaluation of gait imapirment in the presence of peripheral neuropathy and lumbar radiculopathy. Pt reporting she uses no AD in the home, though an SPC and/or HHA for community mobility. She reports that her knees are rigid, while also demonstrating a decreased step length, and high-guard posture due to her neuropathy and pain. She also has poor confidence in her balance. She is completing her previous HEP from PT (Spring 2022); however, she is interested in further PT intervention due to ongoing balance impairment, poor confidence with mobility, and increased gait deficits. PMHx: neuropathy x 20 years, lumbar stenosis and decompression surgery in 2016, history of dizziness.   Prior level of function comment Pt IND with household transfers/ambulation; SPC for community mobility.   Previous/Current Treatment Physical Therapy   Improvement after PT Moderate   Current Community Support Family/friend caregiver  (Pt lives with her . He is legally blind.)   Patient role/Employment history Retired   [facility]   Home/Community Accessibility Comments Pt reporting using HR for household stair negotiation.   Current Assistive Devices Standard Cane  (SPC/HHA for community mobility.)   Patient/Family Goals Statement To improve her balance and gait.   Fall Risk Screen   Fall screen completed by PT   Have you  fallen 2 or more times in the past year? No   Have you fallen and had an injury in the past year? No   Is patient a fall risk? Yes   Fall screen comments Pt at inc risk for falling 2' sensory deficits with transfers/ambulation in presence of peripheral neuropathy.   Pain   Patient currently in pain No   Cognitive Status Examination   Orientation orientation to person, place and time   Cognitive Comment WFL   Posture   Posture Comments Pt with forward head and slight rounded shoulders 2' age related spinal changes.   Range of Motion (ROM)   ROM Comment Pt with full B UE/LE ROM.   Strength   Strength Comments Pt with 4/5 MMT of B UEs/LEs.   Musculoskeletal Special Tests   Musculoskeletal Special Tests 5xSTS = 12.62s; WFL.   Gait   Gait Comments Pt demonstrating decreased gait velocity, decreased step length, high guard posture, and poor turning/multidirectional stepping with catching/postural responses.   Gait Special Tests   Gait Special Tests FUNCTIONAL GAIT ASSESSMENT   Gait Special Tests Functional Gait Assessment Score out of 30   Score out of 30 18   Comments Pt at inc risk for falling.   Balance Special Tests   Balance Special Tests Other;Modified CTSIB Conditions   Balance special tests other ABC = 53%   Balance Special Tests Modified CTSIB Conditions   Condition 1, seconds 30 Seconds   Condition 2, seconds 30 Seconds   Condition 4, seconds 30 Seconds   Condition 5, seconds 30 Seconds   Modified CTSIB Comments Inc postural sway on condition #5; however, no LOB.   Sensory Examination   Sensory Perception Comments Pt reporting numbness tingling primarily up to her ankles; however, occasionally up to her knees.   Coordination   Coordination Comments WFL   Muscle Tone   Muscle Tone Comments WFL   Planned Therapy Interventions   Planned Therapy Interventions balance training;gait training;neuromuscular re-education;ROM;strengthening;stretching   Clinical Impression   Criteria for Skilled Therapeutic Interventions  Met yes, treatment indicated   PT Diagnosis limited functional mobility 2' peripheral neuropathy   Influenced by the following impairments impaired sensation in feet/ankles bilaterally, inc lumbar pain, poor balance confidence, poor postural control with static/dynamic balance activities   Functional limitations due to impairments pt at inc risk for falling with household/community mobility; inc risk for falling on unstable/uneven surfaces (ie. snow/winter months)   Clinical Presentation Stable/Uncomplicated   Clinical Presentation Rationale Pt medically stable.   Clinical Decision Making (Complexity) Low complexity   Therapy Frequency 1 time/week   Predicted Duration of Therapy Intervention (days/wks) 90 days   Risk & Benefits of therapy have been explained Yes   Patient, Family & other staff in agreement with plan of care Yes   Clinical Impression Comments Pt will benefit from skilled PT intervention for improved static/dynamic balance, improved tolerance to physical activity, improved gait mechanics, and improved confidence with mobility, all while working to reduce pt's risk for falling.   GOALS   PT Eval Goals 1;2;3   Goal 1   Goal Identifier HEP   Goal Description Pt will demonstrate IND with balance, strength, and muscular and aerobic endurance programs, while working to improve capacity for functional mobility.   Goal Progress initiated 11/17/2022   Target Date 02/15/23   Goal 2   Goal Identifier ABC   Goal Description Pt will self-report balance confidence of >80% with tasks of functional mobility, while working to decrease pt s risk for falling with household/community mobility.   Goal Progress baseline: 53%   Target Date 02/15/23   Goal 3   Goal Identifier FGA   Goal Description Pt will score >23/30pts on FGA, to demonstrate improved dynamic balance and postural control with ambulation, and decreased risk for falling with functional mobility.   Goal Progress baseline: 18/30pts   Target Date 02/15/23    Total Evaluation Time   PT Eval, Low Complexity Minutes (38874) 30   Therapy Certification   Certification date from 11/17/22   Certification date to 02/15/23   Medical Diagnosis Idiopathic peripheral neuropathy; abnormal gait   Certification I certify the need for these services furnished under this plan of treatment and while under my care.  (Physician co-signature of this document indicates review and certification of the therapy plan).

## 2022-11-17 NOTE — PLAN OF CARE
[facility]                                                                                   OUTPATIENT PHYSICAL THERAPY FUNCTIONAL EVALUATION  PLAN OF TREATMENT FOR OUTPATIENT REHABILITATION  (COMPLETE FOR INITIAL CLAIMS ONLY)  Patient's Last Name, First Name, M.I.  YOB: 1935  Kerri Rocha     Provider's Name   [facility]   Medical Record No.  [id number]     Start of Care Date:  11/17/22   Onset Date:  11/01/22 (order date)   Type:     _X__PT   ____OT  ____SLP Medical Diagnosis:  Idiopathic peripheral neuropathy; abnormal gait     PT Diagnosis:  limited functional mobility 2' peripheral neuropathy Visits from SOC:  1                              __________________________________________________________________________________  Plan of Treatment/Functional Goals:  balance training, gait training, neuromuscular re-education, ROM, strengthening, stretching     GOALS  HEP  Pt will demonstrate IND with balance, strength, and muscular and aerobic endurance programs, while working to improve capacity for functional mobility.  02/15/23    ABC  Pt will self-report balance confidence of >80% with tasks of functional mobility, while working to decrease pt s risk for falling with household/community mobility.  02/15/23    FGA  Pt will score >23/30pts on FGA, to demonstrate improved dynamic balance and postural control with ambulation, and decreased risk for falling with functional mobility.  02/15/23      Therapy Frequency:  1 time/week   Predicted Duration of Therapy Intervention:  90 days    Marilou Acevedo, PT, DPT                                    I CERTIFY THE NEED FOR THESE SERVICES FURNISHED UNDER        THIS PLAN OF TREATMENT AND WHILE UNDER MY CARE     (Physician co-signature of this document indicates review and certification of the therapy plan).                 Certification Date From:  11/17/22   Certification Date To:  02/15/23    Referring Provider:  Parrish Alvarez MD    Initial Assessment  See Epic Evaluation- Start of Care Date: 11/17/22

## 2022-11-29 NOTE — TELEPHONE ENCOUNTER
Called and spoke with patient. RN relayed provider message below. Patient verbalized understanding, thanks provider for message. Patient will contact clinic with any further questions or concerns.     Avi BETTENCOURT RN 11/29/2022 at 12:54 PM

## 2022-11-29 NOTE — TELEPHONE ENCOUNTER
"Called and spoke with patient.     Per patient, \"I still have the days where I feel that way, I really believe it has something to do with my neck/spine\". Patient had injection 2 months ago with Dr. Momin, \"didn't work, not even for a week\", still having lower back pain. Per patient, \"Some days i'm more dizzy than others, not sure it has to do with gabapentin but i'm continuing to do what she suggested. My feet are no different, so she's probably spot on that it doesn't matter if I take one less. I'm supposed to start therapy any day now, which we will do remotely\". Patient notes no worsening in symptoms, still feels about the same.     Patient is taking gabapentin 400mg, taking 2 capsules in the morning, 2 capsules at noon, and 1 capsule in the evening. Neuropathy in feet has not changed, \"which lends itself to that imbalance that I feel, which is what the PT will work on right away\" (imbalance). Dizziness occurs once or twice a week. Usually goes away within 5 minutes after sitting and resting. Does not necessarily occur in the morning, can happen any time.     Patient did try the trazodone for 1 week, per patient \"I didn't sleep well, took it for a week, it just didn't make me feel well\". Patient has stopped trazodone and gone back to taking 1 tylenol PM at night which is working.    Patient believes therapy is helping, does this in the morning and the evening, 30min each time. Patient notes she forgot to ask during last visit: Patient has daughter who is nurse practitioner and was told to ask if there was a topical CBD that could be recommended or prescribed for lower back pain.     Avi BETTENCOURT RN 11/29/2022 at 8:57 AM      "

## 2022-11-29 NOTE — TELEPHONE ENCOUNTER
Thank you for the update - I'm sorry the dizziness is still present.     I think the physical therapy is a really good idea. It may not elucidate the cause but should help us treat it.     Removed trazodone- thank you for trying it.     I don't have a CBD lotion recommendation but am generally okay with her trying it. [facility] and they have pharmacists there that may be the best people to ask. I believe they also sell topicals.     Please have her update me if things are getting worse but hopefully therapy is helpful.    IVIS Arizmendi MD  Internal Medicine-Pediatrics

## 2022-12-06 ENCOUNTER — HOSPITAL ENCOUNTER (OUTPATIENT)
Dept: PHYSICAL THERAPY | Facility: CLINIC | Age: 87
Discharge: HOME | End: 2022-12-06
Payer: MEDICARE

## 2022-12-06 DIAGNOSIS — R26.89 IMPAIRMENT OF BALANCE: Primary | ICD-10-CM

## 2022-12-06 DIAGNOSIS — M62.81 MUSCLE WEAKNESS (GENERALIZED): ICD-10-CM

## 2022-12-06 PROCEDURE — 97110 THERAPEUTIC EXERCISES: CPT | Mod: GP

## 2022-12-06 NOTE — PROGRESS NOTES
Kerri Rocha is a 88 y/o F, who is being seen via a billable video visit.       Patient has given verbal consent for Video visit? Yes     Video Start Time: 10:18am     Telehealth Visit Details     Type of Service:  Telehealth     Video End Time (time video stopped): 10:53am     Originating Location (pt. location): Home     Additional Participants in Telehealth Visit: None     Distant Location (provider location):   On-site     Mode of Communication (Audio Visual or Audio Only):  Audio Visual; Billing only 35min 2' pt's technical difficulty.     Marilou Acevedo, PT, DPT  December 6, 2022

## 2022-12-12 DIAGNOSIS — I10 ESSENTIAL HYPERTENSION: ICD-10-CM

## 2022-12-13 ENCOUNTER — HOSPITAL ENCOUNTER (OUTPATIENT)
Dept: PHYSICAL THERAPY | Facility: CLINIC | Age: 87
Discharge: HOME | End: 2022-12-13
Payer: MEDICARE

## 2022-12-13 DIAGNOSIS — R26.89 IMPAIRMENT OF BALANCE: Primary | ICD-10-CM

## 2022-12-13 DIAGNOSIS — M62.81 MUSCLE WEAKNESS (GENERALIZED): ICD-10-CM

## 2022-12-13 DIAGNOSIS — R26.9 ABNORMAL GAIT: ICD-10-CM

## 2022-12-13 DIAGNOSIS — R53.81 PHYSICAL DECONDITIONING: ICD-10-CM

## 2022-12-13 PROCEDURE — 97110 THERAPEUTIC EXERCISES: CPT | Mod: GP

## 2022-12-13 PROCEDURE — 97112 NEUROMUSCULAR REEDUCATION: CPT | Mod: GP

## 2022-12-14 DIAGNOSIS — I10 ESSENTIAL HYPERTENSION: ICD-10-CM

## 2022-12-14 RX ORDER — LISINOPRIL 10 MG/1
10 TABLET ORAL DAILY
Qty: 90 TABLET | Refills: 2 | Status: SHIPPED | OUTPATIENT
Start: 2022-12-14 | End: 2023-08-31

## 2022-12-15 RX ORDER — AMLODIPINE BESYLATE 10 MG/1
10 TABLET ORAL DAILY
Qty: 90 TABLET | Refills: 3 | OUTPATIENT
Start: 2022-12-15

## 2022-12-15 NOTE — TELEPHONE ENCOUNTER
Year prescription on file, should have enough to make to 9/2023.    Uriel Sykes RN on 12/15/2022 at 1:46 PM

## 2022-12-19 ENCOUNTER — TELEPHONE (OUTPATIENT)
Dept: PEDIATRICS | Facility: CLINIC | Age: 87
End: 2022-12-19

## 2022-12-19 DIAGNOSIS — E78.2 MIXED HYPERLIPIDEMIA: ICD-10-CM

## 2022-12-19 DIAGNOSIS — I10 ESSENTIAL HYPERTENSION: ICD-10-CM

## 2022-12-19 RX ORDER — AMLODIPINE BESYLATE 10 MG/1
10 TABLET ORAL DAILY
Qty: 90 TABLET | Refills: 2 | Status: SHIPPED | OUTPATIENT
Start: 2022-12-19 | End: 2023-08-25

## 2022-12-19 RX ORDER — ATORVASTATIN CALCIUM 20 MG/1
20 TABLET, FILM COATED ORAL AT BEDTIME
Qty: 90 TABLET | Refills: 2 | Status: SHIPPED | OUTPATIENT
Start: 2022-12-19 | End: 2023-09-05

## 2022-12-19 NOTE — TELEPHONE ENCOUNTER
Patient calling regarding amlodipine.  Advised has RX at [facility].  RX was to go to [facility].  Advised atorvastatin also sent to [facility].  Both should have went to [facility].  Resent to correct pharmacy.  Felicita Richardson RN

## 2023-01-03 ENCOUNTER — HOSPITAL ENCOUNTER (OUTPATIENT)
Dept: PHYSICAL THERAPY | Facility: CLINIC | Age: 88
Discharge: HOME | End: 2023-01-03
Payer: MEDICARE

## 2023-01-03 DIAGNOSIS — R26.9 ABNORMAL GAIT: ICD-10-CM

## 2023-01-03 DIAGNOSIS — M62.81 MUSCLE WEAKNESS (GENERALIZED): ICD-10-CM

## 2023-01-03 DIAGNOSIS — R26.89 IMPAIRMENT OF BALANCE: Primary | ICD-10-CM

## 2023-01-03 DIAGNOSIS — R53.81 PHYSICAL DECONDITIONING: ICD-10-CM

## 2023-01-03 PROCEDURE — 97112 NEUROMUSCULAR REEDUCATION: CPT | Mod: GP

## 2023-01-03 PROCEDURE — 97110 THERAPEUTIC EXERCISES: CPT | Mod: GP

## 2023-01-03 NOTE — PROGRESS NOTES
Kerri Rocha is a 88 y/o F, who is being seen via a billable video visit.       Patient has given verbal consent for Video visit? Yes     Video Start Time: 10:17am     Telehealth Visit Details     Type of Service:  Telehealth     Video End Time (time video stopped): 10:56am     Originating Location (pt. location): Home     Additional Participants in Telehealth Visit: None     Distant Location (provider location):   On-site     Mode of Communication (Audio Visual or Audio Only):  Audio Visual; Billing only 35min 2' pt's technical difficulty.     Marilou Acevedo, PT, DPT  January 3, 2023

## 2023-01-26 NOTE — PROGRESS NOTES
[facility] Neurology Follow Up Visit    Kerri Rocha MRN# [id number]   Age: 87 year old YOB: 1935     Brief history of symptoms: The patient was initially seen in neurologic consultation on 10/9/2020 for evaluation of balance difficulties. Please see the comprehensive neurologic consultation note from that date in the Epic records for details.     Interval history:  Her legs feel stiff in the morning and that slowly gets better throughout the day.    Her balance continues to be bothersome, which she thinks is a little worse. She does better at home. She does therapy which sometimes helps. She denies any falls.     There have been 3-4 times over the last 6 months were her speech will be a little slurred at night time. She is tired when this happens. She wonders if it is a side effect of the gabapentin.     The gabapentin helps with the prickly feeling in the feet.     She is still bothered by back pain. Pain is always in the bottom of the tail bone and then radiating into the lower back. It doesn't radiate down the legs. She had a steroid injection in the back last fall, which did not result in any improvement.    She has bilateral hip pain. She has never had steroid injections in the hips.        Past Medical History:     Patient Active Problem List   Diagnosis     Essential hypertension     Idiopathic peripheral neuropathy     Pseudophakia of both eyes     Regular astigmatism of both eyes     Localized osteoporosis without current pathological fracture - 8/2020 waiting for dental work to start fosamax.      Mixed hyperlipidemia     Gait disorder     Vertigo     Chronic kidney disease, stage 3 (H)     Lumbar spondylosis     Lumbar facet arthropathy     Neural foraminal stenosis of lumbosacral spine     S/P lumbar spinal fusion     Past Medical History:   Diagnosis Date     HLD (hyperlipidemia)      HTN (hypertension)      Idiopathic neuropathy      Vitamin D deficiency         Past Surgical History:      Past Surgical History:   Procedure Laterality Date     APPENDECTOMY       BREAST BIOPSY, CORE RT/LT       CARPAL TUNNEL RELEASE RT/LT       CATARACT IOL, RT/LT        SECTION      x2     COLONOSCOPY N/A 8/10/2022    Procedure: COLONOSCOPY (fv);  Surgeon: Constantin Ahn MD;  Location: [facility]     HYSTERECTOMY TOTAL ABDOMINAL, BILATERAL SALPINGO-OOPHORECTOMY, COMBINED       INJECT BLOCK MEDIAL BRANCH CERVICAL/THORACIC/LUMBAR Bilateral 10/18/2021    Procedure: Bilateral L5-S1 FACET JOINT Injection;  Surgeon: Stephanie Momin MD;  Location: [facility] OR     INJECT EPIDURAL CAUDAL N/A 2022    Procedure: INJECTION, EPIDURAL, CAUDAL;  Surgeon: Stephanie Momin MD;  Location: [facility] OR     LAMINECTOMY LUMBAR THREE+ LEVELS  2016    L3-L5     TONSILLECTOMY & ADENOIDECTOMY          Social History:     Social History     Tobacco Use     Smoking status: Never     Smokeless tobacco: Never   Vaping Use     Vaping Use: Never used   Substance Use Topics     Alcohol use: Yes     Alcohol/week: 1.0 standard drink     Comment: rare wine     Drug use: No        Family History:     Family History   Problem Relation Age of Onset     Hypertension Mother      Colon Cancer Father      Heart Disease Father      Hypertension Father      Coronary Artery Disease Father      Heart Disease Brother      Hypertension Brother      Hypertension Sister      Diabetes No family hx of      Cancer No family hx of         Medications:     Current Outpatient Medications   Medication Sig     amLODIPine (NORVASC) 10 MG tablet Take 1 tablet (10 mg) by mouth daily     aspirin 81 MG EC tablet Take 81 mg by mouth daily     atorvastatin (LIPITOR) 20 MG tablet Take 1 tablet (20 mg) by mouth At Bedtime     calcium carbonate (OS-CAMMIE 600 MG [place]. [place]) 600 MG tablet Take 1,200 mg by mouth daily      Cholecalciferol (VITAMIN D3) 2000 UNITS CAPS 5,000 Int'l Units daily      Diphenhydramine-APAP, sleep, (TYLENOL PM EXTRA STRENGTH PO) Take by mouth nightly  as needed     gabapentin (NEURONTIN) 400 MG capsule TAKE 2 CAPSULES IN THE MORNING, 2 CAPSULES AT NOON AND 2 CAPSULES IN THE EVENING     lisinopril (ZESTRIL) 10 MG tablet Take 1 tablet (10 mg) by mouth daily     omega 3 1000 MG CAPS Take 2 g by mouth     No current facility-administered medications for this visit.        Allergies:     Allergies   Allergen Reactions     Codeine      Sulfa Drugs      Sulfasalazine Other (See Comments)     Sulfate Rash        Review of Systems:   As above     Physical Exam:   Vitals: BP (!) 147/80   Pulse 60   Resp 16   Wt 54.4 kg (120 lb)   LMP  (LMP Unknown)   SpO2 98%   BMI 23.90 kg/m     Neurologic:     Mental Status: Fully alert, attentive. Normal memory and fund of knowledge. Language normal, speech clear and fluent, no paraphasic errors.    Cranial Nerves: No dysarthria. EOMI. Facial movements symmetric. No ptosis. Facial sensation intact. Hearing intact. Tongue movements and palate elevation intact. Pupils equal. Optic discs sharp.       Motor: No tremors or other abnormal movements observed. Muscle tone normal throughout. Normal/symmetric rapid finger tapping. Strength 5/5 throughout upper and lower extremities with exception of 4-/5 right APB, 4+/5 left APB, 5-/5 finger extension / ADM / FDI.     Deep Tendon Reflexes: 2+/symmetric throughout upper extremities, 2+ right patella, 2-3+ left patella, and 1+ to trace ankle jerks. No clonus.     Sensory: Vibration is absent the right great toe, 3 seconds in the left great toe, ~5-6 at the ankles, normal in the hands. Decreased sensation to light touch and temperature in the feet compared to hands.  Intact/symmetric to proprioception throughout upper and lower extremities. Negative Romberg.      Coordination: Finger-nose-finger and heel to shin without dysmetria. Rapid alternating movements intact/symmetric with normal speed and rhythm.     Gait: Mildly wide based casual gait. Some mild unsteadiness with toe gait and heel  gait. Needs to hold on to examiner for support during tandem gait, but can take 1-2 steps on own before holding on.     Data reviewed on previous visits    Imaging:  MRI lumbar 4/13/2019  Impression:   1. Multilevel lumbar spondylosis, most pronounced at L3-4 with  moderate severe left and mild to moderate right neural foraminal  stenosis.  2. Synovial cyst within the right neural foramen of L5-S1 narrows the  right lateral recess and likely impinges upon the traversing right S1  nerve root.  3. Postsurgical changes of instrumented fusion of L4-5.     MRI cervical spine 1/2019  IMPRESSION: Mild multilevel degenerative changes with multilevel  neural foraminal stenoses without significant canal stenosis. No  myelopathy signal changes.      MRI brain 11/2018  Impression:    Normal brain for age with attention to the vestibular and auditory  structures. No specific finding to explain the patient's symptoms.     Procedures:  EMG 4/2019  Interpretation:  This is an abnormal EMG.  Findings are consistent with a severe length-dependent axonal sensorimotor polyneuropathy that appears to have advanced from prior EMG in 2003.  There is not clear evidence for overlying lumbosacral radiculopathy but in light of the severe peripheral neuropathy these may be masked and clinical correlation is advised.    Laboratory:  B12 1705 (8/2020)  TSH normal 2017  Per chart review, SPEP and A1c many years back was reportedly normal    MRI thoracic 10/28/2020  IMPRESSION:  1. Small posterior disc herniation at the T7-T8 level that mildly  deforms the anterior surface of the thoracic spinal cord.  2. Otherwise, normal thoracic spine MRI. No spinal canal or neural  foraminal stenosis. No evidence for fracture.    Labs 10/2020 - unremarkable ESR/CRP, SPEP/immunofixation, light chains, A1c, TSH  Labs 5/2021 - With exception of positive LOYDA (1:1250, nucleolar), normal/negative Copper, Zinc, Vitamin E, heavy metal screen, B1, B6, LOYDA,  SSA/SSB    Pertinent Investigations since last visit:   MRI lumbar 7/2022  IMPRESSION:  Stable surgical changes of posterior instrumented fusion and interbody  prosthesis at L4-5. Relatively stable multilevel lumbar spondylosis,  as detailed above.  Personally reviewed and agree with above impression         Assessment and Plan:   Assessment:  Kerri Rocha is a 87 year old female who presents today for follow-up of balance problems. 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 She also has lumbar stenosis and had decompression surgery in 2016. She continues to report worsening balance. Examination is grossly stable today. Repeat MRI cervical/thoracic could be considered if examination changes in the future, but is likely low yield now.     I encouraged her to follow-up with Dr Momin for chronic low back pain, given last injection was not effective. She is currently doing physical therapy. Orthopedics referral was also placed today for bilateral hip pain.     Plan:  - Continue PT excercies  - Follow-up with PMR  - Ortho hip consult    Follow up in Neurology clinic in 6 months or sooner if new issues arise    Jason Gillette MD   of Neurology  [facility]      The total time of this encounter today amounted to 43 minutes. This time included time spent with the patient, prep work, ordering tests, and performing post visit documentation.

## 2023-01-27 ENCOUNTER — OFFICE VISIT (OUTPATIENT)
Dept: NEUROLOGY | Facility: CLINIC | Age: 88
End: 2023-01-27
Payer: MEDICARE

## 2023-01-27 VITALS
OXYGEN SATURATION: 98 % | BODY MASS INDEX: 23.9 KG/M2 | WEIGHT: 120 LBS | SYSTOLIC BLOOD PRESSURE: 147 MMHG | HEART RATE: 60 BPM | RESPIRATION RATE: 16 BRPM | DIASTOLIC BLOOD PRESSURE: 80 MMHG

## 2023-01-27 DIAGNOSIS — M25.551 HIP PAIN, BILATERAL: Primary | ICD-10-CM

## 2023-01-27 DIAGNOSIS — M25.552 HIP PAIN, BILATERAL: Primary | ICD-10-CM

## 2023-01-27 PROCEDURE — 99215 OFFICE O/P EST HI 40 MIN: CPT | Performed by: INTERNAL MEDICINE

## 2023-01-27 ASSESSMENT — PAIN SCALES - GENERAL: PAINLEVEL: MODERATE PAIN (4)

## 2023-01-27 NOTE — LETTER
1/27/2023       RE: Kerri Rocha  [address]  [facility] [address]     Dear Colleague,    Thank you for referring your patient, Kerri Rocha, to the [facility]. Please see a copy of my visit note below.    [facility] Neurology Follow Up Visit    Kerri Rocha MRN# [id number]   Age: 87 year old YOB: 1935     Brief history of symptoms: The patient was initially seen in neurologic consultation on 10/9/2020 for evaluation of balance difficulties. Please see the comprehensive neurologic consultation note from that date in the Epic records for details.     Interval history:  Her legs feel stiff in the morning and that slowly gets better throughout the day.    Her balance continues to be bothersome, which she thinks is a little worse. She does better at home. She does therapy which sometimes helps. She denies any falls.     There have been 3-4 times over the last 6 months were her speech will be a little slurred at night time. She is tired when this happens. She wonders if it is a side effect of the gabapentin.     The gabapentin helps with the prickly feeling in the feet.     She is still bothered by back pain. Pain is always in the bottom of the tail bone and then radiating into the lower back. It doesn't radiate down the legs. She had a steroid injection in the back last fall, which did not result in any improvement.    She has bilateral hip pain. She has never had steroid injections in the hips.        Past Medical History:     Patient Active Problem List   Diagnosis     Essential hypertension     Idiopathic peripheral neuropathy     Pseudophakia of both eyes     Regular astigmatism of both eyes     Localized osteoporosis without current pathological fracture - 8/2020 waiting for dental work to start fosamax.      Mixed hyperlipidemia     Gait disorder     Vertigo     Chronic kidney  disease, stage 3 (H)     Lumbar spondylosis     Lumbar facet arthropathy     Neural foraminal stenosis of lumbosacral spine     S/P lumbar spinal fusion     Past Medical History:   Diagnosis Date     HLD (hyperlipidemia)      HTN (hypertension)      Idiopathic neuropathy      Vitamin D deficiency         Past Surgical History:     Past Surgical History:   Procedure Laterality Date     APPENDECTOMY       BREAST BIOPSY, CORE RT/LT       CARPAL TUNNEL RELEASE RT/LT       CATARACT IOL, RT/LT        SECTION      x2     COLONOSCOPY N/A 8/10/2022    Procedure: COLONOSCOPY (fv);  Surgeon: Constantin Ahn MD;  Location: [facility]     HYSTERECTOMY TOTAL ABDOMINAL, BILATERAL SALPINGO-OOPHORECTOMY, COMBINED       INJECT BLOCK MEDIAL BRANCH CERVICAL/THORACIC/LUMBAR Bilateral 10/18/2021    Procedure: Bilateral L5-S1 FACET JOINT Injection;  Surgeon: Stephanie Momin MD;  Location: [facility] OR     INJECT EPIDURAL CAUDAL N/A 2022    Procedure: INJECTION, EPIDURAL, CAUDAL;  Surgeon: Stephanie Momin MD;  Location: [facility] OR     LAMINECTOMY LUMBAR THREE+ LEVELS  2016    L3-L5     TONSILLECTOMY & ADENOIDECTOMY          Social History:     Social History     Tobacco Use     Smoking status: Never     Smokeless tobacco: Never   Vaping Use     Vaping Use: Never used   Substance Use Topics     Alcohol use: Yes     Alcohol/week: 1.0 standard drink     Comment: rare wine     Drug use: No        Family History:     Family History   Problem Relation Age of Onset     Hypertension Mother      Colon Cancer Father      Heart Disease Father      Hypertension Father      Coronary Artery Disease Father      Heart Disease Brother      Hypertension Brother      Hypertension Sister      Diabetes No family hx of      Cancer No family hx of         Medications:     Current Outpatient Medications   Medication Sig     amLODIPine (NORVASC) 10 MG tablet Take 1 tablet (10 mg) by mouth daily     aspirin 81 MG EC tablet Take 81 mg by mouth daily      atorvastatin (LIPITOR) 20 MG tablet Take 1 tablet (20 mg) by mouth At Bedtime     calcium carbonate (OS-CAMMIE 600 MG Kenaitze. CA) 600 MG tablet Take 1,200 mg by mouth daily      Cholecalciferol (VITAMIN D3) 2000 UNITS CAPS 5,000 Int'l Units daily      Diphenhydramine-APAP, sleep, (TYLENOL PM EXTRA STRENGTH PO) Take by mouth nightly as needed     gabapentin (NEURONTIN) 400 MG capsule TAKE 2 CAPSULES IN THE MORNING, 2 CAPSULES AT NOON AND 2 CAPSULES IN THE EVENING     lisinopril (ZESTRIL) 10 MG tablet Take 1 tablet (10 mg) by mouth daily     omega 3 1000 MG CAPS Take 2 g by mouth     No current facility-administered medications for this visit.        Allergies:     Allergies   Allergen Reactions     Codeine      Sulfa Drugs      Sulfasalazine Other (See Comments)     Sulfate Rash        Review of Systems:   As above     Physical Exam:   Vitals: BP (!) 147/80   Pulse 60   Resp 16   Wt 54.4 kg (120 lb)   LMP  (LMP Unknown)   SpO2 98%   BMI 23.90 kg/m     Neurologic:     Mental Status: Fully alert, attentive. Normal memory and fund of knowledge. Language normal, speech clear and fluent, no paraphasic errors.    Cranial Nerves: No dysarthria. EOMI. Facial movements symmetric. No ptosis. Facial sensation intact. Hearing intact. Tongue movements and palate elevation intact. Pupils equal. Optic discs sharp.       Motor: No tremors or other abnormal movements observed. Muscle tone normal throughout. Normal/symmetric rapid finger tapping. Strength 5/5 throughout upper and lower extremities with exception of 4-/5 right APB, 4+/5 left APB, 5-/5 finger extension / ADM / FDI.     Deep Tendon Reflexes: 2+/symmetric throughout upper extremities, 2+ right patella, 2-3+ left patella, and 1+ to trace ankle jerks. No clonus.     Sensory: Vibration is absent the right great toe, 3 seconds in the left great toe, ~5-6 at the ankles, normal in the hands. Decreased sensation to light touch and temperature in the feet compared to hands.   Intact/symmetric to proprioception throughout upper and lower extremities. Negative Romberg.      Coordination: Finger-nose-finger and heel to shin without dysmetria. Rapid alternating movements intact/symmetric with normal speed and rhythm.     Gait: Mildly wide based casual gait. Some mild unsteadiness with toe gait and heel gait. Needs to hold on to examiner for support during tandem gait, but can take 1-2 steps on own before holding on.     Data reviewed on previous visits    Imaging:  MRI lumbar 4/13/2019  Impression:   1. Multilevel lumbar spondylosis, most pronounced at L3-4 with  moderate severe left and mild to moderate right neural foraminal  stenosis.  2. Synovial cyst within the right neural foramen of L5-S1 narrows the  right lateral recess and likely impinges upon the traversing right S1  nerve root.  3. Postsurgical changes of instrumented fusion of L4-5.     MRI cervical spine 1/2019  IMPRESSION: Mild multilevel degenerative changes with multilevel  neural foraminal stenoses without significant canal stenosis. No  myelopathy signal changes.      MRI brain 11/2018  Impression:    Normal brain for age with attention to the vestibular and auditory  structures. No specific finding to explain the patient's symptoms.     Procedures:  EMG 4/2019  Interpretation:  This is an abnormal EMG.  Findings are consistent with a severe length-dependent axonal sensorimotor polyneuropathy that appears to have advanced from prior EMG in 2003.  There is not clear evidence for overlying lumbosacral radiculopathy but in light of the severe peripheral neuropathy these may be masked and clinical correlation is advised.    Laboratory:  B12 1705 (8/2020)  TSH normal 2017  Per chart review, SPEP and A1c many years back was reportedly normal    MRI thoracic 10/28/2020  IMPRESSION:  1. Small posterior disc herniation at the T7-T8 level that mildly  deforms the anterior surface of the thoracic spinal cord.  2. Otherwise, normal  thoracic spine MRI. No spinal canal or neural  foraminal stenosis. No evidence for fracture.    Labs 10/2020 - unremarkable ESR/CRP, SPEP/immunofixation, light chains, A1c, TSH  Labs 5/2021 - With exception of positive LOYDA (1:1250, nucleolar), normal/negative Copper, Zinc, Vitamin E, heavy metal screen, B1, B6, LOYDA, SSA/SSB    Pertinent Investigations since last visit:   MRI lumbar 7/2022  IMPRESSION:  Stable surgical changes of posterior instrumented fusion and interbody  prosthesis at L4-5. Relatively stable multilevel lumbar spondylosis,  as detailed above.  Personally reviewed and agree with above impression         Assessment and Plan:   Assessment:  Kerri Rocha is a 87 year old female who presents today for follow-up of balance problems. 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 She also has lumbar stenosis and had decompression surgery in 2016. She continues to report worsening balance. Examination is grossly stable today. Repeat MRI cervical/thoracic could be considered if examination changes in the future, but is likely low yield now.     I encouraged her to follow-up with Dr Momin for chronic low back pain, given last injection was not effective. She is currently doing physical therapy. Orthopedics referral was also placed today for bilateral hip pain.     Plan:  - Continue PT excercies  - Follow-up with PMR  - Ortho hip consult    Follow up in Neurology clinic in 6 months or sooner if new issues arise    Jason Gillette MD   of Neurology  [facility]      The total time of this encounter today amounted to 43 minutes. This time included time spent with the patient, prep work, ordering tests, and performing post visit documentation.

## 2023-02-01 ENCOUNTER — HOSPITAL ENCOUNTER (OUTPATIENT)
Dept: PHYSICAL THERAPY | Facility: CLINIC | Age: 88
Discharge: HOME | End: 2023-02-01
Payer: MEDICARE

## 2023-02-01 DIAGNOSIS — M62.81 MUSCLE WEAKNESS (GENERALIZED): ICD-10-CM

## 2023-02-01 DIAGNOSIS — R26.89 IMPAIRMENT OF BALANCE: Primary | ICD-10-CM

## 2023-02-01 DIAGNOSIS — R42 DIZZINESS: ICD-10-CM

## 2023-02-01 DIAGNOSIS — R26.9 ABNORMAL GAIT: ICD-10-CM

## 2023-02-01 DIAGNOSIS — G89.29 CHRONIC LOW BACK PAIN, UNSPECIFIED BACK PAIN LATERALITY, UNSPECIFIED WHETHER SCIATICA PRESENT: ICD-10-CM

## 2023-02-01 DIAGNOSIS — R53.81 PHYSICAL DECONDITIONING: ICD-10-CM

## 2023-02-01 DIAGNOSIS — M54.50 CHRONIC LOW BACK PAIN, UNSPECIFIED BACK PAIN LATERALITY, UNSPECIFIED WHETHER SCIATICA PRESENT: ICD-10-CM

## 2023-02-01 DIAGNOSIS — R52 PAIN: ICD-10-CM

## 2023-02-01 DIAGNOSIS — R26.9 GAIT DISORDER: ICD-10-CM

## 2023-02-01 DIAGNOSIS — G60.9 IDIOPATHIC PERIPHERAL NEUROPATHY: ICD-10-CM

## 2023-02-01 DIAGNOSIS — M47.816 LUMBAR SPONDYLOSIS: ICD-10-CM

## 2023-02-01 PROCEDURE — 97112 NEUROMUSCULAR REEDUCATION: CPT | Mod: GP

## 2023-02-01 NOTE — PROGRESS NOTES
Kerri Rocha is a 88 y/o F, who is being seen via a billable video visit.       Patient has given verbal consent for Video visit? Yes     Video Start Time: 10:20am     Telehealth Visit Details     Type of Service:  Telehealth     Video End Time (time video stopped): 11:00am     Originating Location (pt. location): Home     Additional Participants in Telehealth Visit: None     Distant Location (provider location):   On-site     Mode of Communication (Audio Visual or Audio Only):  Audio Visual   Marilou Acevedo, PT, DPT; Coco Espinal, SPT  February 1, 2023

## 2023-02-01 NOTE — PROGRESS NOTES
02/01/23 1000   Signing Clinician's Name / Credentials   Signing clinician's name / credentials KARO Sullivan   Functional Gait Assessment (NIMESH Stewart, BUCKY Whitfield, et al. (2004))   1. GAIT LEVEL SURFACE 2   2. CHANGE IN GAIT SPEED 1   3. GAIT WITH HORIZONTAL HEAD TURNS 2   4. GAIT WITH VERTICAL HEAD TURNS 3   5. GAIT AND PIVOT TURN 3   6. STEP OVER OBSTACLE 2   7. GAIT WITH NARROW BASE OF SUPPORT 1   8. GAIT WITH EYES CLOSED 2   9. AMBULATING BACKWARDS 2   10. STEPS 2   Total Functional Gait Assessment Score   TOTAL SCORE: (MAXIMUM SCORE 30) 20   Functional Gait Assessment (FGA): The FGA assesses postural stability during various walking tasks.   Gait assistive device used: none      Patient Score: 20/30 - Fall Risk    Scores of <22/30 have been correlated with predicting falls in community-dwelling older adults according to Terry & Richard 2010.   Scores of <18/30 have been correlated with increased risk for falls in patients with Parkinsons Disease according to [place], Nunn et al 2014.  Minimal Detectable Change for patients with acute/chronic stroke = 4.2 according to Thileland & Ritschel 2009  Minimal Detectable Change for patients with vestibular disorder = 8 according to Terry & Richard 2010     Assessment (rationale for performing, application to patient s function & care plan): s/p Idiopathic peripheral neuropathy; abnormal gait with demonstrated balance impairments, repeated as has demonstrated improved gait, balance and overall functional mobility since initial assessment.       Coco Espinal SPT

## 2023-02-01 NOTE — PROGRESS NOTES
[facility]    OUTPATIENT PHYSICAL THERAPY  PLAN OF TREATMENT FOR OUTPATIENT REHABILITATION AND PROGRESS NOTE           Patient's Last Name, First Name, Kerri Price Date of Birth  1935   Provider's Name  [facility] Medical Record No.  [id number]    Onset Date  11/01/22 (order date) Start of Care Date  11/17/22   Type:     _X_PT   ___OT   ___SLP Medical Diagnosis  Idiopathic peripheral neuropathy; abnormal gait   PT Diagnosis  Limited functional mobility 2' peripheral neuropathy Plan of Treatment  Frequency/Duration: 1/x month  Certification date from 2/1/2023 to 4/3/2023     Goals:  Goal Identifier MET: HEP   Goal Description Pt will demonstrate IND with balance, strength, and muscular and aerobic endurance programs, while working to improve capacity for functional mobility.   Target Date 04/02/23   Date Met  02/01/23   Progress (detail required for progress note): 2/1/2023: Pt continues to be IND HEP, choosing appropriately modified program based off of fatigue level.     Goal Identifier ABC   Goal Description Pt will self-report balance confidence of >80% with tasks of functional mobility, while working to decrease pt s risk for falling with household/community mobility.   Target Date 04/02/23   Date Met      Progress (detail required for progress note): 2/1/23: 53.12%. 1/3/23: 66%; baseline: 53%. Extended target date.     Goal Identifier FGA   Goal Description Pt will score >23/30pts on FGA, to demonstrate improved dynamic balance and postural control with ambulation, and decreased risk for falling with functional mobility.   Target Date 04/02/23   Date Met      Progress (detail required for progress note): 2/1/23: 20/30pts. Improving dynamic balance from baseline. Continues to have difficulty with HHT, EC ambulation, and stair negotiation. Extended  target date.     Beginning/End Dates of Progress Note Reporting Period:  11/17/2022 - 2/1/2023    Progress Toward Goals:   Progress this reporting period: Pt progressing well with her PT intervention; however, continues to have ongoing hip and lumbar pain with prolonged physical activity. Pt f/u with her MDs this month. Then, pt to follow up with PT for x1 additional visit in 03/2023, for final update of program and discharge.    Client Self (Subjective) Report for Progress Note Reporting Period: Pt reported compliance of HEP since last visit, feels her back pain and neuropathy are limiting her. Would like to set another visit in about a month to see how she feels about her balance and mobility once she gets more visits in with providers to learn more about her back pain.    Objective Measurements:   Objective Measure: ABC  Details: Pt ABC scale 53.12%  Objective Measure: FGA  Details: 20/30pts       I CERTIFY THE NEED FOR THESE SERVICES FURNISHED UNDER        THIS PLAN OF TREATMENT AND WHILE UNDER MY CARE     (Physician co-signature of this document indicates review and certification of the therapy plan).                Referring Provider: Parrish Alvarez MD Emma VanSickle, PT, DPT

## 2023-02-06 NOTE — ADDENDUM NOTE
Encounter addended by: Marilou Adame PT on: 2/6/2023 9:50 AM   Actions taken: Charge Capture section accepted

## 2023-02-07 ASSESSMENT — ANXIETY QUESTIONNAIRES
3. WORRYING TOO MUCH ABOUT DIFFERENT THINGS: NOT AT ALL
2. NOT BEING ABLE TO STOP OR CONTROL WORRYING: NOT AT ALL
7. FEELING AFRAID AS IF SOMETHING AWFUL MIGHT HAPPEN: NOT AT ALL
GAD7 TOTAL SCORE: 0
1. FEELING NERVOUS, ANXIOUS, OR ON EDGE: NOT AT ALL
GAD7 TOTAL SCORE: 0
6. BECOMING EASILY ANNOYED OR IRRITABLE: NOT AT ALL
GAD7 TOTAL SCORE: 0
4. TROUBLE RELAXING: NOT AT ALL
5. BEING SO RESTLESS THAT IT IS HARD TO SIT STILL: NOT AT ALL
7. FEELING AFRAID AS IF SOMETHING AWFUL MIGHT HAPPEN: NOT AT ALL

## 2023-02-07 ASSESSMENT — PAIN SCALES - PAIN ENJOYMENT GENERAL ACTIVITY SCALE (PEG)
AVG_PAIN_PASTWEEK: 7
AVG_PAIN_PASTWEEK: 7
INTERFERED_GENERAL_ACTIVITY: 8
INTERFERED_ENJOYMENT_LIFE: 8
INTERFERED_ENJOYMENT_LIFE: 8
INTERFERED_GENERAL_ACTIVITY: 8
PEG_TOTALSCORE: 7.67
PEG_TOTALSCORE: 7.67

## 2023-02-13 ENCOUNTER — OFFICE VISIT (OUTPATIENT)
Dept: PALLIATIVE MEDICINE | Facility: CLINIC | Age: 88
End: 2023-02-13
Attending: INTERNAL MEDICINE
Payer: MEDICARE

## 2023-02-13 VITALS — DIASTOLIC BLOOD PRESSURE: 74 MMHG | OXYGEN SATURATION: 98 % | SYSTOLIC BLOOD PRESSURE: 146 MMHG | HEART RATE: 71 BPM

## 2023-02-13 DIAGNOSIS — R22.30 LUMP ON FINGER, UNSPECIFIED LATERALITY: Primary | ICD-10-CM

## 2023-02-13 DIAGNOSIS — M25.551 HIP PAIN, BILATERAL: ICD-10-CM

## 2023-02-13 DIAGNOSIS — M53.3 SACROILIAC JOINT PAIN: ICD-10-CM

## 2023-02-13 DIAGNOSIS — M47.816 SPONDYLOSIS OF LUMBAR REGION WITHOUT MYELOPATHY OR RADICULOPATHY: Primary | ICD-10-CM

## 2023-02-13 DIAGNOSIS — Z98.1 S/P LUMBAR FUSION: ICD-10-CM

## 2023-02-13 DIAGNOSIS — M25.552 HIP PAIN, BILATERAL: ICD-10-CM

## 2023-02-13 PROCEDURE — 99214 OFFICE O/P EST MOD 30 MIN: CPT | Performed by: PHYSICAL MEDICINE & REHABILITATION

## 2023-02-13 ASSESSMENT — PAIN SCALES - GENERAL: PAINLEVEL: SEVERE PAIN (6)

## 2023-02-13 NOTE — PATIENT INSTRUCTIONS
1.You can try over the counter CBD/Hemp products to see if they provide some benefit. Some places to look at are online sites including but not limited to [employer], [employer], [employer], [employer], [employer] as well as stores including but not limited to [employer], [employer], [employer], etc.    2. Medical cannabis certification is a reasonable option to consider if the above does not help.   3. We also discussed cymbalta as an option to help with chronic musculoskeletal pain. We can discuss this again at your next visit.   4. Recommend trial of acupuncture  5. You can try using a sacroiliac joint belt. This can be purchased online.   6. Follow up with me in 4-6 weeks. Ok to do a virtual visit.     Frieda Novak MD  [facility]     ----------------------------------------------------------------  Clinic Number:  [phone]   Call with any questions about your care and for scheduling assistance.   Calls are returned Monday through Friday between 8 AM and 4:30 PM. We usually get back to you within 2 business days depending on the issue/request.    If we are prescribing your medications:  For opioid medication refills, call the clinic or send a [email or web address] message 7 days in advance.  Please include:  Name of requested medication  Name of the pharmacy.  For non-opioid medications, call your pharmacy directly to request a refill. Please allow 3-4 days to be processed.   Per [place] State Law:  All controlled substance prescriptions must be filled within 30 days of being written.    For those controlled substances allowing refills, pickup must occur within 30 days of last fill.      We believe regular attendance is key to your success in our program!    Any time you are unable to keep your appointment we ask that you call us at least 24 hours in advance to cancel.This will allow us to offer the appointment time to another patient.   Multiple missed appointments may lead to  dismissal from the clinic.

## 2023-02-13 NOTE — PROGRESS NOTES
[facility] Consultation    Date of visit: 2/13/2023    Assessment:  Kerri Rocha is a 84 year old female with a past medical history significant for idiopathic peripheral neuropathy, s/p L4-5 lumbar fusion, HLD, HTN  who presents with complaints of axial low back pain.      1. Chronic low back pain: Etiology likely due to bilateral SI joint dysfunction. On previous exam she has positive yeoman's, pain with palpation of PSIS, positive yeoman's, pain with sacral compression and palpation of SI joints. There could also be a component of facet arthropathy below her fusion at L5-S1. MRI shows moderate to severe left L3-4 foraminal stenosis and potentially right S1 nerve root impingement but she has no symptoms correlating with these findings.       2. S/p L4-5 lumbar fusion    Plan:  The following recommendations were given to the patient. Diagnosis, treatment options, risks, benefits, and alternatives were discussed, and all questions were answered. The patient expressed understanding of the plan for management.     I am recommending a multidisciplinary treatment plan to help this patient better manage her pain.  This includes:     1. Therapies: Continue exercise at home.   2. Clinical Health Pain Psychologist: Therapy can help reduce physical and psychosocial triggers or reinforcers of pain by adapting thoughts, feelings and behaviors to reduce symptoms and increase quality of life.  Evidence indicates that the practice of relaxation, meditation, and mindfulness techniques can significantly affect pain levels and overall well being. Not at this time.   3. Diagnostic Studies: None  4. Medication Management:  1. Discussed trying OTC CBD/Hemp products to see if they provide some benefit. Medical cannabis is a potential option.   2. Discussed starting Cymbalta for chronic MSK pain  5. Further procedures recommended: None at this time. No benefit from last 2 SI joint  "injections and very short term relief with facet joint injections and caudal MAGUE.   6. Recommend trial of acupuncture.   7. Discussed trying an SI joint belt to see if that provides benefit.   8. Referral placed to sports medicine for evaluation of hips to rule out intra-articular hip pathology.   9. Follow up: 4-6 weeks.     Reason for consultation:    Primary Care Provider is Parrish Arizmendi.  Pain medications are being prescribed by NA.    Please see the [facility] health questionnaire which the patient completed and reviewed with me in detail.    Kerri Rocha is a 87 year old female who I was asked to see in consultation by Parrish Arizmendi.      Consultation and Evaluation for: Back pain    Review of [place] Prescription Monitoring Program (): Today I have also reviewed the patient's history of controlled substance use, as provided by [place] licensed pharmacies and prescriber dispensers.     Review of Electronic Chart: Today I have also reviewed available medical information in the patient's medical record at [place] ([facility]), including relevant provider notes, laboratory work, and imaging.     Chief Complaint:    Chief Complaint   Patient presents with     Pain       Pain history:  Kerri Rocha is a 87 year old female who presents for initial evaluation of chief pain complaint of back pain. She was previously seen by me in clinic. Last visit on 4/22/2021.     HPI from that visit is as follows: \"She had a L4-5 lumbar fusion in 2016. She had good relief of pain after the fusion. Over the last 1.5 years back pain has been increasing gradually over time. The pain is fairly constant but varies in severity. It is aggravated by prolonged sitting or standing in one spot, getting into the out of the car. It is somewhat relieved with heat. Mainly located in bilateral low back/buttock. No radiating symptoms into the legs. Mostly aching in quality. On average pain is " "6-7/10 in severity. Denies red flags including: bowel or bladder symptoms, fever, chills, saddle anesthesia, profound motor loss, history of cancer, history of immune compromise, weight loss.      She has a hx of peripheral neuropathy. States she has difficulty walking intially during the day and by the middle of the afternoon she feels she can walk normally. She has impaired balance. Had an EMG in April 2019 which also showed evidence of a chronic polyradiculopathy of L3,L5 and S1 myotomes (this report is not available to review).  There was no evidence for ongoing denervation. She had an MRI which showed moderate to severe left L3-4 foraminal stenosis and potentially right S1 nerve root impingement.     She has been working with PT mainly for dizziness and balance.\"     She reports that back pain is constant. Uses her thoracolumbar brace she has from compression fracture 5 years ago. Describes the pain as aching in quality. Worse in mornings and some days it gets better as day goes on. Keeps her from doing activity she would like.      Today she reports pain is in the low back and radiates into the sides of the hips. No further radiation. Feels similar to before. Seems to be worse in severity than before. Aggravated by sitting, walking, prolonged standing. Only thing that helps is sleeping on the sides.    Severity/Intensity: 9/10 at worst, 6/10 at best    Aggravating factors include: unsure exactly what aggravates it. Maybe aggravated by constant bending, gardening for example   Relieving factors include: sitting, laying down    Red Flags: The patient denies bowel or bladder incontinence, parasthesias, weakness, saddle anesthesia, unintentional weight loss, or fever/chills/sweats.     Medications:       Current pain medications:  Gabapentin 800 mg in am/400 mg at noon/800 mg q hs - [facility] for back pain  Advil prn - [facility]  Salon pas - [facility]  Tylenol PM - [facility]    Current calculated MME: 0         Previous pain " medications:  Topical CBD - no difference from salon pas  Icy/hot patches - no difference from salon pas    Past Pain Treatments:  PT: Yes  - extensive therapy - [facility] for back pain; [facility] for neuropathy  Pain psychologist: No  Relaxation techniques/biofeedback: No  TENs Unit: Yes - [facility]  Injections: Yes -  Caudal MAGUE - [facility]  Bilateral L5-S1 facet joint injections - H for 2 weeks  Bilateral SI joint injections on 4/27/2021, 8/17/2020, 12/13/2019 - First injection was [facility], last 2 were [facility].  First one 100% relief for 3 months.   Self-care:   Yes - heat - [facility]  Surgeries related to pain:   Alternative Therapies:    Chiropractic: No   Acupuncture: No   Other: None    Diagnostic tests:  MRI of Lumbar Spine was completed on 7/31/2022 was reviewed and shows:   FINDINGS:  There are 5 type lumbar vertebrae, counting from C2. Conus tip at  approximately L1. Stable surgical changes at L4-5 posterior  instrumented fusion with interbody spacer and partial bony fusion.  Stepwise trace retrolisthesis from T12 through L3. Grade 1  anterolisthesis at L4-5 and L5-S1.     On a level by level basis, the findings are as follows:     L1-2: Disc bulge. Bilateral facet arthropathy. Moderate left and mild  right neural foraminal stenosis. No canal stenosis.     L2-3: Disc bulge. Bilateral facet arthropathy and thickening of the  ligamentum flavum. Mild right and moderate left neural foraminal  stenosis. Mild spinal canal stenosis.     L3-4: Disc bulge. Posterior decompression. Mild-to-moderate bilateral  neural foraminal stenosis.     L4-5: Grade 1 anterolisthesis. Disc bulge. Posterior decompression.  Mild bilateral neural foraminal stenosis.     L5-S1: Grade 1 anterolisthesis. Disc bulge. Bilateral facet  arthropathy and ligamentum flavum thickening. Mild-to-moderate  bilateral neural foraminal stenosis. Mild spinal canal stenosis.  Moderate narrowing of the right lateral recess with abutment and  likely impingement of the descending right S1 nerve  root.     The visualized paraspinous soft tissues are within normal limits.                                                                       IMPRESSION:  Stable surgical changes of posterior instrumented fusion and interbody  prosthesis at L4-5. Relatively stable multilevel lumbar spondylosis,  as detailed above.      EMG/Testing:  NA    Labs:   Creatinine 0.93    CSA and UDS due - NA    Past Medical History:  Past Medical History:   Diagnosis Date     HLD (hyperlipidemia)      HTN (hypertension)      Idiopathic neuropathy      Vitamin D deficiency        Past Surgical History:  Past Surgical History:   Procedure Laterality Date     APPENDECTOMY       BREAST BIOPSY, CORE RT/LT       CARPAL TUNNEL RELEASE RT/LT       CATARACT IOL, RT/LT        SECTION      x2     COLONOSCOPY N/A 8/10/2022    Procedure: COLONOSCOPY (fv);  Surgeon: Constantin Ahn MD;  Location: [facility]     HYSTERECTOMY TOTAL ABDOMINAL, BILATERAL SALPINGO-OOPHORECTOMY, COMBINED       INJECT BLOCK MEDIAL BRANCH CERVICAL/THORACIC/LUMBAR Bilateral 10/18/2021    Procedure: Bilateral L5-S1 FACET JOINT Injection;  Surgeon: Stephanie Momin MD;  Location: [facility] OR     INJECT EPIDURAL CAUDAL N/A 2022    Procedure: INJECTION, EPIDURAL, CAUDAL;  Surgeon: Stephanie Momin MD;  Location: [facility] OR     LAMINECTOMY LUMBAR THREE+ LEVELS  2016    L3-L5     TONSILLECTOMY & ADENOIDECTOMY         Medications:  Current Outpatient Medications   Medication Sig Dispense Refill     amLODIPine (NORVASC) 10 MG tablet Take 1 tablet (10 mg) by mouth daily 90 tablet 2     aspirin 81 MG EC tablet Take 81 mg by mouth daily       atorvastatin (LIPITOR) 20 MG tablet Take 1 tablet (20 mg) by mouth At Bedtime 90 tablet 2     calcium carbonate (OS-CAMMIE 600 MG Saxman. CA) 600 MG tablet Take 1,200 mg by mouth daily        Cholecalciferol (VITAMIN D3) 2000 UNITS CAPS 5,000 Int'l Units daily        Diphenhydramine-APAP, sleep, (TYLENOL PM EXTRA STRENGTH PO) Take by mouth  nightly as needed       gabapentin (NEURONTIN) 400 MG capsule TAKE 2 CAPSULES IN THE MORNING, 2 CAPSULES AT NOON AND 2 CAPSULES IN THE EVENING 360 capsule 3     lisinopril (ZESTRIL) 10 MG tablet Take 1 tablet (10 mg) by mouth daily 90 tablet 2     omega 3 1000 MG CAPS Take 2 g by mouth         Allergies:     Allergies   Allergen Reactions     Codeine      Sulfa Drugs      Sulfasalazine Other (See Comments)     Sulfate Rash       Family history:  Family History   Problem Relation Age of Onset     Hypertension Mother      Colon Cancer Father      Heart Disease Father      Hypertension Father      Coronary Artery Disease Father      Heart Disease Brother      Hypertension Brother      Hypertension Sister      Diabetes No family hx of      Cancer No family hx of        Social History:  Home situation: Lives in [place], [place] with .   Occupation/Schooling: Retired teacher  Tobacco use: None  Alcohol use: None  Drug use: None     Physical Exam:  Vitals:    02/13/23 1326   BP: (!) 146/74   Pulse: 71   SpO2: 98%     Exam:  Constitutional: Well developed, well nourished, appears stated age.  HEENT: Head atraumatic, normocephalic. Eyes without conjunctival injection or jaundice. Neck supple. No obvious neck masses.  Respiratory: Unlabored breathing on room air  Skin: No rash, lesions of exposed skin   Extremities: No clubbing, cyanosis, or edema.   Psychiatric/mental status: Alert, without lethargy or stupor. Speech fluent. Appropriate affect. Mood normal. Able to follow commands without difficulty.     Musculoskeletal exam:  Gait/Station/Posture:   Normal stance, arm swing, and stride; gait slow.  Normal bulk and tone. Unremarkable spinal curvature. ASIS heights even.     Lumbar spine:  Range of motion within normal limits    Rotation/ext to right: painful    Rotation/ext to left: painful   Myofascial tenderness:  Tender over bilateral SI joints, gluteal tenderness, lumbar paraspinal tenderness.    Hip exam:   normal  internal and external range of motion bilaterally.     Neurologic exam:  CN:  Cranial nerves 2-12 are grossly intact  Motor Strength:  5/5 symmetric LE strength    Sensory:  (lower extremities):   Light touch: normal    Allodynia: absent    Hyperalgesia: absent     Frieda Novak MD  [facility]       BILLING TIME DOCUMENTATION:   The total TIME spent on this patient on the date of the encounter/appointment was 42 minutes.      TOTAL TIME includes:   Time spent preparing to see the patient (reviewing records and tests)  Time spent face to face (or over the phone) with the patient   Time spent ordering tests, medications, procedures and referrals  Time spent documenting clinical information in Epic

## 2023-02-23 NOTE — TELEPHONE ENCOUNTER
DIAGNOSIS: Lump on finger, unspecified laterality [R22.30]    APPOINTMENT DATE: 03/10/2023   NOTES STATUS DETAILS   OFFICE NOTE from referring provider Ortho Yohannes 02/13/2023 - Aster Newsome MD - [facility]   MEDICATION LIST Internal

## 2023-03-03 ASSESSMENT — ENCOUNTER SYMPTOMS
MEMORY LOSS: 0
LOSS OF CONSCIOUSNESS: 0
JOINT SWELLING: 0
SPEECH CHANGE: 0
MYALGIAS: 1
BACK PAIN: 1
SEIZURES: 0
DIZZINESS: 1
MUSCLE CRAMPS: 1
NECK PAIN: 1
DISTURBANCES IN COORDINATION: 0
NUMBNESS: 0
STIFFNESS: 1
TINGLING: 1
ARTHRALGIAS: 1
MUSCLE WEAKNESS: 0
PARALYSIS: 0
HEADACHES: 0
TREMORS: 0

## 2023-03-06 DIAGNOSIS — M79.643 PAIN, HAND: Primary | ICD-10-CM

## 2023-03-08 ENCOUNTER — HOSPITAL ENCOUNTER (OUTPATIENT)
Dept: PHYSICAL THERAPY | Facility: CLINIC | Age: 88
Discharge: HOME | End: 2023-03-08
Payer: MEDICARE

## 2023-03-08 DIAGNOSIS — R26.89 IMPAIRMENT OF BALANCE: Primary | ICD-10-CM

## 2023-03-08 DIAGNOSIS — M62.81 MUSCLE WEAKNESS (GENERALIZED): ICD-10-CM

## 2023-03-08 DIAGNOSIS — R53.81 PHYSICAL DECONDITIONING: ICD-10-CM

## 2023-03-08 DIAGNOSIS — M54.50 CHRONIC LOW BACK PAIN, UNSPECIFIED BACK PAIN LATERALITY, UNSPECIFIED WHETHER SCIATICA PRESENT: ICD-10-CM

## 2023-03-08 DIAGNOSIS — G89.29 CHRONIC LOW BACK PAIN, UNSPECIFIED BACK PAIN LATERALITY, UNSPECIFIED WHETHER SCIATICA PRESENT: ICD-10-CM

## 2023-03-08 PROCEDURE — 97110 THERAPEUTIC EXERCISES: CPT | Mod: GP

## 2023-03-08 PROCEDURE — 97112 NEUROMUSCULAR REEDUCATION: CPT | Mod: GP

## 2023-03-08 NOTE — PROGRESS NOTES
03/08/23 1000   Signing Clinician's Name / Credentials   Signing clinician's name / credentials Marilou Acevedo, PT, DPT   Functional Gait Assessment (NIMESH Stewart, BUCKY Whitfield, et al. (2004))   1. GAIT LEVEL SURFACE 3   2. CHANGE IN GAIT SPEED 3   3. GAIT WITH HORIZONTAL HEAD TURNS 2   4. GAIT WITH VERTICAL HEAD TURNS 3   5. GAIT AND PIVOT TURN 3   6. STEP OVER OBSTACLE 2   7. GAIT WITH NARROW BASE OF SUPPORT 1   8. GAIT WITH EYES CLOSED 2   9. AMBULATING BACKWARDS 3   10. STEPS 2   Total Functional Gait Assessment Score   TOTAL SCORE: (MAXIMUM SCORE 30) 24       Functional Gait Assessment (FGA): The FGA assesses postural stability during various walking tasks.   Gait assistive device used: none      Patient Score: 24/30 - Pt no longer at inc risk for falling with functional ambulation in the home and limited community distances.     Scores of <22/30 have been correlated with predicting falls in community-dwelling older adults according to Terry & Richard 2010.   Scores of <18/30 have been correlated with increased risk for falls in patients with Parkinsons Disease according to [place] Zhou et al 2014.  Minimal Detectable Change for patients with acute/chronic stroke = 4.2 according to Thileland & Ritschel 2009  Minimal Detectable Change for patients with vestibular disorder = 8 according to Terry & Richard 2010     Assessment (rationale for performing, application to patient s function & care plan): s/p balance impairment with demonstrated balance impairments, repeated as has demonstrated improved gait, balance and overall functional mobility since initial assessment.

## 2023-03-08 NOTE — PROGRESS NOTES
Kerri Rocha is a 87 year old female who is being seen via a billable video visit.      Patient has given verbal consent for Video visit? Yes    Video Start Time: 10:18am    Telehealth Visit Details    Type of Service:  Telehealth    Video End Time (time video stopped): 10:51am    Originating Location (pt. location): Home    Additional Participants in Telehealth Visit: No    Distant Location (provider location):   [facility] [place]    Mode of Communication (Audio Visual or Audio Only):  Audio Visual    Marilou Acevedo, PT, DPT  March 8, 2023

## 2023-03-08 NOTE — PROGRESS NOTES
[facility]    Outpatient Physical Therapy Discharge Note  Patient: Kerri Rocha  : 1935    Beginning/End Dates of Reporting Period:  2022 to 3/8/2023    Referring Provider: Parrish Alvarez MD    Therapy Diagnosis: limited functional mobility 2' peripheral neuropathy     Client Self Report: Pt reporting she's seen her Provider at the Pain Clinic for pain management (lumbar pain). She's going to be trialing medical marijuana; has ruled out all other options.  Pt unsure of how she feels about the treatment - but, she reports she will give it a shot.    Objective Measurements:  Objective Measure: ABC  Details: 92%  Objective Measure: FGA  Details: 2430pts    Goals:  Goal Identifier MET: HEP   Goal Description Pt will demonstrate IND with balance, strength, and muscular and aerobic endurance programs, while working to improve capacity for functional mobility.   Target Date 23   Date Met  23   Progress (detail required for progress note): 2023: Pt continues to be IND HEP, choosing appropriately modified program based off of fatigue level.     Goal Identifier MET: ABC   Goal Description Pt will self-report balance confidence of >80% with tasks of functional mobility, while working to decrease pt s risk for falling with household/community mobility.   Target Date 23   Date Met  23   Progress (detail required for progress note): 23: 92.0%; with her typical daily activities.     Goal Identifier MET: FGA   Goal Description Pt will score >23/30pts on FGA, to demonstrate improved dynamic balance and postural control with ambulation, and decreased risk for falling with functional mobility.   Target Date 23   Date Met  23   Progress (detail required for progress note): 3/8/2023: 24/30pts; pt no longer at inc risk for falling. Has improved significantly  since initiation of PT.     Plan:  Discharge from therapy. She's to f/u with PT in 6mo.. for reassessment, episode of PT, and then return to HEP.      Discharge:    Reason for Discharge: Patient has met all goals.    Discharge Plan: Patient to continue home program.

## 2023-03-09 ENCOUNTER — ANCILLARY PROCEDURE (OUTPATIENT)
Dept: GENERAL RADIOLOGY | Facility: CLINIC | Age: 88
End: 2023-03-09
Attending: STUDENT IN AN ORGANIZED HEALTH CARE EDUCATION/TRAINING PROGRAM
Payer: MEDICARE

## 2023-03-09 ENCOUNTER — OFFICE VISIT (OUTPATIENT)
Dept: ORTHOPEDICS | Facility: CLINIC | Age: 88
End: 2023-03-09
Payer: MEDICARE

## 2023-03-09 VITALS — WEIGHT: 124 LBS | BODY MASS INDEX: 25 KG/M2 | HEIGHT: 59 IN

## 2023-03-09 DIAGNOSIS — M47.817 ARTHROPATHY OF LUMBOSACRAL FACET JOINT: ICD-10-CM

## 2023-03-09 DIAGNOSIS — M25.551 CHRONIC PAIN OF BOTH HIPS: ICD-10-CM

## 2023-03-09 DIAGNOSIS — M25.551 CHRONIC PAIN OF BOTH HIPS: Primary | ICD-10-CM

## 2023-03-09 DIAGNOSIS — M46.1 SI JOINT ARTHRITIS (H): ICD-10-CM

## 2023-03-09 DIAGNOSIS — G89.29 CHRONIC PAIN OF BOTH HIPS: Primary | ICD-10-CM

## 2023-03-09 DIAGNOSIS — G57.03 PIRIFORMIS SYNDROME OF BOTH SIDES: ICD-10-CM

## 2023-03-09 DIAGNOSIS — G89.29 CHRONIC PAIN OF BOTH HIPS: ICD-10-CM

## 2023-03-09 DIAGNOSIS — M25.552 CHRONIC PAIN OF BOTH HIPS: ICD-10-CM

## 2023-03-09 DIAGNOSIS — M25.552 CHRONIC PAIN OF BOTH HIPS: Primary | ICD-10-CM

## 2023-03-09 PROCEDURE — 99203 OFFICE O/P NEW LOW 30 MIN: CPT | Performed by: STUDENT IN AN ORGANIZED HEALTH CARE EDUCATION/TRAINING PROGRAM

## 2023-03-09 PROCEDURE — 73522 X-RAY EXAM HIPS BI 3-4 VIEWS: CPT | Mod: TC | Performed by: RADIOLOGY

## 2023-03-09 NOTE — PROGRESS NOTES
"ASSESSMENT & PLAN  Patient Instructions     1. Chronic pain of both hips    2. Piriformis syndrome of both sides    3. SI joint arthritis    4. Arthropathy of lumbosacral facet joint      Kerri Rocha is a 87 year old female with chronic low back pain and history of lumbosacral arthropathy s/p lumbar fusion presenting for evaluation of bilateral posterior hip pain. History, exam and X-rays were reviewed. She has mild osteoarthritis of both hips but this does not seem to be playing a role in her symptoms. Evaluation today is most consistent with symptomatic piriformis syndrome and SI joint pain.     - Recommend ongoing PT and consideration of piriformis and/or SI joint cortisone injections.   - I will reach out to Dr. Novak to relay these recommendations, as these injections would be performed through the Pain department.    You may call our direct clinic number ([phone]) at any time with questions or concerns.    Jada Pelayo MD, [facility]      -----    SUBJECTIVE  Kerri Rocha is a/an 87 year old female who is seen in consultation at the request of Jason Gillette MD for evaluation of bilateral hip pain. The patient is seen by themselves.    Onset: \"years\". Reports insidious onset without acute precipitating event.  Location of Pain: bilateral lateral hips and bilateral buttocks  Rating of Pain at worst: 8/10  Rating of Pain Currently: patient reports pain is slightly better this morning after taking tylenol  Worsened by: pain worse in the mornings, sitting  Better with: walking, tylenol  Treatments tried: rest/activity avoidance and Tylenol  Quality: aching, constant  Associated symptoms: neuropathy in bilateral lower legs and feet  Orthopedic history: YES - chronic low back pain, bilateral SI joint dysfunction  Relevant surgical history: YES - s/p lumbar fusion  Social history: retired    Past Medical History:   Diagnosis Date     HLD " (hyperlipidemia)      HTN (hypertension)      Idiopathic neuropathy      Vitamin D deficiency      Social History     Socioeconomic History     Marital status:    Tobacco Use     Smoking status: Never     Smokeless tobacco: Never   Vaping Use     Vaping Use: Never used   Substance and Sexual Activity     Alcohol use: Yes     Alcohol/week: 1.0 standard drink     Comment: rare wine     Drug use: No     Sexual activity: Yes     Partners: Male   Other Topics Concern     Parent/sibling w/ CABG, MI or angioplasty before 65F 55M? 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         Recently moved to [place]. New house - cuold live on the main floor if they wanted to.         Enjoys reading, gardening.         Lives at home with  who is visually impaired.      Social Determinants of Health     Financial Resource Strain: Low Risk      Difficulty of Paying Living Expenses: Not hard at all   Food Insecurity: No Food Insecurity     Worried About Running Out of Food in the Last Year: Never true     Ran Out of Food in the Last Year: Never true   Transportation Needs: No Transportation Needs     Lack of Transportation (Medical): No     Lack of Transportation (Non-Medical): No   Physical Activity: Sufficiently Active     Days of Exercise per Week: 7 days     Minutes of Exercise per Session: 30 min   Stress: No Stress Concern Present     Feeling of Stress : Not at all   Social Connections: Moderately Isolated     Frequency of Communication with Friends and Family: More than three times a week     Frequency of Social Gatherings with Friends and Family: More than three times a week     Attends Taoist Services: Never     Active Member of Clubs or Organizations: No     Marital Status:    Housing Stability: Low Risk      Unable to Pay for Housing in the Last Year: No     Number of Places Lived in the Last Year: 1     Unstable Housing in the Last Year: No         Patient's  "past medical, surgical, social, and family histories were reviewed today and no changes are noted.    REVIEW OF SYSTEMS:  10 point ROS is negative other than symptoms noted above in HPI, Past Medical History or as stated below  Constitutional: NEGATIVE for fever, chills, change in weight  Skin: NEGATIVE for worrisome rashes, moles or lesions  GI/: NEGATIVE for bowel or bladder changes  Neuro: NEGATIVE for weakness, dizziness or paresthesias    OBJECTIVE:  Ht 1.509 m (4' 11.41\")   Wt 56.2 kg (124 lb)   LMP  (LMP Unknown)   BMI 24.70 kg/m     General: healthy, alert and in no distress  HEENT: no scleral icterus or conjunctival erythema  Skin: no suspicious lesions or rash. No jaundice.  CV: no pedal edema  Resp: normal respiratory effort without conversational dyspnea   Psych: normal mood and affect  Gait: normal steady gait with appropriate coordination and balance  Neuro: Normal light sensory exam of lower extremity  MSK:  BILATERAL HIP  Inspection:    No obvious deformity or asymmetry, anterior pelvic tilt  Palpation:    Tender about the piriformis, gluteal muscles and SI joints bilaterally. Otherwise all other landmarks are nontender.  Active Range of Motion:     Flexion 100 degrees limited by tightness, IR 20 degrees, ER 50 degrees  Strength:    Flexion 5-/5, adduction 5-/5, abduction 4+/5    Gluteus medius 4-/5  Special Tests:    Positive: SANJIV, SI provocative testing, FAIR    Negative: Logroll, resisted gluteus medius provocation, anterior impingement (FADIR), Adam Grissom    Independent visualization of the below image:  Recent Results (from the past 24 hour(s))   XR Pelvis and Hip Bilateral 1 View    Narrative    Examination:  XR PELVIS AND HIP BILATERAL 1 VIEW    Date:  3/9/2023 10:08 AM     Clinical Information: Chronic bilateral hip pain.    Comparison: none.      Impression    Impression:    1.  Normal joint alignment. No fracture or bone lesion.    2.  Mild joint space narrowing in both hips " without significant  arthritic changes.    3.  Operative changes of lower lumbar spinal fusion (at L4-5).    LISA SANTIZO MD         SYSTEM ID:  [id number]       Jada Pelayo MD [facility]

## 2023-03-09 NOTE — PATIENT INSTRUCTIONS
1. Chronic pain of both hips    2. Piriformis syndrome of both sides    3. SI joint arthritis    4. Arthropathy of lumbosacral facet joint      Kerri Rocha is a 87 year old female with chronic low back pain and history of lumbosacral arthropathy s/p lumbar fusion presenting for evaluation of bilateral posterior hip pain. History, exam and X-rays were reviewed. She has mild osteoarthritis of both hips but this does not seem to be playing a role in her symptoms. Evaluation today is most consistent with symptomatic piriformis syndrome and SI joint pain.     - Recommend ongoing PT and consideration of piriformis and/or SI joint cortisone injections.   - I will reach out to Dr. Novak to relay these recommendations, as these injections would be performed through the Pain department.    You may call our direct clinic number ([phone]) at any time with questions or concerns.    Jada Pelayo MD, [facility]

## 2023-03-09 NOTE — LETTER
"    3/9/2023         RE: Kerri Rocha  [address]  [facility] [address]        Dear Colleague,    Thank you for referring your patient, Kerri Rocha, to the [facility] [place]. Please see a copy of my visit note below.    ASSESSMENT & PLAN  Patient Instructions     1. Chronic pain of both hips    2. Piriformis syndrome of both sides    3. SI joint arthritis    4. Arthropathy of lumbosacral facet joint      Kerri Rocha is a 87 year old female with chronic low back pain and history of lumbosacral arthropathy s/p lumbar fusion presenting for evaluation of bilateral posterior hip pain. History, exam and X-rays were reviewed. She has mild osteoarthritis of both hips but this does not seem to be playing a role in her symptoms. Evaluation today is most consistent with symptomatic piriformis syndrome and SI joint pain.     - Recommend ongoing PT and consideration of piriformis and/or SI joint cortisone injections.   - I will reach out to Dr. Novak to relay these recommendations, as these injections would be performed through the Pain department.    You may call our direct clinic number ([phone]) at any time with questions or concerns.    Jada Pelayo MD, [facility]      -----    SUBJECTIVE  Kerri Rocha is a/an 87 year old female who is seen in consultation at the request of Jason Gillette MD for evaluation of bilateral hip pain. The patient is seen by themselves.    Onset: \"years\". Reports insidious onset without acute precipitating event.  Location of Pain: bilateral lateral hips and bilateral buttocks  Rating of Pain at worst: 8/10  Rating of Pain Currently: patient reports pain is slightly better this morning after taking tylenol  Worsened by: pain worse in the mornings, sitting  Better with: walking, tylenol  Treatments tried: rest/activity avoidance and Tylenol  Quality: aching, " constant  Associated symptoms: neuropathy in bilateral lower legs and feet  Orthopedic history: YES - chronic low back pain, bilateral SI joint dysfunction  Relevant surgical history: YES - s/p lumbar fusion  Social history: retired    Past Medical History:   Diagnosis Date     HLD (hyperlipidemia)      HTN (hypertension)      Idiopathic neuropathy      Vitamin D deficiency      Social History     Socioeconomic History     Marital status:    Tobacco Use     Smoking status: Never     Smokeless tobacco: Never   Vaping Use     Vaping Use: Never used   Substance and Sexual Activity     Alcohol use: Yes     Alcohol/week: 1.0 standard drink     Comment: rare wine     Drug use: No     Sexual activity: Yes     Partners: Male   Other Topics Concern     Parent/sibling w/ CABG, MI or angioplasty before 65F 55M? 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         Recently moved to [place]. New house - cuold live on the main floor if they wanted to.         Enjoys reading, gardening.         Lives at home with  who is visually impaired.      Social Determinants of Health     Financial Resource Strain: Low Risk      Difficulty of Paying Living Expenses: Not hard at all   Food Insecurity: No Food Insecurity     Worried About Running Out of Food in the Last Year: Never true     Ran Out of Food in the Last Year: Never true   Transportation Needs: No Transportation Needs     Lack of Transportation (Medical): No     Lack of Transportation (Non-Medical): No   Physical Activity: Sufficiently Active     Days of Exercise per Week: 7 days     Minutes of Exercise per Session: 30 min   Stress: No Stress Concern Present     Feeling of Stress : Not at all   Social Connections: Moderately Isolated     Frequency of Communication with Friends and Family: More than three times a week     Frequency of Social Gatherings with Friends and Family: More than three times a week     Attends  "Zoroastrianism Services: Never     Active Member of Clubs or Organizations: No     Marital Status:    Housing Stability: Low Risk      Unable to Pay for Housing in the Last Year: No     Number of Places Lived in the Last Year: 1     Unstable Housing in the Last Year: No         Patient's past medical, surgical, social, and family histories were reviewed today and no changes are noted.    REVIEW OF SYSTEMS:  10 point ROS is negative other than symptoms noted above in HPI, Past Medical History or as stated below  Constitutional: NEGATIVE for fever, chills, change in weight  Skin: NEGATIVE for worrisome rashes, moles or lesions  GI/: NEGATIVE for bowel or bladder changes  Neuro: NEGATIVE for weakness, dizziness or paresthesias    OBJECTIVE:  Ht 1.509 m (4' 11.41\")   Wt 56.2 kg (124 lb)   LMP  (LMP Unknown)   BMI 24.70 kg/m     General: healthy, alert and in no distress  HEENT: no scleral icterus or conjunctival erythema  Skin: no suspicious lesions or rash. No jaundice.  CV: no pedal edema  Resp: normal respiratory effort without conversational dyspnea   Psych: normal mood and affect  Gait: normal steady gait with appropriate coordination and balance  Neuro: Normal light sensory exam of lower extremity  MSK:  BILATERAL HIP  Inspection:    No obvious deformity or asymmetry, anterior pelvic tilt  Palpation:    Tender about the piriformis, gluteal muscles and SI joints bilaterally. Otherwise all other landmarks are nontender.  Active Range of Motion:     Flexion 100 degrees limited by tightness, IR 20 degrees, ER 50 degrees  Strength:    Flexion 5-/5, adduction 5-/5, abduction 4+/5    Gluteus medius 4-/5  Special Tests:    Positive: SANJIV, SI provocative testing, FAIR    Negative: Logroll, resisted gluteus medius provocation, anterior impingement (FADIR), Adam Grissom    Independent visualization of the below image:  Recent Results (from the past 24 hour(s))   XR Pelvis and Hip Bilateral 1 View    Narrative    " Examination:  XR PELVIS AND HIP BILATERAL 1 VIEW    Date:  3/9/2023 10:08 AM     Clinical Information: Chronic bilateral hip pain.    Comparison: none.      Impression    Impression:    1.  Normal joint alignment. No fracture or bone lesion.    2.  Mild joint space narrowing in both hips without significant  arthritic changes.    3.  Operative changes of lower lumbar spinal fusion (at L4-5).    LISA SANTIZO MD         SYSTEM ID:  [id number]       Jada Pelayo MD [facility]        Again, thank you for allowing me to participate in the care of your patient.        Sincerely,        Jada Pelayo MD

## 2023-03-09 NOTE — PROGRESS NOTES
Hand Surgery History & Physical    REFERRING PHYSICIAN: Aster Newsome   PRIMARY CARE PHYSICIAN: Parrish Arizmendi     Chief Complaint:   Consult (Lump on Right index finger.  )    History of Present Illness:   Kerri Rocha is a 87 year old right-hand dominant female who presents for evaluation of right index finger mass.  Reports that she first noticed a sore and tenderness to the radial distal aspect of her index finger in May of last year.  She was previously seen by her primary care provider as well as a dermatologist for further evaluation.  She states the dermatologist attempted to ask great the mass without success.  Previous radiographs demonstrated a calcific area to the distal tip of the finger.  She denies any erythema or drainage from the finger.  It is tender when she touches it.,  Given that it is her dominant hand and index finger it is bothersome when she pinches or  anything.  Patient has tried Tylenol for discomfort.  Denies any numbness or tingling.  Patient does not recall any significant trauma to the finger.     Past Medical History:     Past Medical History:   Diagnosis Date     HLD (hyperlipidemia)      HTN (hypertension)      Idiopathic neuropathy      Vitamin D deficiency        Past Surgical History:     Past Surgical History:   Procedure Laterality Date     APPENDECTOMY       BREAST BIOPSY, CORE RT/LT       CARPAL TUNNEL RELEASE RT/LT       CATARACT IOL, RT/LT        SECTION      x2     COLONOSCOPY N/A 8/10/2022    Procedure: COLONOSCOPY (fv);  Surgeon: Constantin Ahn MD;  Location: [facility]     HYSTERECTOMY TOTAL ABDOMINAL, BILATERAL SALPINGO-OOPHORECTOMY, COMBINED       INJECT BLOCK MEDIAL BRANCH CERVICAL/THORACIC/LUMBAR Bilateral 10/18/2021    Procedure: Bilateral L5-S1 FACET JOINT Injection;  Surgeon: Stephanie Momin MD;  Location: [facility]     INJECT EPIDURAL CAUDAL N/A 2022    Procedure: INJECTION, EPIDURAL, CAUDAL;  Surgeon: Stephanie Momin MD;   Location: [facility]     LAMINECTOMY LUMBAR THREE+ LEVELS  03/22/2016    L3-L5     TONSILLECTOMY & ADENOIDECTOMY         Social History:     Social History     Tobacco Use     Smoking status: Never     Smokeless tobacco: Never   Substance Use Topics     Alcohol use: Yes     Alcohol/week: 1.0 standard drink     Comment: rare wine       Family History:     Family History   Problem Relation Age of Onset     Hypertension Mother      Colon Cancer Father      Heart Disease Father      Hypertension Father      Coronary Artery Disease Father      Heart Disease Brother      Hypertension Brother      Hypertension Sister      Diabetes No family hx of      Cancer No family hx of        Allergies:     Allergies   Allergen Reactions     Codeine      Sulfa Drugs      Sulfasalazine Other (See Comments)     Sulfate Rash       Medications:     Current Outpatient Medications   Medication     amLODIPine (NORVASC) 10 MG tablet     aspirin 81 MG EC tablet     atorvastatin (LIPITOR) 20 MG tablet     calcium carbonate (OS-CAMMIE 600 MG [place]. [place]) 600 MG tablet     Cholecalciferol (VITAMIN D3) 2000 UNITS CAPS     Diphenhydramine-APAP, sleep, (TYLENOL PM EXTRA STRENGTH PO)     gabapentin (NEURONTIN) 400 MG capsule     lisinopril (ZESTRIL) 10 MG tablet     omega 3 1000 MG CAPS     No current facility-administered medications for this visit.        Review of Systems:     A 10 point ROS was performed and reviewed. Specific responses to these questions are noted at the end of the document.    Physical Exam:   Physical Exam Adult:    Musculoskeletal: A focused physical examination of the right index finger reveals:  Tender palpable nodule to the distal radial aspect of the right index fingertip.  Doing over the nodule.  No erythema or drainage.  Negative Tinel's.  Sensation intact to light touch to the fingertip.  Brisk capillary refill to the fingertip.  Able to make a full composite fist.  Fires FDP, FDS and extensors of the index finger.              Imaging:   3 view X-ray of the right index finger was independently interpreted by me. The results were discussed with the patient.  Findings include: Soft tissue calcifications to the radial aspect of the distal phalanx of the index finger.  Scattered degenerative changes throughout the finger.    Assessment and Plan:   Assessment:  87 year old female with soft tissue calcific mass to the right index finger.  Present for approximately 1 year, unchanged.  Differential diagnoses could include gout, posttraumatic calcification, calcific periarthritis.     Plan: Discussed treatment options with the patient including continued monitoring, conservative measures with anti-inflammatories, as well as surgical excision.  Recommended trialing scheduled anti-inflammatories.  Also discussed surgical excision under local anesthesia only.I discussed the risks and benefits of surgery, including but not limited to: infection, bleeding, pain, scarring, damage to nerves, blood vessels, or other nearby structures, stiffness, failure to improve, recurrence or worsening of symptoms, need for further surgery, wound healing issues, and anesthetic risks.  Patient would like to proceed with surgical excision at this time.  Discussed that I would like to review with the surgeon to see if there is any additional imaging required prior to surgery.  We will follow-up with patient next week.    Addendum 3/14: Discussed with the patient no further imaging needed.  Patient reported that she had significant improvement in her symptoms after starting scheduled Aleve.  Patient would like to hold off on scheduling surgical excision at this time.  I discussed that I will place a case request and should the patient elect to proceed with surgical excision she can contact us.  Case request placed for right index finger mass excision under local anesthesia only with Dr. More [place],     Janine Thomas, BRIANA   Orthopedic Surgery    Answers for HPI/ROS  submitted by the patient on 3/3/2023  General Symptoms: No  Skin Symptoms: No  HENT Symptoms: No  EYE SYMPTOMS: No  HEART SYMPTOMS: No  LUNG SYMPTOMS: No  INTESTINAL SYMPTOMS: No  URINARY SYMPTOMS: No  GYNECOLOGIC SYMPTOMS: No  BREAST SYMPTOMS: No  SKELETAL SYMPTOMS: Yes  BLOOD SYMPTOMS: No  NERVOUS SYSTEM SYMPTOMS: Yes  MENTAL HEALTH SYMPTOMS: No  Back pain: Yes  Muscle aches: Yes  Neck pain: Yes  Swollen joints: No  Joint pain: Yes  Bone pain: No  Muscle cramps: Yes  Muscle weakness: No  Joint stiffness: Yes  Bone fracture: No  Trouble with coordination: No  Dizziness or trouble with balance: Yes  Fainting or black-out spells: No  Memory loss: No  Headache: No  Seizures: No  Speech problems: No  Tingling: Yes  Tremor: No  Difficulty walking: Yes  Paralysis: No  Numbness: No

## 2023-03-10 ENCOUNTER — ANCILLARY PROCEDURE (OUTPATIENT)
Dept: GENERAL RADIOLOGY | Facility: CLINIC | Age: 88
End: 2023-03-10
Attending: PHYSICIAN ASSISTANT
Payer: MEDICARE

## 2023-03-10 ENCOUNTER — OFFICE VISIT (OUTPATIENT)
Dept: ORTHOPEDICS | Facility: CLINIC | Age: 88
End: 2023-03-10
Payer: MEDICARE

## 2023-03-10 ENCOUNTER — PRE VISIT (OUTPATIENT)
Dept: ORTHOPEDICS | Facility: CLINIC | Age: 88
End: 2023-03-10

## 2023-03-10 DIAGNOSIS — R22.30 LUMP ON FINGER, UNSPECIFIED LATERALITY: ICD-10-CM

## 2023-03-10 DIAGNOSIS — M79.643 PAIN, HAND: ICD-10-CM

## 2023-03-10 PROCEDURE — 73130 X-RAY EXAM OF HAND: CPT | Mod: RT | Performed by: RADIOLOGY

## 2023-03-10 PROCEDURE — 99203 OFFICE O/P NEW LOW 30 MIN: CPT | Performed by: PHYSICIAN ASSISTANT

## 2023-03-10 NOTE — NURSING NOTE
Reason For Visit:   Chief Complaint   Patient presents with     Consult     Lump on Right index finger.         Primary MD: Parrish Arizmendi  Ref. MD: Aster Newsome    Age: 87 year old    ?  No      LMP  (LMP Unknown)       Pain Assessment  Patient Currently in Pain: Yes  0-10 Pain Scale: 5  Primary Pain Location: Finger (Comment which one) (right index finger)  Pain Descriptors: Constant, Dull, Sore    Hand Dominance Evaluation  Hand Dominance: Right          QuickDASH Assessment  QuickDASH Main 3/3/2023   1. Open a tight or new jar. Severe difficulty   2. Do heavy household chores (e.g., wash walls, floors) Mild difficulty   3. Carry a shopping bag or briefcase. Moderate difficulty   4. Wash your back. Mild difficulty   5. Use a knife to cut food. No difficulty   6. Recreational activities in which you take some force or impact through your arm, shoulder or hand (e.g., golf, hammering, tennis, etc.). Unable to answer   7. During the past week, to what extent has your arm, shoulder or hand problem interfered with your normal social activities with family, friends, neighbours or groups? Not at all   8. During the past week, were you limited in your work or other regular daily activities as a result of your arm, shoulder or hand problem? Not limited at all   9. Arm, shoulder or hand pain. Mild   10.Tingling (pins and needles) in your arm,shoulder or hand. None   11. During the past week, how much difficulty have you had sleeping because of the pain in your arm, shoulder or hand? No difficulty   Quickdash Ability Score 15.91          Current Outpatient Medications   Medication Sig Dispense Refill     amLODIPine (NORVASC) 10 MG tablet Take 1 tablet (10 mg) by mouth daily 90 tablet 2     aspirin 81 MG EC tablet Take 81 mg by mouth daily       atorvastatin (LIPITOR) 20 MG tablet Take 1 tablet (20 mg) by mouth At Bedtime 90 tablet 2     calcium carbonate (OS-CAMMIE 600 MG [place]. [place]) 600 MG tablet Take  1,200 mg by mouth daily        Cholecalciferol (VITAMIN D3) 2000 UNITS CAPS 5,000 Int'l Units daily        Diphenhydramine-APAP, sleep, (TYLENOL PM EXTRA STRENGTH PO) Take by mouth nightly as needed       gabapentin (NEURONTIN) 400 MG capsule TAKE 2 CAPSULES IN THE MORNING, 2 CAPSULES AT NOON AND 2 CAPSULES IN THE EVENING 360 capsule 3     lisinopril (ZESTRIL) 10 MG tablet Take 1 tablet (10 mg) by mouth daily 90 tablet 2     omega 3 1000 MG CAPS Take 2 g by mouth         Allergies   Allergen Reactions     Codeine      Sulfa Drugs      Sulfasalazine Other (See Comments)     Sulfate Rash       SARIAH SARABIA, ATC

## 2023-03-10 NOTE — LETTER
3/10/2023         RE: Kerri Rocha  [address]  [facility] [address]        Dear Colleague,    Thank you for referring your patient, Kerri Rocha, to the [facility] [place]. Please see a copy of my visit note below.    Hand Surgery History & Physical    REFERRING PHYSICIAN: Aster Newsome   PRIMARY CARE PHYSICIAN: Parrish Arizmendi     Chief Complaint:   Consult (Lump on Right index finger.  )    History of Present Illness:   Kerri Rocha is a 87 year old right-hand dominant female who presents for evaluation of right index finger mass.  Reports that she first noticed a sore and tenderness to the radial distal aspect of her index finger in May of last year.  She was previously seen by her primary care provider as well as a dermatologist for further evaluation.  She states the dermatologist attempted to ask great the mass without success.  Previous radiographs demonstrated a calcific area to the distal tip of the finger.  She denies any erythema or drainage from the finger.  It is tender when she touches it.,  Given that it is her dominant hand and index finger it is bothersome when she pinches or  anything.  Patient has tried Tylenol for discomfort.  Denies any numbness or tingling.  Patient does not recall any significant trauma to the finger.     Past Medical History:     Past Medical History:   Diagnosis Date     HLD (hyperlipidemia)      HTN (hypertension)      Idiopathic neuropathy      Vitamin D deficiency        Past Surgical History:     Past Surgical History:   Procedure Laterality Date     APPENDECTOMY       BREAST BIOPSY, CORE RT/LT       CARPAL TUNNEL RELEASE RT/LT       CATARACT IOL, RT/LT        SECTION      x2     COLONOSCOPY N/A 8/10/2022    Procedure: COLONOSCOPY (fv);  Surgeon: Constantin Ahn MD;  Location: [facility]     HYSTERECTOMY TOTAL ABDOMINAL, BILATERAL SALPINGO-OOPHORECTOMY, COMBINED       INJECT BLOCK  MEDIAL BRANCH CERVICAL/THORACIC/LUMBAR Bilateral 10/18/2021    Procedure: Bilateral L5-S1 FACET JOINT Injection;  Surgeon: Setphanie Momin MD;  Location: [facility]     INJECT EPIDURAL CAUDAL N/A 9/14/2022    Procedure: INJECTION, EPIDURAL, CAUDAL;  Surgeon: Stephanie Momin MD;  Location: [facility]     LAMINECTOMY LUMBAR THREE+ LEVELS  03/22/2016    L3-L5     TONSILLECTOMY & ADENOIDECTOMY         Social History:     Social History     Tobacco Use     Smoking status: Never     Smokeless tobacco: Never   Substance Use Topics     Alcohol use: Yes     Alcohol/week: 1.0 standard drink     Comment: rare wine       Family History:     Family History   Problem Relation Age of Onset     Hypertension Mother      Colon Cancer Father      Heart Disease Father      Hypertension Father      Coronary Artery Disease Father      Heart Disease Brother      Hypertension Brother      Hypertension Sister      Diabetes No family hx of      Cancer No family hx of        Allergies:     Allergies   Allergen Reactions     Codeine      Sulfa Drugs      Sulfasalazine Other (See Comments)     Sulfate Rash       Medications:     Current Outpatient Medications   Medication     amLODIPine (NORVASC) 10 MG tablet     aspirin 81 MG EC tablet     atorvastatin (LIPITOR) 20 MG tablet     calcium carbonate (OS-CAMMIE 600 MG [place]. [place]) 600 MG tablet     Cholecalciferol (VITAMIN D3) 2000 UNITS CAPS     Diphenhydramine-APAP, sleep, (TYLENOL PM EXTRA STRENGTH PO)     gabapentin (NEURONTIN) 400 MG capsule     lisinopril (ZESTRIL) 10 MG tablet     omega 3 1000 MG CAPS     No current facility-administered medications for this visit.        Review of Systems:     A 10 point ROS was performed and reviewed. Specific responses to these questions are noted at the end of the document.    Physical Exam:   Physical Exam Adult:    Musculoskeletal: A focused physical examination of the right index finger reveals:  Tender palpable nodule to the distal radial aspect of the right  index fingertip.  Doing over the nodule.  No erythema or drainage.  Negative Tinel's.  Sensation intact to light touch to the fingertip.  Brisk capillary refill to the fingertip.  Able to make a full composite fist.  Fires FDP, FDS and extensors of the index finger.             Imaging:   3 view X-ray of the right index finger was independently interpreted by me. The results were discussed with the patient.  Findings include: Soft tissue calcifications to the radial aspect of the distal phalanx of the index finger.  Scattered degenerative changes throughout the finger.    Assessment and Plan:   Assessment:  87 year old female with soft tissue calcific mass to the right index finger.  Present for approximately 1 year, unchanged.  Differential diagnoses could include gout, posttraumatic calcification, calcific periarthritis.     Plan: Discussed treatment options with the patient including continued monitoring, conservative measures with anti-inflammatories, as well as surgical excision.  Recommended trialing scheduled anti-inflammatories.  Also discussed surgical excision under local anesthesia only.I discussed the risks and benefits of surgery, including but not limited to: infection, bleeding, pain, scarring, damage to nerves, blood vessels, or other nearby structures, stiffness, failure to improve, recurrence or worsening of symptoms, need for further surgery, wound healing issues, and anesthetic risks.  Patient would like to proceed with surgical excision at this time.  Discussed that I would like to review with the surgeon to see if there is any additional imaging required prior to surgery.  We will follow-up with patient next week.    Addendum 3/14: Discussed with the patient no further imaging needed.  Patient reported that she had significant improvement in her symptoms after starting scheduled Aleve.  Patient would like to hold off on scheduling surgical excision at this time.  I discussed that I will place a  case request and should the patient elect to proceed with surgical excision she can contact us.  Case request placed for right index finger mass excision under local anesthesia only with Dr. More [place],     Janine Thomas PA-C   Orthopedic Surgery    Answers for HPI/ROS submitted by the patient on 3/3/2023  General Symptoms: No  Skin Symptoms: No  HENT Symptoms: No  EYE SYMPTOMS: No  HEART SYMPTOMS: No  LUNG SYMPTOMS: No  INTESTINAL SYMPTOMS: No  URINARY SYMPTOMS: No  GYNECOLOGIC SYMPTOMS: No  BREAST SYMPTOMS: No  SKELETAL SYMPTOMS: Yes  BLOOD SYMPTOMS: No  NERVOUS SYSTEM SYMPTOMS: Yes  MENTAL HEALTH SYMPTOMS: No  Back pain: Yes  Muscle aches: Yes  Neck pain: Yes  Swollen joints: No  Joint pain: Yes  Bone pain: No  Muscle cramps: Yes  Muscle weakness: No  Joint stiffness: Yes  Bone fracture: No  Trouble with coordination: No  Dizziness or trouble with balance: Yes  Fainting or black-out spells: No  Memory loss: No  Headache: No  Seizures: No  Speech problems: No  Tingling: Yes  Tremor: No  Difficulty walking: Yes  Paralysis: No  Numbness: No

## 2023-03-21 ENCOUNTER — TELEPHONE (OUTPATIENT)
Dept: PALLIATIVE MEDICINE | Facility: CLINIC | Age: 88
End: 2023-03-21
Payer: MEDICARE

## 2023-03-21 NOTE — TELEPHONE ENCOUNTER
Called pt. Advised that her SI or piriformis was not assessed and can either be seen for follow up for assessment-needed prior to placement of injection or can have referring place order for piriformis injection. Scheduled for 03/30/23 follow up for assessment      --------------------------------  - Recommend ongoing PT and consideration of piriformis and/or SI joint cortisone injections.   - I will reach out to Dr. Novak to relay these recommendations, as these injections would be performed through the Pain department.      Carmencita DOMINGUEZ, RN Care Coordinator  [facility]  Pain Management

## 2023-03-21 NOTE — TELEPHONE ENCOUNTER
[facility] Call Center    Phone Message    May a detailed message be left on voicemail: yes     Reason for Call: Pt said that Dr. Jada Knowles referred her to Dr. Novak for hip injections but no orders have been placed.  Pt wants to know how she can get the injections done.  Thanks.

## 2023-03-30 ENCOUNTER — OFFICE VISIT (OUTPATIENT)
Dept: PALLIATIVE MEDICINE | Facility: CLINIC | Age: 88
End: 2023-03-30
Payer: MEDICARE

## 2023-03-30 ENCOUNTER — TELEPHONE (OUTPATIENT)
Dept: PALLIATIVE MEDICINE | Facility: CLINIC | Age: 88
End: 2023-03-30

## 2023-03-30 VITALS — DIASTOLIC BLOOD PRESSURE: 71 MMHG | HEART RATE: 78 BPM | OXYGEN SATURATION: 96 % | SYSTOLIC BLOOD PRESSURE: 121 MMHG

## 2023-03-30 DIAGNOSIS — M53.3 SACROILIAC JOINT PAIN: ICD-10-CM

## 2023-03-30 DIAGNOSIS — M47.816 SPONDYLOSIS OF LUMBAR REGION WITHOUT MYELOPATHY OR RADICULOPATHY: ICD-10-CM

## 2023-03-30 DIAGNOSIS — M79.18 MYOFASCIAL PAIN: Primary | ICD-10-CM

## 2023-03-30 DIAGNOSIS — Z98.1 S/P LUMBAR FUSION: ICD-10-CM

## 2023-03-30 PROCEDURE — 99214 OFFICE O/P EST MOD 30 MIN: CPT | Performed by: PHYSICAL MEDICINE & REHABILITATION

## 2023-03-30 ASSESSMENT — PAIN SCALES - GENERAL: PAINLEVEL: SEVERE PAIN (7)

## 2023-03-30 NOTE — PROGRESS NOTES
[facility] Follow Up     Date of visit: 3/30/2023    Assessment:  Kerri Rocha is a 87 year old female with a past medical history significant for idiopathic peripheral neuropathy, s/p L4-5 lumbar fusion, HLD, HTN  who presents with complaints of axial low back pain.      1. Chronic low back pain: Etiology likely due to bilateral SI joint dysfunction. On previous exam she has positive yeoman's, pain with palpation of PSIS, positive yeoman's, pain with sacral compression and palpation of SI joints. There could also be a component of facet arthropathy below her fusion at L5-S1. MRI shows moderate to severe left L3-4 foraminal stenosis and potentially right S1 nerve root impingement but she has no symptoms correlating with these findings.      2. Myofascial pain: pain in bilateral piriformis. Pain reproduced with palpation of piriformis and with piriformis stretching.     3. S/p L4-5 fusion       Plan:  1. Therapies:  Continue HEP.   2. Clinical Health Pain Psychologist: Therapy can help reduce physical and psychosocial triggers or reinforcers of pain by adapting thoughts, feelings and behaviors to reduce symptoms and increase quality of life.  Evidence indicates that the practice of relaxation, meditation, and mindfulness techniques can significantly affect pain levels and overall well being. Not at this time  3. Diagnostic Studies: no new imaging needed  4. Medication Management:  No changes made today    5. Further procedures recommended: order placed for bilateral piriformis injections  6. Follow up: 2-3 weeks after injections      Chief complaint:   Chief Complaint   Patient presents with     Pain       Since her last visit, Kerri Rocha reports:  - She saw sports medicine for evaluation of hips. It was thought that the hips are not the primary source of her pain.   - she is continuing to do HEP regularly. Stretching is somewhat helpful overall but does not really improve pain.    - pain is aggravated with standing and walking. Relieved with sitting and laying down.     Pain scores:  Pain intensity on average is 7 on a scale of 0-10.     Medications:       Current pain medications:  Gabapentin 800 mg in am/400 mg at noon/800 mg q hs - [facility] for back pain  Advil prn - [facility]  Salon pas - [facility]  Tylenol PM - [facility]       Current calculated MME: 0         Previous pain medications:  Topical CBD - no difference from salon pas  Icy/hot patches - no difference from salon pas    Past pain treatments:  PT: Yes  - extensive therapy - [facility] for back pain; [facility] for neuropathy  Pain psychologist: No  Relaxation techniques/biofeedback: No  TENs Unit: Yes - [facility]  Injections: Yes -  Caudal MAGUE - [facility]  Bilateral L5-S1 facet joint injections - H for 2 weeks  Bilateral SI joint injections on 4/27/2021, 8/17/2020, 12/13/2019 - First injection was H, last 2 were NH.  First one 100% relief for 3 months.   Self-care:   Yes - heat - [facility]  Surgeries related to pain:   Alternative Therapies:               Chiropractic: No              Acupuncture: No   Other: None    Medications:  Current Outpatient Medications   Medication Sig Dispense Refill     amLODIPine (NORVASC) 10 MG tablet Take 1 tablet (10 mg) by mouth daily 90 tablet 2     aspirin 81 MG EC tablet Take 81 mg by mouth daily       atorvastatin (LIPITOR) 20 MG tablet Take 1 tablet (20 mg) by mouth At Bedtime 90 tablet 2     calcium carbonate (OS-CAMMIE 600 MG [place]. [place]) 600 MG tablet Take 1,200 mg by mouth daily        Cholecalciferol (VITAMIN D3) 2000 UNITS CAPS 5,000 Int'l Units daily        Diphenhydramine-APAP, sleep, (TYLENOL PM EXTRA STRENGTH PO) Take by mouth nightly as needed       gabapentin (NEURONTIN) 400 MG capsule TAKE 2 CAPSULES IN THE MORNING, 2 CAPSULES AT NOON AND 2 CAPSULES IN THE EVENING 360 capsule 3     lisinopril (ZESTRIL) 10 MG tablet Take 1 tablet (10 mg) by mouth daily 90 tablet 2     omega 3 1000 MG CAPS Take 2 g by mouth       Diagnostic tests:  MRI of Lumbar  Spine was completed on 7/31/2022 was reviewed and shows:   FINDINGS:  There are 5 type lumbar vertebrae, counting from C2. Conus tip at  approximately L1. Stable surgical changes at L4-5 posterior  instrumented fusion with interbody spacer and partial bony fusion.  Stepwise trace retrolisthesis from T12 through L3. Grade 1  anterolisthesis at L4-5 and L5-S1.     On a level by level basis, the findings are as follows:     L1-2: Disc bulge. Bilateral facet arthropathy. Moderate left and mild  right neural foraminal stenosis. No canal stenosis.     L2-3: Disc bulge. Bilateral facet arthropathy and thickening of the  ligamentum flavum. Mild right and moderate left neural foraminal  stenosis. Mild spinal canal stenosis.     L3-4: Disc bulge. Posterior decompression. Mild-to-moderate bilateral  neural foraminal stenosis.     L4-5: Grade 1 anterolisthesis. Disc bulge. Posterior decompression.  Mild bilateral neural foraminal stenosis.     L5-S1: Grade 1 anterolisthesis. Disc bulge. Bilateral facet  arthropathy and ligamentum flavum thickening. Mild-to-moderate  bilateral neural foraminal stenosis. Mild spinal canal stenosis.  Moderate narrowing of the right lateral recess with abutment and  likely impingement of the descending right S1 nerve root.     The visualized paraspinous soft tissues are within normal limits.                                                                       IMPRESSION:  Stable surgical changes of posterior instrumented fusion and interbody  prosthesis at L4-5. Relatively stable multilevel lumbar spondylosis,  as detailed above.    EMG/Testing:  NA    Labs:   Creatinine 0.93    CSA and UDS due - NA    Physical Exam:  Blood pressure 121/71, pulse 78, SpO2 96 %, not currently breastfeeding.  General: A&O x 3, in NAD  Gait: Slow  MSK exam: tenderness to palpation of bilateral piriformis    Frieda Novak MD  [facility]     BILLING TIME DOCUMENTATION:   The total TIME spent on  this patient on the date of the encounter/appointment was 11 minutes.      TOTAL TIME includes:   Time spent preparing to see the patient (reviewing records and tests)   Time spent face to face (or over the phone) with the patient   Time spent ordering tests, medications, procedures and referrals  Time spent documenting clinical information in Epic

## 2023-03-30 NOTE — TELEPHONE ENCOUNTER
Pt was already pre-booked into last open rad spot for Dr Novak on 4/6    Just needs to make sure she does not need PA and prescreening. Order is attached to OV 3/30.    Thanks!    Cintia RODRIGUEZ RN Care Coordinator  [facility]

## 2023-03-30 NOTE — TELEPHONE ENCOUNTER
No PA required, patient is already scheduled.      Rozina LAWLER    [place] [facility]

## 2023-03-30 NOTE — PATIENT INSTRUCTIONS
Order placed for piriformis injections on the right and left sides. You will be called to get this scheduled.  Follow up with me 2-3 weeks after the injections. If you are not getting adequate pain relief we can discuss certification for medical cannabis vs other medication options.   Frieda Novak MD  [facility]     ----------------------------------------------------------------  Clinic Number:  [phone]   Call with any questions about your care and for scheduling assistance.   Calls are returned Monday through Friday between 8 AM and 4:30 PM. We usually get back to you within 2 business days depending on the issue/request.    If we are prescribing your medications:  For opioid medication refills, call the clinic or send a My eShoe message 7 days in advance.  Please include:  Name of requested medication  Name of the pharmacy.  For non-opioid medications, call your pharmacy directly to request a refill. Please allow 3-4 days to be processed.   Per [place] State Law:  All controlled substance prescriptions must be filled within 30 days of being written.    For those controlled substances allowing refills, pickup must occur within 30 days of last fill.      We believe regular attendance is key to your success in our program!    Any time you are unable to keep your appointment we ask that you call us at least 24 hours in advance to cancel.This will allow us to offer the appointment time to another patient.   Multiple missed appointments may lead to dismissal from the clinic.

## 2023-03-30 NOTE — TELEPHONE ENCOUNTER
Screening Questions for Radiology Injections:    Injection to be done at which interventional clinic site? [facility]    Procedure ordered by [facility]    Procedure ordered?  Bilateral piriformis injections     Transforaminal Cervical MAGUE - Send to [facility] - No [facility] providers perform this procedure    What insurance would patient like us to bill for this procedure? Medicare    Worker's comp or MVA (motor vehicle accident) -Any injection DO NOT SCHEDULE and route to [place].      [employer] insurance - For SI joint injections, DO NOT SCHEDULE and route to Rozina Deo.       ALL BCBS, [employer] and [employer] C[employer] - DO NOT SCHEDULE and route to Rozina Curiely    MEDICA- facet joint injections, [facility]    IF SCHEDULING IN [place] PLEASE SCHEDULE AT LEAST 7-10 BUSINESS DAYS OUT SO A PA CAN BE OBTAINED    Is an  needed? No     Patient has a  home? (Review Grid) YES: ok    Any chance of pregnancy? NO   If YES, do NOT schedule and route to RN pool  - Dr. oMmin route to Analia Tovar and PM&R Nurse  [[id number]]      Is patient actively being treated for cancer or immunocompromised? No  If YES, do NOT schedule and route to RN pool/ Dr. Momin's Team    Does the patient have a bleeding or clotting disorder? No     If YES, okay to schedule AND route to RN nurse ramirez/ Dr. Momin's Team     (For any patients with platelet count <100, RN must forward to provider)    Is patient taking any Blood Thinners OR Antiplatelet medication?  No   If hold needed, do NOT schedule, route to RN pool/ Dr. Momin's Team    Examples:   o Blood Thinners: (Coumadin, Warfarin, Jantoven, Pradaxa, Xarelto, Eliquis, Edoxaban, Enoxaparin, Lovenox, Heparin, Arixtra, Fondaparinux or Fragmin)  o Antiplatelet Medications: (Plavix, Brilinta or Effient)     Is patient taking any aspirin products (includes Excedrin and Fiorinal)? No     If more than 325mg/day, OK to schedule; Instruct Pt to  decrease to less than 325 mg for 7 days AND route to RN pool/ Dr. Momin's Team     For CERVICAL procedures, hold all aspirin products for 6 days.     Tell Pt that if aspirin product is not held for 6 days, the procedure WILL BE cancelled.     Any allergies to contrast dye, iodine, shellfish, or numbing and steroid medications? No    If YES, schedule and add allergy information to appointment notes AND route to the RN pool/ Dr. oMmin's Team    If MAGUE and Contrast Dye / Iodine Allergy? DO NOT SCHEDULE, route to RN pool/ Dr. Momin's Team    Allergies: Codeine, Sulfa drugs, Sulfasalazine, and Sulfate     Does patient have an active infection or treated for one within the past week? No    Is patient currently taking any antibiotics or steroid medications?  No     For patients on chronic, preventative, or prophylactic antibiotics, procedures may be scheduled.     For patients on antibiotics for active or recent infection, schedule 4 days after completed.    For patients on steroid medications, schedule 4 days after completed.     Has the patient had a flu shot or any other vaccinations within the past 7 days? No  If yes, explain that for the vaccine to work best they need to:       wait 1 week before and 1 week after getting any Vaccine    wait 1 week before and 2 weeks after getting Covid Vaccine #2 or BOOSTER    If patient has concerns about the timing, send to RN pool/ Dr. Momin's Team    Does patient have an MRI/CT?  Not Applicable Include Date and Check Procedure Scheduling Grid to see if required.    Was the MRI/CT done within the last 3 years?  NA     If no route to RN Pool/ Dr. Delarosas Team    If yes, where was the MRI/CT done?      Refer to PACS Transmissions list for approved external locations and route to RN Pool High Priority/ Dr. Delarosas Team    If MRI was not done at approved external location do NOT schedule and route to RN pool/ Dr. Momin's Team      If patient has an imaging disc, the injection  MAY be scheduled but patient must bring disc to appt or appt will be cancelled.    Is patient able to transfer to a procedure table with minimal or no assistance? Yes     If no, do NOT schedule and route to RN Pool/ Dr. Momin's Team    Procedure Specific Instructions:    If celiac plexus block, informed patient NPO for 6 hours and that it is okay to take medications with sips of water, especially blood pressure medications Not Applicable         If this is for a cervical procedure, informed patient that aspirin needs to be held for 6 days.   Not Applicable      Sedation, If Sedation is ordered for any procedure, patient must be NPO for 6 hours prior to procedure Not Applicable      If IV needed:    Do not schedule procedures requiring IV placement in the first appointment of the day or first appointment after lunch. Do NOT schedule at 0745, 0815 or 1245.         Instructed patient to arrive 30 minutes early for IV start if required. (Check Procedure Scheduling Grid)  Not Applicable    Reminders:    If you are started on any steroids or antibiotics between now and your appointment, you must contact us because the procedure may need to be cancelled.  Yes      As a reminder, receiving steroids can decrease your body's ability to fight infection.   Would you still like to move forward with scheduling the injection?  Yes      IV Sedation is not provided for procedures. If oral anti-anxiety medication is needed, the patient should request this from their referring provider.      Instruct patient to arrive as directed prior to the scheduled appointment time:  If IV needed 30 minutes before appointment time       For patients 85 or older we recommend having an adult stay w/ them for the remainder of the day.       If the patient is Diabetic, remind them to bring their glucometer.      Does the patient have any questions?  NO  Ariela [place]  [facility]

## 2023-04-05 ENCOUNTER — TELEPHONE (OUTPATIENT)
Dept: PALLIATIVE MEDICINE | Facility: CLINIC | Age: 88
End: 2023-04-05
Payer: MEDICARE

## 2023-04-05 NOTE — TELEPHONE ENCOUNTER
Spoke to patient, reminded patient of date, time and location of appointment.     Asked patient if they have received anti-biotics or vaccines in the past 7 days?     Reminded patient to eat and drink as usual.    Remained to hold any blood thinners or pain medications that they were asked to hold per nurse.     Reminded patient they will need a  for this appointment.      Reminded patient that both patient and  must wear a mask during their visit.        Francisca Rutherford MA  [facility]

## 2023-04-06 ENCOUNTER — RADIOLOGY INJECTION OFFICE VISIT (OUTPATIENT)
Dept: PALLIATIVE MEDICINE | Facility: CLINIC | Age: 88
End: 2023-04-06
Payer: MEDICARE

## 2023-04-06 VITALS — SYSTOLIC BLOOD PRESSURE: 126 MMHG | OXYGEN SATURATION: 98 % | HEART RATE: 83 BPM | DIASTOLIC BLOOD PRESSURE: 69 MMHG

## 2023-04-06 DIAGNOSIS — G57.01 PIRIFORMIS SYNDROME, RIGHT: ICD-10-CM

## 2023-04-06 DIAGNOSIS — M79.18 MYOFASCIAL PAIN: Primary | ICD-10-CM

## 2023-04-06 DIAGNOSIS — G57.02 PIRIFORMIS SYNDROME, LEFT: ICD-10-CM

## 2023-04-06 PROCEDURE — 20552 NJX 1/MLT TRIGGER POINT 1/2: CPT | Performed by: PHYSICAL MEDICINE & REHABILITATION

## 2023-04-06 PROCEDURE — 77002 NEEDLE LOCALIZATION BY XRAY: CPT | Performed by: PHYSICAL MEDICINE & REHABILITATION

## 2023-04-06 RX ORDER — TRIAMCINOLONE ACETONIDE 40 MG/ML
40 INJECTION, SUSPENSION INTRA-ARTICULAR; INTRAMUSCULAR ONCE
Status: COMPLETED | OUTPATIENT
Start: 2023-04-06 | End: 2023-04-06

## 2023-04-06 RX ADMIN — TRIAMCINOLONE ACETONIDE 40 MG: 40 INJECTION, SUSPENSION INTRA-ARTICULAR; INTRAMUSCULAR at 08:56

## 2023-04-06 NOTE — NURSING NOTE
Pre-procedure Intake  If YES to any questions or NO to having a   Please complete laminated checklist and leave on the computer keyboard for Provider, verbally inform provider if able.    For SCS Trial, RFA's or any sedation procedure:  Have you been fasting? NA    If yes, for how long?     Are you taking any any blood thinners such as Coumadin, Warfarin, Jantoven, Pradaxa Xarelto, Eliquis, Edoxaban, Enoxaparin, Lovenox, Heparin, Arixtra, Fondaparinux, or Fragmin? OR Antiplatelet medication such as Plavix, Brilinta, or Effient?   No     If yes, when did you take your last dose?     Do you take aspirin?  Yes -   ASA    If cervical procedure, have you held aspirin for 6 days?   NA    Do you have any allergies to contrast dye, iodine, steroid and/or numbing medications?  NO    Are you currently taking antibiotics or have an active infection?  NO    Have you had a fever/elevated temperature within the past week? NO    Are you currently taking oral steroids? NO    Do you have a ? Yes    Are you pregnant or breastfeeding?  Not Applicable    Have you received the COVID-19 vaccine? Yes    If yes, was it your 1st, 2nd or only dose needed? 5th    Date of most recent vaccine: 09/25/22    Notify provider and RNs if systolic BP >170, diastolic BP >100, P >100 or O2 sats < 90%      Francisca Rutherford MA  [facility]

## 2023-04-06 NOTE — NURSING NOTE
Discharge Information    IV Discontiued Time:  NA    Amount of Fluid Infused:  NA    Discharge Criteria = When patient returns to baseline or as per MD order    Consciousness:  Pt is fully awake    Circulation:  BP +/- 20% of pre-procedure level    Respiration:  Patient is able to breathe deeply    O2 Sat:  Patient is able to maintain O2 Sat >92% on room air    Activity:  Moves 4 extremities on command    Ambulation:  Patient is able to stand and walk or stand and pivot into wheelchair    Dressing:  Clean/dry or No Dressing    Notes:   Discharge instructions and AVS given to patient    Patient meets criteria for discharge?  YES    Admitted to PCU?  No    Responsible adult present to accompany patient home?  Yes    Signature/Title:    Carmencita Payton RN  RN Care Coordinator  [place] [facility] [place]

## 2023-04-06 NOTE — PATIENT INSTRUCTIONS
[facility] Procedure Discharge Instructions    Today you saw:    Dr. Frieda Novak      Your procedure:   Piriformis injection     Medications used:  Lidocaine (anesthetic)  Bupivacaine (anesthetic)  Kenalog (steroid)  Omnipaque (contrast)                Be cautious when walking as numbness and/or weakness in the legs may occur up to 6-8 hours after the procedure due to effect of the local anesthetic  Do not drive for 6 hours. The effect of the local anesthetic could slow your reflexes.   Avoid strenuous activity for the first 24 hours. You may resume your regular activities after that.   You may shower, however avoid swimming, tub baths or hot tubs for 24 hours following your procedure  You may have a mild to moderate increase in pain for several days following the injection.    You may use ice packs for 10-15 minutes, 3 to 4 times a day at the injection site for comfort  Do not use heat to painful areas for 6 to 8 hours. This will give the local anesthetic time to wear off and prevent you from accidentally burning your skin.  Unless you have been directed to avoid the use of anti-inflammatory medications (NSAIDS-ibuprofen, Aleve, Motrin), you may use these medications or Tylenol for pain control if needed.   With diabetes, check your blood sugar more frequently than usual as your blood sugar may be higher than normal for 10-14 days following a steroid injection. Contact your doctor who manages your diabetes if your blood sugar is higher than usual  Possible side effects of steroids that you may experience include flushing, elevated blood pressure, increased appetite, mild headaches and restlessness.  All of these symptoms will get better with time.  It may take up to 14 days for the steroid medication to start working although you may feel the effect as early as a few days after the procedure.   Follow up with your referring provider in 2-3 weeks    If you experience any of the following, call  the pain center line during work hours at [phone] or on-call physician after hours at [phone]:  -Fever over 100 degree F  -Swelling, bleeding, redness, drainage, warmth at the injection site  -Progressive weakness or numbness in your legs   -Loss of bowel or bladder function  -Unusual new onset of pain that is not improving

## 2023-04-06 NOTE — PROGRESS NOTES
[facility] - Procedure Note    Date of Service: 4/6/2023    Procedure performed: Bilateral piriformis injection  Diagnosis: Myofascial trigger point/piriformis syndrome  : Frieda Novak MD    Indications: Kerri Rocha is a 87 year old female who is seen by me in clinic presents today for bilateral piriformis injection. The patient describes bilateral buttock pain. The patient reports minimal improvement with conservative treatment, including previous injections, PT, medications.    MRI was done on 7/31/2022 which showed:                                                                    IMPRESSION:  Stable surgical changes of posterior instrumented fusion and interbody  prosthesis at L4-5. Relatively stable multilevel lumbar spondylosis,  as detailed above.    Allergies:      Allergies   Allergen Reactions     Codeine      Sulfa Drugs      Sulfasalazine Other (See Comments)     Sulfate Rash        Vitals:  /64   Pulse 80   LMP  (LMP Unknown)   SpO2 96%     Review of Systems: The patient denies recent fever, chills, illness, use of antibiotics or anticoagulants. All other 10-point review of systems negative.     Procedure:   A fluoroscopic view of the inferior border of the right SI joint was obtained.  A location 1.5 cm inferior and 1.5 cm lateral to the inferior border of the SI joint was marked. 2 ml of Lidocaine 1% was used to anesthetize the skin. The needle was advance under intermittent fluoroscopy through the gluteal muscle to the deeper piriformis muscle. The same procedure was repeated on the left side. At this point, 1 mL of Omnipaque was injected with a flow consistent with piriformis muscle spread.  A solution containing 3 ml of 0.25% bupivacaine and 40 mg of kenalog was injected on both sides. The needle was removed.     Assessment/Plan: Kerri Rocha is a 87 year old female s/p bilateral piriformis injection today for myositis/piriformis syndrome.      Pre procecedure pain score: 8/10   Post procedure pain score: 0/10.     1. The patient was advised to contact the [facility] for any of the following:   Fever, chills, or night sweats   New onset of pain, numbness, or weakness   Any questions/concerns regarding the procedure  If unable to contact the Pain Center, the patient was instructed to go to a local Emergency Room for any complications.   2. The patient will follow up with me in clinic in 4-6 weeks      Frieda Novak MD  [facility] [place]

## 2023-05-30 ENCOUNTER — OFFICE VISIT (OUTPATIENT)
Dept: PALLIATIVE MEDICINE | Facility: CLINIC | Age: 88
End: 2023-05-30
Payer: MEDICARE

## 2023-05-30 VITALS — DIASTOLIC BLOOD PRESSURE: 74 MMHG | SYSTOLIC BLOOD PRESSURE: 156 MMHG | OXYGEN SATURATION: 98 % | HEART RATE: 67 BPM

## 2023-05-30 DIAGNOSIS — M47.816 SPONDYLOSIS OF LUMBAR REGION WITHOUT MYELOPATHY OR RADICULOPATHY: Primary | ICD-10-CM

## 2023-05-30 DIAGNOSIS — M79.18 MYOFASCIAL PAIN: ICD-10-CM

## 2023-05-30 DIAGNOSIS — Z98.1 S/P LUMBAR FUSION: ICD-10-CM

## 2023-05-30 DIAGNOSIS — M53.3 SACROILIAC JOINT PAIN: ICD-10-CM

## 2023-05-30 PROCEDURE — 99214 OFFICE O/P EST MOD 30 MIN: CPT | Performed by: PHYSICAL MEDICINE & REHABILITATION

## 2023-05-30 ASSESSMENT — PAIN SCALES - GENERAL: PAINLEVEL: MODERATE PAIN (5)

## 2023-05-30 NOTE — PROGRESS NOTES
[facility] Follow Up     Date of visit: 5/30/2023    Chief complaint:   Chief Complaint   Patient presents with     Pain     Since her last visit, Kerri Rocha reports:  - She had bilateral piriformis injections on 4/6/2023. Only had benefit for the first couple days.   - pain is still located in bilateral low back/buttock.   - she started using CBD gummies from [employer]. This helps for several hours. She takes 1/2 gummy at a time. Only taking once per day.   - she is worried about using this to cover the pain.   - still doing exercises regularly     Pain scores:  Pain intensity on average is 7 on a scale of 0-10. When she takes the gummy pain can be 0/10 for 3-4 hours.     Medications:       Current pain medications:  Gabapentin 800 mg in am/400 mg at noon/800 mg q hs - [facility] for back pain  Advil prn - NH  Salon pas - H  Tylenol PM - NH  CBD gummies 50 mg - 1/2 gummy once a day.      Current calculated MME: 0         Previous pain medications:  Topical CBD - no difference from salon pas  Icy/hot patches - no difference from salon pas    Past pain treatments:  PT: Yes  - extensive therapy - [facility] for back pain; [facility] for neuropathy  Pain psychologist: No  Relaxation techniques/biofeedback: No  TENs Unit: Yes - [facility]  Injections: Yes -  Bilateral Piriformis injections - NH  Caudal MAGUE - NH  Bilateral L5-S1 facet joint injections - H for 2 weeks  Bilateral SI joint injections on 4/27/2021, 8/17/2020, 12/13/2019 - First injection was H, last 2 were NH.  First one 100% relief for 3 months.   Self-care:   Yes - heat - [facility]  Surgeries related to pain:   Alternative Therapies:               Chiropractic: No              Acupuncture: No   Other: None    Medications:  Current Outpatient Medications   Medication Sig Dispense Refill     amLODIPine (NORVASC) 10 MG tablet Take 1 tablet (10 mg) by mouth daily 90 tablet 2     aspirin 81 MG EC tablet Take 81 mg by mouth daily       atorvastatin  (LIPITOR) 20 MG tablet Take 1 tablet (20 mg) by mouth At Bedtime 90 tablet 2     calcium carbonate (OS-CAMMIE 600 MG [place]. [place]) 600 MG tablet Take 1,200 mg by mouth daily        Cholecalciferol (VITAMIN D3) 2000 UNITS CAPS 5,000 Int'l Units daily        Diphenhydramine-APAP, sleep, (TYLENOL PM EXTRA STRENGTH PO) Take by mouth nightly as needed       gabapentin (NEURONTIN) 400 MG capsule TAKE 2 CAPSULES IN THE MORNING, 2 CAPSULES AT NOON AND 2 CAPSULES IN THE EVENING 360 capsule 3     lisinopril (ZESTRIL) 10 MG tablet Take 1 tablet (10 mg) by mouth daily 90 tablet 2     omega 3 1000 MG CAPS Take 2 g by mouth       Diagnostic tests:  MRI of Lumbar Spine was completed on 7/31/2022 was reviewed and shows:   FINDINGS:  There are 5 type lumbar vertebrae, counting from C2. Conus tip at  approximately L1. Stable surgical changes at L4-5 posterior  instrumented fusion with interbody spacer and partial bony fusion.  Stepwise trace retrolisthesis from T12 through L3. Grade 1  anterolisthesis at L4-5 and L5-S1.     On a level by level basis, the findings are as follows:     L1-2: Disc bulge. Bilateral facet arthropathy. Moderate left and mild  right neural foraminal stenosis. No canal stenosis.     L2-3: Disc bulge. Bilateral facet arthropathy and thickening of the  ligamentum flavum. Mild right and moderate left neural foraminal  stenosis. Mild spinal canal stenosis.     L3-4: Disc bulge. Posterior decompression. Mild-to-moderate bilateral  neural foraminal stenosis.     L4-5: Grade 1 anterolisthesis. Disc bulge. Posterior decompression.  Mild bilateral neural foraminal stenosis.     L5-S1: Grade 1 anterolisthesis. Disc bulge. Bilateral facet  arthropathy and ligamentum flavum thickening. Mild-to-moderate  bilateral neural foraminal stenosis. Mild spinal canal stenosis.  Moderate narrowing of the right lateral recess with abutment and  likely impingement of the descending right S1 nerve root.     The visualized paraspinous soft  tissues are within normal limits.                                                                       IMPRESSION:  Stable surgical changes of posterior instrumented fusion and interbody  prosthesis at L4-5. Relatively stable multilevel lumbar spondylosis,  as detailed above.    EMG/Testing:  NA    Labs:   Creatinine 0.93    CSA and UDS due - NA    Physical Exam:  Blood pressure (!) 156/74, pulse 67, SpO2 98 %, not currently breastfeeding.  General: A&O x 3, in NAD  Gait: Slow  Psych: normal mood and affect    Assessment:  Kerri Rocha is a 87 year old female with a past medical history significant for idiopathic peripheral neuropathy, s/p L4-5 lumbar fusion, HLD, HTN  who presents with complaints of axial low back pain.      1. Chronic low back pain: Etiology likely multifactorial secondary to bilateral SI joint dysfunction. On previous exam she has positive yeoman's, pain with palpation of PSIS, positive yeoman's, pain with sacral compression and palpation of SI joints. There could also be a component of facet arthropathy below her fusion at L5-S1. MRI shows moderate to severe left L3-4 foraminal stenosis and potentially right S1 nerve root impingement but she has no symptoms correlating with these findings.      2. Myofascial pain: pain in bilateral piriformis. Pain reproduced with palpation of piriformis and with piriformis stretching. Only 1-2 days improvement with piriformis injections.    3. S/p L4-5 fusion       Plan:  1. Therapies:  Continue HEP.   2. Clinical Health Pain Psychologist: Therapy can help reduce physical and psychosocial triggers or reinforcers of pain by adapting thoughts, feelings and behaviors to reduce symptoms and increase quality of life.  Evidence indicates that the practice of relaxation, meditation, and mindfulness techniques can significantly affect pain levels and overall well being. Not at this time  3. Diagnostic Studies: no new imaging needed  4. Medication Management:     1. Discussed trial of Cymbalta to help with chronic MSK pain. She will consider this option.   2. She has been using CBD gummies which have provided several hours of good pain relief. She has been using 1/2 gummy once daily. Tolerating it well. Discussed she could try using 1/2 gummy twice daily to see if she gets more pain relief during the day.  3. Continue gabapentin.   5. Further procedures recommended: No further injections. No long lasting benefit with SI joint injections, piriformis injections, lumbar facet or caudal MAGUE.   6. Follow up: OZIEL Novak MD  [facility]

## 2023-05-30 NOTE — PATIENT INSTRUCTIONS
Consider starting Cymbalta for chronic musculoskeletal pain. Let me know if this is something you want to try.   Follow up with me as needed.     Frieda Novak MD  [facility]   ----------------------------------------------------------------  Clinic Number:  [phone]   Call with any questions about your care and for scheduling assistance.   Calls are returned Monday through Friday between 8 AM and 4:30 PM. We usually get back to you within 2 business days depending on the issue/request.    If we are prescribing your medications:  For opioid medication refills, call the clinic or send a [facility] message 7 days in advance.  Please include:  Name of requested medication  Name of the pharmacy.  For non-opioid medications, call your pharmacy directly to request a refill. Please allow 3-4 days to be processed.   Per [place] State Law:  All controlled substance prescriptions must be filled within 30 days of being written.    For those controlled substances allowing refills, pickup must occur within 30 days of last fill.      We believe regular attendance is key to your success in our program!    Any time you are unable to keep your appointment we ask that you call us at least 24 hours in advance to cancel.This will allow us to offer the appointment time to another patient.   Multiple missed appointments may lead to dismissal from the clinic.

## 2023-07-28 ENCOUNTER — OFFICE VISIT (OUTPATIENT)
Dept: NEUROLOGY | Facility: CLINIC | Age: 88
End: 2023-07-28
Payer: MEDICARE

## 2023-07-28 VITALS
HEIGHT: 59 IN | HEART RATE: 76 BPM | BODY MASS INDEX: 24.6 KG/M2 | WEIGHT: 122 LBS | SYSTOLIC BLOOD PRESSURE: 148 MMHG | DIASTOLIC BLOOD PRESSURE: 81 MMHG | RESPIRATION RATE: 16 BRPM | OXYGEN SATURATION: 98 %

## 2023-07-28 DIAGNOSIS — G89.29 CHRONIC BILATERAL LOW BACK PAIN WITHOUT SCIATICA: ICD-10-CM

## 2023-07-28 DIAGNOSIS — G60.9 IDIOPATHIC PERIPHERAL NEUROPATHY: Primary | ICD-10-CM

## 2023-07-28 DIAGNOSIS — M54.50 CHRONIC BILATERAL LOW BACK PAIN WITHOUT SCIATICA: ICD-10-CM

## 2023-07-28 PROCEDURE — 99214 OFFICE O/P EST MOD 30 MIN: CPT | Performed by: INTERNAL MEDICINE

## 2023-07-28 ASSESSMENT — PAIN SCALES - GENERAL: PAINLEVEL: SEVERE PAIN (6)

## 2023-07-28 NOTE — LETTER
"7/28/2023       RE: Kerri Rocha  [address]  [place] [place] [address]     Dear Colleague,    Thank you for referring your patient, Kerri Rocha, to the [facility]. Please see a copy of my visit note below.    [facility] Neurology Follow Up Visit    Kerri Rocha MRN# [id number]   Age: 88 year old YOB: 1935     Brief history of symptoms: The patient was initially seen in neurologic consultation on 10/9/2020 for evaluation of balance difficulties. Please see the comprehensive neurologic consultation note from that date in the Epic records for details.     Interval history:  Patient had a pain clinic visit in May. She had injections in the low back previously. She tried hemp and she is not sure how much it helped. She is resuming physical therapy in September.     She feels like her \"sensory stuff is not there\" right away in the morning. It gets better with walking.     She does does Solanpas at night on the low back.    She does her own exercises twice a day.     She doesn't like the cane. She walks around independently. She holds onto someone else when she walks outside of the house.       Past Medical History:     Patient Active Problem List   Diagnosis    Essential hypertension    Idiopathic peripheral neuropathy    Pseudophakia of both eyes    Regular astigmatism of both eyes    Localized osteoporosis without current pathological fracture - 8/2020 waiting for dental work to start fosamax.     Mixed hyperlipidemia    Gait disorder    Vertigo    Chronic kidney disease, stage 3 (H)    Lumbar spondylosis    Lumbar facet arthropathy    Neural foraminal stenosis of lumbosacral spine    S/P lumbar spinal fusion     Past Medical History:   Diagnosis Date    HLD (hyperlipidemia)     HTN (hypertension)     Idiopathic neuropathy     Vitamin D deficiency         Past Surgical History:     Past " Surgical History:   Procedure Laterality Date    APPENDECTOMY      BREAST BIOPSY, CORE RT/LT      CARPAL TUNNEL RELEASE RT/LT      CATARACT IOL, RT/LT       SECTION      x2    COLONOSCOPY N/A 8/10/2022    Procedure: COLONOSCOPY (fv);  Surgeon: Constantin Ahn MD;  Location: [facility]    HYSTERECTOMY TOTAL ABDOMINAL, BILATERAL SALPINGO-OOPHORECTOMY, COMBINED      INJECT BLOCK MEDIAL BRANCH CERVICAL/THORACIC/LUMBAR Bilateral 10/18/2021    Procedure: Bilateral L5-S1 FACET JOINT Injection;  Surgeon: Stephanie Momin MD;  Location: [facility] OR    INJECT EPIDURAL CAUDAL N/A 2022    Procedure: INJECTION, EPIDURAL, CAUDAL;  Surgeon: Stephanie Momin MD;  Location: [facility] OR    LAMINECTOMY LUMBAR THREE+ LEVELS  2016    L3-L5    TONSILLECTOMY & ADENOIDECTOMY          Social History:     Social History     Tobacco Use    Smoking status: Never    Smokeless tobacco: Never   Vaping Use    Vaping Use: Never used   Substance Use Topics    Alcohol use: Yes     Alcohol/week: 1.0 standard drink of alcohol     Comment: rare wine    Drug use: No        Family History:     Family History   Problem Relation Age of Onset    Hypertension Mother     Colon Cancer Father     Heart Disease Father     Hypertension Father     Coronary Artery Disease Father     Heart Disease Brother     Hypertension Brother     Hypertension Sister     Diabetes No family hx of     Cancer No family hx of         Medications:     Current Outpatient Medications   Medication Sig    amLODIPine (NORVASC) 10 MG tablet Take 1 tablet (10 mg) by mouth daily    aspirin 81 MG EC tablet Take 81 mg by mouth daily    atorvastatin (LIPITOR) 20 MG tablet Take 1 tablet (20 mg) by mouth At Bedtime    calcium carbonate (OS-CAMMIE 600 MG [place]. [place]) 600 MG tablet Take 1,200 mg by mouth daily     Cholecalciferol (VITAMIN D3) 2000 UNITS CAPS 5,000 Int'l Units daily     Diphenhydramine-APAP, sleep, (TYLENOL PM EXTRA STRENGTH PO) Take by mouth nightly as needed    gabapentin  "(NEURONTIN) 400 MG capsule TAKE 2 CAPSULES IN THE MORNING, 2 CAPSULES AT NOON AND 2 CAPSULES IN THE EVENING    lisinopril (ZESTRIL) 10 MG tablet Take 1 tablet (10 mg) by mouth daily    omega 3 1000 MG CAPS Take 2 g by mouth     No current facility-administered medications for this visit.        Allergies:     Allergies   Allergen Reactions    Codeine     Sulfa Antibiotics     Sulfasalazine Other (See Comments)    Sulfate Rash        Review of Systems:   As above     Physical Exam:   Vitals: BP (!) 148/81 (BP Location: Left arm)   Pulse 76   Resp 16   Ht 1.499 m (4' 11\")   Wt 55.3 kg (122 lb)   LMP  (LMP Unknown)   SpO2 98%   BMI 24.64 kg/m     Neurologic:     Mental Status: Fully alert, attentive. Grossly normal memory and fund of knowledge. Language normal, speech clear and fluent, no paraphasic errors.    Cranial Nerves: No dysarthria. EOMI. Visual fields intact. Facial movements symmetric. No ptosis. Facial sensation intact. Hearing intact. Tongue movements and palate elevation intact. Pupils equal.      Motor: No tremors or other abnormal movements observed. Muscle tone normal throughout. Normal/symmetric rapid finger tapping. Strength 5/5 throughout upper and lower extremities with exception of 4-/5 right APB, 4+/5 left APB, 5-/5 finger extension / ADM / FDI.     Deep Tendon Reflexes: 2+/symmetric throughout upper extremities, 2-3+ bilateral patella, and 1+ to trace ankle jerks. No clonus.     Sensory: Vibration is absent the right great toe, 3 seconds in the left great toe, ~5-6 at the ankles, normal in the hands. Decreased sensation to light touch and temperature in the feet compared to hands.  Intact/symmetric to proprioception throughout upper and lower extremities. Negative Romberg.      Coordination: Finger-nose-finger and heel to shin without dysmetria. Rapid alternating movements intact/symmetric with normal speed and rhythm.     Gait: Mildly wide based casual gait. Some mild unsteadiness with " toe gait and heel gait. Not able to perform tandem with feet right in front of each other.      Data reviewed on previous visits    Imaging:  MRI lumbar 7/2022  IMPRESSION:  Stable surgical changes of posterior instrumented fusion and interbody  prosthesis at L4-5. Relatively stable multilevel lumbar spondylosis,  as detailed above.    MRI lumbar 4/13/2019  Impression:   1. Multilevel lumbar spondylosis, most pronounced at L3-4 with  moderate severe left and mild to moderate right neural foraminal  stenosis.  2. Synovial cyst within the right neural foramen of L5-S1 narrows the  right lateral recess and likely impinges upon the traversing right S1  nerve root.  3. Postsurgical changes of instrumented fusion of L4-5.     MRI cervical spine 1/2019  IMPRESSION: Mild multilevel degenerative changes with multilevel  neural foraminal stenoses without significant canal stenosis. No  myelopathy signal changes.      MRI brain 11/2018  Impression:    Normal brain for age with attention to the vestibular and auditory  structures. No specific finding to explain the patient's symptoms.     Procedures:  EMG 4/2019  Interpretation:  This is an abnormal EMG.  Findings are consistent with a severe length-dependent axonal sensorimotor polyneuropathy that appears to have advanced from prior EMG in 2003.  There is not clear evidence for overlying lumbosacral radiculopathy but in light of the severe peripheral neuropathy these may be masked and clinical correlation is advised.    Laboratory:  B12 1705 (8/2020)  TSH normal 2017  Per chart review, SPEP and A1c many years back was reportedly normal    MRI thoracic 10/28/2020  IMPRESSION:  1. Small posterior disc herniation at the T7-T8 level that mildly  deforms the anterior surface of the thoracic spinal cord.  2. Otherwise, normal thoracic spine MRI. No spinal canal or neural  foraminal stenosis. No evidence for fracture.    Labs 10/2020 - unremarkable ESR/CRP, SPEP/immunofixation, light  chains, A1c, TSH  Labs 5/2021 - With exception of positive LOYDA (1:1250, nucleolar), normal/negative Copper, Zinc, Vitamin E, heavy metal screen, B1, B6, LOYDA, SSA/SSB    Pertinent Investigations since last visit:   None         Assessment and Plan:   Assessment:  Kerri Rocha is a 88 year old female who presents today for follow-up of balance problems. 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 She also has lumbar stenosis and had decompression surgery in 2016. She continues to report worsening balance. Examination is grossly stable today. Ceruloplasmin was also ordered today.    Patient previously was seen at [facility] and would like to follow-up there again. Referral was placed today.     Plan:  - Continue PT excercies  - Ceruloplasmin  - Spine center referral    Follow up in Neurology clinic in 12 months or sooner if new issues arise      The total time of this encounter today amounted to 33 minutes. This time included time spent with the patient, prep work, ordering tests, and performing post visit documentation.        Again, thank you for allowing me to participate in the care of your patient.      Sincerely,    Jason Gillette MD

## 2023-07-28 NOTE — PROGRESS NOTES
"[facility] Neurology Follow Up Visit    Kerri Rocha MRN# [id number]   Age: 88 year old YOB: 1935     Brief history of symptoms: The patient was initially seen in neurologic consultation on 10/9/2020 for evaluation of balance difficulties. Please see the comprehensive neurologic consultation note from that date in the Epic records for details.     Interval history:  Patient had a pain clinic visit in May. She had injections in the low back previously. She tried hemp and she is not sure how much it helped. She is resuming physical therapy in September.     She feels like her \"sensory stuff is not there\" right away in the morning. It gets better with walking.     She does does Solanpas at night on the low back.    She does her own exercises twice a day.     She doesn't like the cane. She walks around independently. She holds onto someone else when she walks outside of the house.       Past Medical History:     Patient Active Problem List   Diagnosis    Essential hypertension    Idiopathic peripheral neuropathy    Pseudophakia of both eyes    Regular astigmatism of both eyes    Localized osteoporosis without current pathological fracture - 2020 waiting for dental work to start fosamax.     Mixed hyperlipidemia    Gait disorder    Vertigo    Chronic kidney disease, stage 3 (H)    Lumbar spondylosis    Lumbar facet arthropathy    Neural foraminal stenosis of lumbosacral spine    S/P lumbar spinal fusion     Past Medical History:   Diagnosis Date    HLD (hyperlipidemia)     HTN (hypertension)     Idiopathic neuropathy     Vitamin D deficiency         Past Surgical History:     Past Surgical History:   Procedure Laterality Date    APPENDECTOMY      BREAST BIOPSY, CORE RT/LT      CARPAL TUNNEL RELEASE RT/LT      CATARACT IOL, RT/LT       SECTION      x2    COLONOSCOPY N/A 8/10/2022    Procedure: COLONOSCOPY (fv);  Surgeon: Constantin Ahn MD;  Location:  GI    HYSTERECTOMY TOTAL ABDOMINAL, " BILATERAL SALPINGO-OOPHORECTOMY, COMBINED      INJECT BLOCK MEDIAL BRANCH CERVICAL/THORACIC/LUMBAR Bilateral 10/18/2021    Procedure: Bilateral L5-S1 FACET JOINT Injection;  Surgeon: Stephanie Momin MD;  Location: [facility] OR    INJECT EPIDURAL CAUDAL N/A 9/14/2022    Procedure: INJECTION, EPIDURAL, CAUDAL;  Surgeon: Stephanie Momin MD;  Location: [facility] OR    LAMINECTOMY LUMBAR THREE+ LEVELS  03/22/2016    L3-L5    TONSILLECTOMY & ADENOIDECTOMY          Social History:     Social History     Tobacco Use    Smoking status: Never    Smokeless tobacco: Never   Vaping Use    Vaping Use: Never used   Substance Use Topics    Alcohol use: Yes     Alcohol/week: 1.0 standard drink of alcohol     Comment: rare wine    Drug use: No        Family History:     Family History   Problem Relation Age of Onset    Hypertension Mother     Colon Cancer Father     Heart Disease Father     Hypertension Father     Coronary Artery Disease Father     Heart Disease Brother     Hypertension Brother     Hypertension Sister     Diabetes No family hx of     Cancer No family hx of         Medications:     Current Outpatient Medications   Medication Sig    amLODIPine (NORVASC) 10 MG tablet Take 1 tablet (10 mg) by mouth daily    aspirin 81 MG EC tablet Take 81 mg by mouth daily    atorvastatin (LIPITOR) 20 MG tablet Take 1 tablet (20 mg) by mouth At Bedtime    calcium carbonate (OS-CAMMIE 600 MG [place]) 600 MG tablet Take 1,200 mg by mouth daily     Cholecalciferol (VITAMIN D3) 2000 UNITS CAPS 5,000 Int'l Units daily     Diphenhydramine-APAP, sleep, (TYLENOL PM EXTRA STRENGTH PO) Take by mouth nightly as needed    gabapentin (NEURONTIN) 400 MG capsule TAKE 2 CAPSULES IN THE MORNING, 2 CAPSULES AT NOON AND 2 CAPSULES IN THE EVENING    lisinopril (ZESTRIL) 10 MG tablet Take 1 tablet (10 mg) by mouth daily    omega 3 1000 MG CAPS Take 2 g by mouth     No current facility-administered medications for this visit.        Allergies:     Allergies   Allergen  "Reactions    Codeine     Sulfa Antibiotics     Sulfasalazine Other (See Comments)    Sulfate Rash        Review of Systems:   As above     Physical Exam:   Vitals: BP (!) 148/81 (BP Location: Left arm)   Pulse 76   Resp 16   Ht 1.499 m (4' 11\")   Wt 55.3 kg (122 lb)   LMP  (LMP Unknown)   SpO2 98%   BMI 24.64 kg/m     Neurologic:     Mental Status: Fully alert, attentive. Grossly normal memory and fund of knowledge. Language normal, speech clear and fluent, no paraphasic errors.    Cranial Nerves: No dysarthria. EOMI. Visual fields intact. Facial movements symmetric. No ptosis. Facial sensation intact. Hearing intact. Tongue movements and palate elevation intact. Pupils equal.      Motor: No tremors or other abnormal movements observed. Muscle tone normal throughout. Normal/symmetric rapid finger tapping. Strength 5/5 throughout upper and lower extremities with exception of 4-/5 right APB, 4+/5 left APB, 5-/5 finger extension / ADM / FDI.     Deep Tendon Reflexes: 2+/symmetric throughout upper extremities, 2-3+ bilateral patella, and 1+ to trace ankle jerks. No clonus.     Sensory: Vibration is absent the right great toe, 3 seconds in the left great toe, ~5-6 at the ankles, normal in the hands. Decreased sensation to light touch and temperature in the feet compared to hands.  Intact/symmetric to proprioception throughout upper and lower extremities. Negative Romberg.      Coordination: Finger-nose-finger and heel to shin without dysmetria. Rapid alternating movements intact/symmetric with normal speed and rhythm.     Gait: Mildly wide based casual gait. Some mild unsteadiness with toe gait and heel gait. Not able to perform tandem with feet right in front of each other.      Data reviewed on previous visits    Imaging:  MRI lumbar 7/2022  IMPRESSION:  Stable surgical changes of posterior instrumented fusion and interbody  prosthesis at L4-5. Relatively stable multilevel lumbar spondylosis,  as detailed " above.    MRI lumbar 4/13/2019  Impression:   1. Multilevel lumbar spondylosis, most pronounced at L3-4 with  moderate severe left and mild to moderate right neural foraminal  stenosis.  2. Synovial cyst within the right neural foramen of L5-S1 narrows the  right lateral recess and likely impinges upon the traversing right S1  nerve root.  3. Postsurgical changes of instrumented fusion of L4-5.     MRI cervical spine 1/2019  IMPRESSION: Mild multilevel degenerative changes with multilevel  neural foraminal stenoses without significant canal stenosis. No  myelopathy signal changes.      MRI brain 11/2018  Impression:    Normal brain for age with attention to the vestibular and auditory  structures. No specific finding to explain the patient's symptoms.     Procedures:  EMG 4/2019  Interpretation:  This is an abnormal EMG.  Findings are consistent with a severe length-dependent axonal sensorimotor polyneuropathy that appears to have advanced from prior EMG in 2003.  There is not clear evidence for overlying lumbosacral radiculopathy but in light of the severe peripheral neuropathy these may be masked and clinical correlation is advised.    Laboratory:  B12 1705 (8/2020)  TSH normal 2017  Per chart review, SPEP and A1c many years back was reportedly normal    MRI thoracic 10/28/2020  IMPRESSION:  1. Small posterior disc herniation at the T7-T8 level that mildly  deforms the anterior surface of the thoracic spinal cord.  2. Otherwise, normal thoracic spine MRI. No spinal canal or neural  foraminal stenosis. No evidence for fracture.    Labs 10/2020 - unremarkable ESR/CRP, SPEP/immunofixation, light chains, A1c, TSH  Labs 5/2021 - With exception of positive LOYDA (1:1250, nucleolar), normal/negative Copper, Zinc, Vitamin E, heavy metal screen, B1, B6, LOYDA, SSA/SSB    Pertinent Investigations since last visit:   None         Assessment and Plan:   Assessment:  Kerri Rocha is a 88 year old female who presents today for  follow-up of balance problems. 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 She also has lumbar stenosis and had decompression surgery in 2016. She continues to report worsening balance. Examination is grossly stable today. Ceruloplasmin was also ordered today.    Patient previously was seen at [facility] and would like to follow-up there again. Referral was placed today.     Plan:  - Continue PT excercies  - Ceruloplasmin  - Spine center referral    Follow up in Neurology clinic in 12 months or sooner if new issues arise    Jason Gillette MD   of Neurology  [facility]      The total time of this encounter today amounted to 33 minutes. This time included time spent with the patient, prep work, ordering tests, and performing post visit documentation.

## 2023-08-04 DIAGNOSIS — L03.011 PARONYCHIA OF FINGER, RIGHT: ICD-10-CM

## 2023-08-04 DIAGNOSIS — M25.50 ARTHRALGIA, UNSPECIFIED JOINT: Primary | ICD-10-CM

## 2023-08-04 RX ORDER — MUPIROCIN 20 MG/G
OINTMENT TOPICAL 3 TIMES DAILY
Qty: 22 G | Refills: 0 | OUTPATIENT
Start: 2023-08-04 | End: 2023-08-11

## 2023-08-04 NOTE — TELEPHONE ENCOUNTER
"Called the pt.    Encourage she trial Voltaren. Pt agreed to try.      - Lanre \"Washington\" Earlene (he/him/his), RN - Patient Advocate Liason (PAL)  [facility]    "

## 2023-08-04 NOTE — TELEPHONE ENCOUNTER
"Called the pt.    States that she has been using the topical antibiotic nightly for the past year and she states has been providing her with mild pain relief. I reviewed with her that it is an antibiotic, and not a pain reliever.    She states symptoms have not resolved, has seen dermatology and then orthopedics as recently as March of this past year. Ortho recommended a trial of anti-inflammatories and then possibly surgical excision. Pt has not been using a daily anti-inflammatory more than she currently (Tylenol PM, PRN for sleep).    Recommended she reach out to ortho provider (provided her with contact info).    Dr Arizmendi: I'm not sure how you feel about reordering this medication, but she states the topical antibiotic has been helpful for pain (unsure how, though).      - Lanre \"[place]\" Earlene (he/him/his), RN - Patient Advocate Liason (PAL)  [facility]  "

## 2023-08-04 NOTE — TELEPHONE ENCOUNTER
Does she have an infection she's worreid about?     IVIS Arizmendi MD  Internal Medicine-Pediatrics

## 2023-08-08 ENCOUNTER — TRANSFERRED RECORDS (OUTPATIENT)
Dept: HEALTH INFORMATION MANAGEMENT | Facility: CLINIC | Age: 88
End: 2023-08-08
Payer: MEDICARE

## 2023-08-25 ENCOUNTER — TELEPHONE (OUTPATIENT)
Dept: PALLIATIVE MEDICINE | Facility: CLINIC | Age: 88
End: 2023-08-25
Payer: MEDICARE

## 2023-08-25 DIAGNOSIS — I10 ESSENTIAL HYPERTENSION: ICD-10-CM

## 2023-08-25 NOTE — TELEPHONE ENCOUNTER
Transfer note: transfer back to PCP. Called pt to notify    Carmencita DOMINGUEZ, RN Care Coordinator  [facility]  Pain [facility]

## 2023-08-29 RX ORDER — AMLODIPINE BESYLATE 10 MG/1
10 TABLET ORAL DAILY
Qty: 90 TABLET | Refills: 0 | Status: SHIPPED | OUTPATIENT
Start: 2023-08-29 | End: 2023-10-02

## 2023-08-29 NOTE — TELEPHONE ENCOUNTER
Routing refill request to provider for review/approval because:  Labs out of range:  BP    BP Readings from Last 3 Encounters:   07/28/23 (!) 148/81   05/30/23 (!) 156/74   04/06/23 126/69       Sheridan VALDEZ RN

## 2023-08-31 DIAGNOSIS — I10 ESSENTIAL HYPERTENSION: ICD-10-CM

## 2023-09-01 NOTE — TELEPHONE ENCOUNTER
Routing refill request to provider for review/approval because:  Drug not on the [facility] refill protocol   BP Readings from Last 3 Encounters:   07/28/23 (!) 148/81   05/30/23 (!) 156/74   04/06/23 126/69

## 2023-09-05 DIAGNOSIS — E78.2 MIXED HYPERLIPIDEMIA: ICD-10-CM

## 2023-09-05 RX ORDER — LISINOPRIL 10 MG/1
10 TABLET ORAL DAILY
Qty: 90 TABLET | Refills: 0 | Status: SHIPPED | OUTPATIENT
Start: 2023-09-05 | End: 2023-10-02

## 2023-09-06 ENCOUNTER — VIRTUAL VISIT (OUTPATIENT)
Dept: PHYSICAL THERAPY | Facility: CLINIC | Age: 88
End: 2023-09-06
Payer: MEDICARE

## 2023-09-06 DIAGNOSIS — R53.1 DECREASED STRENGTH: ICD-10-CM

## 2023-09-06 DIAGNOSIS — R26.89 BALANCE PROBLEMS: Primary | ICD-10-CM

## 2023-09-06 PROCEDURE — 97110 THERAPEUTIC EXERCISES: CPT | Mod: GP

## 2023-09-06 PROCEDURE — 97161 PT EVAL LOW COMPLEX 20 MIN: CPT | Mod: GP

## 2023-09-06 NOTE — PROGRESS NOTES
PHYSICAL THERAPY EVALUATION  Type of Visit: Evaluation    See electronic medical record for Abuse and Falls Screening details.    Subjective       Presenting condition or subjective complaint: Back and neuropathy - per patient. She also reports that since her last bout of PT, she's seen her neurologist 2' inc of peripheral neuropathy - spreading up her ankles, orthopedic physician - recommending she continue exercises for her back and balance, and the pain clinic for a steroid injection in her back (didn't help for too long). She reports continuing her HEP x2/day.     Date of onset: 23 (order date 2' pt's long hx of condition)    Relevant medical history: Numbness or tingling in perianal area   Past Medical History:   Diagnosis Date    HLD (hyperlipidemia)     HTN (hypertension)     Idiopathic neuropathy     Vitamin D deficiency      Dates & types of surgery:    Past Surgical History:   Procedure Laterality Date    APPENDECTOMY      BREAST BIOPSY, CORE RT/LT      CARPAL TUNNEL RELEASE RT/LT      CATARACT IOL, RT/LT       SECTION      x2    COLONOSCOPY N/A 8/10/2022    Procedure: COLONOSCOPY (fv);  Surgeon: Constantin Ahn MD;  Location: [facility]    HYSTERECTOMY TOTAL ABDOMINAL, BILATERAL SALPINGO-OOPHORECTOMY, COMBINED      INJECT BLOCK MEDIAL BRANCH CERVICAL/THORACIC/LUMBAR Bilateral 10/18/2021    Procedure: Bilateral L5-S1 FACET JOINT Injection;  Surgeon: Stephanie Momin MD;  Location: [facility] OR    INJECT EPIDURAL CAUDAL N/A 2022    Procedure: INJECTION, EPIDURAL, CAUDAL;  Surgeon: Stephanie Momin MD;  Location: [facility] OR    LAMINECTOMY LUMBAR THREE+ LEVELS  2016    L3-L5    TONSILLECTOMY & ADENOIDECTOMY       Prior diagnostic imaging/testing results: MRI; X-ray; Bone scan     Prior therapy history for the same diagnosis, illness or injury: Yes Many times    Prior Level of Function  Transfers: Independent  Ambulation: Independent; will hold on to her SPC or  for inc balance/safety  when outdoors.  ADL: Independent  IADL: Pt/ share responsibilities.    Living Environment  Social support: With a significant other or spouse   Type of home: House   Stairs to enter the home: No       Ramp: No   Stairs inside the home: Yes 14 Is there a railing: Yes   Help at home: Home and Yard maintenance tasks  Equipment owned: Straight Cane   Employment: No    Hobbies/Interests:  Exercise; Gardening; Family    Patient goals for therapy: Walk and balance    Pain assessment: Pain present  Pt has a history of back pain.      Objective   Cognitive Status Examination  PT with no cognitive concerns.    OBSERVATION: Pt appearing healthy/well.  PALPATION: N/A  RANGE OF MOTION: LE ROM WFL  UE ROM WFL  STRENGTH:  Observed 4/5 B LE strength via functional mobility testing and 5xSTS; see below.    BED MOBILITY: Independent    TRANSFERS: Independent    WHEELCHAIR MOBILITY: N/A    GAIT:   Level of [place]: Independent  Assistive Device(s): Cane (single end), Pt utilizing HR in her home for inc support.  Gait Deviations: Stride length decreased  Debbie decreased  Gait Distance: 30ft, at most during session.  Stairs: Not completed today.    BALANCE: Standing Balance (static):Fair  Standing Balance (dynamic):Fair    SPECIAL TESTS  5 Times Sit-to-Stand (5TSTS)   13.6s; pt with typical LE strength of healthy adult.     Single Leg Stance Right (sec) 2-3s; very reliant on HR 2' fear of LOB..   Single Leg Stance Left (sec) 2-3s; very reliant on HR 2' fear of LOB..   Romberg  (sec) 30s; very reliant on HR 2' fear of LOB.     SENSATION:  Pt reporting numbness/tingling in the feet 2' hx of peripheral neuropathy.    COORDINATION: Deferred 2' no cerebellar involvement.    Assessment & Plan   CLINICAL IMPRESSIONS  Medical Diagnosis: Gait disorder; Idiopathic peripheral neuropathy; Lumbar spondylosis    Treatment Diagnosis: impairment of balance with household/community activities   Impression/Assessment: Patient is a 88 year  old female with imbalance, pain, and occasional weakness complaints.  The following significant findings have been identified: Pain, Decreased ROM/flexibility, Decreased strength, Impaired balance, Impaired gait, Impaired muscle performance, Decreased activity tolerance, and Instability. These impairments interfere with their ability to perform household mobility and community mobility as compared to previous level of function.     Clinical Decision Making (Complexity):  Clinical Presentation: Stable/Uncomplicated  Clinical Presentation Rationale: based on medical and personal factors listed in PT evaluation  Clinical Decision Making (Complexity): Low complexity    PLAN OF CARE  Treatment Interventions:  Interventions: Gait Training, Neuromuscular Re-education, Therapeutic Activity, Therapeutic Exercise, Self-Care/Home Management    Long Term Goals     PT Goal 1  Goal Identifier: HEP  Goal Description: Pt will demonstrate IND with strength, balance, and muscular and aerobic endurance HEP, while working to improve capacity for functional mobility.  Goal Progress: initiated 9/6/2023  Target Date: 12/04/23  PT Goal 2  Goal Identifier: 5xSTS  Goal Description: Pt will complete x5 STS in <12s, without use of B UEs, to demonstrate improvement in B LE strength.  Goal Progress: baseline: 13.8s with no UEs from pt's typical/home chair  Target Date: 12/04/23  PT Goal 3  Goal Identifier: DGI  Goal Description: Pt will score >/=19/24pts on DGI; to demonstrate improved dynamic balance and postural control with ambulation, and decreased risk for falling with functional mobility.  Target Date: 12/04/23      Frequency of Treatment: 1x/month  Duration of Treatment: 90 days    Education Assessment:   Learner/Method: Patient;Listening;Demonstration;Pictures/Video    Risks and benefits of evaluation/treatment have been explained.   Patient/Family/caregiver agrees with Plan of Care.     Evaluation Time:     PT [place], Low Complexity  Minutes (30326): 11     Signing Clinician: Marilou Acevedo, PT, DPT, NCS      [facility]                                                                                   OUTPATIENT PHYSICAL THERAPY      PLAN OF TREATMENT FOR OUTPATIENT REHABILITATION   Patient's Last Name, First Name, SHANTEDakotaMICHAELDakota  Kerri Rocha YOB: 1935   Provider's Name   [facility]   Medical Record No.  [id number]     Onset Date: 01/01/23 (order date 2' pt's long hx of condition)  Start of Care Date: 09/06/23     Medical Diagnosis:  Gait disorder; Idiopathic peripheral neuropathy; Lumbar spondylosis      PT Treatment Diagnosis:  impairment of balance with household/community activities Plan of Treatment  Frequency/Duration: 1x/month/ 90 days    Certification date from 09/06/23 to 12/04/23         See note for plan of treatment details and functional goals      Marilou Acevedo PT, DPT, NCS                        I CERTIFY THE NEED FOR THESE SERVICES FURNISHED UNDER        THIS PLAN OF TREATMENT AND WHILE UNDER MY CARE     (Physician attestation of this document indicates review and certification of the therapy plan).                Referring Provider:  Parrish Arizmendi MD      Initial Assessment  See Epic Evaluation- Start of Care Date: 09/06/23

## 2023-09-06 NOTE — PROGRESS NOTES
Kerri BETTENCOURT  is a 89y/o F,  who is being seen via a billable video visit.       Patient has given verbal consent for Video visit? Yes     Video Start Time: 10:33am     Telehealth Visit Details     Type of Service:  Telehealth     Video End Time (time video stopped): 11:04am    Originating Location (pt. location): Home     Additional Participants in Telehealth Visit: None     Distant Location (provider location): On-site     Mode of Communication (Audio Visual or Audio Only):  Audio Visual     Marilou Acevedo, PT, DPT, NCS  9/6/2023

## 2023-09-08 RX ORDER — ATORVASTATIN CALCIUM 20 MG/1
20 TABLET, FILM COATED ORAL AT BEDTIME
Qty: 90 TABLET | Refills: 0 | Status: SHIPPED | OUTPATIENT
Start: 2023-09-08 | End: 2023-10-02

## 2023-09-08 NOTE — TELEPHONE ENCOUNTER
Appointment in 2 weeks  Prescription approved per [facility] Refill Protocol.  Briseyda Vega RN

## 2023-09-11 DIAGNOSIS — G62.9 NEUROPATHY: ICD-10-CM

## 2023-09-12 RX ORDER — GABAPENTIN 400 MG/1
CAPSULE ORAL
Qty: 360 CAPSULE | Refills: 0 | Status: SHIPPED | OUTPATIENT
Start: 2023-09-12 | End: 2023-10-02

## 2023-09-25 SDOH — HEALTH STABILITY: PHYSICAL HEALTH: ON AVERAGE, HOW MANY DAYS PER WEEK DO YOU ENGAGE IN MODERATE TO STRENUOUS EXERCISE (LIKE A BRISK WALK)?: 5 DAYS

## 2023-09-25 SDOH — HEALTH STABILITY: PHYSICAL HEALTH: ON AVERAGE, HOW MANY MINUTES DO YOU ENGAGE IN EXERCISE AT THIS LEVEL?: 20 MIN

## 2023-09-25 ASSESSMENT — ENCOUNTER SYMPTOMS
NERVOUS/ANXIOUS: 0
DYSURIA: 0
NAUSEA: 0
WEAKNESS: 0
FEVER: 0
BREAST MASS: 0
JOINT SWELLING: 0
DIZZINESS: 1
HEADACHES: 0
CONSTIPATION: 0
SORE THROAT: 0
ABDOMINAL PAIN: 0
FREQUENCY: 0
HEMATOCHEZIA: 0
DIARRHEA: 0
HEMATURIA: 0
PALPITATIONS: 0
ARTHRALGIAS: 1
CHILLS: 0
EYE PAIN: 0
SHORTNESS OF BREATH: 0
HEARTBURN: 0
COUGH: 0
PARESTHESIAS: 0
MYALGIAS: 1

## 2023-09-25 ASSESSMENT — SOCIAL DETERMINANTS OF HEALTH (SDOH)
HOW OFTEN DO YOU ATTENT MEETINGS OF THE CLUB OR ORGANIZATION YOU BELONG TO?: NEVER
HOW OFTEN DO YOU ATTEND CHURCH OR RELIGIOUS SERVICES?: NEVER
HOW OFTEN DO YOU GET TOGETHER WITH FRIENDS OR RELATIVES?: THREE TIMES A WEEK
IN A TYPICAL WEEK, HOW MANY TIMES DO YOU TALK ON THE PHONE WITH FAMILY, FRIENDS, OR NEIGHBORS?: MORE THAN THREE TIMES A WEEK
DO YOU BELONG TO ANY CLUBS OR ORGANIZATIONS SUCH AS CHURCH GROUPS UNIONS, FRATERNAL OR ATHLETIC GROUPS, OR SCHOOL GROUPS?: NO

## 2023-09-25 ASSESSMENT — LIFESTYLE VARIABLES
AUDIT-C TOTAL SCORE: 2
SKIP TO QUESTIONS 9-10: 1
HOW OFTEN DO YOU HAVE SIX OR MORE DRINKS ON ONE OCCASION: NEVER
HOW OFTEN DO YOU HAVE A DRINK CONTAINING ALCOHOL: 2-4 TIMES A MONTH
HOW MANY STANDARD DRINKS CONTAINING ALCOHOL DO YOU HAVE ON A TYPICAL DAY: 1 OR 2

## 2023-09-25 ASSESSMENT — ACTIVITIES OF DAILY LIVING (ADL): CURRENT_FUNCTION: NO ASSISTANCE NEEDED

## 2023-09-26 ENCOUNTER — LAB (OUTPATIENT)
Dept: LAB | Facility: CLINIC | Age: 88
End: 2023-09-26
Payer: MEDICARE

## 2023-09-26 DIAGNOSIS — G60.9 IDIOPATHIC PERIPHERAL NEUROPATHY: ICD-10-CM

## 2023-09-26 PROCEDURE — 36415 COLL VENOUS BLD VENIPUNCTURE: CPT

## 2023-09-26 PROCEDURE — 82390 ASSAY OF CERULOPLASMIN: CPT

## 2023-09-28 LAB — CERULOPLASMIN SERPL-MCNC: 16 MG/DL (ref 20–60)

## 2023-10-02 ENCOUNTER — VIRTUAL VISIT (OUTPATIENT)
Dept: PEDIATRICS | Facility: CLINIC | Age: 88
End: 2023-10-02
Payer: MEDICARE

## 2023-10-02 DIAGNOSIS — M25.472 BILATERAL SWELLING OF FEET AND ANKLES: ICD-10-CM

## 2023-10-02 DIAGNOSIS — M47.816 LUMBAR FACET ARTHROPATHY: ICD-10-CM

## 2023-10-02 DIAGNOSIS — M47.816 LUMBAR SPONDYLOSIS: ICD-10-CM

## 2023-10-02 DIAGNOSIS — M25.471 BILATERAL SWELLING OF FEET AND ANKLES: ICD-10-CM

## 2023-10-02 DIAGNOSIS — M25.475 BILATERAL SWELLING OF FEET AND ANKLES: ICD-10-CM

## 2023-10-02 DIAGNOSIS — G60.9 IDIOPATHIC PERIPHERAL NEUROPATHY: ICD-10-CM

## 2023-10-02 DIAGNOSIS — I10 ESSENTIAL HYPERTENSION: ICD-10-CM

## 2023-10-02 DIAGNOSIS — Z98.1 S/P LUMBAR SPINAL FUSION: ICD-10-CM

## 2023-10-02 DIAGNOSIS — F51.01 PRIMARY INSOMNIA: ICD-10-CM

## 2023-10-02 DIAGNOSIS — N18.31 STAGE 3A CHRONIC KIDNEY DISEASE (H): ICD-10-CM

## 2023-10-02 DIAGNOSIS — M81.6 LOCALIZED OSTEOPOROSIS WITHOUT CURRENT PATHOLOGICAL FRACTURE: ICD-10-CM

## 2023-10-02 DIAGNOSIS — M25.474 BILATERAL SWELLING OF FEET AND ANKLES: ICD-10-CM

## 2023-10-02 DIAGNOSIS — E78.2 MIXED HYPERLIPIDEMIA: ICD-10-CM

## 2023-10-02 DIAGNOSIS — Z00.00 ENCOUNTER FOR MEDICARE ANNUAL WELLNESS EXAM: Primary | ICD-10-CM

## 2023-10-02 PROCEDURE — 99214 OFFICE O/P EST MOD 30 MIN: CPT | Mod: 95 | Performed by: INTERNAL MEDICINE

## 2023-10-02 PROCEDURE — G0439 PPPS, SUBSEQ VISIT: HCPCS | Mod: 95 | Performed by: INTERNAL MEDICINE

## 2023-10-02 RX ORDER — LISINOPRIL 10 MG/1
10 TABLET ORAL DAILY
Qty: 90 TABLET | Refills: 0 | Status: SHIPPED | OUTPATIENT
Start: 2023-10-02 | End: 2023-11-30

## 2023-10-02 RX ORDER — AMLODIPINE BESYLATE 5 MG/1
5 TABLET ORAL DAILY
Qty: 90 TABLET | Refills: 0 | Status: SHIPPED | OUTPATIENT
Start: 2023-10-02 | End: 2023-11-30

## 2023-10-02 RX ORDER — ATORVASTATIN CALCIUM 20 MG/1
20 TABLET, FILM COATED ORAL AT BEDTIME
Qty: 90 TABLET | Refills: 3 | Status: SHIPPED | OUTPATIENT
Start: 2023-10-02 | End: 2023-11-30

## 2023-10-02 RX ORDER — GABAPENTIN 400 MG/1
CAPSULE ORAL
Qty: 150 CAPSULE | Refills: 1 | Status: SHIPPED | OUTPATIENT
Start: 2023-10-02 | End: 2023-11-30

## 2023-10-02 ASSESSMENT — ENCOUNTER SYMPTOMS
WEAKNESS: 0
HEADACHES: 0
DIZZINESS: 1
HEARTBURN: 0
DYSURIA: 0
MYALGIAS: 1
FEVER: 0
EYE PAIN: 0
NERVOUS/ANXIOUS: 0
HEMATURIA: 0
CHILLS: 0
CONSTIPATION: 0
HEMATOCHEZIA: 0
NAUSEA: 0
COUGH: 0
ARTHRALGIAS: 1
BREAST MASS: 0
FREQUENCY: 0
SHORTNESS OF BREATH: 0
PARESTHESIAS: 0
DIARRHEA: 0
JOINT SWELLING: 0
SORE THROAT: 0
ABDOMINAL PAIN: 0
PALPITATIONS: 0

## 2023-10-02 ASSESSMENT — ACTIVITIES OF DAILY LIVING (ADL): CURRENT_FUNCTION: NO ASSISTANCE NEEDED

## 2023-10-02 NOTE — Clinical Note
Can you help me find the pain teams topical compound recipe? I want to see how it's covered for Kerri. Thanks! CHRISTIANA

## 2023-10-02 NOTE — PATIENT INSTRUCTIONS
"For the back  - I think the next step is circling back with Dr. Momin (PM&R)  - I'll send in a topical compound to see if it helps - let me know if too expensive   - can try topical CBD lotion too    Swollen ankles  Blood pressure  - I wonder if the swollen ankles are from amlodipine  - Lower your amlodipine dose to 5 mg daily (take 1/2 tab of what you have at home, new prescription sent to your pharmacy)  - Send me message in 2 weeks with how you're doing and send me a few blood pressure reasons (calm, sitting for 10 min, take it 3x in a row and then take the average)  - If we need to we can increase other blood pressure meds  - If not helpful - sometimes we do a low dose water pill     For the finger  - they last saw you 3/2023 - Janine Thomas PA-C (Orthopedic Surgery)  - can call and ask them if you're ready to schedule: right index finger mass excision under local anesthesia only with Dr. More Norman,      Department Address: [address] [place] [id number]   Department Phone: [phone]       Morning feeling off  - let's try lowering your night gabapentin to 400 mg (1 capsule) and see if you feel any better in the morning.   - send me a message in a few weeks with how you're doing - both in terms of \"feeling off\" and the neuropathy     They will call you to schedule your dexa/bone scan.    Patient Education   Personalized Prevention Plan  You are due for the preventive services outlined below.  Your care team is available to assist you in scheduling these services.  If you have already completed any of these items, please share that information with your care team to update in your medical record.  Health Maintenance Due   Topic Date Due    Hemoglobin  Never done    Urine Test  Never done    Zoster (Shingles) Vaccine (1 of 2) Never done    Diptheria Tetanus Pertussis (DTAP/TDAP/TD) Vaccine (3 - Td or Tdap) 08/15/2022    ANNUAL REVIEW OF HM ORDERS  09/22/2022    Flu Vaccine " (1) 09/01/2023    COVID-19 Vaccine (6 - 2023-24 season) 09/01/2023    Basic Metabolic Panel  09/29/2023    Cholesterol Lab  09/29/2023    Kidney Microalbumin Urine Test  09/29/2023

## 2023-10-02 NOTE — PROGRESS NOTES
"SUBJECTIVE:   Kerri is a 88 year old who presents for Preventive Visit.      Are you in the first 12 months of your Medicare coverage?  No    Healthy Habits:     In general, how would you rate your overall health?  Good    Frequency of exercise:  6-7 days/week    Duration of exercise:  30-45 minutes    Do you usually eat at least 4 servings of fruit and vegetables a day, include whole grains    & fiber and avoid regularly eating high fat or \"junk\" foods?  Yes    Taking medications regularly:  Yes    Medication side effects:  None    Ability to successfully perform activities of daily living:  No assistance needed    Home Safety:  No safety concerns identified    Hearing Impairment:  Difficulty understanding soft or whispered speech    In the past 6 months, have you been bothered by leaking of urine?  No    In general, how would you rate your overall mental or emotional health?  Excellent    Additional concerns today:  Yes    Have you ever done Advance Care Planning? (For example, a Health Directive, POLST, or a discussion with a medical provider or your loved ones about your wishes): No, advance care planning information given to patient to review.  Patient plans to discuss their wishes with loved ones or provider.      Fall risk  Unable to complete due to virtual visit; need for additional assessment in future face-to-face visit    Cognitive Screening   1) Repeat 3 items (Leader, Season, Table)    2) Clock draw: NORMAL  3) 3 item recall: Recalls 3 objects  Results: 3 items recalled: COGNITIVE IMPAIRMENT LESS LIKELY    Mini-CogTM Copyright [place]. Licensed by the author for use in [facility]; reprinted with permission (trina@.[email or web address]). All rights reserved.        Last OV 9/2022    # bilateral ankle swelling  - can't do neuropathy  - even on hot days    # Finger  - saw ortho - topical burned.     (I10) Essential hypertension      BP Readings from Last 6 Encounters:   09/29/22 125/73   09/14/22 (!) 146/62 "   08/10/22 106/52   07/15/22 132/73   06/03/22 106/62   05/12/22 122/74      Plan: Albumin Random Urine Quantitative with Creat         Ratio, amLODIPine (NORVASC) 10 MG tablet,         lisinopril (ZESTRIL) 10 MG tablet,         Comprehensive metabolic panel (BMP + Alb, Alk         Phos, ALT, AST, Total. Bili, TP)     (E78.2) Mixed hyperlipidemia  Plan: atorvastatin (LIPITOR) 20 MG tablet,         Comprehensive metabolic panel (BMP + Alb, Alk         Phos, ALT, AST, Total. Bili, TP)     (G62.9) Neuropathy  Comment: Following with Neuro and PM&R.   Dr. Gillette 7/2023  - Continue PT excercies  - Ceruloplasmin  - Spine center referral  - tramadol made her really nauseated     # Lumbar spondylosis  - [facility]  - was told to basically take aleve  - was told to consider seeing PM&right again - injection lasted 4 days   - limits her a lot  - can really only get going 3-4 hours later    (N18.31) Stage 3a chronic kidney disease (H)  Explained CKD 3 - stable and monitoring.  Plan: Albumin Random Urine Quantitative with Creat         Ratio, Comprehensive metabolic panel (BMP +         Alb, Alk Phos, ALT, AST, Total. Bili, TP)     (F51.01) Primary insomnia  - takes tylenol PM     (M81.6) Localized osteoporosis without current pathological fracture - 8/2020 waiting for dental work to start fosamax.   Comment: Still doing dental work. Discussed age and starting a medication - will defer to next year.  Vitamin D Deficiency Screening Results:  Lab Results   Component Value Date    VITDT 70 08/10/2020    VITDT 66 08/06/2019    VITDT 61 04/03/2017       Reviewed and updated as needed this visit by clinical staff                  Reviewed and updated as needed this visit by Provider                 Social History     Tobacco Use    Smoking status: Never    Smokeless tobacco: Never   Substance Use Topics    Alcohol use: Yes     Alcohol/week: 1.0 standard drink of alcohol     Comment: rare wine             9/25/2023      4:25 PM   Alcohol Use   Prescreen: >3 drinks/day or >7 drinks/week? No     Do you have a current opioid prescription? No  Do you use any other controlled substances or medications that are not prescribed by a provider? None    Current providers sharing in care for this patient include:   Patient Care Team:  Parrish Arizmendi MD as PCP - General (Internal Medicine)  Raymundo Solorio MD as MD (Neurology)  Arabella Purvis MD as Hospitalist (Endocrinology, Diabetes, and Metabolism)  Jason Gillette MD as MD (Neurology)  Parrish Arizmendi MD as Assigned PCP  Jason Gillette MD as Assigned Neuroscience Provider  Janine Thomas PA-C as Assigned Musculoskeletal Provider    The following health maintenance items are reviewed in Epic and correct as of today:  Health Maintenance   Topic Date Due    HEMOGLOBIN  Never done    URINALYSIS  Never done    ZOSTER IMMUNIZATION (1 of 2) Never done    DTAP/TDAP/TD IMMUNIZATION (3 - Td or Tdap) 08/15/2022    ANNUAL REVIEW OF HM ORDERS  09/22/2022    INFLUENZA VACCINE (1) 09/01/2023    COVID-19 Vaccine (6 - 2023-24 season) 09/01/2023    BMP  09/29/2023    LIPID  09/29/2023    MICROALBUMIN  09/29/2023    MEDICARE ANNUAL WELLNESS VISIT  09/29/2023    FALL RISK ASSESSMENT  10/02/2024    ADVANCE CARE PLANNING  09/29/2027    PHQ-2 (once per calendar year)  Completed    Pneumococcal Vaccine: 65+ Years  Completed    IPV IMMUNIZATION  Aged Out    HPV IMMUNIZATION  Aged Out    MENINGITIS IMMUNIZATION  Aged Out           Mammogram Screening - Patient over age 75, has elected to discontinue screenings.  Pertinent mammograms are reviewed under the imaging tab.    Review of Systems   Constitutional:  Negative for chills and fever.   HENT:  Negative for congestion, ear pain, hearing loss and sore throat.    Eyes:  Negative for pain and visual disturbance.   Respiratory:  Negative for cough and shortness of breath.    Cardiovascular:  Positive for peripheral edema.  "Negative for chest pain and palpitations.   Gastrointestinal:  Negative for abdominal pain, constipation, diarrhea, heartburn, hematochezia and nausea.   Breasts:  Negative for tenderness, breast mass and discharge.   Genitourinary:  Negative for dysuria, frequency, genital sores, hematuria, pelvic pain, urgency, vaginal bleeding and vaginal discharge.   Musculoskeletal:  Positive for arthralgias and myalgias. Negative for joint swelling.   Skin:  Negative for rash.   Neurological:  Positive for dizziness. Negative for weakness, headaches and paresthesias.   Psychiatric/Behavioral:  Negative for mood changes. The patient is not nervous/anxious.        OBJECTIVE:   LMP  (LMP Unknown)  Estimated body mass index is 24.64 kg/m  as calculated from the following:    Height as of 7/28/23: 1.499 m (4' 11\").    Weight as of 7/28/23: 55.3 kg (122 lb).  Physical Exam  GENERAL: Healthy, alert and no distress  EYES: Eyes grossly normal to inspection.  No discharge or erythema, or obvious scleral/conjunctival abnormalities.  RESP: No audible wheeze, cough, or visible cyanosis.  No visible retractions or increased work of breathing.    SKIN: Visible skin clear. No significant rash, abnormal pigmentation or lesions.  NEURO: Cranial nerves grossly intact.  Mentation and speech appropriate for age.  PSYCH: Mentation appears normal, affect normal/bright, judgement and insight intact, normal speech and appearance well-groomed.    Diagnostic Test Results:  Labs reviewed in Epic    ASSESSMENT / PLAN:   (Z00.00) Encounter for Medicare annual wellness exam  (primary encounter diagnosis)  - vaccines utd, will update [facility]  - planning on RSV vaccine in a few weeks.  Plan: CBC with platelets    (I10) Essential hypertension  (M25.471,  M25.474,  M25.475,  M25.472) Bilateral swelling of feet and ankles  BP Readings from Last 6 Encounters:   07/28/23 (!) 148/81   05/30/23 (!) 156/74   04/06/23 126/69   03/30/23 121/71   02/13/23 (!) 146/74 "   01/27/23 (!) 147/80   Adjusting amlodipline.  Plan: amLODIPine (NORVASC) 5 MG tablet, lisinopril         (ZESTRIL) 10 MG tablet    (E78.2) Mixed hyperlipidemia  Plan: Lipid panel reflex to direct LDL Fasting,         Comprehensive metabolic panel, atorvastatin         (LIPITOR) 20 MG tablet    (N18.31) Stage 3a chronic kidney disease (H)  Comment: Comprehensive metabolic panel,    (G60.9) Idiopathic peripheral neuropathy  Comment: Following with Neuro, f/up 1 year. Recently lower copper and waiting to hear from team.  Plan: gabapentin (NEURONTIN) 400 MG capsule  Lowering gabapentin dose to see if helps w/ AM unsteadiness.      (M47.816) Lumbar facet arthropathy  (M47.816) Lumbar spondylosis  (Z98.1) S/P lumbar spinal fusion  Comment: In cmobo with neuropathy limits what she can do.   Recently eval'd by [facility].   Last injection by PMR only lasted a few days  Doing physical therapy  - will [place] back with PMR  See PI    (M81.6) Localized osteoporosis without current pathological fracture - 8/2020 waiting for dental work to start fosamax.   Plan: Vitamin D Deficiency, DX Hip/Pelvis/Spine    (F51.01) Primary insomnia  Comment: Tried trazodone, better with Tylenol PM.    --------  PATIENT INSTRUCTIONS  For the back  - I think the next step is circling back with Dr. Momin (PM&R)  - I'll send in a topical compound to see if it helps - let me know if too expensive   - can try topical CBD lotion too    Swollen ankles  Blood pressure  - I wonder if the swollen ankles are from amlodipine  - Lower your amlodipine dose to 5 mg daily (take 1/2 tab of what you have at home, new prescription sent to your pharmacy)  - Send me message in 2 weeks with how you're doing and send me a few blood pressure reasons (calm, sitting for 10 min, take it 3x in a row and then take the average)  - If we need to we can increase other blood pressure meds  - If not helpful - sometimes we do a low dose water pill     For the finger  - they last  "saw you 3/2023 - Janine Thomas PA-C (Orthopedic Surgery)  - can call and ask them if you're ready to schedule: right index finger mass excision under local anesthesia only with Dr. More Norman,      Department Address: [address] [place] [id number]   Department Phone: [phone]       Morning feeling off  - let's try lowering your night gabapentin to 400 mg (1 capsule) and see if you feel any better in the morning.   - send me a message in a few weeks with how you're doing - both in terms of \"feeling off\" and the neuropathy     They will call you to schedule your dexa/bone scan.  --------------    COUNSELING:  Reviewed preventive health counseling, as reflected in patient instructions        She reports that she has never smoked. She has never used smokeless tobacco.      Appropriate preventive services were discussed with this patient, including applicable screening as appropriate for fall prevention, nutrition, physical activity, Tobacco-use cessation, weight loss and cognition.  Checklist reviewing preventive services available has been given to the patient.    Reviewed patients plan of care and provided an AVS. The Intermediate Care Plan ( asthma action plan, low back pain action plan, and migraine action plan) for Kerri meets the Care Plan requirement. This Care Plan has been established and reviewed with the Patient.          Parrish Arizmendi MD  [facility]    Identified Health Risks:  I have reviewed Opioid Use Disorder and Substance Use Disorder risk factors and made any needed referrals.     VIDEO VISIT  Start: 529, End 554  "

## 2023-10-04 ENCOUNTER — TELEPHONE (OUTPATIENT)
Dept: PEDIATRICS | Facility: CLINIC | Age: 88
End: 2023-10-04

## 2023-10-04 ENCOUNTER — VIRTUAL VISIT (OUTPATIENT)
Dept: PHYSICAL THERAPY | Facility: CLINIC | Age: 88
End: 2023-10-04
Payer: MEDICARE

## 2023-10-04 DIAGNOSIS — R53.1 DECREASED STRENGTH: Primary | ICD-10-CM

## 2023-10-04 DIAGNOSIS — M47.816 LUMBAR SPONDYLOSIS: Primary | ICD-10-CM

## 2023-10-04 DIAGNOSIS — R26.89 BALANCE PROBLEMS: ICD-10-CM

## 2023-10-04 PROCEDURE — 97110 THERAPEUTIC EXERCISES: CPT | Mod: GP

## 2023-10-04 PROCEDURE — 97112 NEUROMUSCULAR REEDUCATION: CPT | Mod: GP

## 2023-10-04 NOTE — TELEPHONE ENCOUNTER
Topical Medications for Pain Management   (Narrative by [facility] Medical Director, Magali Owen MD.)    Basics:  In general, while inpatient we typically have no problems getting these medications for patients  Problems come in when you attempt to get these medications covered as an outpatient  Lidocaine patches often are limited as an outpatient to their FDA approved indications  Compounded medications may not be covered by insurance for outpatient use due to limited evidence/research  These medications are intended to be used for focal areas. Large body areas should be avoided due to higher absorption. As I tell patients,  pick a couple of areas of your worst pain. Use on a smaller area, don t coat your entire body   With limited use as described above, systemic absorption is limited. Therefore these are often considered to be quite safe. Most side effects are local reactions.  Compounded medications need to be made at specific pharmacies. Anytime we make a compounded medication for outpatients, we use any [facility] pharmacy. Typically compounded meds come in PLO gel (a white cream, absorbs well) but it can be made in multiple different vehicles including Vanicream.  For outpatient use, we typically prescribe 120g/month. To give you an idea of how much cream this is, that is about the size of   of a can of soda.    Lidocaine patches  In general, we tend to utilize these frequently on inpatients  Helpful for more superficial pain such as myofascial or joint pain  Also helpful for neuropathic pain  OK to cut to better fit to size  Dosing  1-3 patches. Wear for 12 hours, remove for 12 hours. OK to cut to better fit to size.  Must be worn over INTACT skin only. Recommend for post-op patients to write that this should not be placed over an incision.  Ok to use in places of chronic and acute pain  Outpatient logistics  Often difficult to get covered  Comes in boxes of 30. Can  prescribe up to 90/month  While FDA indication is for 12 hours on, 12 hours off, shown to be worn safely for longer periods such as 14-16 hours. Skin needs a break from adhesive  If not covered, REALLY expensive- hundreds of dollars. This is the case even though it was recently made generic. Prices have not dropped.    Lidocaine ointment  If patches not covered, there are various preparations of topical lidocaine.  Lidocaine 5% ointment is the highest potency available. This is sometimes hard to order, you have to search under  lidocaine oint  to find. If you type in the full word  ointment  you won t find.  Ointment is oily but highest dose.  I typically recommend  apply dime size amount topically to painful area (limited to 2-3 per application) TID prn.  Comes in a VERY weird dose size as an outpatient- 35.44g.  I often supply 70.88g as a month s supply as an outpatient (2 tubes)    NSAID preparations (ketoprofen gel and voltaren gel)  Most helpful for joint related pain, but also can be helpful for myofascial pain  Due to limited absorption, thought to be safe even in situations where oral NSAIDs need to be avoided. Rarely will we find specialists who want to avoid use, but this isn t common.  Must be used over intact skin  Ketoprofen 10% PLO cream  This is what we typically use as an inpatient  Compounded medication- can only be made at a [facility] pharmacy  Apply small amount topically to painful areas (limit to 2-3 locations per application) TID prn.  Apply to intact skin.  When prescribing for outpatient, typically give 120g/month  Often difficult to get covered by outpatient insurance  Voltaren 1% gel (diclofenac)  Commercially available at any pharmacy  Comes with a dosing ruler where you measure grams on a stick  Smaller joints 2g, larger joints 4g.  Apply 4x/day prn.    Comes in 100g tubes, this is typically a month supply  Easier to get covered by insurance  Flector patch (diclofenac 1.5% transderm patch)    FDA approved for short-term use   Apply patch to affected area twice daily.  Apply to intact skin.    Morphine 0.1% Intrasite gel  This gel is the only gel we would use on broken, ulcerated, or inflamed tissue  On occasion, plastics or wound management has not wanted us to use this in a wound- worth asking  Not likely to be helpful over normal, intact skin.  Dosing:  Morphine 0.1% weight-to-weight in Intrasite gel: Apply small amount to pain sites (to wound, ulcer or inflamed tissue) BID prn or with each dressing change.     For outpatient prescribing, this is a controlled substance, so requires a MELANI number, and must be printed.  Cannot be faxed /called.  Again, a compounded medication that needs to be made at a [facility] pharmacy    Neuropathic compounded medications  These are made at [facility] pharmacies  Can be difficult to get covered by insurance  We often make in batches of 120g/month.  Direction is to apply small amount topically  painful area TID prn  Multiple options for what can be put in.  These medications work in the periphery, which is different than their oral counterparts that are absorbed systemically.  Therefore, you can use these medications topically even if they had non-serious problem with oral preparation.  For example, if they had n/v or dizziness from oral gabapentin, not likely to have same problems with topical preparations.  Options  Gabapentin 8% in PLO gel  Amitriptyline 3%, ketamine 3% , gabapentin 8%, lidocaine 5%

## 2023-10-04 NOTE — PROGRESS NOTES
10/04/23 1300   Signing Clinician's Name / Credentials   Signing clinician's name / credentials Marilou Acevedo, PT, DPT, NCS   Dynamic Gait Index (Edwin and Dyson [place], 1995)   Gait Level Surface 3   Change in Gait Speed 3   Gait and Horizontal Head Turns 2   Gait with Vertical Head Turns 2   Gait and Pivot Turns 3   Step Over Obstacle 2   Step Around Obstacles 3   Steps 1   Total Dynamic Gait Index Score  (A score of 19 or less has been correlated to an increased risk of falls in community dwelling older adults, patients with vestibular disorders, and patients with MS.)   Total Score (out of 24) 19       Dynamic Gait Index (DGI):The DGI is a measure of balance during gait that is reliable and valid for the elderly and individuals with Parkinson's disease, MS, vestibular disorders, or s/p stroke. Gait assistive device used: None    Patient score: 19/24  Scores ?19/24 indicate an increased risk for falls according to Sara et al 2000  Minimal Detectable Change = 2.9 in community dwelling elderly according to Luis F et al 2011    Assessment (rationale for performing, application to patient s function & care plan): Pt at inc risk for falling 2' balance impairment with dynamic gait activities. Plan to continue to progress with PT intervention and HEP. Pt in support. Discussed goal of >/=20 points to reduce risk for falling.

## 2023-10-04 NOTE — TELEPHONE ENCOUNTER
Looking into compounded pain creams for Dr. Arizmendi.   Reached out to contact at the [facility].   Will reach out to MTM as well.

## 2023-10-05 NOTE — TELEPHONE ENCOUNTER
This has been super helpful.     I talked to the compounding pharmacy and placed the order. They will reach out to pt and run it through her insurance so that we can get a cost first. I did tell Kerri that these creams often are not covered well. She can decline if too expensive.     These medications are intended to be used for focal areas. Large body areas should be avoided due to higher absorption. As I tell patients,  pick a couple of areas of your worst pain. Use on a smaller area, don t coat your entire body     Please update pt that this was sent.     IVIS Arizmendi MD  Internal Medicine-Pediatrics

## 2023-10-13 ENCOUNTER — LAB (OUTPATIENT)
Dept: LAB | Facility: CLINIC | Age: 88
End: 2023-10-13
Payer: MEDICARE

## 2023-10-13 DIAGNOSIS — M81.6 LOCALIZED OSTEOPOROSIS WITHOUT CURRENT PATHOLOGICAL FRACTURE: ICD-10-CM

## 2023-10-13 DIAGNOSIS — N18.31 STAGE 3A CHRONIC KIDNEY DISEASE (H): ICD-10-CM

## 2023-10-13 DIAGNOSIS — Z00.00 ENCOUNTER FOR MEDICARE ANNUAL WELLNESS EXAM: ICD-10-CM

## 2023-10-13 DIAGNOSIS — E78.2 MIXED HYPERLIPIDEMIA: ICD-10-CM

## 2023-10-13 LAB
ALBUMIN SERPL BCG-MCNC: 4.1 G/DL (ref 3.5–5.2)
ALP SERPL-CCNC: 60 U/L (ref 35–104)
ALT SERPL W P-5'-P-CCNC: 14 U/L (ref 0–50)
ANION GAP SERPL CALCULATED.3IONS-SCNC: 10 MMOL/L (ref 7–15)
AST SERPL W P-5'-P-CCNC: 28 U/L (ref 0–45)
BILIRUB SERPL-MCNC: 0.4 MG/DL
BUN SERPL-MCNC: 34.3 MG/DL (ref 8–23)
CALCIUM SERPL-MCNC: 9.9 MG/DL (ref 8.8–10.2)
CHLORIDE SERPL-SCNC: 107 MMOL/L (ref 98–107)
CHOLEST SERPL-MCNC: 148 MG/DL
CREAT SERPL-MCNC: 1.11 MG/DL (ref 0.51–0.95)
DEPRECATED HCO3 PLAS-SCNC: 25 MMOL/L (ref 22–29)
EGFRCR SERPLBLD CKD-EPI 2021: 48 ML/MIN/1.73M2
ERYTHROCYTE [DISTWIDTH] IN BLOOD BY AUTOMATED COUNT: 13.7 % (ref 10–15)
GLUCOSE SERPL-MCNC: 95 MG/DL (ref 70–99)
HCT VFR BLD AUTO: 39.6 % (ref 35–47)
HDLC SERPL-MCNC: 37 MG/DL
HGB BLD-MCNC: 13.1 G/DL (ref 11.7–15.7)
LDLC SERPL CALC-MCNC: 84 MG/DL
MCH RBC QN AUTO: 30.9 PG (ref 26.5–33)
MCHC RBC AUTO-ENTMCNC: 33.1 G/DL (ref 31.5–36.5)
MCV RBC AUTO: 93 FL (ref 78–100)
NONHDLC SERPL-MCNC: 111 MG/DL
PLATELET # BLD AUTO: 265 10E3/UL (ref 150–450)
POTASSIUM SERPL-SCNC: 4.7 MMOL/L (ref 3.4–5.3)
PROT SERPL-MCNC: 6.8 G/DL (ref 6.4–8.3)
RBC # BLD AUTO: 4.24 10E6/UL (ref 3.8–5.2)
SODIUM SERPL-SCNC: 142 MMOL/L (ref 135–145)
TRIGL SERPL-MCNC: 136 MG/DL
VIT D+METAB SERPL-MCNC: 54 NG/ML (ref 20–50)
WBC # BLD AUTO: 6.3 10E3/UL (ref 4–11)

## 2023-10-13 PROCEDURE — 36415 COLL VENOUS BLD VENIPUNCTURE: CPT

## 2023-10-13 PROCEDURE — 82306 VITAMIN D 25 HYDROXY: CPT

## 2023-10-13 PROCEDURE — 80061 LIPID PANEL: CPT

## 2023-10-13 PROCEDURE — 80053 COMPREHEN METABOLIC PANEL: CPT

## 2023-10-13 PROCEDURE — 85027 COMPLETE CBC AUTOMATED: CPT

## 2023-10-23 ENCOUNTER — ANCILLARY PROCEDURE (OUTPATIENT)
Dept: BONE DENSITY | Facility: CLINIC | Age: 88
End: 2023-10-23
Attending: INTERNAL MEDICINE
Payer: MEDICARE

## 2023-10-23 ENCOUNTER — LAB (OUTPATIENT)
Dept: LAB | Facility: CLINIC | Age: 88
End: 2023-10-23
Payer: MEDICARE

## 2023-10-23 DIAGNOSIS — I10 ESSENTIAL HYPERTENSION: ICD-10-CM

## 2023-10-23 DIAGNOSIS — M81.6 LOCALIZED OSTEOPOROSIS OF LEQUESNE: ICD-10-CM

## 2023-10-23 DIAGNOSIS — M81.6 LOCALIZED OSTEOPOROSIS WITHOUT CURRENT PATHOLOGICAL FRACTURE: ICD-10-CM

## 2023-10-23 LAB
ANION GAP SERPL CALCULATED.3IONS-SCNC: 14 MMOL/L (ref 7–15)
BUN SERPL-MCNC: 26.7 MG/DL (ref 8–23)
CALCIUM SERPL-MCNC: 9.6 MG/DL (ref 8.8–10.2)
CHLORIDE SERPL-SCNC: 100 MMOL/L (ref 98–107)
CREAT SERPL-MCNC: 1.1 MG/DL (ref 0.51–0.95)
DEPRECATED HCO3 PLAS-SCNC: 22 MMOL/L (ref 22–29)
EGFRCR SERPLBLD CKD-EPI 2021: 48 ML/MIN/1.73M2
GLUCOSE SERPL-MCNC: 91 MG/DL (ref 70–99)
POTASSIUM SERPL-SCNC: 4.9 MMOL/L (ref 3.4–5.3)
SODIUM SERPL-SCNC: 136 MMOL/L (ref 135–145)

## 2023-10-23 PROCEDURE — 36415 COLL VENOUS BLD VENIPUNCTURE: CPT

## 2023-10-23 PROCEDURE — 80048 BASIC METABOLIC PNL TOTAL CA: CPT

## 2023-10-23 PROCEDURE — 77080 DXA BONE DENSITY AXIAL: CPT | Performed by: INTERNAL MEDICINE

## 2023-10-23 PROCEDURE — 77081 DXA BONE DENSITY APPENDICULR: CPT | Performed by: INTERNAL MEDICINE

## 2023-10-24 ENCOUNTER — MYC MEDICAL ADVICE (OUTPATIENT)
Dept: PEDIATRICS | Facility: CLINIC | Age: 88
End: 2023-10-24
Payer: MEDICARE

## 2023-11-01 ENCOUNTER — VIRTUAL VISIT (OUTPATIENT)
Dept: PHYSICAL THERAPY | Facility: CLINIC | Age: 88
End: 2023-11-01
Payer: MEDICARE

## 2023-11-01 DIAGNOSIS — R26.89 BALANCE PROBLEMS: ICD-10-CM

## 2023-11-01 DIAGNOSIS — R53.1 DECREASED STRENGTH: Primary | ICD-10-CM

## 2023-11-01 PROCEDURE — 97112 NEUROMUSCULAR REEDUCATION: CPT | Mod: GP

## 2023-11-01 PROCEDURE — 97110 THERAPEUTIC EXERCISES: CPT | Mod: GP

## 2023-11-01 NOTE — PROGRESS NOTES
11/01/23 1000   Signing Clinician's Name / Credentials   Signing clinician's name / credentials Marilou Acevedo, PT, DPT, NCS   Dynamic Gait Index (Edwin and Dyson [place], 1995)   Gait Level Surface 3   Change in Gait Speed 2   Gait and Horizontal Head Turns 3   Gait with Vertical Head Turns 3   Gait and Pivot Turns 3   Step Over Obstacle 3   Step Around Obstacles 3   Steps 1   Total Dynamic Gait Index Score  (A score of 19 or less has been correlated to an increased risk of falls in community dwelling older adults, patients with vestibular disorders, and patients with MS.)   Total Score (out of 24) 21       Dynamic Gait Index (DGI):The DGI is a measure of balance during gait that is reliable and valid for the elderly and individuals with Parkinson's disease, MS, vestibular disorders, or s/p stroke. Gait assistive device used: None    Patient score: 21/24  Scores ?19/24 indicate an increased risk for falls according to Sara et al 2000  Minimal Detectable Change = 2.9 in community dwelling elderly according to Luis F et al 2011    Assessment (rationale for performing, application to patient s function & care plan): Pt not at inc risk for falling with household and limited community ambulation. Has improved her score from baseline.

## 2023-11-01 NOTE — PROGRESS NOTES
11/01/23 0500   Appointment Info   Signing clinician's name / credentials Marilou Acevedo, PT, DPT, NCS   Visits Used 3   Medical Diagnosis Gait disorder; Idiopathic peripheral neuropathy; Lumbar spondylosis   PT Tx Diagnosis impairment of balance with household/community activities   Quick Adds Certification;Virtual Visit   Virtual Visit   Time of service begin: 1024   Time of service end: 1053   Provider Location (Distant Site) Office   Patient Location (Originating Site) Home/other residence   Virtual Visit Rationale The virtual visit will provide the care the patient needs. We reviewed the patient's chart, and PTRx prescription to determine the following telemedicine visit is appropriate and effective for the patient's care.   Progress Note/Certification   Start of Care Date 09/06/23   Onset of illness/injury or Date of Surgery 01/01/23  (order date 2' pt's long hx of condition)   Therapy Frequency 1x/month   Predicted Duration 60 days   Certification date from 11/01/23   Certification date to 12/30/23   Progress Note Due Date   (at 10th)   GOALS   PT Goals 2;3;4;5   PT Goal 1   Goal Identifier HEP   Goal Description Pt will demonstrate IND with strength, balance, and muscular and aerobic endurance HEP, while working to improve capacity for functional mobility.   Goal Progress 12/30/2023: Pt to continue HEP with new additions.   Target Date 12/30/23   PT Goal 2   Goal Identifier MET: 5xSTS   Goal Description Pt will complete x5 STS in <12s, without use of B UEs, to demonstrate improvement in B LE strength.   Goal Progress 11/1/2023: 10.8s with no UEs from pt's typical/home chair   Target Date 12/04/23   Date Met 11/01/23   PT Goal 3   Goal Identifier MET: DGI   Goal Description Pt will score >/=19/24pts on DGI; to demonstrate improved dynamic balance and postural control with ambulation, and decreased risk for falling with functional mobility.   Goal Progress 11/1/2023: Pt with 21/24 points on the DGI; not at  inc risk for falling. Has met goal.   Target Date 12/04/23   Date Met 11/01/23   PT Goal 4   Goal Identifier NEW: HERCULES   Goal Description Pt will score >/=46/56 on HERCULES Balance Assessment, to demonstrate improved standing static/dynamic balance and decreased risk for falling with functional mobility.   Target Date 12/30/23   Subjective Report   Subjective Report Pt reporting she completes her HEP daily. She feels her LE strength and balance continue to improve. She would like to incorporate exercises for her UE strength. She connected with her provider about different medications to improve her back pain; recommended one specialty pharmacy medicine - though too expensive for regular use. Will continue PT to help to improve pain management with physical exercise/mobility.   Treatment Interventions (PT)   Interventions Neuromuscular Re-education;Therapeutic Procedure/Exercise   Therapeutic Procedure/Exercise   Therapeutic Procedures: strength, endurance, ROM, flexibillity minutes (15973) 21   Skilled Intervention 5xSTS; strengthening; HEP; pt edu   Patient Response/Progress Completed 5xSTS; see goal above/has improved B LE strength to be that of a healthy adult. Progressed pt's HEP for further improvement of pt's UE strength, improved posture, and reduction of lumbar pain. She's to add standing bicep curls, tricep extension, overhead/alternating shoulder press, and shoulder rolls. Each exercise to be completed with 1-2lb dumbbbells bilaterally at dose of 10x2. Pt in support. Completed x10 repetitions of each exercise during PT session to familiarize movement pattern. PT updated pt's HEP; emailed program to pt to print for home use. Plan to f/u in 1-month. Then to take a 2-3 month break from skilled PT intervention 2' pt's strong ability to perform HEP with IND. Pt in support.   Neuromuscular Re-education   Neuromuscular re-ed of mvmt, balance, coord, kinesthetic sense, posture, proprioception minutes (10087) 8    Skilled Intervention DGI; HEP; pt edu   Patient Response/Progress Pt completed DGI; see goal above. Has reduced her risk for falling with household mobility and limited community mobility. Pt does not complete typical community mobility - therefore, FGA not appropriate/too high-level. Took gait with HHT and VHT off of pt's HEP to reduce increased number of exercises; she's demonstrated IND with each task/improved her safety. Plan to assess HERCULES at pt's next session to determine if increasead deficits are present in balance.   Education   Learner/Method Patient;Listening;Demonstration;Pictures/Video   Plan   Home program See PTRx   Plan for next session f/u with HEP; HERCULES; progress programs; discharge to HEP until March or April   Total Session Time   Timed Code Treatment Minutes 29   Total Treatment Time (sum of timed and untimed services) 29       [facility]                                                                                   OUTPATIENT PHYSICAL THERAPY    PLAN OF TREATMENT FOR OUTPATIENT REHABILITATION   Patient's Last Name, First Name, Kerri Price YOB: 1935   Provider's Name   [facility]   Medical Record No.  [id number]     Onset Date: 01/01/23 (order date 2' pt's long hx of condition)  Start of Care Date: 09/06/23     Medical Diagnosis:  Gait disorder; Idiopathic peripheral neuropathy; Lumbar spondylosis      PT Treatment Diagnosis:  impairment of balance with household/community activities Plan of Treatment  Frequency/Duration: 1x/month/ 60 days    Certification date from 11/01/23 to 12/30/23         See note for plan of treatment details and functional goals     Marilou Acevedo, PT, DPT, NCS                          I CERTIFY THE NEED FOR THESE SERVICES FURNISHED UNDER        THIS PLAN OF TREATMENT AND WHILE UNDER MY CARE     (Physician attestation of this document indicates review and certification of the  therapy plan).                Referring Provider:  MD eder Pendleton    Initial Assessment  See Epic Evaluation- Start of Care Date: 09/06/23

## 2023-11-30 DIAGNOSIS — E78.2 MIXED HYPERLIPIDEMIA: ICD-10-CM

## 2023-11-30 DIAGNOSIS — G60.9 IDIOPATHIC PERIPHERAL NEUROPATHY: ICD-10-CM

## 2023-11-30 DIAGNOSIS — I10 ESSENTIAL HYPERTENSION: ICD-10-CM

## 2023-11-30 RX ORDER — ATORVASTATIN CALCIUM 20 MG/1
20 TABLET, FILM COATED ORAL AT BEDTIME
Qty: 90 TABLET | Refills: 3 | Status: SHIPPED | OUTPATIENT
Start: 2023-11-30

## 2023-11-30 RX ORDER — AMLODIPINE BESYLATE 5 MG/1
5 TABLET ORAL DAILY
Qty: 90 TABLET | Refills: 3 | Status: SHIPPED | OUTPATIENT
Start: 2023-11-30

## 2023-11-30 RX ORDER — LISINOPRIL 10 MG/1
10 TABLET ORAL DAILY
Qty: 90 TABLET | Refills: 3 | Status: SHIPPED | OUTPATIENT
Start: 2023-11-30

## 2023-11-30 RX ORDER — GABAPENTIN 400 MG/1
CAPSULE ORAL
Qty: 150 CAPSULE | Refills: 5 | Status: SHIPPED | OUTPATIENT
Start: 2023-11-30 | End: 2024-07-19

## 2023-12-06 ENCOUNTER — VIRTUAL VISIT (OUTPATIENT)
Dept: PHYSICAL THERAPY | Facility: CLINIC | Age: 88
End: 2023-12-06
Payer: MEDICARE

## 2023-12-06 DIAGNOSIS — R53.1 DECREASED STRENGTH: Primary | ICD-10-CM

## 2023-12-06 DIAGNOSIS — R26.89 BALANCE PROBLEMS: ICD-10-CM

## 2023-12-06 PROCEDURE — 97112 NEUROMUSCULAR REEDUCATION: CPT | Mod: GP

## 2023-12-06 NOTE — PROGRESS NOTES
12/06/23 0500   Appointment Info   Signing clinician's name / credentials Marilou Acevedo, PT, DPT, NCS   Visits Used 4   Medical Diagnosis Gait disorder; Idiopathic peripheral neuropathy; Lumbar spondylosis   PT Tx Diagnosis impairment of balance with household/community activities   Quick Adds Certification;Virtual Visit   Virtual Visit   Time of service begin: 1030   Time of service end: 1057   Provider Location (Distant Site) Office   Patient Location (Originating Site) Home/other residence   Virtual Visit Rationale The virtual visit will provide the care the patient needs. We reviewed the patient's chart, and PTRx prescription to determine the following telemedicine visit is appropriate and effective for the patient's care.   Progress Note/Certification   Start of Care Date 09/06/23   Onset of illness/injury or Date of Surgery 01/01/23  (order date 2' pt's long hx of condition)   Therapy Frequency 1x/month   Predicted Duration 60 days   Certification date from 11/01/23   Certification date to 12/30/23   Progress Note Due Date   (at 10th)   GOALS   PT Goals 2;3;4;5   PT Goal 1   Goal Identifier MET: HEP   Goal Description Pt will demonstrate IND with strength, balance, and muscular and aerobic endurance HEP, while working to improve capacity for functional mobility.   Goal Progress 12/6/2023: Completed final progression of PT exercises; see PTRx.   Target Date 12/30/23   Date Met 12/06/23   PT Goal 2   Goal Identifier MET: 5xSTS   Goal Description Pt will complete x5 STS in <12s, without use of B UEs, to demonstrate improvement in B LE strength.   Goal Progress 11/1/2023: 10.8s with no UEs from pt's typical/home chair   Target Date 12/04/23   Date Met 11/01/23   PT Goal 3   Goal Identifier MET: DGI   Goal Description Pt will score >/=19/24pts on DGI; to demonstrate improved dynamic balance and postural control with ambulation, and decreased risk for falling with functional mobility.   Goal Progress 11/1/2023:  Pt with 21/24 points on the DGI; not at inc risk for falling. Has met goal.   Target Date 12/04/23   Date Met 11/01/23   PT Goal 4   Goal Identifier MET: HERCULES   Goal Description Pt will score >/=46/56 on HERCULES Balance Assessment, to demonstrate improved standing static/dynamic balance and decreased risk for falling with functional mobility.   Goal Progress 12/6/2023: Pt with 50/56 points on the HERCULES. Pt not at risk for falling.   Target Date 12/30/23   Date Met 12/06/23   Subjective Report   Subjective Report Pt reporting she completes her HEP daily. She would like to add additional balance exercises to her program. She reports completeing her exercises x2/day - once in AM/PM. Pt in support of discharge today with resumption in 03/2024.   Objective Measures   Objective Measures Objective Measure 1   Objective Measure 1   Objective Measure HERCULES   Details 50/56 points   Treatment Interventions (PT)   Interventions Neuromuscular Re-education;Therapeutic Procedure/Exercise   Neuromuscular Re-education   Neuromuscular re-ed of mvmt, balance, coord, kinesthetic sense, posture, proprioception minutes (59518) 27   Skilled Intervention HERCULES; static/dynamic balance; pt edu; HEP   Patient Response/Progress Pt completed HERCULES; see goal above. Pt not at risk for falling with household mobility. She's now passed both the HERCULES and the DGI - she's not at risk for falling with with either household mobility or limited community mobility. Pt pleased with her high-level of function. Again, pt does not complete typical community mobility - therefore, FGA not appropriate/too high-level. Completed additional balance exercises of tandem stance vs. split stance for 30s x1, weight-shifting R/L and ant/post without UE support 10x2 of each, and static stance with feet together & EC for 15s x2. Added these to pt's HEP. Pt in support. She's to continue her balance exercises daily through the month of December, then to focus on strength + balance  beginning in January 2023. Plan to f/u with PT in March 2024 for progression and check-in of progress with programs. Pt has demonstrated IND with completion of HEP and redcued risk for falling. Does not require skilled PT intervention at this time. Plan for d/c today. Pt has stated understanding and method for resuming/scheduling in March 2024.   Education   Learner/Method Patient;Listening;Demonstration;Pictures/Video   Plan   Home program See PTRx   Plan for next session f/u with HEP; HERCULES; progress programs; discharge to HEP until March or April   Total Session Time   Timed Code Treatment Minutes 27   Total Treatment Time (sum of timed and untimed services) 27         DISCHARGE  Reason for Discharge: Patient has met all goals.    Equipment Issued: N/A    Discharge Plan: Patient to continue home program.    Referring Provider:  Parrish Arizmendi MD

## 2023-12-06 NOTE — PROGRESS NOTES
12/06/23 1000   Signing Clinician's Name / Credentials   Signing clinician's name / credentials Marilou Acevedo, PT, DPT, NCS   Forrester Balance Scale (DIOMEDES RAMSAY, AIDE CANAS, SUKHDEV CARRIZALES, ARNAUD WAYNE: MEASURING BALANCE IN THE ELDERLY: VALIDATION OF AN INSTRUMENT. CAN. J. PUB. HEALTH, JULY/AUGUST SUPPLEMENT 2:S7-11, 1992.)   Sit To Stand 4   Standing Unsupported 4   Sitting Unsupported 4   Stand to Sit 4   Transfers 4   Standing with Eyes Closed 4   Standing Unsupported, Feet Together 4   Reach Forward With Outstretched Arm 4   Retrieve Object From Floor 4   Turning to Look Behind 3   Turn 360 Degrees 4   Placing Alternate Foot on Stool (4-6 inches) 3   Unsupported Tandem Stand (Demonstrate to Subject) 1   One Leg Stand 3   Total Score (A score of 45 or less has been correlated with an increased risk of falls)   Total Score (out of 56) 50       Forrester Balance Scale (BBS) Cutoff Scores: A score of < 45 /56 indicates an increased risk for falls.     The BBS is a measure of static and dynamic standing balance that has been validated in community dwelling elderly individuals and individuals who have Parkinson's Disease, MS, and those who are s/p CVA and TBI. The test is administered without an assistive device. Scores from the Forrester are used to determine the probability of falling based on the patient's previous history of falls and their test performance.     Minimal Detectable Change = 6.5   & Minimal Detectable Change (Parkinson's Disease) = 5 according to Quang & Narcisoey 2008    Assessment (rationale for performing, application to patient s function & care plan): PT not at risk for falling with household mobility.

## 2024-01-23 ENCOUNTER — NURSE TRIAGE (OUTPATIENT)
Dept: PEDIATRICS | Facility: CLINIC | Age: 89
End: 2024-01-23
Payer: MEDICARE

## 2024-01-23 NOTE — TELEPHONE ENCOUNTER
Nurse Triage SBAR    Is this a 2nd Level Triage? NO    Situation: itching on back    Background: Itching on back started 3 weeks ago.     Assessment: Itching present on pt's upper back, mainly on the right side. Pt cannot visualize area without using a mirror. She says she can't see it very well but there does appear to be some associated redness. No bumps present. Itching is rated at 7/8 out of 10 and pain is rated at a 5. Some scratch marks present and some scabs present on pt's hair line. Pt has used cortisone cream on area which helps briefly    Protocol Recommended Disposition:   See in Office Within 3 Days    Recommendation: Reviewed care advice under care tab with pt. Instructed pt to call back if new or worsening symptoms. Patient was given an opportunity to ask questions, verbalized understanding of plan, and is agreeable.    Scheduled pt for Thursday 1/25/24 with Magali Nicole PA-C.    Sheridan VALDEZ RN           Does the patient meet one of the following criteria for ADS visit consideration? 16+ years old, with an [facility] PCP     TIP  Providers, please consider if this condition is appropriate for management at one of our Acute and Diagnostic Services sites.     If patient is a good candidate, please use dotphrase <dot>triageresponse and select Refer to ADS to document.    Reason for Disposition   MODERATE-SEVERE local itching (i.e., interferes with work, school, activities) and not improved after 24 hours of hydrocortisone cream    Additional Information   Negative: Athlete's foot suspected (e.g., itchy rash of feet, especially 3rd-4th web spaces)   Negative: Insect bites suspected   Negative: Jock Itch suspected (e.g., itchy rash on inner thighs near genital area)   Negative: Pubic lice suspected (e.g., genital itching and lice or nits are seen)   Negative: Poison ivy, oak, or sumac rash suspected (e.g., itchy rash after contact with poison ivy)   Negative: Genital itching - female   Negative: Genital  "itching - male   Negative: Rectal (anus) itching   Negative: Localized rash also present   Negative: Patient sounds very sick or weak to the triager   Negative: Looks infected (e.g., spreading redness, red streak, pus)    Answer Assessment - Initial Assessment Questions  1. DESCRIPTION: \"Describe the itching you are having.\" \"Where is it located?\"      Itching across upper back, mainly on right side    2. SEVERITY: \"How bad is it?\"     - MILD: Doesn't interfere with normal activities.    - MODERATE-SEVERE: Interferes with work, school, sleep, or other activities.       moderate  3. SCRATCHING: \"Are there any scratch marks? Bleeding?\"      Yes, scratch marks, scabs on hair line  Some redness present    Put cortisone cream on it, helps briefly    4. ONSET: \"When did the itching begin?\"       3 weeks ago  5. CAUSE: \"What do you think is causing the itching?\"       unsure  6. OTHER SYMPTOMS: \"Do you have any other symptoms?\"       no  7. PREGNANCY: \"Is there any chance you are pregnant?\" \"When was your last menstrual period?\"      N/a    Protocols used: Itching - [id number]    "

## 2024-01-25 ENCOUNTER — OFFICE VISIT (OUTPATIENT)
Dept: PEDIATRICS | Facility: CLINIC | Age: 89
End: 2024-01-25
Payer: MEDICARE

## 2024-01-25 VITALS
HEART RATE: 72 BPM | SYSTOLIC BLOOD PRESSURE: 134 MMHG | TEMPERATURE: 98.4 F | RESPIRATION RATE: 16 BRPM | BODY MASS INDEX: 23.66 KG/M2 | DIASTOLIC BLOOD PRESSURE: 74 MMHG | OXYGEN SATURATION: 98 % | WEIGHT: 117.13 LBS

## 2024-01-25 DIAGNOSIS — L85.3 DRY SKIN: Primary | ICD-10-CM

## 2024-01-25 DIAGNOSIS — L29.9 ITCHY SKIN: ICD-10-CM

## 2024-01-25 PROCEDURE — 99213 OFFICE O/P EST LOW 20 MIN: CPT | Performed by: PHYSICIAN ASSISTANT

## 2024-01-25 RX ORDER — ZOSTER VACCINE RECOMBINANT, ADJUVANTED 50 MCG/0.5
KIT INTRAMUSCULAR
COMMUNITY
Start: 2023-07-07 | End: 2024-05-22

## 2024-01-25 RX ORDER — TETANUS TOXOID, REDUCED DIPHTHERIA TOXOID AND ACELLULAR PERTUSSIS VACCINE, ADSORBED 5; 2.5; 8; 8; 2.5 [IU]/.5ML; [IU]/.5ML; UG/.5ML; UG/.5ML; UG/.5ML
SUSPENSION INTRAMUSCULAR
COMMUNITY
Start: 2023-06-16

## 2024-01-25 RX ORDER — RESPIRATORY SYNCYTIAL VIRUS VACCINE 120MCG/0.5
0.5 KIT INTRAMUSCULAR ONCE
Qty: 1 EACH | Refills: 0 | Status: CANCELLED | OUTPATIENT
Start: 2024-01-25 | End: 2024-01-25

## 2024-01-25 RX ORDER — COVID-19 ANTIGEN TEST
KIT MISCELLANEOUS
COMMUNITY
Start: 2023-04-24

## 2024-01-25 ASSESSMENT — PAIN SCALES - GENERAL: PAINLEVEL: NO PAIN (0)

## 2024-01-25 NOTE — PROGRESS NOTES
Assessment & Plan     Dry skin      Itchy skin      Patient has dry itchy skin, but no abnormal appearing rash or lesions, and no vesicles concerning of shingles.  Home cares discussed and encouraged.  Patient understands and agrees with the plan today.        Patient Plan:  For the dry skin, please use a lotion or cream like Aquaphor to help moisturize your skin.    You can try the topical hydrocortisone as well.    You can also try an antihistamine like Claritin, 10 mg, over the counter.  You take one tablet once daily to help with the itching.     Please followup if your symptoms do not improve as expected.      Yue Manning is a 88 year old, presenting for the following health issues:  Derm Problem        1/25/2024     7:26 AM   Additional Questions   Roomed by Terri Morgan CMA   Accompanied by  in Lobby         1/25/2024     7:26 AM   Patient Reported Additional Medications   Patient reports taking the following new medications N/A     History of Present Illness       Reason for visit:  Symptoms of itchy painful rash  Symptom onset:  3-4 weeks ago  Symptoms include:  See above  Symptom intensity:  Moderate  Symptom progression:  Staying the same  Had these symptoms before:  No  What makes it worse:  No  What makes it better:  Cortisone 10- briefly    She eats 2-3 servings of fruits and vegetables daily.She consumes 0 sweetened beverage(s) daily.She exercises with enough effort to increase her heart rate 30 to 60 minutes per day.  She exercises with enough effort to increase her heart rate 6 days per week.   She is taking medications regularly.       Patient is here for upper back skin itching over the last three weeks.  She states that she has had her shingles shot, but she is worried it could be shingles.  The itching has kept her up at night.  She is not having other symptoms with the itching.          Objective    /74 (BP Location: Right arm, Patient Position: Sitting, Cuff Size: Adult  Regular)   Pulse 72   Temp 98.4  F (36.9  C) (Tympanic)   Resp 16   Wt 53.1 kg (117 lb 2 oz)   LMP  (LMP Unknown)   SpO2 98%   BMI 23.66 kg/m    Body mass index is 23.66 kg/m .  Physical Exam   GENERAL: alert and no distress  EYES: Eyes grossly normal to inspection, PERRL and conjunctivae and sclerae normal  MS: no gross musculoskeletal defects noted, no edema  SKIN: Dry skin across upper back bilaterally.  Couple raised excoriations from itching and about 3-4 small scabs from itching as well.  No erythema or edema noted and no vesicular lesions.  No drainage noted.    PSYCH: mentation appears normal, affect normal/bright          Signed Electronically by: Magali Nicole PA-C

## 2024-01-25 NOTE — PATIENT INSTRUCTIONS
For the dry skin, please use a lotion or cream like Aquaphor to help moisturize your skin.    You can try the topical hydrocortisone as well.    You can also try an antihistamine like Claritin, 10 mg, over the counter.  You take one tablet once daily to help with the itching.     Please followup if your symptoms do not improve as expected.

## 2024-02-05 ENCOUNTER — TELEPHONE (OUTPATIENT)
Dept: PEDIATRICS | Facility: CLINIC | Age: 89
End: 2024-02-05
Payer: MEDICARE

## 2024-02-05 NOTE — TELEPHONE ENCOUNTER
Received call from patient regarding rash. Patient states rash is worse. Rash is more red and seems to have spread over her shoulders and on her upper chest. Very itchy. Feels like a burning pain. No fever. Patient was seen for rash 1/25/24. Per last OV notes: Please followup if your symptoms do not improve as expected.       RN advised patient if new/worsening symptoms to be seen sooner in ED/UC. Scheduled for first available appointment per patient request 2/6/24.     Avi BETTENCOURT RN 2/5/2024 at 8:29 AM

## 2024-02-06 ENCOUNTER — OFFICE VISIT (OUTPATIENT)
Dept: PEDIATRICS | Facility: CLINIC | Age: 89
End: 2024-02-06
Payer: MEDICARE

## 2024-02-06 VITALS
BODY MASS INDEX: 23.74 KG/M2 | RESPIRATION RATE: 18 BRPM | HEART RATE: 68 BPM | OXYGEN SATURATION: 99 % | TEMPERATURE: 97.2 F | DIASTOLIC BLOOD PRESSURE: 54 MMHG | HEIGHT: 60 IN | WEIGHT: 120.9 LBS | SYSTOLIC BLOOD PRESSURE: 140 MMHG

## 2024-02-06 DIAGNOSIS — R21 RASH AND NONSPECIFIC SKIN ERUPTION: Primary | ICD-10-CM

## 2024-02-06 PROCEDURE — 99213 OFFICE O/P EST LOW 20 MIN: CPT | Performed by: PHYSICIAN ASSISTANT

## 2024-02-06 RX ORDER — TRIAMCINOLONE ACETONIDE 1 MG/G
CREAM TOPICAL 2 TIMES DAILY
Qty: 45 G | Refills: 0 | Status: SHIPPED | OUTPATIENT
Start: 2024-02-06 | End: 2024-02-20

## 2024-02-06 ASSESSMENT — PAIN SCALES - GENERAL: PAINLEVEL: EXTREME PAIN (8)

## 2024-02-06 NOTE — PROGRESS NOTES
Assessment & Plan     Rash and nonspecific skin eruption  Rash is very non-discrete. Appears mostly to be excoriations from itching.  No identified triggers today.  Recommend trying triamcinolone twice daily for 2 weeks.  Continue with lotion and/or aquaphor.   Can continue on cetirizine if needed.  Sending referral to derm due to this concern.  - triamcinolone (KENALOG) 0.1 % external cream; Apply topically 2 times daily for 14 days  - Adult Dermatology  Referral; Future          Subjective   Kerri is a 88 year old, presenting for the following health issues:  Derm Problem (rash)      2/6/2024     7:11 AM   Additional Questions   Roomed by Leslie   Accompanied by          2/6/2024     7:11 AM   Patient Reported Additional Medications   Patient reports taking the following new medications none     History of Present Illness       She eats 2-3 servings of fruits and vegetables daily.She consumes 0 sweetened beverage(s) daily.She exercises with enough effort to increase her heart rate 30 to 60 minutes per day.  She exercises with enough effort to increase her heart rate 6 days per week.   She is taking medications regularly.     Rash  Onset/Duration: 3-4 weeks  Description  Location: back and shoulders  Character: painful, burning  Itching: severe  Intensity:  severe  Progression of Symptoms:  worsening and waxing and waning  Accompanying signs and symptoms:   Fever: No  Body aches or joint pain: No  Sore throat symptoms: No  Recent cold symptoms: No  History:           Previous episodes of similar rash: None  New exposures:  None  Recent travel: No  Exposure to similar rash: No  Precipitating or alleviating factors: none  Therapies tried and outcome: Claritin/loratadine -  not effective      Patient reports rash started at the beginning of Jan. She was seen in clinic on 1/25 and advised to try cetirizine for the itching. No specific rash was identified. Thought to be dry skin. Pt has tried putting on  "Cerave and aquaphor lotion without improvement. She reports it bothers her all night but its the worst at night. She did try some hydrocortisone without improvement as well. No new medications or products. She is noticing it more on her shoulders and upper chest as well. She denies any recent travel or foods. Denies history of eczema,psoriasis and other skin conditions. Had her labs checked last in October without significant findings.           Objective    BP (!) 140/54 (BP Location: Right arm, Patient Position: Sitting, Cuff Size: Adult Regular)   Pulse 68   Temp 97.2  F (36.2  C) (Tympanic)   Resp 18   Ht 1.511 m (4' 11.5\")   Wt 54.8 kg (120 lb 14.4 oz)   LMP  (LMP Unknown)   SpO2 99%   BMI 24.01 kg/m    Body mass index is 24.01 kg/m .    Physical Exam   GENERAL: alert and no distress  EYES: Eyes grossly normal to inspection, PERRL and conjunctivae and sclerae normal  MS: no gross musculoskeletal defects noted, no edema  SKIN: Dry skin of upper back. Raised excoriations from itching with 8-10 scabs from itching. No erythema, edema. No vesicles.   BACK: no CVA tenderness, no paralumbar tenderness  PSYCH: mentation appears normal, affect normal/bright        Signed Electronically by: Hali Aguirre PA-C    "

## 2024-02-06 NOTE — PATIENT INSTRUCTIONS
Try topical triamcinolone twice daily for 2 weeks.   Referral to dermatology has been placed.   Continue with lotion and/or aquaphor.   Can continue on cetirizine if needed.

## 2024-02-13 ENCOUNTER — OFFICE VISIT (OUTPATIENT)
Dept: DERMATOLOGY | Facility: CLINIC | Age: 89
End: 2024-02-13
Payer: MEDICARE

## 2024-02-13 ENCOUNTER — LAB (OUTPATIENT)
Dept: LAB | Facility: CLINIC | Age: 89
End: 2024-02-13
Payer: MEDICARE

## 2024-02-13 DIAGNOSIS — R21 RASH AND NONSPECIFIC SKIN ERUPTION: ICD-10-CM

## 2024-02-13 LAB — CK SERPL-CCNC: 95 U/L (ref 26–192)

## 2024-02-13 PROCEDURE — 88305 TISSUE EXAM BY PATHOLOGIST: CPT | Mod: 26 | Performed by: PATHOLOGY

## 2024-02-13 PROCEDURE — 36415 COLL VENOUS BLD VENIPUNCTURE: CPT | Performed by: PATHOLOGY

## 2024-02-13 PROCEDURE — 82085 ASSAY OF ALDOLASE: CPT | Mod: 90 | Performed by: PATHOLOGY

## 2024-02-13 PROCEDURE — 86038 ANTINUCLEAR ANTIBODIES: CPT | Performed by: DERMATOLOGY

## 2024-02-13 PROCEDURE — 88312 SPECIAL STAINS GROUP 1: CPT | Mod: TC,59 | Performed by: DERMATOLOGY

## 2024-02-13 PROCEDURE — 11104 PUNCH BX SKIN SINGLE LESION: CPT | Mod: GC | Performed by: DERMATOLOGY

## 2024-02-13 PROCEDURE — 88312 SPECIAL STAINS GROUP 1: CPT | Mod: 26 | Performed by: PATHOLOGY

## 2024-02-13 PROCEDURE — 82550 ASSAY OF CK (CPK): CPT | Performed by: PATHOLOGY

## 2024-02-13 PROCEDURE — 99000 SPECIMEN HANDLING OFFICE-LAB: CPT | Performed by: PATHOLOGY

## 2024-02-13 PROCEDURE — 11105 PUNCH BX SKIN EA SEP/ADDL: CPT | Mod: GC | Performed by: DERMATOLOGY

## 2024-02-13 RX ORDER — CLOBETASOL PROPIONATE 0.5 MG/G
OINTMENT TOPICAL 2 TIMES DAILY
Qty: 60 G | Refills: 1 | Status: SHIPPED | OUTPATIENT
Start: 2024-02-13 | End: 2024-05-28

## 2024-02-13 ASSESSMENT — PAIN SCALES - GENERAL: PAINLEVEL: MODERATE PAIN (5)

## 2024-02-13 NOTE — NURSING NOTE
Dermatology Rooming Note    Kerri Rocha's goals for this visit include:   Chief Complaint   Patient presents with    Rash     Kerri is here today for a rash that she has had for about 1 month      NICOLETTE Callahan

## 2024-02-13 NOTE — LETTER
2/13/2024       RE: Kerri Rocha  [address]  [place] [address]     Dear Colleague,    Thank you for referring your patient, Kerri Rocha, to the [facility] DERMATOLOGY CLINIC [place] [facility]. Please see a copy of my visit note below.    [facility] Dermatology Note  Encounter Date: Feb 13, 2024       Dermatology Problem List:  1. New onset eruption of upper back and dorsal hands, DDx dermatomyositis vs eczematous dermatitis.  - Biopsy 2/13/24    ____________________________________________    Assessment & Plan:    # Erythematous macular rash over the upper back and dorsal hands in shawl like distribution, c/f dermatomyositis vs acute contact dermatitis  - Punch biopsy performed today (see procedure note(s) below).  - Checking CK, aldolase, and LOYDA today  - Can continue triamcinolone 0.1 oint bid; also prescribed clobetasol ointment to start twice daily as needed    Procedures Performed:    - Punch biopsy procedure note, location(s): L 3rd MCP joint, L upper back. After discussion of benefits and risks including but not limited to bleeding, infection, scar, incomplete removal, recurrence, and non-diagnostic biopsy, written consent and photographs were obtained. The areas were cleaned with isopropyl alcohol. 0.5mL of 1% lidocaine with epinephrine was injected to obtain adequate anesthesia and a 3 mm punch biopsy was performed the MCP and a 4 mm punch biopsy performed over the upper back. 4-0 Prolene sutures were utilized to approximate the epidermal edges. White petrolatum ointment and a bandage was applied to the wound. Explicit verbal and written wound care instructions were provided. The patient left the dermatology clinic in good condition.     Follow-up: 6 weeks    Staff and Resident:     Colin Mireles MD PGY2    I have seen and examined this patient and agree with the assessment and plan as  documented in the resident's note, and was present for all procedures.    Rakesh Talley MD  Dermatology Attending    ____________________________________________    CC: No chief complaint on file.    HPI:  Ms. Kerri Rocha is a(n) 88 year old female ( who presents today as a new patient for pruritic rash over the upper back    Referred from PCP for pruritic rash over the upper back. Recommended triamcinolone and continued use of lotion and/or aquaphor, cetirizine for itching  - Started a little over 1 month ago w/ itchy rash over the upper back and neck, has since spread down to the mid back and around to upper anterior chest  - Has been trying aquaphor, topical triamcinolone, and claritin for itching. These have not been overly helpful thus far, still very itchy  - No recent illness or other systemic symptoms  - Improves w/ ice/cool air  - Worried she may be contagious and give this rash to others at upcoming grandchildren's birthday party she would like to attend    Patient is otherwise feeling well, without additional concerns.    ROS: As per HPI    Labs:  None reviewed.     Physical Exam:  Vitals: LMP  (LMP Unknown)   SKIN: Exam of head, neck, bilateral upper extremities,back, chest, abdomen  - Erythematous macular rash w/ various stages of healing over the upper back in a shawl like distribution. Excoriations present over many of these lesions  - No other lesions of concern on areas examined.     Medications:  Current Outpatient Medications   Medication    amLODIPine (NORVASC) 5 MG tablet    aspirin 81 MG EC tablet    atorvastatin (LIPITOR) 20 MG tablet    BOOSTRIX 5-2.5-18.5 LF-MCG/0.5 MYRIAM injection    calcium carbonate (OS-CAMMIE 600 MG [place]. [place]) 600 MG tablet    Cholecalciferol (VITAMIN D3) 2000 UNITS CAPS    Diphenhydramine-APAP, sleep, (TYLENOL PM EXTRA STRENGTH PO)    FLOWFLEX COVID-19 AG HOME TEST KIT    gabapentin (NEURONTIN) 400 MG capsule    lisinopril (ZESTRIL) 10 MG tablet    omega 3 1000 MG CAPS     SHINGRIX injection    triamcinolone (KENALOG) 0.1 % external cream     No current facility-administered medications for this visit.      Past Medical History:   Patient Active Problem List   Diagnosis    Essential hypertension    Idiopathic peripheral neuropathy    Pseudophakia of both eyes    Regular astigmatism of both eyes    Localized osteoporosis without current pathological fracture - 8/2020 waiting for dental work to start fosamax.     Mixed hyperlipidemia    Gait disorder    Vertigo    Chronic kidney disease, stage 3 (H)    Lumbar spondylosis    Lumbar facet arthropathy    Neural foraminal stenosis of lumbosacral spine    S/P lumbar spinal fusion     Past Medical History:   Diagnosis Date    HLD (hyperlipidemia)     HTN (hypertension)     Idiopathic neuropathy     Vitamin D deficiency

## 2024-02-13 NOTE — PROGRESS NOTES
[facility] Dermatology Note  Encounter Date: Feb 13, 2024       Dermatology Problem List:  1. New onset eruption of upper back and dorsal hands, DDx dermatomyositis vs eczematous dermatitis.  - Biopsy 2/13/24    ____________________________________________    Assessment & Plan:    # Erythematous macular rash over the upper back and dorsal hands in shawl like distribution, c/f dermatomyositis vs acute contact dermatitis  - Punch biopsy performed today (see procedure note(s) below).  - Checking CK, aldolase, and LOYDA today  - Can continue triamcinolone 0.1 oint bid; also prescribed clobetasol ointment to start twice daily as needed    Procedures Performed:    - Punch biopsy procedure note, location(s): L 3rd MCP joint, L upper back. After discussion of benefits and risks including but not limited to bleeding, infection, scar, incomplete removal, recurrence, and non-diagnostic biopsy, written consent and photographs were obtained. The areas were cleaned with isopropyl alcohol. 0.5mL of 1% lidocaine with epinephrine was injected to obtain adequate anesthesia and a 3 mm punch biopsy was performed the MCP and a 4 mm punch biopsy performed over the upper back. 4-0 Prolene sutures were utilized to approximate the epidermal edges. White petrolatum ointment and a bandage was applied to the wound. Explicit verbal and written wound care instructions were provided. The patient left the dermatology clinic in good condition.     Follow-up: 6 weeks    Staff and Resident:     Colin Mireles MD PGY2    I have seen and examined this patient and agree with the assessment and plan as documented in the resident's note, and was present for all procedures.    Rakesh Talley MD  Dermatology Attending    ____________________________________________    CC: No chief complaint on file.    HPI:  Ms. Kerri Rocha is a(n) 88 year old female ( who presents today as a new patient for pruritic rash over the upper  back    Referred from PCP for pruritic rash over the upper back. Recommended triamcinolone and continued use of lotion and/or aquaphor, cetirizine for itching  - Started a little over 1 month ago w/ itchy rash over the upper back and neck, has since spread down to the mid back and around to upper anterior chest  - Has been trying aquaphor, topical triamcinolone, and claritin for itching. These have not been overly helpful thus far, still very itchy  - No recent illness or other systemic symptoms  - Improves w/ ice/cool air  - Worried she may be contagious and give this rash to others at upcoming grandchildren's birthday party she would like to attend    Patient is otherwise feeling well, without additional concerns.    ROS: As per HPI    Labs:  None reviewed.     Physical Exam:  Vitals: LMP  (LMP Unknown)   SKIN: Exam of head, neck, bilateral upper extremities,back, chest, abdomen  - Erythematous macular rash w/ various stages of healing over the upper back in a shawl like distribution. Excoriations present over many of these lesions  - No other lesions of concern on areas examined.     Medications:  Current Outpatient Medications   Medication     amLODIPine (NORVASC) 5 MG tablet     aspirin 81 MG EC tablet     atorvastatin (LIPITOR) 20 MG tablet     BOOSTRIX 5-2.5-18.5 LF-MCG/0.5 MYRIAM injection     calcium carbonate (OS-CAMMIE 600 MG [place]. [place]) 600 MG tablet     Cholecalciferol (VITAMIN D3) 2000 UNITS CAPS     Diphenhydramine-APAP, sleep, (TYLENOL PM EXTRA STRENGTH PO)     FLOWFLEX COVID-19 AG HOME TEST KIT     gabapentin (NEURONTIN) 400 MG capsule     lisinopril (ZESTRIL) 10 MG tablet     omega 3 1000 MG CAPS     SHINGRIX injection     triamcinolone (KENALOG) 0.1 % external cream     No current facility-administered medications for this visit.      Past Medical History:   Patient Active Problem List   Diagnosis     Essential hypertension     Idiopathic peripheral neuropathy     Pseudophakia of both eyes     Regular  astigmatism of both eyes     Localized osteoporosis without current pathological fracture - 8/2020 waiting for dental work to start fosamax.      Mixed hyperlipidemia     Gait disorder     Vertigo     Chronic kidney disease, stage 3 (H)     Lumbar spondylosis     Lumbar facet arthropathy     Neural foraminal stenosis of lumbosacral spine     S/P lumbar spinal fusion     Past Medical History:   Diagnosis Date     HLD (hyperlipidemia)      HTN (hypertension)      Idiopathic neuropathy      Vitamin D deficiency

## 2024-02-14 LAB — ALDOLASE SERPL-CCNC: 4.4 U/L

## 2024-02-15 LAB
ANA PAT SER IF-IMP: ABNORMAL
ANA SER QL IF: POSITIVE
ANA TITR SER IF: ABNORMAL {TITER}
PATH REPORT.COMMENTS IMP SPEC: NORMAL
PATH REPORT.FINAL DX SPEC: NORMAL
PATH REPORT.GROSS SPEC: NORMAL
PATH REPORT.MICROSCOPIC SPEC OTHER STN: NORMAL
PATH REPORT.RELEVANT HX SPEC: NORMAL

## 2024-02-27 ENCOUNTER — OFFICE VISIT (OUTPATIENT)
Dept: PEDIATRICS | Facility: CLINIC | Age: 89
End: 2024-02-27
Payer: MEDICARE

## 2024-02-27 VITALS
WEIGHT: 117.4 LBS | BODY MASS INDEX: 23.05 KG/M2 | DIASTOLIC BLOOD PRESSURE: 60 MMHG | HEART RATE: 82 BPM | HEIGHT: 60 IN | OXYGEN SATURATION: 97 % | RESPIRATION RATE: 16 BRPM | TEMPERATURE: 97.4 F | SYSTOLIC BLOOD PRESSURE: 132 MMHG

## 2024-02-27 DIAGNOSIS — Z48.02 ENCOUNTER FOR REMOVAL OF SUTURES: Primary | ICD-10-CM

## 2024-02-27 PROCEDURE — 99024 POSTOP FOLLOW-UP VISIT: CPT | Performed by: PHYSICIAN ASSISTANT

## 2024-02-27 RX ORDER — RESPIRATORY SYNCYTIAL VIRUS VACCINE 120MCG/0.5
0.5 KIT INTRAMUSCULAR ONCE
Qty: 1 EACH | Refills: 0 | Status: CANCELLED | OUTPATIENT
Start: 2024-02-27 | End: 2024-02-27

## 2024-02-27 ASSESSMENT — PAIN SCALES - GENERAL: PAINLEVEL: NO PAIN (0)

## 2024-02-27 NOTE — PROGRESS NOTES
"Assessment & Plan     Encounter for removal of sutures  Sutures were removed without difficult.  Patient left in good condition. See procedure note for details.  Recommend to keep covered when exposed to anything that could cause site to get dirty. Can put some Aquaphor or Vaseline on the sites if needed.   Follow-up with Derm in March.        Yue Manning is a 88 year old, presenting for the following health issues:  Suture Removal      2/27/2024     7:37 AM   Additional Questions   Roomed by esau   Accompanied by saira         2/27/2024     7:37 AM   Patient Reported Additional Medications   Patient reports taking the following new medications na     Suture Removal     Patient is a 88 y.o. female here today for suture removal. Patient was seen by dermatology on 2/13 and had 2 skin biopsies. One on her left upper back and one of the left 3rd MCP. No concerns for infection. She states she is working with derm on what her rash might be. She has scheduled follow-up with Dr. Talley at the Derm clinic in [place] on 3/21.                Objective    /60   Pulse 82   Temp 97.4  F (36.3  C) (Temporal)   Resp 16   Ht 1.511 m (4' 11.5\")   Wt 53.3 kg (117 lb 6.4 oz)   LMP  (LMP Unknown)   SpO2 97%   BMI 23.32 kg/m    Body mass index is 23.32 kg/m .    Physical Exam   GENERAL: alert and no distress  MS: no gross musculoskeletal defects noted, no edema  SKIN: no suspicious lesions or rashes. Suture removed from L 3rd MCP and L upper back. Both healing. No obvious induration or fluctuation. No pus or open lesions.  PSYCH: mentation appears normal, affect normal/bright    PROCEDURE:  Suture removal: Left 3rd MCP joint and Left upper back. One suture located at both locations. Area was cleansed with alcohol and using forceps and scissors the sutures were removed. Both areas were covered with a bandage. Patient left in good condition.        Signed Electronically by: Hali Aguirre PA-C    "

## 2024-03-03 PROBLEM — R21 RASH AND NONSPECIFIC SKIN ERUPTION: Status: ACTIVE | Noted: 2024-03-03

## 2024-03-21 ENCOUNTER — LAB (OUTPATIENT)
Dept: LAB | Facility: CLINIC | Age: 89
End: 2024-03-21
Payer: MEDICARE

## 2024-03-21 ENCOUNTER — MYC MEDICAL ADVICE (OUTPATIENT)
Dept: DERMATOLOGY | Facility: CLINIC | Age: 89
End: 2024-03-21

## 2024-03-21 ENCOUNTER — OFFICE VISIT (OUTPATIENT)
Dept: DERMATOLOGY | Facility: CLINIC | Age: 89
End: 2024-03-21
Payer: MEDICARE

## 2024-03-21 ENCOUNTER — TELEPHONE (OUTPATIENT)
Dept: DERMATOLOGY | Facility: CLINIC | Age: 89
End: 2024-03-21

## 2024-03-21 DIAGNOSIS — Z72.89 OTHER PROBLEMS RELATED TO LIFESTYLE: ICD-10-CM

## 2024-03-21 DIAGNOSIS — Z11.59 ENCOUNTER FOR SCREENING FOR OTHER VIRAL DISEASES: ICD-10-CM

## 2024-03-21 DIAGNOSIS — R21 RASH: Primary | ICD-10-CM

## 2024-03-21 DIAGNOSIS — R21 RASH: ICD-10-CM

## 2024-03-21 LAB
ALBUMIN SERPL BCG-MCNC: 4.5 G/DL (ref 3.5–5.2)
ALP SERPL-CCNC: 66 U/L (ref 40–150)
ALT SERPL W P-5'-P-CCNC: 20 U/L (ref 0–50)
ANION GAP SERPL CALCULATED.3IONS-SCNC: 12 MMOL/L (ref 7–15)
AST SERPL W P-5'-P-CCNC: 35 U/L (ref 0–45)
BASOPHILS # BLD AUTO: 0.1 10E3/UL (ref 0–0.2)
BASOPHILS NFR BLD AUTO: 1 %
BILIRUB SERPL-MCNC: 0.4 MG/DL
BUN SERPL-MCNC: 21.4 MG/DL (ref 8–23)
CALCIUM SERPL-MCNC: 10.2 MG/DL (ref 8.8–10.2)
CHLORIDE SERPL-SCNC: 103 MMOL/L (ref 98–107)
CREAT SERPL-MCNC: 1 MG/DL (ref 0.51–0.95)
DEPRECATED HCO3 PLAS-SCNC: 25 MMOL/L (ref 22–29)
EGFRCR SERPLBLD CKD-EPI 2021: 54 ML/MIN/1.73M2
EOSINOPHIL # BLD AUTO: 0.3 10E3/UL (ref 0–0.7)
EOSINOPHIL NFR BLD AUTO: 4 %
ERYTHROCYTE [DISTWIDTH] IN BLOOD BY AUTOMATED COUNT: 13.5 % (ref 10–15)
GLUCOSE SERPL-MCNC: 105 MG/DL (ref 70–99)
HCT VFR BLD AUTO: 40.6 % (ref 35–47)
HGB BLD-MCNC: 13.5 G/DL (ref 11.7–15.7)
IMM GRANULOCYTES # BLD: 0 10E3/UL
IMM GRANULOCYTES NFR BLD: 0 %
LYMPHOCYTES # BLD AUTO: 1.9 10E3/UL (ref 0.8–5.3)
LYMPHOCYTES NFR BLD AUTO: 25 %
MCH RBC QN AUTO: 30.9 PG (ref 26.5–33)
MCHC RBC AUTO-ENTMCNC: 33.3 G/DL (ref 31.5–36.5)
MCV RBC AUTO: 93 FL (ref 78–100)
MONOCYTES # BLD AUTO: 0.8 10E3/UL (ref 0–1.3)
MONOCYTES NFR BLD AUTO: 10 %
NEUTROPHILS # BLD AUTO: 4.5 10E3/UL (ref 1.6–8.3)
NEUTROPHILS NFR BLD AUTO: 60 %
NRBC # BLD AUTO: 0 10E3/UL
NRBC BLD AUTO-RTO: 0 /100
PLATELET # BLD AUTO: 287 10E3/UL (ref 150–450)
POTASSIUM SERPL-SCNC: 4.3 MMOL/L (ref 3.4–5.3)
PROT SERPL-MCNC: 7.5 G/DL (ref 6.4–8.3)
RBC # BLD AUTO: 4.37 10E6/UL (ref 3.8–5.2)
SODIUM SERPL-SCNC: 140 MMOL/L (ref 135–145)
WBC # BLD AUTO: 7.6 10E3/UL (ref 4–11)

## 2024-03-21 PROCEDURE — 87340 HEPATITIS B SURFACE AG IA: CPT | Performed by: DERMATOLOGY

## 2024-03-21 PROCEDURE — 85025 COMPLETE CBC W/AUTO DIFF WBC: CPT | Performed by: PATHOLOGY

## 2024-03-21 PROCEDURE — 84182 PROTEIN WESTERN BLOT TEST: CPT | Mod: 90 | Performed by: PATHOLOGY

## 2024-03-21 PROCEDURE — 86704 HEP B CORE ANTIBODY TOTAL: CPT | Performed by: DERMATOLOGY

## 2024-03-21 PROCEDURE — 86235 NUCLEAR ANTIGEN ANTIBODY: CPT | Mod: 90 | Performed by: PATHOLOGY

## 2024-03-21 PROCEDURE — 83516 IMMUNOASSAY NONANTIBODY: CPT | Mod: 90 | Performed by: PATHOLOGY

## 2024-03-21 PROCEDURE — 86803 HEPATITIS C AB TEST: CPT | Performed by: DERMATOLOGY

## 2024-03-21 PROCEDURE — 80053 COMPREHEN METABOLIC PANEL: CPT | Performed by: PATHOLOGY

## 2024-03-21 PROCEDURE — 36415 COLL VENOUS BLD VENIPUNCTURE: CPT | Performed by: PATHOLOGY

## 2024-03-21 PROCEDURE — 99000 SPECIMEN HANDLING OFFICE-LAB: CPT | Performed by: PATHOLOGY

## 2024-03-21 PROCEDURE — 86481 TB AG RESPONSE T-CELL SUSP: CPT | Performed by: DERMATOLOGY

## 2024-03-21 PROCEDURE — 99214 OFFICE O/P EST MOD 30 MIN: CPT | Mod: GC | Performed by: DERMATOLOGY

## 2024-03-21 PROCEDURE — 86706 HEP B SURFACE ANTIBODY: CPT | Performed by: DERMATOLOGY

## 2024-03-21 RX ORDER — METHOTREXATE 2.5 MG/1
10 TABLET ORAL
Qty: 20 TABLET | Refills: 0 | Status: SHIPPED | OUTPATIENT
Start: 2024-03-21 | End: 2024-04-25

## 2024-03-21 RX ORDER — FOLIC ACID 1 MG/1
1 TABLET ORAL DAILY
Qty: 100 TABLET | Refills: 3 | Status: SHIPPED | OUTPATIENT
Start: 2024-03-21 | End: 2024-04-25

## 2024-03-21 ASSESSMENT — PAIN SCALES - GENERAL: PAINLEVEL: NO PAIN (0)

## 2024-03-21 NOTE — NURSING NOTE
Dermatology Rooming Note    Kerri Rocha's goals for this visit include:   Chief Complaint   Patient presents with    Derm Problem     Here today for rash follow up. It went away and came back.      Lisa Cheung RN

## 2024-03-21 NOTE — PROGRESS NOTES
[facility] Dermatology Note  Encounter Date: Mar 21, 2024  Office Visit     Dermatology Problem List:  # Rash; clinically c/f dermatomyositis though biopsy without evidence of interface dermatitis   - Ddx includes eczematous dermatitis   - Bx L 3rd MCP and upper back (2/13/24): mild perivascular lymphocytic inflammation, favor partially treated eczematous dermatitis, no evidence of interface process.    - Labs (2/13/24): LOYDA 1:1280 (nucleolar); CK and aldolase wnl  - Pending (3/21/24): myositis panel.   - Tx: methotrexate 10mg qweek (rx 3/21/24)  - Methotrexate monitoring (3/21/24): CBC w diff wnl; mild Cr elevation.    ____________________________________________    Assessment & Plan:   # Rash - dermatomyositis v eczematous dermatitis   - Clinically, her rash over the MCPs and upper back continues to be concerning for dermatomyositis, though biopsy from last month did not reveal an interface process (felt most c/w partially treated eczematous dermatitis). She additionally endorses scalp itch since the start of her rash, which can be a feature of dermatomyositis. Markedly elevated LOYDA (1:1280) is additionally concerning for an autoimmune process. CK and aldolase were notably normal last month and she does deny muscle weakness. With the exception of the rash, she feels she is in her usual state of health and is up to date on colonoscopy.  - For further evaluation, will obtain myositis panel today.   - Treatment-wise, with goal of covering for both eczematous dermatitis and dermatomyositis, will start methotrexate (pending baseline labs). Start 10mg weekly with 1mg folic acid on non-methotrexate days.  - Baseline labs today: CBC w diff, CMP, hepatitis serologies, quant.   - Repeat labs in two weeks (she will have them drawn at [facility]).  - Follow-up in clinic in one month.     Procedures Performed:   None      Follow-up: 2 weeks for lab only; clinic visit in one month (with repeat labs).      Staff and Resident:     Resident:  I saw and discussed the patient with the attending physician, Dr. Talley.     Chago Mohamud MD, PhD  PGY-3 Dermatology Resident     I have seen and examined this patient and agree with the assessment and plan as documented in the resident's note.    Rakesh Talley MD  Dermatology Attending    ____________________________________________    CC: Derm Problem (Here today for rash follow up. It went away and came back. )      HPI:  Ms. Kerri Rocha is a(n) 88 year old female who presents today as a return patient for rash.     - Last seen one month ago for new rash. At that time, some concern clinically for dermatomyositis. Bx x2 (L 3rd MCP and upper back) were more suggestive of partially treated eczematous process without evidence of an interface dermatitis. CK and aldolase were wnl. However, LOYDA was notably positive at 1:1280 (nucleolar).   - She tried triam ointment after that visit without improvement in the rash, though did reach out for a new topical given ineffectiveness so we prescribed clobetasol --- clobetasol cleared her rash on her upper back and hands, though it returned last week when she discontinued it.   - Reports persistent redness of her MCP joints and upper back, worse at night.   - Additionally with scalp itch which started around the same time.  - Denies muscle weakness.  - Otherwise feels well - no unexplained weight change; no blood in urine; no abdominal symptoms. Up to date on colonoscopy.         Labs Reviewed:  N/A    Physical Exam:  Vitals: LMP  (LMP Unknown)   SKIN: Focused exam of face, scalp, trunk, arms, hands:  - Dorsal MCP joints with confluent light red erythema.  - Purplish red periungual erythema.   - A few prominent proximal nailfold capillaries.   - Upper back/base of neck with light red, ill-defined patch.               Medications:  Current Outpatient Medications   Medication     amLODIPine (NORVASC) 5 MG tablet     aspirin 81 MG  EC tablet     atorvastatin (LIPITOR) 20 MG tablet     BOOSTRIX 5-2.5-18.5 LF-MCG/0.5 MYRIAM injection     calcium carbonate (OS-CAMMIE 600 MG [place]. [place]) 600 MG tablet     Cholecalciferol (VITAMIN D3) 2000 UNITS CAPS     clobetasol (TEMOVATE) 0.05 % external ointment     Diphenhydramine-APAP, sleep, (TYLENOL PM EXTRA STRENGTH PO)     FLOWFLEX COVID-19 AG HOME TEST KIT     gabapentin (NEURONTIN) 400 MG capsule     lisinopril (ZESTRIL) 10 MG tablet     omega 3 1000 MG CAPS     SHINGRIX injection     No current facility-administered medications for this visit.        Past Medical History:   Patient Active Problem List   Diagnosis     Essential hypertension     Idiopathic peripheral neuropathy     Pseudophakia of both eyes     Regular astigmatism of both eyes     Localized osteoporosis without current pathological fracture - 8/2020 waiting for dental work to start fosamax.      Mixed hyperlipidemia     Gait disorder     Vertigo     Chronic kidney disease, stage 3 (H)     Lumbar spondylosis     Lumbar facet arthropathy     Neural foraminal stenosis of lumbosacral spine     S/P lumbar spinal fusion     Rash and nonspecific skin eruption     Past Medical History:   Diagnosis Date     HLD (hyperlipidemia)      HTN (hypertension)      Idiopathic neuropathy      Vitamin D deficiency        CC Rakesh Talley MD  [address] [facility] [address]  [place] [address] on close of this encounter.

## 2024-03-21 NOTE — LETTER
3/21/2024       RE: Kerri Rocha  [address]  [facility] [address]     Dear Colleague,    Thank you for referring your patient, Kerri Rocha, to the [facility] DERMATOLOGY CLINIC [place] at [facility]. Please see a copy of my visit note below.    [facility] Dermatology Note  Encounter Date: Mar 21, 2024  Office Visit     Dermatology Problem List:  # Rash; clinically c/f dermatomyositis though biopsy without evidence of interface dermatitis   - Ddx includes eczematous dermatitis   - Bx L 3rd MCP and upper back (2/13/24): mild perivascular lymphocytic inflammation, favor partially treated eczematous dermatitis, no evidence of interface process.    - Labs (2/13/24): LOYDA 1:1280 (nucleolar); CK and aldolase wnl  - Pending (3/21/24): myositis panel.   - Tx: methotrexate 10mg qweek (rx 3/21/24)  - Methotrexate monitoring (3/21/24): CBC w diff wnl; mild Cr elevation.    ____________________________________________    Assessment & Plan:   # Rash - dermatomyositis v eczematous dermatitis   - Clinically, her rash over the MCPs and upper back continues to be concerning for dermatomyositis, though biopsy from last month did not reveal an interface process (felt most c/w partially treated eczematous dermatitis). She additionally endorses scalp itch since the start of her rash, which can be a feature of dermatomyositis. Markedly elevated LOYDA (1:1280) is additionally concerning for an autoimmune process. CK and aldolase were notably normal last month and she does deny muscle weakness. With the exception of the rash, she feels she is in her usual state of health and is up to date on colonoscopy.  - For further evaluation, will obtain myositis panel today.   - Treatment-wise, with goal of covering for both eczematous dermatitis and dermatomyositis, will start methotrexate (pending baseline labs). Start 10mg weekly with 1mg folic acid  Patient is a 23-year-old female who presents to the emergency room status post syncopal episode.  Past medical history of asthma.  She endorses that she has been extremely fatigued for months and falls asleep easily.  Today she reports that she had fallen asleep she woke up she went up to talk to her parents.  While standing talking to her parents she began to feel lightheaded with some black spots in her vision and then per mother patient had a witnessed syncopal event.  LOC was approximately 15 seconds.  She reported near syncopal episodes in the past but denies any prior history of syncope.  Denies any fevers chills nausea vomiting chest pain shortness of breath abdominal pain.  She has noted some intermittent palpitations.  She denies any excess caffeine or supplement use.  She does endorse that she has 1 cup of coffee a day. Differential includes but not limited to dehydration, thyroid issue, cardiac abnormality, neurological cause, possible PE. Will obtain screening labs, check thyroid studies, obtain cardiac markers, EKG, check d dimer, CT head, orthostatics and tox labs. on non-methotrexate days.  - Baseline labs today: CBC w diff, CMP, hepatitis serologies, quant.   - Repeat labs in two weeks (she will have them drawn at [facility]).  - Follow-up in clinic in one month.     Procedures Performed:   None      Follow-up: 2 weeks for lab only; clinic visit in one month (with repeat labs).     Staff and Resident:     Resident:  I saw and discussed the patient with the attending physician, Dr. Talley.     Chago Mohamud MD, PhD  PGY-3 Dermatology Resident     I have seen and examined this patient and agree with the assessment and plan as documented in the resident's note.    Rakesh Talley MD  Dermatology Attending    ____________________________________________    CC: Derm Problem (Here today for rash follow up. It went away and came back. )      HPI:  Ms. Kerri Rocha is a(n) 88 year old female who presents today as a return patient for rash.     - Last seen one month ago for new rash. At that time, some concern clinically for dermatomyositis. Bx x2 (L 3rd MCP and upper back) were more suggestive of partially treated eczematous process without evidence of an interface dermatitis. CK and aldolase were wnl. However, LOYDA was notably positive at 1:1280 (nucleolar).   - She tried triam ointment after that visit without improvement in the rash, though did reach out for a new topical given ineffectiveness so we prescribed clobetasol --- clobetasol cleared her rash on her upper back and hands, though it returned last week when she discontinued it.   - Reports persistent redness of her MCP joints and upper back, worse at night.   - Additionally with scalp itch which started around the same time.  - Denies muscle weakness.  - Otherwise feels well - no unexplained weight change; no blood in urine; no abdominal symptoms. Up to date on colonoscopy.         Labs Reviewed:  N/A    Physical Exam:  Vitals: LMP  (LMP Unknown)   SKIN: Focused exam of face, scalp, trunk, arms, hands:  - Dorsal  MCP joints with confluent light red erythema.  - Purplish red periungual erythema.   - A few prominent proximal nailfold capillaries.   - Upper back/base of neck with light red, ill-defined patch.               Medications:  Current Outpatient Medications   Medication    amLODIPine (NORVASC) 5 MG tablet    aspirin 81 MG EC tablet    atorvastatin (LIPITOR) 20 MG tablet    BOOSTRIX 5-2.5-18.5 LF-MCG/0.5 MYRIAM injection    calcium carbonate (OS-CAMMIE 600 MG Stebbins. CA) 600 MG tablet    Cholecalciferol (VITAMIN D3) 2000 UNITS CAPS    clobetasol (TEMOVATE) 0.05 % external ointment    Diphenhydramine-APAP, sleep, (TYLENOL PM EXTRA STRENGTH PO)    FLOWFLEX COVID-19 AG HOME TEST KIT    gabapentin (NEURONTIN) 400 MG capsule    lisinopril (ZESTRIL) 10 MG tablet    omega 3 1000 MG CAPS    SHINGRIX injection     No current facility-administered medications for this visit.        Past Medical History:   Patient Active Problem List   Diagnosis    Essential hypertension    Idiopathic peripheral neuropathy    Pseudophakia of both eyes    Regular astigmatism of both eyes    Localized osteoporosis without current pathological fracture - 8/2020 waiting for dental work to start fosamax.     Mixed hyperlipidemia    Gait disorder    Vertigo    Chronic kidney disease, stage 3 (H)    Lumbar spondylosis    Lumbar facet arthropathy    Neural foraminal stenosis of lumbosacral spine    S/P lumbar spinal fusion    Rash and nonspecific skin eruption     Past Medical History:   Diagnosis Date    HLD (hyperlipidemia)     HTN (hypertension)     Idiopathic neuropathy     Vitamin D deficiency        CC Rakesh Talley MD  [address] [facility] [address]  [place] [address] on close of this encounter.       Patient is a 23-year-old female who presents to the emergency room status post syncopal episode.  Past medical history of asthma.  She endorses that she has been extremely fatigued for months and falls asleep easily.  Today she reports that she had fallen asleep she woke up she went up to talk to her parents.  While standing talking to her parents she began to feel lightheaded with some black spots in her vision and then per mother patient had a witnessed syncopal event.  LOC was approximately 15 seconds.  She reported near syncopal episodes in the past but denies any prior history of syncope.  Denies any fevers chills nausea vomiting chest pain shortness of breath abdominal pain.  She has noted some intermittent palpitations.  She denies any excess caffeine or supplement use.  She does endorse that she has 1 cup of coffee a day. Differential includes but not limited to dehydration, thyroid issue, cardiac abnormality, neurological cause, possible PE. Will obtain screening labs, check thyroid studies, obtain cardiac markers, EKG, check d dimer, CT head, orthostatics and tox labs.  Outside labs reviewed patient with a normal white count D-dimer negative coags within normal no significant electrolyte abnormality TSH within normal cardiac enzymes negative times 1U tox negative CT imaging of the head reveals no acute intracranial hemorrhage mass effect.  Independent review of EKG reveals normal sinus rhythm at 74 bpm.  Patient is not orthostatic she has been ambulatory in the ED without ataxia.  Results reviewed with patient and mother offered observation overnight for cardiac eval in the morning refusing at this time we will follow-up with her own cardiologist this week.  Suggested no driving until further work-up.  Copy of results provided all questions answered stable for discharge home.

## 2024-03-21 NOTE — TELEPHONE ENCOUNTER
PRIOR AUTHORIZATION DENIED    Medication: FOLIC ACID 1 MG PO TABS  Insurance Company: [employer] - Phone [phone] Fax [phone]  Denial Date: 3/21/2024  Denial Reason(s):     Appeal Information:       Patient Notified: No

## 2024-03-21 NOTE — PATIENT INSTRUCTIONS
Methotrexate     Methotrexate is a once-a-week medicine! It usually comes in 2.5 mg pills, meaning you will have to take multiple pills at the same time to get your desired dose (ex: 2 pills = 5 mg, 3 pills = 7.5 mg, 4 pills = 10 mg) You take your entire dose on one day of the week. You should take folic acid on all other days. Do not take methotrexate and folic acid on the same day. Take folic acid (1mg daily) on the days you do not take methotrexate.  It usually takes about 3 months to see the full effects/improvement with this medication.     Here are some practical things to know about:  - At the beginning of taking methotrexate, we need to watch the blood count most carefully.    - It is important to limit alcohol while on this medicine. What has worked well for some people is to take the medicine on Mondays and have your 1-2 adult beverages on the weekend. If you want to drink more alcohol than that, then this is not the medicine for you.  - It is also important to be aware of potential drug interactions while on this medicine. If you get prescribed a new medicine, please make sure the clinician and the pharmacist know that you take methotrexate. Some more commonly used antibiotics (like doxycycline and sulfa medicines like bactrim) interact with methotrexate.  Even aspirin and ibuprofen can interact with (and increase the levels in your body of) methotrexate. If you take aspirin daily, your body s level will be stable. However, if you have an injury, for example, and need to take a lot of aspirin or ibuprofen (or other NSAID) suddenly, please let your doctor know, as your methotrexate dose may need monitoring.  - Methotrexate lowers your body s folate (a B vitamin) levels. This happens naturally as part of how methotrexate works in your system.  However, this can lead to anemia and blood count problems.  It is recommended to take folate 1 mg daily on the days you are NOT taking methotrexate (i.e. 6 out of 7  days).  Folate is available over the counter.    - If you have nausea after taking the methotrexate, try splitting up the dose next time (i.e. take half the dose in the morning and half the dose in the evening, about 12 hours apart). Also, double your folate intake.

## 2024-03-22 LAB
HBV CORE AB SERPL QL IA: NONREACTIVE
HBV SURFACE AB SERPL IA-ACNC: <3.5 M[IU]/ML
HBV SURFACE AB SERPL IA-ACNC: NONREACTIVE M[IU]/ML
HBV SURFACE AG SERPL QL IA: NONREACTIVE
HCV AB SERPL QL IA: NONREACTIVE
QUANTIFERON MITOGEN: 10 IU/ML
QUANTIFERON NIL TUBE: 0.09 IU/ML
QUANTIFERON TB1 TUBE: 0.13 IU/ML
QUANTIFERON TB2 TUBE: 0.11

## 2024-03-23 ENCOUNTER — MYC MEDICAL ADVICE (OUTPATIENT)
Dept: DERMATOLOGY | Facility: CLINIC | Age: 89
End: 2024-03-23
Payer: MEDICARE

## 2024-03-23 LAB
GAMMA INTERFERON BACKGROUND BLD IA-ACNC: 0.09 IU/ML
M TB IFN-G BLD-IMP: NEGATIVE
M TB IFN-G CD4+ BCKGRND COR BLD-ACNC: 9.91 IU/ML
MITOGEN IGNF BCKGRD COR BLD-ACNC: 0.02 IU/ML
MITOGEN IGNF BCKGRD COR BLD-ACNC: 0.04 IU/ML

## 2024-03-24 NOTE — TELEPHONE ENCOUNTER
Sent [facility] message re: methotrexate baseline labs, okay to start methotrexate. Myositis panel still pending.

## 2024-03-31 LAB
ANA SER QL: NEGATIVE
ANNOTATION COMMENT IMP: NORMAL
EJ AB SER QL: NEGATIVE
ENA JO1 IGG SER-ACNC: 1 AU/ML
MDA5 AB SER QL LINE BLOT: NEGATIVE
MI2 AB SER QL: NEGATIVE
MJ AB SER QL LINE BLOT: NEGATIVE
OJ AB SER QL: NEGATIVE
PL12 AB SER QL: NEGATIVE
PL7 AB SER QL: NEGATIVE
SAE1 AB SER QL LINE BLOT: NEGATIVE
SRP AB SERPL QL: NEGATIVE
TIF1-GAMMA AB SER QL LINE BLOT: NEGATIVE

## 2024-04-01 ENCOUNTER — DOCUMENTATION ONLY (OUTPATIENT)
Dept: LAB | Facility: CLINIC | Age: 89
End: 2024-04-01
Payer: MEDICARE

## 2024-04-01 DIAGNOSIS — Z79.899 ENCOUNTER FOR LONG-TERM (CURRENT) USE OF HIGH-RISK MEDICATION: Primary | ICD-10-CM

## 2024-04-01 NOTE — PROGRESS NOTES
Kerri Rocha has an upcoming lab appointment:    Future Appointments   Date Time Provider Department [place]   4/8/2024  2:00 PM [facility]    4/25/2024  1:00 PM Rakesh Talley MD [facility]   7/26/2024 10:30 AM Jason Gillette MD [facility]   10/11/2024  8:30 AM Parrish Arizmendi MD [facility]   12/17/2024 10:00 AM Atser Cordon MD [facility]     Patient is scheduled for the following lab(s): pt has a lab appointment on 04/08/24 we need orders placed thank you.    There is no order available. Please review and place either future orders or HMPO (Review of Health Maintenance Protocol Orders), as appropriate.    Health Maintenance Due   Topic    ANNUAL REVIEW OF HM ORDERS     MICROALBUMIN      Bella Ballard, NOLBERTOT

## 2024-04-08 ENCOUNTER — LAB (OUTPATIENT)
Dept: LAB | Facility: CLINIC | Age: 89
End: 2024-04-08
Payer: MEDICARE

## 2024-04-08 DIAGNOSIS — Z79.899 ENCOUNTER FOR LONG-TERM (CURRENT) USE OF HIGH-RISK MEDICATION: ICD-10-CM

## 2024-04-08 LAB
BASOPHILS # BLD AUTO: 0.1 10E3/UL (ref 0–0.2)
BASOPHILS NFR BLD AUTO: 1 %
EOSINOPHIL # BLD AUTO: 0.2 10E3/UL (ref 0–0.7)
EOSINOPHIL NFR BLD AUTO: 3 %
ERYTHROCYTE [DISTWIDTH] IN BLOOD BY AUTOMATED COUNT: 13.7 % (ref 10–15)
HCT VFR BLD AUTO: 36.6 % (ref 35–47)
HGB BLD-MCNC: 12.1 G/DL (ref 11.7–15.7)
IMM GRANULOCYTES # BLD: 0 10E3/UL
IMM GRANULOCYTES NFR BLD: 0 %
LYMPHOCYTES # BLD AUTO: 1.8 10E3/UL (ref 0.8–5.3)
LYMPHOCYTES NFR BLD AUTO: 23 %
MCH RBC QN AUTO: 31.2 PG (ref 26.5–33)
MCHC RBC AUTO-ENTMCNC: 33.1 G/DL (ref 31.5–36.5)
MCV RBC AUTO: 94 FL (ref 78–100)
MONOCYTES # BLD AUTO: 0.8 10E3/UL (ref 0–1.3)
MONOCYTES NFR BLD AUTO: 10 %
NEUTROPHILS # BLD AUTO: 4.7 10E3/UL (ref 1.6–8.3)
NEUTROPHILS NFR BLD AUTO: 62 %
PLATELET # BLD AUTO: 251 10E3/UL (ref 150–450)
RBC # BLD AUTO: 3.88 10E6/UL (ref 3.8–5.2)
WBC # BLD AUTO: 7.6 10E3/UL (ref 4–11)

## 2024-04-08 PROCEDURE — 36415 COLL VENOUS BLD VENIPUNCTURE: CPT

## 2024-04-08 PROCEDURE — 80053 COMPREHEN METABOLIC PANEL: CPT

## 2024-04-08 PROCEDURE — 85025 COMPLETE CBC W/AUTO DIFF WBC: CPT

## 2024-04-09 LAB
ALBUMIN SERPL BCG-MCNC: 4.3 G/DL (ref 3.5–5.2)
ALP SERPL-CCNC: 61 U/L (ref 40–150)
ALT SERPL W P-5'-P-CCNC: 19 U/L (ref 0–50)
ANION GAP SERPL CALCULATED.3IONS-SCNC: 12 MMOL/L (ref 7–15)
AST SERPL W P-5'-P-CCNC: 29 U/L (ref 0–45)
BILIRUB SERPL-MCNC: 0.3 MG/DL
BUN SERPL-MCNC: 24.2 MG/DL (ref 8–23)
CALCIUM SERPL-MCNC: 9.8 MG/DL (ref 8.8–10.2)
CHLORIDE SERPL-SCNC: 99 MMOL/L (ref 98–107)
CREAT SERPL-MCNC: 1.06 MG/DL (ref 0.51–0.95)
DEPRECATED HCO3 PLAS-SCNC: 24 MMOL/L (ref 22–29)
EGFRCR SERPLBLD CKD-EPI 2021: 51 ML/MIN/1.73M2
GLUCOSE SERPL-MCNC: 91 MG/DL (ref 70–99)
POTASSIUM SERPL-SCNC: 5.3 MMOL/L (ref 3.4–5.3)
PROT SERPL-MCNC: 7 G/DL (ref 6.4–8.3)
SODIUM SERPL-SCNC: 135 MMOL/L (ref 135–145)

## 2024-04-16 DIAGNOSIS — L30.9 DERMATITIS: Primary | ICD-10-CM

## 2024-04-16 RX ORDER — FLUOCINONIDE TOPICAL SOLUTION USP, 0.05% 0.5 MG/ML
SOLUTION TOPICAL 2 TIMES DAILY
Qty: 60 ML | Refills: 3 | Status: SHIPPED | OUTPATIENT
Start: 2024-04-16 | End: 2024-07-01

## 2024-04-16 NOTE — PROGRESS NOTES
Received message from dermatology clinic staff that patient is experiencing severe scalp itch.  Reviewed prior note from Dr. Talley's clinic in March 2024.  Patient has upcoming appointment with Dr. Talley on April 25, 2024.    Discussed with Dr. Talley.  Will prescribe fluocinonide 0.05% solution to be used twice daily for the patient's scalp symptoms.    MyChart message sent to patient.    Staff: Dr. Mayank Montemayor MD PGY-3  Dermatology Resident

## 2024-04-22 PROBLEM — R21 RASH: Status: ACTIVE | Noted: 2024-04-22

## 2024-04-25 ENCOUNTER — OFFICE VISIT (OUTPATIENT)
Dept: DERMATOLOGY | Facility: CLINIC | Age: 89
End: 2024-04-25
Attending: DERMATOLOGY
Payer: MEDICARE

## 2024-04-25 DIAGNOSIS — R21 RASH: Primary | ICD-10-CM

## 2024-04-25 PROCEDURE — 88313 SPECIAL STAINS GROUP 2: CPT | Mod: TC | Performed by: DERMATOLOGY

## 2024-04-25 PROCEDURE — 99214 OFFICE O/P EST MOD 30 MIN: CPT | Mod: 25 | Performed by: DERMATOLOGY

## 2024-04-25 PROCEDURE — 88305 TISSUE EXAM BY PATHOLOGIST: CPT | Mod: 26 | Performed by: DERMATOLOGY

## 2024-04-25 PROCEDURE — 11104 PUNCH BX SKIN SINGLE LESION: CPT | Mod: GC | Performed by: DERMATOLOGY

## 2024-04-25 RX ORDER — METHOTREXATE 2.5 MG/1
TABLET ORAL
Qty: 20 TABLET | Refills: 0 | Status: SHIPPED | OUTPATIENT
Start: 2024-04-25 | End: 2024-05-23

## 2024-04-25 RX ORDER — KETOCONAZOLE 20 MG/ML
SHAMPOO TOPICAL DAILY PRN
Qty: 120 ML | Refills: 11 | Status: SHIPPED | OUTPATIENT
Start: 2024-04-25 | End: 2024-07-01

## 2024-04-25 RX ORDER — CLOBETASOL PROPIONATE 0.05 G/100ML
SHAMPOO TOPICAL
Qty: 118 ML | Refills: 4 | Status: SHIPPED | OUTPATIENT
Start: 2024-04-25 | End: 2024-05-28

## 2024-04-25 RX ORDER — PREDNISONE 10 MG/1
TABLET ORAL
Qty: 21 TABLET | Refills: 0 | Status: SHIPPED | OUTPATIENT
Start: 2024-04-25 | End: 2024-05-22

## 2024-04-25 RX ORDER — FOLIC ACID 1 MG/1
1 TABLET ORAL DAILY
Qty: 100 TABLET | Refills: 3 | Status: SHIPPED | OUTPATIENT
Start: 2024-04-25 | End: 2024-05-28

## 2024-04-25 ASSESSMENT — PAIN SCALES - GENERAL: PAINLEVEL: NO PAIN (0)

## 2024-04-25 NOTE — PATIENT INSTRUCTIONS
START taking prednisone    Take 20 mg daily for one week, THEN take 10 mg (one tablet) daily for one week. Take this in the morning, as it can keep you up at night. Also, please let us know if you are experiencing abdominal pain or dark stools while on this medication.    Increase methotrexate to FIVE tablets ONCE weekly (12.5mg weekly)    START using clobetasol shampoo daily as needed for scalp itch. Also start using ketoconazole shampoo three times per week.     We will see you back in clinic in one MONTH.    Wound Care After a Biopsy    What is a skin biopsy?  A skin biopsy allows the doctor to examine a very small piece of tissue under the microscope to determine the diagnosis and the best treatment for the skin condition. A local anesthetic (numbing medicine) is injected with a very small needle into the skin area to be tested. A small piece of skin is taken from the area. Sometimes a suture (stitch) is used.     What are the risks of a skin biopsy?  I will experience scar, bleeding, swelling, pain, crusting and redness. I may experience incomplete removal or recurrence. Risks of this procedure are excessive bleeding, bruising, infection, nerve damage, numbness, thick (hypertrophic or keloidal) scar and non-diagnostic biopsy.    How should I care for my wound for the first 24 hours?  Keep the wound dry and covered for 24 hours  If it bleeds, hold direct pressure on the area for 15 minutes. If bleeding does not stop, call us or go to the emergency room  Avoid strenuous exercise the first 1-2 days or as your doctor instructs you    How should I care for the wound after 24 hours?  After 24 hours, remove the bandage  You may bathe or shower as normal  If you had a scalp biopsy, you can shampoo as usual and can use shower water to clean the biopsy site daily  Clean the wound once a day with gentle soap and water  Do not scrub, be gentle  Apply white petroleum/Vaseline after cleaning the wound with a cotton swab or a  clean finger, and keep the site covered with a Bandaid /bandage. Bandages are not necessary with a scalp biopsy  If you are unable to cover the site with a Bandaid /bandage, re-apply ointment 2-3 times a day to keep the site moist. Moisture will help with healing  Avoid strenuous activity for first 1-2 days  Avoid lakes, rivers, pools, and oceans until the stitches are removed or the site is healed    How do I clean my wound?  Wash hands thoroughly with soap or use hand  before all wound care  Clean the wound with gentle soap and water  Apply white petroleum/Vaseline  to wound after it is clean  Replace the Bandaid /bandage to keep the wound covered for the first few days or as instructed by your doctor  If you had a scalp biopsy, warm shower water to the area on a daily basis should suffice    What should I use to clean my wound?   Cotton-tipped applicators (Qtips )  White petroleum jelly (Vaseline ). Use a clean new container and use Q-tips to apply.  Bandaids  as needed  Gentle soap     How should I care for my wound long term?  Do not get your wound dirty  Keep up with wound care for one week or until the area is healed.  If you have stitches, stitches need to be removed in 10 days. You may return to our clinic for this or you may have it done locally at your doctor s office.  A small scab will form and fall off by itself when the area is completely healed. The area will be red and will become pink in color as it heals. Sun protection is very important for how your scar will turn out. Sunscreen with an SPF 30 or greater is recommended once the area is healed.  You should have some soreness but it should be mild and slowly go away over several days. Talk to your doctor about using tylenol for pain,    When should I call my doctor?  If you have increased:   Pain or swelling  Pus or drainage (clear or slightly yellow drainage is ok)  Temperature over 100F  Spreading redness or warmth around wound    When  will I hear about my results?  The biopsy results can take 2 weeks to come back.  Your results will automatically release to [employer] before your provider has even reviewed them.  The clinic will call you with the results, send you a [employer] message, or have you schedule a follow-up clinic or phone time to discuss the results.  Contact our clinics if you do not hear from us in 2 weeks.    Who should I call with questions?  [facility]: [phone]  [facility]: [phone]  For urgent needs outside of business hours call the [facility] at [phone] and ask for the dermatology resident on call

## 2024-04-25 NOTE — PROGRESS NOTES
[facility] Dermatology Note  Encounter Date: Apr 25, 2024  Office Visit     Dermatology Problem List:  # Rash; clinically c/f dermatomyositis though biopsy without evidence of interface dermatitis   - Ddx includes eczematous dermatitis   - Bx L 3rd MCP and upper back (2/13/24): mild perivascular lymphocytic inflammation, favor partially treated eczematous dermatitis, no evidence of interface process.    - L Scalp Bx 4/2024: Pending  - Labs (2/13/24): LOYDA 1:1280 (nucleolar); CK and aldolase and myositis panel wnl  - Tx: methotrexate 12.5mg qweek & prednisone 20mg x 7,10mg x 7 (rx 4/25/24), clobetasol shampoo daily, ketoconazole TIW  - prior: methotrexate 10mg qweek (3/21/24-4/25/24)  - Methotrexate monitoring (3/21/24): CBC w diff wnl; mild Cr elevation.    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 Polymyositis panel negative. LOYDA markedly elevated 1:1280, nucleolar. Pt reportedly UTD with age appropriate cancer screenings, and ROS negative. Exam today notable for erythema and scale throughout scalp with focal areas of erosions as well as patches of dark pink erythema overlying MCPs, right elbow, and dilated capillary loops at nailfold. Ddx includes another eczematous dermatitis (contact, seborrheic dermatitis) based on spongiosis on prior biopsy, but distribution and progression to significant scalp pruritus concerning.  -Ketoconazole shampoo three times per week  -Clobetasol Shampoo daily  -Scalp Biopsy as per below  -INCREASE methotrexate to 12.5 mg (5-tablets) ONCE weekly.  -start prednisone 20mg x 7 days, 10mg x 7 days    Procedures Performed:   - Punch biopsy procedure note, location(s): L parietal scalp. After discussion of benefits and risks including but not limited to bleeding,  infection, scar, incomplete removal, recurrence, and non-diagnostic biopsy, verbal consent and photographs were obtained. The area was cleaned with isopropyl alcohol. 0.5mL of 1% lidocaine with epinephrine was injected to obtain adequate anesthesia and a 4 mm punch biopsy was performed at site(s). 4-0 Prolene sutures were utilized to approximate the epidermal edges. White petrolatum ointment and a bandage was applied to the wound. Explicit verbal and written wound care instructions were provided. The patient left the dermatology clinic in good condition.     Follow-up: 1 month(s) in-person, or earlier for new or changing lesions    Staff and Resident:     Colin Inman MD  Internal Medicine-Dermatology PGY-2    Staff: Mayank     I have seen and examined this patient and agree with the assessment and plan as documented in the resident's note, and was present for all procedures.    Rakesh Talley MD  Dermatology Attending    ____________________________________________    CC: Derm Problem (Kerri is here today for a rash follow up - has moved to the scalp )    HPI:  Ms. Kerri Rocha is a(n) 88 year old female who presents today as a return patient for rash    Last seen by Dr. Talley 3/2023. At that time, eruption seemed clinically consistent with dermatomyositis. 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 Prior lab work notable for nucleolar LOYDA 1:1280, bu negative CK, aldolase, and polymyositis panel    -Feels like things are worse. Scalp itch not helped by topical steroids. Keeps her up at night. Feels some nausea with methotrexate.     -Still gets rash on hands overlying MCPs, elbow. Getting it less on the upper back. Denies involvement of lateral hips, knees. Denies muscle weakness, difficulty rising to stand, difficulty brushing hair.    -Denies headaches, vision changes, abdominal pain, lumps/bumps anywhere, NVD, new constipation, bloating.  -States she has been UTD with cancer screenings  (colonoscopies, mammograms). Had bilateral salpingo-oophorectomy + hysterectomy.    Patient is otherwise feeling well, without additional skin concerns.    Labs Reviewed:  Hepatitis Panel  Quant  Polymyositis Panel  CBC  CMP   All unremarkable. Cr stable at 1.0    Physical Exam:  Vitals: LMP  (LMP Unknown)   SKIN: Focused examination of scalp, upper back, hands, arms, upper chest was performed.  -prominent capillary loops at nailfold of b/l hands  -subtle pink erythematous patches overlying MCPs  -subtle pink to red erythematous patch on right elbow  -very subtle light pink erythema on upper back  -thin erythematous scaly plaque with erosions on scalp,  - No other lesions of concern on areas examined.     Medications:  Current Outpatient Medications   Medication Sig Dispense Refill    amLODIPine (NORVASC) 5 MG tablet Take 1 tablet (5 mg) by mouth daily 90 tablet 3    aspirin 81 MG EC tablet Take 81 mg by mouth daily      atorvastatin (LIPITOR) 20 MG tablet Take 1 tablet (20 mg) by mouth at bedtime 90 tablet 3    BOOSTRIX 5-2.5-18.5 LF-MCG/0.5 MYRIAM injection       calcium carbonate (OS-CAMMIE 600 MG [place]. [place]) 600 MG tablet Take 1,200 mg by mouth daily       Cholecalciferol (VITAMIN D3) 2000 UNITS CAPS 1,000 Int'l Units daily      clobetasol (TEMOVATE) 0.05 % external ointment Apply topically 2 times daily 60 g 1    Diphenhydramine-APAP, sleep, (TYLENOL PM EXTRA STRENGTH PO) Take by mouth nightly as needed      FLOWFLEX COVID-19 AG HOME TEST KIT USE TO TEST AT HOME FOR COVID      fluocinonide (LIDEX) 0.05 % external solution Apply topically 2 times daily To scalp for itch 60 mL 3    folic acid (FOLVITE) 1 MG tablet Take 1 tablet (1 mg) by mouth daily On days you don't take methotrexate 100 tablet 3    gabapentin (NEURONTIN) 400 MG capsule TAKE 2 CAPSULES IN THE MORNING, 2 CAPSULES AT NOON AND 1 CAPSULE IN THE EVENING 150 capsule 5    lisinopril (ZESTRIL) 10 MG tablet Take 1 tablet (10 mg) by mouth daily 90 tablet 3     methotrexate 2.5 MG tablet Take 4 tablets (10 mg) by mouth every 7 days 20 tablet 0    omega 3 1000 MG CAPS Take 2 g by mouth      SHINGRIX injection        No current facility-administered medications for this visit.      Past Medical History:   Patient Active Problem List   Diagnosis    Essential hypertension    Idiopathic peripheral neuropathy    Pseudophakia of both eyes    Regular astigmatism of both eyes    Localized osteoporosis without current pathological fracture - 8/2020 waiting for dental work to start fosamax.     Mixed hyperlipidemia    Gait disorder    Vertigo    Chronic kidney disease, stage 3 (H)    Lumbar spondylosis    Lumbar facet arthropathy    Neural foraminal stenosis of lumbosacral spine    S/P lumbar spinal fusion    Rash and nonspecific skin eruption    Rash     Past Medical History:   Diagnosis Date    HLD (hyperlipidemia)     HTN (hypertension)     Idiopathic neuropathy     Vitamin D deficiency        CC Rakesh Talley MD  [address] [facility] [address]  [place] [address] on close of this encounter.

## 2024-04-25 NOTE — LETTER
4/25/2024       RE: Kerri Rocha  [address]  [facility] [place] [id number]     Dear Colleague,    Thank you for referring your patient, Kerri Rocha, to the [facility] [place] at [facility]. Please see a copy of my visit note below.    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    - L Scalp Bx 4/2024: Pending  - Labs (2/13/24): LOYDA 1:1280 (nucleolar); CK and aldolase and myositis panel wnl  - Tx: methotrexate 12.5mg qweek & prednisone 20mg x 7,10mg x 7 (rx 4/25/24), clobetasol shampoo daily, ketoconazole TIW  - prior: methotrexate 10mg qweek (3/21/24-4/25/24)  - Methotrexate monitoring (3/21/24): CBC w diff wnl; mild Cr elevation.    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 Polymyositis panel negative. LOYDA markedly elevated 1:1280, nucleolar. Pt reportedly UTD with age appropriate cancer screenings, and ROS negative. Exam today notable for erythema and scale throughout scalp with focal areas of erosions as well as patches of dark pink erythema overlying MCPs, right elbow, and dilated capillary loops at nailfold. Ddx includes another eczematous dermatitis (contact, seborrheic dermatitis) based on spongiosis on prior biopsy, but distribution and progression to significant scalp pruritus concerning.  -Ketoconazole shampoo three times per  week  -Clobetasol Shampoo daily  -Scalp Biopsy as per below  -INCREASE methotrexate to 12.5 mg (5-tablets) ONCE weekly.  -start prednisone 20mg x 7 days, 10mg x 7 days    Procedures Performed:   - Punch biopsy procedure note, location(s): L parietal scalp. After discussion of benefits and risks including but not limited to bleeding, infection, scar, incomplete removal, recurrence, and non-diagnostic biopsy, verbal consent and photographs were obtained. The area was cleaned with isopropyl alcohol. 0.5mL of 1% lidocaine with epinephrine was injected to obtain adequate anesthesia and a 4 mm punch biopsy was performed at site(s). 4-0 Prolene sutures were utilized to approximate the epidermal edges. White petrolatum ointment and a bandage was applied to the wound. Explicit verbal and written wound care instructions were provided. The patient left the dermatology clinic in good condition.     Follow-up: 1 month(s) in-person, or earlier for new or changing lesions    Staff and Resident:     Colin Inman MD  Internal Medicine-Dermatology PGY-2    Staff: Mayank     I have seen and examined this patient and agree with the assessment and plan as documented in the resident's note, and was present for all procedures.    Rakesh Talley MD  Dermatology Attending    ____________________________________________    CC: Derm Problem (Kerri is here today for a rash follow up - has moved to the scalp )    HPI:  Ms. Kerri Rocha is a(n) 88 year old female who presents today as a return patient for rash    Last seen by Dr. Talley 3/2023. At that time, eruption seemed clinically consistent with dermatomyositis. 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 Prior lab work notable for nucleolar LOYDA 1:1280, bu negative CK, aldolase, and polymyositis panel    -Feels like things are worse. Scalp itch not helped by topical steroids. Keeps her up at night. Feels some nausea with methotrexate.     -Still gets rash on hands overlying  MCPs, elbow. Getting it less on the upper back. Denies involvement of lateral hips, knees. Denies muscle weakness, difficulty rising to stand, difficulty brushing hair.    -Denies headaches, vision changes, abdominal pain, lumps/bumps anywhere, NVD, new constipation, bloating.  -States she has been UTD with cancer screenings (colonoscopies, mammograms). Had bilateral salpingo-oophorectomy + hysterectomy.    Patient is otherwise feeling well, without additional skin concerns.    Labs Reviewed:  Hepatitis Panel  Quant  Polymyositis Panel  CBC  CMP   All unremarkable. Cr stable at 1.0    Physical Exam:  Vitals: LMP  (LMP Unknown)   SKIN: Focused examination of scalp, upper back, hands, arms, upper chest was performed.  -prominent capillary loops at nailfold of b/l hands  -subtle pink erythematous patches overlying MCPs  -subtle pink to red erythematous patch on right elbow  -very subtle light pink erythema on upper back  -thin erythematous scaly plaque with erosions on scalp,  - No other lesions of concern on areas examined.     Medications:  Current Outpatient Medications   Medication Sig Dispense Refill    amLODIPine (NORVASC) 5 MG tablet Take 1 tablet (5 mg) by mouth daily 90 tablet 3    aspirin 81 MG EC tablet Take 81 mg by mouth daily      atorvastatin (LIPITOR) 20 MG tablet Take 1 tablet (20 mg) by mouth at bedtime 90 tablet 3    BOOSTRIX 5-2.5-18.5 LF-MCG/0.5 MYRIAM injection       calcium carbonate (OS-CAMMIE 600 MG [place]. [place]) 600 MG tablet Take 1,200 mg by mouth daily       Cholecalciferol (VITAMIN D3) 2000 UNITS CAPS 1,000 Int'l Units daily      clobetasol (TEMOVATE) 0.05 % external ointment Apply topically 2 times daily 60 g 1    Diphenhydramine-APAP, sleep, (TYLENOL PM EXTRA STRENGTH PO) Take by mouth nightly as needed      FLOWFLEX COVID-19 AG HOME TEST KIT USE TO TEST AT HOME FOR COVID      fluocinonide (LIDEX) 0.05 % external solution Apply topically 2 times daily To scalp for itch 60 mL 3    folic acid  (FOLVITE) 1 MG tablet Take 1 tablet (1 mg) by mouth daily On days you don't take methotrexate 100 tablet 3    gabapentin (NEURONTIN) 400 MG capsule TAKE 2 CAPSULES IN THE MORNING, 2 CAPSULES AT NOON AND 1 CAPSULE IN THE EVENING 150 capsule 5    lisinopril (ZESTRIL) 10 MG tablet Take 1 tablet (10 mg) by mouth daily 90 tablet 3    methotrexate 2.5 MG tablet Take 4 tablets (10 mg) by mouth every 7 days 20 tablet 0    omega 3 1000 MG CAPS Take 2 g by mouth      SHINGRIX injection        No current facility-administered medications for this visit.      Past Medical History:   Patient Active Problem List   Diagnosis    Essential hypertension    Idiopathic peripheral neuropathy    Pseudophakia of both eyes    Regular astigmatism of both eyes    Localized osteoporosis without current pathological fracture - 8/2020 waiting for dental work to start fosamax.     Mixed hyperlipidemia    Gait disorder    Vertigo    Chronic kidney disease, stage 3 (H)    Lumbar spondylosis    Lumbar facet arthropathy    Neural foraminal stenosis of lumbosacral spine    S/P lumbar spinal fusion    Rash and nonspecific skin eruption    Rash     Past Medical History:   Diagnosis Date    HLD (hyperlipidemia)     HTN (hypertension)     Idiopathic neuropathy     Vitamin D deficiency        CC Rakesh Talley MD  [address] [facility] [address]  [place] [address] on close of this encounter.

## 2024-04-25 NOTE — NURSING NOTE
Dermatology Rooming Note    Kerri Rocha's goals for this visit include:   Chief Complaint   Patient presents with    Derm Problem     Kerri is here today for a rash follow up - has moved to the scalp      NICOLETTE Callahan

## 2024-05-01 LAB
PATH REPORT.COMMENTS IMP SPEC: NORMAL
PATH REPORT.FINAL DX SPEC: NORMAL
PATH REPORT.GROSS SPEC: NORMAL
PATH REPORT.MICROSCOPIC SPEC OTHER STN: NORMAL
PATH REPORT.RELEVANT HX SPEC: NORMAL

## 2024-05-21 ENCOUNTER — NURSE TRIAGE (OUTPATIENT)
Dept: PEDIATRICS | Facility: CLINIC | Age: 89
End: 2024-05-21
Payer: MEDICARE

## 2024-05-21 NOTE — TELEPHONE ENCOUNTER
Ok to wait until tomorrow unless develops new/worsening symptoms including dizziness, worsening pain, leg weakness, bowel/bladder changes, groin numbness, etc.    Please discuss appropriate tylenol dosing.

## 2024-05-21 NOTE — TELEPHONE ENCOUNTER
"RN called and spoke with patient. Relayed provider message. Patient was given an opportunity to ask questions, verbalized understanding of plan, and is agreeable.      RN went over tylenol dosing with patient. Patient has extra strength tylenol:   \"take 1,000 mg every 8 hours. Each Extra Strength Tylenol pill has 500 mg of acetaminophen. The most you should take each day is 3,000 mg (6 pills a day).      Use the lowest amount that makes your pain feel better.\"    Instructed pt to call back  or seek care if new or worsening symptoms.        Marilyn ANGELES RN on 5/21/2024 at 11:10 AM     "

## 2024-05-21 NOTE — TELEPHONE ENCOUNTER
"Nurse Triage SBAR  Is this a 2nd Level Triage? YES, LICENSED PRACTITIONER REVIEW IS REQUIRED  Routed to provider  Does the patient meet one of the following criteria for ADS visit consideration? 17+ years old, with an [facility] PCP     S-(situation): Fell on 5/1224 and has since been having low back pain.    B-(background):   -Feet crossed when turning around in a crowded hallway and fell onto her buttocks. Did not hit her head.   -Hx peripheral neuropathy, osteoporosis, lumbar spinal fusion in 2016, vertigo, gait disorder. Hasn't fallen in years. Takes baby aspirin daily.    A-(assessment):   -Lower back, especially L flank, is painful. It has gradually gotten worse. Pain is worse when standing or especially when turning over in bed. Rates pain 10/10 last night, but today 7/10. Denies pain when taking a deep breath, blood in urine.  -Unable to view lower back, so can't see if there is bruising, but her daughter on the day of the fall said there didn't look to be bruising.  -Neuropathy to the feet and ankles baseline.  -Walks with a cane more consistently since the fall out of fear of falling. Denies dizziness, weakness, fever.  -Has been taking tylenol Q4 (\"it takes the edge off\") and applying icy hot.    R-(recommendations): Go To Office Now. Patient does not have transportation until tomorrow. Scheduled for tomorrow. Forwarding to provider to be sure this is appropriate. I let her know that we would call her back to confirm.    Reason for Disposition   Fall and patient seems very anxious and fearful of falling again   SEVERE back pain (e.g., excruciating, unable to do any normal activities) and not improved after pain medicine and CARE ADVICE   Landed hard on feet or buttocks and pain over spine    Additional Information   Negative: Major injury from dangerous force (e.g., fall > 10 feet or 3 meters)   Negative: Major bleeding (e.g., actively dripping or spurting) and can't be stopped   Negative: Shock suspected " (e.g., cold/pale/clammy skin, too weak to stand)   Negative: Difficult to awaken or acting confused (e.g., disoriented, slurred speech)   Negative: SEVERE weakness (i.e., unable to walk or barely able to walk, requires support) and new-onset or worsening   Negative: Can't stand (bear weight) or walk and new-onset after fall   Negative: Sounds like a life-threatening emergency to the triager   Negative: Fainted (passed out)   Negative: New-onset or worsening weakness of the face, arm or leg on one side of the body   Negative: New-onset or worsening dizziness and described as spinning or off balance (i.e., vertigo)   Negative: New-onset or worsening dizziness and NO spinning sensation or trouble with balance   Negative: Pregnant and fall   Negative: Patient has a concerning injury to a specific part of the body (e.g., chest, leg, head)   Negative: Patient has a wound (abrasion, cut, puncture, other skin injury or tear)   Negative: Injury (or injuries) that need emergency care   Negative: Sounds like a serious injury to the triager   Negative: Muscle pain and dark (cola colored) or red-colored urine   Negative: Unable to get up until help (e.g., caregiver, family, friend) arrived and on the ground 1 hour or more   Negative: Patient sounds very sick or weak to the triager   Negative: MODERATE weakness (i.e., interferes with work, school, normal activities) and new-onset or worsening   Negative: Fever > 101 F (38.3 C) and age > 60 years   Negative: Fever > 100.0 F (37.8 C) and bedridden (e.g., CVA, chronic illness, recovering from surgery)   Negative: Fever > 100.0 F (37.8 C) and diabetes mellitus or weak immune system (e.g., HIV positive, cancer chemo, splenectomy, organ transplant, chronic steroids)   Negative: Suspicious history for the fall   Negative: Caller has URGENT question and triager unable to answer question   Negative: New-onset or worsening pale skin (pallor)   Negative: No prior tetanus shots (or is not  fully vaccinated) and any wound (e.g., cut or scrape)   Negative: HIV positive or severe immunodeficiency (severely weak immune system) and DIRTY cut or scrape   Negative: Caller has NON-URGENT question and triager unable to answer question   Negative: MILD weakness (i.e., does not interfere with ability to work, go to school, normal activities)  (Exception: mild weakness is a chronic symptom.)   Negative: Last tetanus shot > 5 years ago and DIRTY cut or scrape   Negative: Last tetanus shot > 10 years ago and CLEAN cut or scrape (e.g., object and skin were clean)   Negative: Patient wants to be seen   Negative: Fall and went to emergency department for evaluation or treatment   Negative: Dangerous mechanism of injury (e.g., MVA, contact sports, trampoline, diving, fall > 10 feet or 3 meters)  (Exception: Back pain began > 1 hour after injury.)   Negative: Weakness (i.e., paralysis, loss of muscle strength) of the leg(s) or foot and sudden onset after back injury   Negative: Numbness (i.e., loss of sensation) of the leg(s) or foot and sudden onset after back injury   Negative: Major bleeding (actively dripping or spurting) that can't be stopped   Negative: Bullet, knife or other serious penetrating wound   Negative: Shock suspected (e.g., cold/pale/clammy skin, too weak to stand, low BP, rapid pulse)   Negative: Sounds like a life-threatening emergency to the triager   Negative: Back pain from overuse (work, exercise, gardening) OR from twisting, lifting, or bending injury   Negative: Back pain not from an injury   Negative: SEVERE pain in kidney area (flank) that follows a direct blow to that site   Negative: Blood in urine (red, pink, or tea-colored)   Negative: Unable to urinate (or only a few drops) > 4 hours and bladder feels very full (e.g., palpable bladder or strong urge to urinate)   Negative: Loss of bladder or bowel control (urine or bowel incontinence; wetting self, leaking stool) of new-onset   Negative:  Numbness (loss of sensation) in groin or rectal area   Negative: Skin is split open or gaping (length > 1/2 inch or 12 mm)   Negative: Puncture wound of back   Negative: Bleeding won't stop after 10 minutes of direct pressure (using correct technique)   Negative: Sounds like a serious injury to the triager   Negative: Weakness of a leg or foot (e.g., unable to bear weight, dragging foot)   Negative: Numbness of a leg or foot (i.e., loss of sensation)   Negative: Pain radiates into the thigh or further down the leg now    Protocols used: Falls and [id number], Back Injury-A-OH

## 2024-05-22 ENCOUNTER — TELEPHONE (OUTPATIENT)
Dept: PEDIATRICS | Facility: CLINIC | Age: 89
End: 2024-05-22

## 2024-05-22 ENCOUNTER — OFFICE VISIT (OUTPATIENT)
Dept: PEDIATRICS | Facility: CLINIC | Age: 89
End: 2024-05-22
Payer: MEDICARE

## 2024-05-22 VITALS
BODY MASS INDEX: 23.71 KG/M2 | SYSTOLIC BLOOD PRESSURE: 115 MMHG | WEIGHT: 120.8 LBS | RESPIRATION RATE: 16 BRPM | OXYGEN SATURATION: 97 % | HEIGHT: 60 IN | TEMPERATURE: 97.8 F | HEART RATE: 76 BPM | DIASTOLIC BLOOD PRESSURE: 72 MMHG

## 2024-05-22 DIAGNOSIS — R10.2 ACUTE PELVIC PAIN: ICD-10-CM

## 2024-05-22 DIAGNOSIS — R10.2 ACUTE PELVIC PAIN: Primary | ICD-10-CM

## 2024-05-22 PROCEDURE — 99213 OFFICE O/P EST LOW 20 MIN: CPT | Performed by: INTERNAL MEDICINE

## 2024-05-22 RX ORDER — TRAMADOL HYDROCHLORIDE 50 MG/1
50 TABLET ORAL
Qty: 7 TABLET | Refills: 0 | Status: SHIPPED | OUTPATIENT
Start: 2024-05-22 | End: 2024-07-01

## 2024-05-22 RX ORDER — TRAMADOL HYDROCHLORIDE 50 MG/1
50 TABLET ORAL
Qty: 10 TABLET | Refills: 0 | Status: SHIPPED | OUTPATIENT
Start: 2024-05-22 | End: 2024-05-22

## 2024-05-22 ASSESSMENT — ENCOUNTER SYMPTOMS: BACK PAIN: 1

## 2024-05-22 ASSESSMENT — PAIN SCALES - GENERAL: PAINLEVEL: EXTREME PAIN (8)

## 2024-05-22 NOTE — PROGRESS NOTES
"  Assessment & Plan       ICD-10-CM    1. Acute pelvic pain  R10.2 TraMADol (ULTRAM) 50 MG tablet        Pain in the posterior pelvic region - gluteal to pubic ramus. Clinically low likelihood for fracture. We discussed management options.  Will treat pain w/ tramadol prn at nighttime to assist with sleep.   Consider PT referral - she will contact me if an order is desired.  If sx do not improve over the next 1-2 weeks then consider imaging of the pelvis.     Eb Cuevas MD         Yue Manning is a 89 year old, presenting for the following health issues:  Back Pain        5/22/2024     9:21 AM   Additional Questions   Roomed by Binta Conte     History of Present Illness       Reason for visit:  Fell on my back on May 12. Pain progressed to point of no sleep and constant pain.  Symptom onset:  3-7 days ago  Symptoms include:  Severe back pain from a fall  Symptom intensity:  Severe  Symptom progression:  Staying the same  Had these symptoms before:  No  What makes it worse:  Lying down   Turning in bed  What makes it better:  Not so far    She eats 2-3 servings of fruits and vegetables daily.She consumes 0 sweetened beverage(s) daily.She exercises with enough effort to increase her heart rate 20 to 29 minutes per day.  She exercises with enough effort to increase her heart rate 5 days per week.   She is taking medications regularly.     Kerri is here today to evaluate lower back to buttock area pain.  Onset of symptoms was May 12 when she fell backward.  No loss of consciousness with the fall.  She has some pain in the left shoulder area posteriorly but this seems to be improving.  Pain has been fairly constant.  It is interfering with her sleep.        Objective    /72 (BP Location: Right arm, Patient Position: Sitting, Cuff Size: Adult Regular)   Pulse 76   Temp 97.8  F (36.6  C) (Temporal)   Resp 16   Ht 1.511 m (4' 11.5\")   Wt 54.8 kg (120 lb 12.8 oz)   LMP  (LMP Unknown)   SpO2 97%   BMI " 23.99 kg/m    Body mass index is 23.99 kg/m .  Physical Exam   GEN: No distress  SKIN: No rashes  ABD: BS+. S, ND, NT.  BACK: No point tenderness with palpation along the lumbar spine or SI joints.  MS: Discomfort with palpation over the left mid gluteal region.  Pain is not severe with palpation.  Hip range of motion is normal.  No pain with hip movement.  No hip effusion.            Signed Electronically by: Eb Cuevas MD

## 2024-05-22 NOTE — TELEPHONE ENCOUNTER
[facility] Pharmacist called to clarify Tramadol prescription.  They need to confirm if it is for chronic pain, so they can fill >7 day supply.      Mag Joseph RN, BSN  [facility]

## 2024-05-23 ENCOUNTER — MYC MEDICAL ADVICE (OUTPATIENT)
Dept: DERMATOLOGY | Facility: CLINIC | Age: 89
End: 2024-05-23
Payer: MEDICARE

## 2024-05-23 DIAGNOSIS — R21 RASH: ICD-10-CM

## 2024-05-23 RX ORDER — METHOTREXATE 2.5 MG/1
TABLET ORAL
Qty: 20 TABLET | Refills: 0 | Status: SHIPPED | OUTPATIENT
Start: 2024-05-23 | End: 2024-05-28

## 2024-05-28 ENCOUNTER — OFFICE VISIT (OUTPATIENT)
Dept: DERMATOLOGY | Facility: CLINIC | Age: 89
End: 2024-05-28
Attending: DERMATOLOGY
Payer: MEDICARE

## 2024-05-28 ENCOUNTER — LAB (OUTPATIENT)
Dept: LAB | Facility: CLINIC | Age: 89
End: 2024-05-28
Payer: MEDICARE

## 2024-05-28 DIAGNOSIS — R21 RASH AND NONSPECIFIC SKIN ERUPTION: ICD-10-CM

## 2024-05-28 DIAGNOSIS — M33.13 DERMATOMYOSITIS (H): ICD-10-CM

## 2024-05-28 DIAGNOSIS — R21 RASH: ICD-10-CM

## 2024-05-28 DIAGNOSIS — M33.13 DERMATOMYOSITIS (H): Primary | ICD-10-CM

## 2024-05-28 DIAGNOSIS — N18.31 STAGE 3A CHRONIC KIDNEY DISEASE (H): ICD-10-CM

## 2024-05-28 DIAGNOSIS — Z12.31 ENCOUNTER FOR SCREENING MAMMOGRAM FOR MALIGNANT NEOPLASM OF BREAST: ICD-10-CM

## 2024-05-28 LAB
ALBUMIN SERPL BCG-MCNC: 4.3 G/DL (ref 3.5–5.2)
ALP SERPL-CCNC: 79 U/L (ref 40–150)
ALT SERPL W P-5'-P-CCNC: 17 U/L (ref 0–50)
ANION GAP SERPL CALCULATED.3IONS-SCNC: 12 MMOL/L (ref 7–15)
AST SERPL W P-5'-P-CCNC: 33 U/L (ref 0–45)
BASOPHILS # BLD AUTO: 0.1 10E3/UL (ref 0–0.2)
BASOPHILS NFR BLD AUTO: 1 %
BILIRUB SERPL-MCNC: 0.3 MG/DL
BUN SERPL-MCNC: 25.6 MG/DL (ref 8–23)
CALCIUM SERPL-MCNC: 9.7 MG/DL (ref 8.8–10.2)
CHLORIDE SERPL-SCNC: 103 MMOL/L (ref 98–107)
CREAT SERPL-MCNC: 0.97 MG/DL (ref 0.51–0.95)
CRP SERPL-MCNC: <3 MG/L
DEPRECATED HCO3 PLAS-SCNC: 24 MMOL/L (ref 22–29)
EGFRCR SERPLBLD CKD-EPI 2021: 56 ML/MIN/1.73M2
EOSINOPHIL # BLD AUTO: 0.2 10E3/UL (ref 0–0.7)
EOSINOPHIL NFR BLD AUTO: 2 %
ERYTHROCYTE [DISTWIDTH] IN BLOOD BY AUTOMATED COUNT: 15.9 % (ref 10–15)
ERYTHROCYTE [SEDIMENTATION RATE] IN BLOOD BY WESTERGREN METHOD: 28 MM/HR (ref 0–30)
GLUCOSE SERPL-MCNC: 94 MG/DL (ref 70–99)
HCT VFR BLD AUTO: 36.5 % (ref 35–47)
HGB BLD-MCNC: 12 G/DL (ref 11.7–15.7)
IMM GRANULOCYTES # BLD: 0 10E3/UL
IMM GRANULOCYTES NFR BLD: 0 %
LYMPHOCYTES # BLD AUTO: 1.7 10E3/UL (ref 0.8–5.3)
LYMPHOCYTES NFR BLD AUTO: 16 %
MCH RBC QN AUTO: 32 PG (ref 26.5–33)
MCHC RBC AUTO-ENTMCNC: 32.9 G/DL (ref 31.5–36.5)
MCV RBC AUTO: 97 FL (ref 78–100)
MONOCYTES # BLD AUTO: 1 10E3/UL (ref 0–1.3)
MONOCYTES NFR BLD AUTO: 10 %
NEUTROPHILS # BLD AUTO: 7.5 10E3/UL (ref 1.6–8.3)
NEUTROPHILS NFR BLD AUTO: 71 %
NRBC # BLD AUTO: 0 10E3/UL
NRBC BLD AUTO-RTO: 0 /100
PLATELET # BLD AUTO: 368 10E3/UL (ref 150–450)
POTASSIUM SERPL-SCNC: 4.5 MMOL/L (ref 3.4–5.3)
PROT SERPL-MCNC: 6.9 G/DL (ref 6.4–8.3)
RBC # BLD AUTO: 3.75 10E6/UL (ref 3.8–5.2)
SODIUM SERPL-SCNC: 139 MMOL/L (ref 135–145)
WBC # BLD AUTO: 10.6 10E3/UL (ref 4–11)

## 2024-05-28 PROCEDURE — 85025 COMPLETE CBC W/AUTO DIFF WBC: CPT | Performed by: PATHOLOGY

## 2024-05-28 PROCEDURE — 86140 C-REACTIVE PROTEIN: CPT | Performed by: PATHOLOGY

## 2024-05-28 PROCEDURE — 85652 RBC SED RATE AUTOMATED: CPT | Performed by: PATHOLOGY

## 2024-05-28 PROCEDURE — 99000 SPECIMEN HANDLING OFFICE-LAB: CPT | Performed by: PATHOLOGY

## 2024-05-28 PROCEDURE — 80053 COMPREHEN METABOLIC PANEL: CPT | Performed by: PATHOLOGY

## 2024-05-28 PROCEDURE — 36415 COLL VENOUS BLD VENIPUNCTURE: CPT | Performed by: PATHOLOGY

## 2024-05-28 PROCEDURE — 86235 NUCLEAR ANTIGEN ANTIBODY: CPT | Performed by: DERMATOLOGY

## 2024-05-28 PROCEDURE — 99214 OFFICE O/P EST MOD 30 MIN: CPT | Performed by: DERMATOLOGY

## 2024-05-28 RX ORDER — METHOTREXATE 2.5 MG/1
TABLET ORAL
Qty: 24 TABLET | Refills: 1 | Status: SHIPPED | OUTPATIENT
Start: 2024-05-28 | End: 2024-06-25

## 2024-05-28 RX ORDER — FOLIC ACID 1 MG/1
1 TABLET ORAL DAILY
Qty: 100 TABLET | Refills: 3 | Status: SHIPPED | OUTPATIENT
Start: 2024-05-28

## 2024-05-28 RX ORDER — CLOBETASOL PROPIONATE 0.05 G/100ML
SHAMPOO TOPICAL
Qty: 118 ML | Refills: 4 | Status: SHIPPED | OUTPATIENT
Start: 2024-05-28 | End: 2024-07-01

## 2024-05-28 RX ORDER — PREDNISONE 10 MG/1
10 TABLET ORAL DAILY
Qty: 30 TABLET | Refills: 1 | Status: SHIPPED | OUTPATIENT
Start: 2024-05-28 | End: 2024-08-01

## 2024-05-28 RX ORDER — CLOBETASOL PROPIONATE 0.5 MG/G
OINTMENT TOPICAL 2 TIMES DAILY
Qty: 60 G | Refills: 1 | Status: SHIPPED | OUTPATIENT
Start: 2024-05-28 | End: 2024-07-01

## 2024-05-28 ASSESSMENT — PAIN SCALES - GENERAL: PAINLEVEL: NO PAIN (0)

## 2024-05-28 NOTE — PATIENT INSTRUCTIONS
Start 10 mg Prednisone daily  Start taking 15 mg Methotrexate (6 pills) weekly  Start Clobetasol ointment for itchy places on your body  Continue Clobetasol and Ketoconazole shampoo  Get blood tests at the clinic today   Get mammogram and abdominal ultrasound which can be done at your local clinic  Return visit in a month

## 2024-05-28 NOTE — NURSING NOTE
Chief Complaint   Patient presents with    Derm Problem     Patient reports that the rash has not improved on their scalp and has returned on their back and shoulders. The patient reports that the current topical medications are not working and would like to discuss treatment plan.      Viki Lugo LPN

## 2024-05-28 NOTE — LETTER
5/28/2024       RE: Kerri Rocha  [address]  [facility] [id number]     Dear Colleague,    Thank you for referring your patient, Kerri Rocha, to the [facility] [place] [facility]. Please see a copy of my visit note below.    [facility] Dermatology Note  Encounter Date: May 28, 2024  Office Visit     Dermatology Problem List:  # Dermatomyositis  - Bx L 3rd MCP and upper back (2/13/24): mild perivascular lymphocytic inflammation, favor partially treated eczematous dermatitis, no evidence of interface process.    - L Scalp Bx 4/2024: epidermal atrophy with rare necrotic keratinocytes, and a mild perivascular lymphocytic infiltrate   - Labs (2/13/24): LOYDA 1:1280 (nucleolar); CK and aldolase and myositis panel wnl  - Tx: methotrexate 15mg qweek & prednisone 10 mg daily, clobetasol shampoo daily, ketoconazole TIW  - prior: methotrexate methotrexate 12.5mg qweek & prednisone 20mg x 7,10mg x 7 (rx 4/25/24), clobetasol shampoo daily, ketoconazole TIW  - Methotrexate monitoring (4/8/24): CBC w diff wnl; mild Cr & BUN elevation, mild GFR decrease.    ____________________________________________    Assessment & Plan:    # Clinical exam and (subtle biopsy findings) most consistent with dermatomyositis.  To date has not had evidence of muscle involvement.    - Start 10 mg prednisone daily as itch has returned   - Increase methotrexate to 15 mg (6 tabs) weekly    - Start clobetasol ointment for itchy areas   - Get blood tests today (ESR, CRP, CMP, CBC w/ diff, CHILO antibody panel)   - CBC w/ diff & CMP for methotrexate monitoring   - ESR, CRP, CHILO antibody for ruling out other differentials   - Get mammogram and abdominal ultrasound to rule out paraneoplastic DM (has had age-appropriate colonoscopy screening in past year; never smoker); could also consider SPEP and peripheral smear if the above is unrevealing    - Return visit in 1 month    Procedures Performed:   None    Follow-up: 1 month(s) in-person, or earlier for new or changing lesions    Staff and Medical Student:     Russell Florentino MS3  [facility]    I was present with the medical student who participated in the service and in the documentation.  I have verified the history and personally performed the physical exam and medical decision making.  I agree with the assessment and plan of care as documented in the note.      Rakesh Talley MD  Dermatology Attending    ____________________________________________    CC: Derm Problem (Patient reports that the rash has not improved on their scalp and has returned on their back and shoulders. The patient reports that the current topical medications are not working and would like to discuss treatment plan. )    HPI:  Ms. Kerri Rocha is a(n) 89 year old female who presents today as a return patient for rash. Last seen by Dr. Talley on 4/25/24 where she was started on Ketoconazole shampoo 3x per week, Clobetasol shampoo every day, prednisone for 2 weeks (20mg x 7 days and then 10mg x 7 days), and her methotrexate was increased to 12.5mg. A punch biopsy was also taken from the left parietal scalp that came back from dermatopathology as pronounced epidermal atrophy with rare necrotic keratinocytes, and a mild perivascular lymphocytic infiltrate which gives the impression of dermatomyositis. Today Kerri states that her rash has remained consistent on her scalp and has returned on her shoulders and chest despite not missing a dose of her medications or shampoos. She states that her scalp might have gotten a little better when she was on the prednisone but since then it has continued to itch. Patient claims that she has troubles sleeping and can only get sleep when she puts a moist towel on her head at night. She is also worried about her kidneys and liver with taking an increased dose of methotrexate. Denies muscle  weakness, difficulty rising to stand, difficulty brushing hair.    Patient is otherwise feeling well, without additional skin concerns.    Labs:  Dermatopathology report from punch biopsy of left parietal scalp  reviewed.    Physical Exam:  Vitals: LMP  (LMP Unknown)   SKIN: Focused examination of scalp, upper back, hands, arms, upper chest was performed.  - subtle pink to red erythematous patch on right elbow  - erythematous scaly plaque with excoriations on scalp  - erythematous scaly plaque with excoriations on upper back  - Red scaly plaques along upper nose folds  - No other lesions of concern on areas examined.     Medications:  Current Outpatient Medications   Medication Sig Dispense Refill    amLODIPine (NORVASC) 5 MG tablet Take 1 tablet (5 mg) by mouth daily 90 tablet 3    aspirin 81 MG EC tablet Take 81 mg by mouth daily      atorvastatin (LIPITOR) 20 MG tablet Take 1 tablet (20 mg) by mouth at bedtime 90 tablet 3    BOOSTRIX 5-2.5-18.5 LF-MCG/0.5 MYRIAM injection       calcium carbonate (OS-CAMMIE 600 MG [place]. CA) 600 MG tablet Take 1,200 mg by mouth daily       Cholecalciferol (VITAMIN D3) 2000 UNITS CAPS 1,000 Int'l Units daily      clobetasol (TEMOVATE) 0.05 % external ointment Apply topically 2 times daily 60 g 1    clobetasol propionate (CLOBEX) 0.05 % external shampoo Use daily as needed for scalp itch 118 mL 4    Diphenhydramine-APAP, sleep, (TYLENOL PM EXTRA STRENGTH PO) Take by mouth nightly as needed      FLOWFLEX COVID-19 AG HOME TEST KIT USE TO TEST AT HOME FOR COVID      fluocinonide (LIDEX) 0.05 % external solution Apply topically 2 times daily To scalp for itch 60 mL 3    folic acid (FOLVITE) 1 MG tablet Take 1 tablet (1 mg) by mouth daily On days you don't take methotrexate 100 tablet 3    gabapentin (NEURONTIN) 400 MG capsule TAKE 2 CAPSULES IN THE MORNING, 2 CAPSULES AT NOON AND 1 CAPSULE IN THE EVENING 150 capsule 5    ketoconazole (NIZORAL) 2 % external shampoo Apply topically daily as  needed for itching or irritation Use as scalp wash three times weekly. Lather in, let sit for five minutes, then rinse. 120 mL 11    lisinopril (ZESTRIL) 10 MG tablet Take 1 tablet (10 mg) by mouth daily 90 tablet 3    methotrexate 2.5 MG tablet Take five tablets (12.5mg total) once WEEKLY. 20 tablet 0    omega 3 1000 MG CAPS Take 2 g by mouth      traMADol (ULTRAM) 50 MG tablet Take 1 tablet (50 mg) by mouth nightly as needed for pain 7 tablet 0     No current facility-administered medications for this visit.      Past Medical History:   Patient Active Problem List   Diagnosis    Essential hypertension    Idiopathic peripheral neuropathy    Pseudophakia of both eyes    Regular astigmatism of both eyes    Localized osteoporosis without current pathological fracture - 8/2020 waiting for dental work to start fosamax.     Mixed hyperlipidemia    Gait disorder    Vertigo    Chronic kidney disease, stage 3 (H)    Lumbar spondylosis    Lumbar facet arthropathy    Neural foraminal stenosis of lumbosacral spine    S/P lumbar spinal fusion    Rash and nonspecific skin eruption    Rash     Past Medical History:   Diagnosis Date    HLD (hyperlipidemia)     HTN (hypertension)     Idiopathic neuropathy     Vitamin D deficiency         CC Rakesh Talley MD  [address]  [place] [address]

## 2024-05-29 LAB
ENA SM IGG SER IA-ACNC: <0.7 U/ML
ENA SM IGG SER IA-ACNC: NEGATIVE
ENA SS-A AB SER IA-ACNC: <0.5 U/ML
ENA SS-A AB SER IA-ACNC: NEGATIVE
ENA SS-B IGG SER IA-ACNC: <0.6 U/ML
ENA SS-B IGG SER IA-ACNC: NEGATIVE
U1 SNRNP IGG SER IA-ACNC: 1.6 U/ML
U1 SNRNP IGG SER IA-ACNC: NEGATIVE

## 2024-06-03 ENCOUNTER — HOSPITAL ENCOUNTER (OUTPATIENT)
Dept: ULTRASOUND IMAGING | Facility: CLINIC | Age: 89
Discharge: HOME | End: 2024-06-03
Attending: DERMATOLOGY | Admitting: DERMATOLOGY
Payer: MEDICARE

## 2024-06-03 DIAGNOSIS — M33.13 DERMATOMYOSITIS (H): ICD-10-CM

## 2024-06-03 PROCEDURE — 76700 US EXAM ABDOM COMPLETE: CPT

## 2024-06-04 ENCOUNTER — ANCILLARY PROCEDURE (OUTPATIENT)
Dept: MAMMOGRAPHY | Facility: CLINIC | Age: 89
End: 2024-06-04
Attending: DERMATOLOGY
Payer: MEDICARE

## 2024-06-04 DIAGNOSIS — M33.13 DERMATOMYOSITIS (H): ICD-10-CM

## 2024-06-04 DIAGNOSIS — Z12.31 ENCOUNTER FOR SCREENING MAMMOGRAM FOR MALIGNANT NEOPLASM OF BREAST: ICD-10-CM

## 2024-06-04 PROCEDURE — 77063 BREAST TOMOSYNTHESIS BI: CPT | Mod: TC | Performed by: RADIOLOGY

## 2024-06-04 PROCEDURE — 77067 SCR MAMMO BI INCL CAD: CPT | Mod: TC | Performed by: RADIOLOGY

## 2024-06-25 ENCOUNTER — OFFICE VISIT (OUTPATIENT)
Dept: DERMATOLOGY | Facility: CLINIC | Age: 89
End: 2024-06-25
Payer: MEDICARE

## 2024-06-25 ENCOUNTER — LAB (OUTPATIENT)
Dept: LAB | Facility: CLINIC | Age: 89
End: 2024-06-25
Payer: MEDICARE

## 2024-06-25 DIAGNOSIS — Z79.899 ENCOUNTER FOR LONG-TERM (CURRENT) USE OF HIGH-RISK MEDICATION: Primary | ICD-10-CM

## 2024-06-25 DIAGNOSIS — R21 RASH: ICD-10-CM

## 2024-06-25 DIAGNOSIS — M33.13 DERMATOMYOSITIS (H): ICD-10-CM

## 2024-06-25 DIAGNOSIS — Z79.899 ENCOUNTER FOR LONG-TERM (CURRENT) USE OF HIGH-RISK MEDICATION: ICD-10-CM

## 2024-06-25 LAB
ALBUMIN SERPL BCG-MCNC: 4.4 G/DL (ref 3.5–5.2)
ALP SERPL-CCNC: 59 U/L (ref 40–150)
ALT SERPL W P-5'-P-CCNC: 14 U/L (ref 0–50)
ANION GAP SERPL CALCULATED.3IONS-SCNC: 12 MMOL/L (ref 7–15)
AST SERPL W P-5'-P-CCNC: 22 U/L (ref 0–45)
BASOPHILS # BLD AUTO: 0.1 10E3/UL (ref 0–0.2)
BASOPHILS NFR BLD AUTO: 1 %
BILIRUB SERPL-MCNC: 0.4 MG/DL
BUN SERPL-MCNC: 22.6 MG/DL (ref 8–23)
CALCIUM SERPL-MCNC: 10 MG/DL (ref 8.8–10.2)
CHLORIDE SERPL-SCNC: 103 MMOL/L (ref 98–107)
CREAT SERPL-MCNC: 1.05 MG/DL (ref 0.51–0.95)
DEPRECATED HCO3 PLAS-SCNC: 26 MMOL/L (ref 22–29)
EGFRCR SERPLBLD CKD-EPI 2021: 51 ML/MIN/1.73M2
EOSINOPHIL # BLD AUTO: 0.2 10E3/UL (ref 0–0.7)
EOSINOPHIL NFR BLD AUTO: 2 %
ERYTHROCYTE [DISTWIDTH] IN BLOOD BY AUTOMATED COUNT: 15.7 % (ref 10–15)
GLUCOSE SERPL-MCNC: 100 MG/DL (ref 70–99)
HCT VFR BLD AUTO: 38.5 % (ref 35–47)
HGB BLD-MCNC: 12.6 G/DL (ref 11.7–15.7)
IMM GRANULOCYTES # BLD: 0.1 10E3/UL
IMM GRANULOCYTES NFR BLD: 0 %
LYMPHOCYTES # BLD AUTO: 3.1 10E3/UL (ref 0.8–5.3)
LYMPHOCYTES NFR BLD AUTO: 23 %
MCH RBC QN AUTO: 32.2 PG (ref 26.5–33)
MCHC RBC AUTO-ENTMCNC: 32.7 G/DL (ref 31.5–36.5)
MCV RBC AUTO: 99 FL (ref 78–100)
MONOCYTES # BLD AUTO: 1.4 10E3/UL (ref 0–1.3)
MONOCYTES NFR BLD AUTO: 10 %
NEUTROPHILS # BLD AUTO: 8.8 10E3/UL (ref 1.6–8.3)
NEUTROPHILS NFR BLD AUTO: 64 %
NRBC # BLD AUTO: 0 10E3/UL
NRBC BLD AUTO-RTO: 0 /100
PLATELET # BLD AUTO: 292 10E3/UL (ref 150–450)
POTASSIUM SERPL-SCNC: 4.9 MMOL/L (ref 3.4–5.3)
PROT SERPL-MCNC: 6.9 G/DL (ref 6.4–8.3)
RBC # BLD AUTO: 3.91 10E6/UL (ref 3.8–5.2)
RETICS # AUTO: 0.09 10E6/UL (ref 0.03–0.1)
RETICS/RBC NFR AUTO: 2.2 % (ref 0.5–2)
SODIUM SERPL-SCNC: 141 MMOL/L (ref 135–145)
TOTAL PROTEIN SERUM FOR ELP: 6.7 G/DL (ref 6.4–8.3)
WBC # BLD AUTO: 13.6 10E3/UL (ref 4–11)

## 2024-06-25 PROCEDURE — 85025 COMPLETE CBC W/AUTO DIFF WBC: CPT | Performed by: PATHOLOGY

## 2024-06-25 PROCEDURE — 99000 SPECIMEN HANDLING OFFICE-LAB: CPT | Performed by: PATHOLOGY

## 2024-06-25 PROCEDURE — 99214 OFFICE O/P EST MOD 30 MIN: CPT | Performed by: DERMATOLOGY

## 2024-06-25 PROCEDURE — 99207 BLOOD MORPHOLOGY PATHOLOGIST REVIEW: CPT | Performed by: STUDENT IN AN ORGANIZED HEALTH CARE EDUCATION/TRAINING PROGRAM

## 2024-06-25 PROCEDURE — 85045 AUTOMATED RETICULOCYTE COUNT: CPT | Performed by: PATHOLOGY

## 2024-06-25 PROCEDURE — 84165 PROTEIN E-PHORESIS SERUM: CPT | Mod: TC | Performed by: STUDENT IN AN ORGANIZED HEALTH CARE EDUCATION/TRAINING PROGRAM

## 2024-06-25 PROCEDURE — 80053 COMPREHEN METABOLIC PANEL: CPT | Performed by: PATHOLOGY

## 2024-06-25 PROCEDURE — 36415 COLL VENOUS BLD VENIPUNCTURE: CPT | Performed by: PATHOLOGY

## 2024-06-25 PROCEDURE — 84165 PROTEIN E-PHORESIS SERUM: CPT | Mod: 26 | Performed by: STUDENT IN AN ORGANIZED HEALTH CARE EDUCATION/TRAINING PROGRAM

## 2024-06-25 PROCEDURE — 84155 ASSAY OF PROTEIN SERUM: CPT | Performed by: DERMATOLOGY

## 2024-06-25 RX ORDER — METHOTREXATE 2.5 MG/1
TABLET ORAL
Qty: 24 TABLET | Refills: 1 | Status: SHIPPED | OUTPATIENT
Start: 2024-06-25 | End: 2024-09-05

## 2024-06-25 RX ORDER — PREDNISONE 2.5 MG/1
TABLET ORAL
Qty: 60 TABLET | Refills: 1 | Status: SHIPPED | OUTPATIENT
Start: 2024-06-25 | End: 2024-08-19

## 2024-06-25 RX ORDER — METHOTREXATE 2.5 MG/1
TABLET ORAL
Status: CANCELLED | OUTPATIENT
Start: 2024-06-25

## 2024-06-25 ASSESSMENT — PAIN SCALES - GENERAL: PAINLEVEL: NO PAIN (0)

## 2024-06-25 NOTE — PROGRESS NOTES
[facility] Dermatology Note  Encounter Date: Jun 25, 2024  Office Visit     Dermatology Problem List:  # Dermatomyositis  - Bx L 3rd MCP and upper back (2/13/24): mild perivascular lymphocytic inflammation, favor partially treated eczematous dermatitis, no evidence of interface process.    - L Scalp Bx 4/2024: epidermal atrophy with rare necrotic keratinocytes, and a mild perivascular lymphocytic infiltrate   - Labs (2/13/24): LOYDA 1:1280 (nucleolar); CK and aldolase and myositis panel wnl  - Tx: methotrexate 15mg qweek & prednisone taper from 10 mg daily  - prior: methotrexate 12.5mg qweek & prednisone 20mg x 7,10mg x 7 (rx 4/25/24), clobetasol shampoo daily, ketoconazole TIW  - Methotrexate monitoring (5/28/24): CBC w diff wnl; mild Cr & BUN elevation, mild GFR decrease.    ____________________________________________    Assessment & Plan:    # Clinical exam and subtle biopsy findings most consistent with dermatomyositis. Skin involvement improving, still no muscle involvement.  - Reviewed that patient's dermatomyositis is affecting the skin only (to date). Reviewed that this is an autoimmune condition. Discussed that we would like to rule out any underlying malignancy. Reviewed that labs and scans since last visit were reassuring. Recommended completing a few more blood tests today.  - Discussed that we would like to decrease prednisone dose today.  - Prednisone 2.5mg pills instructions:   - Week 1: Alternate 4 pills daily with 3 pills daily   - Week 2: Take 3 pills daily   - Week 3: Alternate 3 pills daily with 2 pills daily   - Week 4 and beyond: Take 2 pills daily until you return to clinic  - Continue methotrexate 15 mg weekly.  - As needed, resume clobetasol ointment BID for itching.  - Will complete labs today: CBC with diff, CMP, peripheral smear, SPEP.    Procedures Performed:   None    Follow-up: 2 month(s) in-person, or earlier for new or changing lesions    Staff and Medical Student:      Staff: Rakesh Talley MD    Medical Student: Ronit Castorena, MS3    I was present with the medical student who participated in the service and in the documentation.  I have verified the history and personally performed the physical exam and medical decision making.  I agree with the assessment and plan of care as documented in the note.      Rakesh Talley MD  Dermatology Attending  ____________________________________________    CC: Derm Problem (Patient reports discontinued use of shampoo which has helped improve the scalp rash. The patient reports continued itching on their shoulders. )    HPI:  Ms. Kerri Rocha is a(n) 89 year old female who presents today as a return patient for dermatomyositis.    Patient reports that rash on her scalp has improved over the past month. She reports using ketoconazole shampoo 3x weekly and clobetasol shampoo daily for about 4 weeks, and she stopped about 2 weeks ago after itching resolved and she could sleep better. She denies any itching elsewhere on her body, and she has not needed to use any clobetasol ointment in the past month. She has continued to take prednisone 10 mg daily and methotrexate 15 mg weekly. She notes she feels dizzy, a little nauseous, and tired on days she takes both pills. She notes she would like to decrease her prednisone today if possible. She denies any muscle weakness or difficulty standing outside of her ordinary symptoms of neuropathy. Denies any weight loss, weight gain, fever. Reports mild constipation.    Patient is otherwise feeling well, without additional skin concerns.    Labs:  ESR, CHILO antibody panel, CBC , and CMP  reviewed.    Physical Exam:  Vitals: LMP  (LMP Unknown)   SKIN: Focused examination of scalp, face, hands, arms was performed.  - mild erythema on scalp with no scaling  - subtle pink to red patches over bilateral knuckles   - pink patch on right elbow  - No other lesions of concern on areas examined.     Medications:  Current  Outpatient Medications   Medication Sig Dispense Refill    amLODIPine (NORVASC) 5 MG tablet Take 1 tablet (5 mg) by mouth daily 90 tablet 3    aspirin 81 MG EC tablet Take 81 mg by mouth daily      atorvastatin (LIPITOR) 20 MG tablet Take 1 tablet (20 mg) by mouth at bedtime 90 tablet 3    BOOSTRIX 5-2.5-18.5 LF-MCG/0.5 MYRIAM injection       calcium carbonate (OS-CAMMIE 600 MG [place]. CA) 600 MG tablet Take 1,200 mg by mouth daily       Cholecalciferol (VITAMIN D3) 2000 UNITS CAPS 1,000 Int'l Units daily      clobetasol (TEMOVATE) 0.05 % external ointment Apply topically 2 times daily 60 g 1    clobetasol propionate (CLOBEX) 0.05 % external shampoo Use daily as needed for scalp itch 118 mL 4    Diphenhydramine-APAP, sleep, (TYLENOL PM EXTRA STRENGTH PO) Take by mouth nightly as needed      FLOWFLEX COVID-19 AG HOME TEST KIT USE TO TEST AT HOME FOR COVID      fluocinonide (LIDEX) 0.05 % external solution Apply topically 2 times daily To scalp for itch 60 mL 3    folic acid (FOLVITE) 1 MG tablet Take 1 tablet (1 mg) by mouth daily On days you don't take methotrexate 100 tablet 3    gabapentin (NEURONTIN) 400 MG capsule TAKE 2 CAPSULES IN THE MORNING, 2 CAPSULES AT NOON AND 1 CAPSULE IN THE EVENING 150 capsule 5    ketoconazole (NIZORAL) 2 % external shampoo Apply topically daily as needed for itching or irritation Use as scalp wash three times weekly. Lather in, let sit for five minutes, then rinse. 120 mL 11    lisinopril (ZESTRIL) 10 MG tablet Take 1 tablet (10 mg) by mouth daily 90 tablet 3    methotrexate 2.5 MG tablet Take six tablets (15mg total) once WEEKLY. 24 tablet 1    omega 3 1000 MG CAPS Take 2 g by mouth      predniSONE (DELTASONE) 10 MG tablet Take 1 tablet (10 mg) by mouth daily 30 tablet 1    traMADol (ULTRAM) 50 MG tablet Take 1 tablet (50 mg) by mouth nightly as needed for pain 7 tablet 0     No current facility-administered medications for this visit.      Past Medical History:   Patient Active Problem  List   Diagnosis    Essential hypertension    Idiopathic peripheral neuropathy    Pseudophakia of both eyes    Regular astigmatism of both eyes    Localized osteoporosis without current pathological fracture - 8/2020 waiting for dental work to start fosamax.     Mixed hyperlipidemia    Gait disorder    Vertigo    Chronic kidney disease, stage 3 (H)    Lumbar spondylosis    Lumbar facet arthropathy    Neural foraminal stenosis of lumbosacral spine    S/P lumbar spinal fusion    Rash and nonspecific skin eruption    Rash    Dermatomyositis (H)     Past Medical History:   Diagnosis Date    HLD (hyperlipidemia)     HTN (hypertension)     Idiopathic neuropathy     Vitamin D deficiency         CC Parrish Arizmendi MD  [address] [place] [place] [address] on close of this encounter.

## 2024-06-25 NOTE — NURSING NOTE
Chief Complaint   Patient presents with    Derm Problem     Patient reports discontinued use of shampoo which has helped improve the scalp rash. The patient reports continued itching on their shoulders.      Viki Lugo LPN

## 2024-06-25 NOTE — PATIENT INSTRUCTIONS
Instructions:  - Prednisone 2.5mg instructions:   - Week 1: Alternate 4 pills daily with 3 pills daily   - Week 2: Take 3 pills daily   - Week 3: Alternate 3 pills daily with 2 pills daily   - Week 4 and beyond: Take 2 pills daily until you return to clinic  - Continue methotrexate 15 mg weekly. Skip prednisone on days when you take methotrexate.

## 2024-06-26 LAB
ALBUMIN SERPL ELPH-MCNC: 4.3 G/DL (ref 3.7–5.1)
ALPHA1 GLOB SERPL ELPH-MCNC: 0.2 G/DL (ref 0.2–0.4)
ALPHA2 GLOB SERPL ELPH-MCNC: 0.6 G/DL (ref 0.5–0.9)
B-GLOBULIN SERPL ELPH-MCNC: 0.7 G/DL (ref 0.6–1)
GAMMA GLOB SERPL ELPH-MCNC: 0.8 G/DL (ref 0.7–1.6)
M PROTEIN SERPL ELPH-MCNC: 0 G/DL
PATH REPORT.COMMENTS IMP SPEC: NORMAL
PATH REPORT.FINAL DX SPEC: NORMAL
PATH REPORT.MICROSCOPIC SPEC OTHER STN: NORMAL
PATH REPORT.MICROSCOPIC SPEC OTHER STN: NORMAL
PATH REPORT.RELEVANT HX SPEC: NORMAL
PROT PATTERN SERPL ELPH-IMP: NORMAL

## 2024-07-01 ENCOUNTER — OFFICE VISIT (OUTPATIENT)
Dept: NEUROLOGY | Facility: CLINIC | Age: 89
End: 2024-07-01
Payer: MEDICARE

## 2024-07-01 VITALS
OXYGEN SATURATION: 97 % | DIASTOLIC BLOOD PRESSURE: 84 MMHG | HEART RATE: 74 BPM | RESPIRATION RATE: 16 BRPM | SYSTOLIC BLOOD PRESSURE: 139 MMHG

## 2024-07-01 DIAGNOSIS — G60.9 IDIOPATHIC PERIPHERAL NEUROPATHY: ICD-10-CM

## 2024-07-01 DIAGNOSIS — M54.2 NECK PAIN: ICD-10-CM

## 2024-07-01 DIAGNOSIS — R26.89 IMBALANCE: Primary | ICD-10-CM

## 2024-07-01 PROCEDURE — G2211 COMPLEX E/M VISIT ADD ON: HCPCS | Performed by: INTERNAL MEDICINE

## 2024-07-01 PROCEDURE — 99214 OFFICE O/P EST MOD 30 MIN: CPT | Performed by: INTERNAL MEDICINE

## 2024-07-01 RX ORDER — METHOCARBAMOL 500 MG/1
500 TABLET, FILM COATED ORAL 2 TIMES DAILY PRN
Qty: 60 TABLET | Refills: 5 | Status: SHIPPED | OUTPATIENT
Start: 2024-07-01 | End: 2024-08-01

## 2024-07-01 ASSESSMENT — PAIN SCALES - GENERAL: PAINLEVEL: NO PAIN (0)

## 2024-07-01 NOTE — NURSING NOTE
Chief Complaint   Patient presents with    RECHECK     /84 (BP Location: Right arm, Patient Position: Sitting, Cuff Size: Adult Regular)   Pulse 74   Resp 16   LMP  (LMP Unknown)   SpO2 97%     ARIN ALIVIA

## 2024-07-01 NOTE — PROGRESS NOTES
[facility] Neurology Follow Up Visit    Kerri Rocha MRN# [id number]   Age: 89 year old YOB: 1935     Brief history of symptoms: The patient was initially seen in neurologic consultation on 10/9/2020 for evaluation of balance difficulties. Please see the comprehensive neurologic consultation note from that date in the Epic records for details.     Interval history:  Her walking and balance is worse compared to a year ago.     She had a fall once on Mother's Day. She fell backwards. This was her first fall in several years.     She is shuffling more with walking.     She continues on gabapentin. She takes 2 capsules in the morning, 1 at noon, and 2 at night. Pain overall is doing better, but is still present.     She denies any numbness in the hands, just the legs.     She does her exercises twice a day.     She was doing physical therapy last in March.       Past Medical History:     Patient Active Problem List   Diagnosis    Essential hypertension    Idiopathic peripheral neuropathy    Pseudophakia of both eyes    Regular astigmatism of both eyes    Localized osteoporosis without current pathological fracture - 2020 waiting for dental work to start fosamax.     Mixed hyperlipidemia    Gait disorder    Vertigo    Chronic kidney disease, stage 3 (H)    Lumbar spondylosis    Lumbar facet arthropathy    Neural foraminal stenosis of lumbosacral spine    S/P lumbar spinal fusion    Rash and nonspecific skin eruption    Rash    Dermatomyositis (H)     Past Medical History:   Diagnosis Date    HLD (hyperlipidemia)     HTN (hypertension)     Idiopathic neuropathy     Vitamin D deficiency         Past Surgical History:     Past Surgical History:   Procedure Laterality Date    APPENDECTOMY      BREAST BIOPSY, CORE RT/LT      CARPAL TUNNEL RELEASE RT/LT      CATARACT IOL, RT/LT       SECTION      x2    COLONOSCOPY N/A 8/10/2022    Procedure: COLONOSCOPY (fv);  Surgeon: Constantin Ahn MD;  Location:   GI    HYSTERECTOMY TOTAL ABDOMINAL, BILATERAL SALPINGO-OOPHORECTOMY, COMBINED      INJECT BLOCK MEDIAL BRANCH CERVICAL/THORACIC/LUMBAR Bilateral 10/18/2021    Procedure: Bilateral L5-S1 FACET JOINT Injection;  Surgeon: Stephanie Momin MD;  Location: [facility] OR    INJECT EPIDURAL CAUDAL N/A 9/14/2022    Procedure: INJECTION, EPIDURAL, CAUDAL;  Surgeon: Stephanie Momin MD;  Location: [facility] OR    LAMINECTOMY LUMBAR THREE+ LEVELS  03/22/2016    L3-L5    TONSILLECTOMY & ADENOIDECTOMY          Social History:     Social History     Tobacco Use    Smoking status: Never     Passive exposure: Never    Smokeless tobacco: Never   Vaping Use    Vaping status: Never Used   Substance Use Topics    Alcohol use: Yes     Alcohol/week: 1.0 standard drink of alcohol     Comment: rare wine    Drug use: No        Family History:     Family History   Problem Relation Age of Onset    Hypertension Mother     Colon Cancer Father     Heart Disease Father     Hypertension Father     Coronary Artery Disease Father     Heart Disease Brother     Hypertension Brother     Hypertension Sister     Diabetes No family hx of     Cancer No family hx of         Medications:     Current Outpatient Medications   Medication Sig Dispense Refill    amLODIPine (NORVASC) 5 MG tablet Take 1 tablet (5 mg) by mouth daily 90 tablet 3    aspirin 81 MG EC tablet Take 81 mg by mouth daily      atorvastatin (LIPITOR) 20 MG tablet Take 1 tablet (20 mg) by mouth at bedtime 90 tablet 3    BOOSTRIX 5-2.5-18.5 LF-MCG/0.5 MYRIAM injection       calcium carbonate (OS-CAMMIE 600 MG [place]. [place]) 600 MG tablet Take 1,200 mg by mouth daily       Cholecalciferol (VITAMIN D3) 2000 UNITS CAPS 1,000 Int'l Units daily      clobetasol (TEMOVATE) 0.05 % external ointment Apply topically 2 times daily 60 g 1    clobetasol propionate (CLOBEX) 0.05 % external shampoo Use daily as needed for scalp itch 118 mL 4    Diphenhydramine-APAP, sleep, (TYLENOL PM EXTRA STRENGTH PO) Take by mouth nightly as  needed      FLOWFLEX COVID-19 AG HOME TEST KIT USE TO TEST AT HOME FOR COVID      fluocinonide (LIDEX) 0.05 % external solution Apply topically 2 times daily To scalp for itch 60 mL 3    folic acid (FOLVITE) 1 MG tablet Take 1 tablet (1 mg) by mouth daily On days you don't take methotrexate 100 tablet 3    gabapentin (NEURONTIN) 400 MG capsule TAKE 2 CAPSULES IN THE MORNING, 2 CAPSULES AT NOON AND 1 CAPSULE IN THE EVENING 150 capsule 5    ketoconazole (NIZORAL) 2 % external shampoo Apply topically daily as needed for itching or irritation Use as scalp wash three times weekly. Lather in, let sit for five minutes, then rinse. 120 mL 11    lisinopril (ZESTRIL) 10 MG tablet Take 1 tablet (10 mg) by mouth daily 90 tablet 3    methotrexate 2.5 MG tablet Take six tablets (15mg total) once WEEKLY. 24 tablet 1    omega 3 1000 MG CAPS Take 2 g by mouth      predniSONE (DELTASONE) 10 MG tablet Take 1 tablet (10 mg) by mouth daily 30 tablet 1    predniSONE (DELTASONE) 2.5 MG tablet This week: alternate 4 pills daily with 3 pills daily, every other day.  Week 2: 3 pills daily.  Week 3: alternate 3 pills daily and 2 pills daily.  Week 4 and beyond: 2 pills a day, until you return to clinic. 60 tablet 1    traMADol (ULTRAM) 50 MG tablet Take 1 tablet (50 mg) by mouth nightly as needed for pain 7 tablet 0     No current facility-administered medications for this visit.        Allergies:     Allergies   Allergen Reactions    Codeine     Sulfa Antibiotics     Sulfasalazine Other (See Comments)    Sulfate Rash        Review of Systems:   As above     Physical Exam:   Vitals: /84 (BP Location: Right arm, Patient Position: Sitting, Cuff Size: Adult Regular)   Pulse 74   Resp 16   LMP  (LMP Unknown)   SpO2 97%    Neurologic:     Mental Status: Fully alert, attentive. Grossly normal memory and fund of knowledge. Language normal, speech clear and fluent, no paraphasic errors.    Cranial Nerves: No dysarthria. EOMI. Visual fields  intact. Facial movements symmetric. No ptosis. Facial sensation intact. Hearing intact. Tongue movements and palate elevation intact. Pupils equal.      Motor: No tremors or other abnormal movements observed. Muscle tone normal throughout. Normal/symmetric rapid finger tapping. Strength 5/5 throughout upper and lower extremities with exception of 4-/5 right APB, 4+/5 left APB, 5-/5 finger extension / ADM / FDI.     Deep Tendon Reflexes: 2+/symmetric throughout upper extremities, 2-3+ bilateral patella, and 1+ to trace ankle jerks. No clonus.     Sensory: Vibration is absent the toes and left ankle, ~3 seconds in the right ankle, ~4 at the knees, normal in the hands. Decreased sensation to light touch and temperature in the feet compared to hands. Possible mild decreased sensation to proprioception in the toes, otherwise intact. Positive Romberg.      Coordination: Finger-nose-finger and heel to shin without dysmetria.     Gait: Mildly wide based casual gait with small steps. Mildly unsteady casual gait. Not able to walk on toes or tandem. Moderate issues with heel gait.      Data reviewed on previous visits    Imaging:  MRI lumbar 7/2022  IMPRESSION:  Stable surgical changes of posterior instrumented fusion and interbody  prosthesis at L4-5. Relatively stable multilevel lumbar spondylosis,  as detailed above.    MRI lumbar 4/13/2019  Impression:   1. Multilevel lumbar spondylosis, most pronounced at L3-4 with  moderate severe left and mild to moderate right neural foraminal  stenosis.  2. Synovial cyst within the right neural foramen of L5-S1 narrows the  right lateral recess and likely impinges upon the traversing right S1  nerve root.  3. Postsurgical changes of instrumented fusion of L4-5.     MRI cervical spine 1/2019  IMPRESSION: Mild multilevel degenerative changes with multilevel  neural foraminal stenoses without significant canal stenosis. No  myelopathy signal changes.      MRI brain 11/2018  Impression:     Normal brain for age with attention to the vestibular and auditory  structures. No specific finding to explain the patient's symptoms.     Procedures:  EMG 4/2019  Interpretation:  This is an abnormal EMG.  Findings are consistent with a severe length-dependent axonal sensorimotor polyneuropathy that appears to have advanced from prior EMG in 2003.  There is not clear evidence for overlying lumbosacral radiculopathy but in light of the severe peripheral neuropathy these may be masked and clinical correlation is advised.    Laboratory:  B12 1705 (8/2020)  TSH normal 2017  Per chart review, SPEP and A1c many years back was reportedly normal    MRI thoracic 10/28/2020  IMPRESSION:  1. Small posterior disc herniation at the T7-T8 level that mildly  deforms the anterior surface of the thoracic spinal cord.  2. Otherwise, normal thoracic spine MRI. No spinal canal or neural  foraminal stenosis. No evidence for fracture.    Labs 10/2020 - unremarkable ESR/CRP, SPEP/immunofixation, light chains, A1c, TSH  Labs 5/2021 - With exception of positive LOYDA (1:1250, nucleolar), normal/negative Copper, Zinc, Vitamin E, heavy metal screen, B1, B6, LOYDA, SSA/SSB    Pertinent Investigations since last visit:   Ceruloplasmin low at 16 (7/2023)         Assessment and Plan:   Assessment:  Kerri Rocha is a 89 year old female who presents today for follow-up of balance problems. 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 She also has lumbar stenosis and had decompression surgery in 2016. Balance has worsened compared to exam 1 year ago. Symptoms may be related to progression of polyneuropathy. We will also repeat spine imaging to evaluate for myelopathy.     Plan:  - MRI C/T spine  - PT referral    Follow up in Neurology clinic in 6 months or sooner if new issues arise    Jason Gillette MD   of Neurology  [facility]      The total time of  this encounter today amounted to 34 minutes. This time included time spent with the patient, prep work, ordering tests, and performing post visit documentation.    The longitudinal plan of care for the diagnosis(es)/condition(s) as documented were addressed during this visit. Due to the added complexity in care, I will continue to support Kerri in the subsequent management and with ongoing continuity of care.

## 2024-07-01 NOTE — LETTER
7/1/2024       RE: Kerri Rocha  [address]  [place] [address]       Dear Colleague,      Thank you for referring your patient, Kerri Rocha, to the [facility] NEUROLOGY [facility] [place] [facility]. Please see a copy of my visit note below.    [facility] Neurology Follow Up Visit    Kerri Rocha MRN# [id number]   Age: 89 year old YOB: 1935     Brief history of symptoms: The patient was initially seen in neurologic consultation on 10/9/2020 for evaluation of balance difficulties. Please see the comprehensive neurologic consultation note from that date in the Epic records for details.     Interval history:  Her walking and balance is worse compared to a year ago.     She had a fall once on Mother's Day. She fell backwards. This was her first fall in several years.     She is shuffling more with walking.     She continues on gabapentin. She takes 2 capsules in the morning, 1 at noon, and 2 at night. Pain overall is doing better, but is still present.     She denies any numbness in the hands, just the legs.     She does her exercises twice a day.     She was doing physical therapy last in March.       Past Medical History:     Patient Active Problem List   Diagnosis    Essential hypertension    Idiopathic peripheral neuropathy    Pseudophakia of both eyes    Regular astigmatism of both eyes    Localized osteoporosis without current pathological fracture - 8/2020 waiting for dental work to start fosamax.     Mixed hyperlipidemia    Gait disorder    Vertigo    Chronic kidney disease, stage 3 (H)    Lumbar spondylosis    Lumbar facet arthropathy    Neural foraminal stenosis of lumbosacral spine    S/P lumbar spinal fusion    Rash and nonspecific skin eruption    Rash    Dermatomyositis (H)     Past Medical History:   Diagnosis Date    HLD (hyperlipidemia)     HTN (hypertension)     Idiopathic neuropathy     Vitamin D deficiency          Past Surgical History:     Past Surgical History:   Procedure Laterality Date    APPENDECTOMY      BREAST BIOPSY, CORE RT/LT      CARPAL TUNNEL RELEASE RT/LT      CATARACT IOL, RT/LT       SECTION      x2    COLONOSCOPY N/A 8/10/2022    Procedure: COLONOSCOPY (fv);  Surgeon: Constantin Ahn MD;  Location: [facility] GI    HYSTERECTOMY TOTAL ABDOMINAL, BILATERAL SALPINGO-OOPHORECTOMY, COMBINED      INJECT BLOCK MEDIAL BRANCH CERVICAL/THORACIC/LUMBAR Bilateral 10/18/2021    Procedure: Bilateral L5-S1 FACET JOINT Injection;  Surgeon: Stephanie Momin MD;  Location: [facility] OR    INJECT EPIDURAL CAUDAL N/A 2022    Procedure: INJECTION, EPIDURAL, CAUDAL;  Surgeon: Stephanie Momin MD;  Location: [facility] OR    LAMINECTOMY LUMBAR THREE+ LEVELS  2016    L3-L5    TONSILLECTOMY & ADENOIDECTOMY          Social History:     Social History     Tobacco Use    Smoking status: Never     Passive exposure: Never    Smokeless tobacco: Never   Vaping Use    Vaping status: Never Used   Substance Use Topics    Alcohol use: Yes     Alcohol/week: 1.0 standard drink of alcohol     Comment: rare wine    Drug use: No        Family History:     Family History   Problem Relation Age of Onset    Hypertension Mother     Colon Cancer Father     Heart Disease Father     Hypertension Father     Coronary Artery Disease Father     Heart Disease Brother     Hypertension Brother     Hypertension Sister     Diabetes No family hx of     Cancer No family hx of         Medications:     Current Outpatient Medications   Medication Sig Dispense Refill    amLODIPine (NORVASC) 5 MG tablet Take 1 tablet (5 mg) by mouth daily 90 tablet 3    aspirin 81 MG EC tablet Take 81 mg by mouth daily      atorvastatin (LIPITOR) 20 MG tablet Take 1 tablet (20 mg) by mouth at bedtime 90 tablet 3    BOOSTRIX 5-2.5-18.5 LF-MCG/0.5 MYRIAM injection       calcium carbonate (OS-CAMMIE 600 MG [place]. [place]) 600 MG tablet Take 1,200 mg by mouth daily       Cholecalciferol  (VITAMIN D3) 2000 UNITS CAPS 1,000 Int'l Units daily      clobetasol (TEMOVATE) 0.05 % external ointment Apply topically 2 times daily 60 g 1    clobetasol propionate (CLOBEX) 0.05 % external shampoo Use daily as needed for scalp itch 118 mL 4    Diphenhydramine-APAP, sleep, (TYLENOL PM EXTRA STRENGTH PO) Take by mouth nightly as needed      FLOWFLEX COVID-19 AG HOME TEST KIT USE TO TEST AT HOME FOR COVID      fluocinonide (LIDEX) 0.05 % external solution Apply topically 2 times daily To scalp for itch 60 mL 3    folic acid (FOLVITE) 1 MG tablet Take 1 tablet (1 mg) by mouth daily On days you don't take methotrexate 100 tablet 3    gabapentin (NEURONTIN) 400 MG capsule TAKE 2 CAPSULES IN THE MORNING, 2 CAPSULES AT NOON AND 1 CAPSULE IN THE EVENING 150 capsule 5    ketoconazole (NIZORAL) 2 % external shampoo Apply topically daily as needed for itching or irritation Use as scalp wash three times weekly. Lather in, let sit for five minutes, then rinse. 120 mL 11    lisinopril (ZESTRIL) 10 MG tablet Take 1 tablet (10 mg) by mouth daily 90 tablet 3    methotrexate 2.5 MG tablet Take six tablets (15mg total) once WEEKLY. 24 tablet 1    omega 3 1000 MG CAPS Take 2 g by mouth      predniSONE (DELTASONE) 10 MG tablet Take 1 tablet (10 mg) by mouth daily 30 tablet 1    predniSONE (DELTASONE) 2.5 MG tablet This week: alternate 4 pills daily with 3 pills daily, every other day.  Week 2: 3 pills daily.  Week 3: alternate 3 pills daily and 2 pills daily.  Week 4 and beyond: 2 pills a day, until you return to clinic. 60 tablet 1    traMADol (ULTRAM) 50 MG tablet Take 1 tablet (50 mg) by mouth nightly as needed for pain 7 tablet 0     No current facility-administered medications for this visit.        Allergies:     Allergies   Allergen Reactions    Codeine     Sulfa Antibiotics     Sulfasalazine Other (See Comments)    Sulfate Rash        Review of Systems:   As above     Physical Exam:   Vitals: /84 (BP Location: Right arm,  Patient Position: Sitting, Cuff Size: Adult Regular)   Pulse 74   Resp 16   LMP  (LMP Unknown)   SpO2 97%    Neurologic:     Mental Status: Fully alert, attentive. Grossly normal memory and fund of knowledge. Language normal, speech clear and fluent, no paraphasic errors.    Cranial Nerves: No dysarthria. EOMI. Visual fields intact. Facial movements symmetric. No ptosis. Facial sensation intact. Hearing intact. Tongue movements and palate elevation intact. Pupils equal.      Motor: No tremors or other abnormal movements observed. Muscle tone normal throughout. Normal/symmetric rapid finger tapping. Strength 5/5 throughout upper and lower extremities with exception of 4-/5 right APB, 4+/5 left APB, 5-/5 finger extension / ADM / FDI.     Deep Tendon Reflexes: 2+/symmetric throughout upper extremities, 2-3+ bilateral patella, and 1+ to trace ankle jerks. No clonus.     Sensory: Vibration is absent the toes and left ankle, ~3 seconds in the right ankle, ~4 at the knees, normal in the hands. Decreased sensation to light touch and temperature in the feet compared to hands. Possible mild decreased sensation to proprioception in the toes, otherwise intact. Positive Romberg.      Coordination: Finger-nose-finger and heel to shin without dysmetria.     Gait: Mildly wide based casual gait with small steps. Mildly unsteady casual gait. Not able to walk on toes or tandem. Moderate issues with heel gait.      Data reviewed on previous visits    Imaging:  MRI lumbar 7/2022  IMPRESSION:  Stable surgical changes of posterior instrumented fusion and interbody  prosthesis at L4-5. Relatively stable multilevel lumbar spondylosis,  as detailed above.    MRI lumbar 4/13/2019  Impression:   1. Multilevel lumbar spondylosis, most pronounced at L3-4 with  moderate severe left and mild to moderate right neural foraminal  stenosis.  2. Synovial cyst within the right neural foramen of L5-S1 narrows the  right lateral recess and likely  impinges upon the traversing right S1  nerve root.  3. Postsurgical changes of instrumented fusion of L4-5.     MRI cervical spine 1/2019  IMPRESSION: Mild multilevel degenerative changes with multilevel  neural foraminal stenoses without significant canal stenosis. No  myelopathy signal changes.      MRI brain 11/2018  Impression:    Normal brain for age with attention to the vestibular and auditory  structures. No specific finding to explain the patient's symptoms.     Procedures:  EMG 4/2019  Interpretation:  This is an abnormal EMG.  Findings are consistent with a severe length-dependent axonal sensorimotor polyneuropathy that appears to have advanced from prior EMG in 2003.  There is not clear evidence for overlying lumbosacral radiculopathy but in light of the severe peripheral neuropathy these may be masked and clinical correlation is advised.    Laboratory:  B12 1705 (8/2020)  TSH normal 2017  Per chart review, SPEP and A1c many years back was reportedly normal    MRI thoracic 10/28/2020  IMPRESSION:  1. Small posterior disc herniation at the T7-T8 level that mildly  deforms the anterior surface of the thoracic spinal cord.  2. Otherwise, normal thoracic spine MRI. No spinal canal or neural  foraminal stenosis. No evidence for fracture.    Labs 10/2020 - unremarkable ESR/CRP, SPEP/immunofixation, light chains, A1c, TSH  Labs 5/2021 - With exception of positive LOYDA (1:1250, nucleolar), normal/negative Copper, Zinc, Vitamin E, heavy metal screen, B1, B6, LOYDA, SSA/SSB    Pertinent Investigations since last visit:   Ceruloplasmin low at 16 (7/2023)         Assessment and Plan:   Assessment:  Kerri Rocha is a 89 year old female who presents today for follow-up of balance problems. 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 She also has lumbar stenosis and had decompression surgery in 2016. Balance has worsened compared to exam 1 year ago.  Symptoms may be related to progression of polyneuropathy. We will also repeat spine imaging to evaluate for myelopathy.     Plan:  - MRI C/T spine  - PT referral    Follow up in Neurology clinic in 6 months or sooner if new issues arise      The total time of this encounter today amounted to 34 minutes. This time included time spent with the patient, prep work, ordering tests, and performing post visit documentation.    The longitudinal plan of care for the diagnosis(es)/condition(s) as documented were addressed during this visit. Due to the added complexity in care, I will continue to support Kerri in the subsequent management and with ongoing continuity of care.        Again, thank you for allowing me to participate in the care of your patient.      Sincerely,    Jason Gillette MD

## 2024-07-12 PROBLEM — Z79.899 ENCOUNTER FOR LONG-TERM (CURRENT) USE OF HIGH-RISK MEDICATION: Status: ACTIVE | Noted: 2024-07-12

## 2024-07-18 ENCOUNTER — MYC MEDICAL ADVICE (OUTPATIENT)
Dept: PEDIATRICS | Facility: CLINIC | Age: 89
End: 2024-07-18
Payer: MEDICARE

## 2024-07-18 DIAGNOSIS — G60.9 IDIOPATHIC PERIPHERAL NEUROPATHY: ICD-10-CM

## 2024-07-19 NOTE — TELEPHONE ENCOUNTER
Called patient's pharmacy, [facility]: [phone]. They advised that their system is down and they are unable to access patient information.    Sent [facility] message to patient advising to contact her insurance.  Jada SMITH RN, BSN

## 2024-07-19 NOTE — TELEPHONE ENCOUNTER
See patient's [facility] message   - Patient states that she spoke with a pharmacist at [facility] and was informed that her prescription is for 150 capsules   - Patient states that they will look into this, but states that she should be good to receive the 150 capsules with her next shipment   - Patient requesting a new prescription be sent as the current one has      - Pended gabapentin (NEURONTIN) 400 MG capsule with the [facility] - [place], [place] - [address], please review and refill/reorder as appropriate.     Rose MCCLAIN RN   [facility]

## 2024-07-20 RX ORDER — GABAPENTIN 400 MG/1
CAPSULE ORAL
Qty: 150 CAPSULE | Refills: 4 | Status: SHIPPED | OUTPATIENT
Start: 2024-07-20

## 2024-07-25 ENCOUNTER — HOSPITAL ENCOUNTER (OUTPATIENT)
Dept: MRI IMAGING | Facility: CLINIC | Age: 89
Discharge: HOME | End: 2024-07-25
Attending: INTERNAL MEDICINE | Admitting: INTERNAL MEDICINE
Payer: MEDICARE

## 2024-07-25 DIAGNOSIS — R26.89 IMBALANCE: ICD-10-CM

## 2024-07-25 PROCEDURE — 72146 MRI CHEST SPINE W/O DYE: CPT | Mod: MG

## 2024-07-25 PROCEDURE — G1010 CDSM STANSON: HCPCS

## 2024-07-29 ENCOUNTER — MYC MEDICAL ADVICE (OUTPATIENT)
Dept: PEDIATRICS | Facility: CLINIC | Age: 89
End: 2024-07-29

## 2024-07-29 ENCOUNTER — VIRTUAL VISIT (OUTPATIENT)
Dept: PHYSICAL THERAPY | Facility: CLINIC | Age: 89
End: 2024-07-29
Attending: INTERNAL MEDICINE
Payer: MEDICARE

## 2024-07-29 DIAGNOSIS — R26.89 IMBALANCE: ICD-10-CM

## 2024-07-29 DIAGNOSIS — M25.552 HIP PAIN, LEFT: Primary | ICD-10-CM

## 2024-07-29 DIAGNOSIS — G60.9 IDIOPATHIC PERIPHERAL NEUROPATHY: ICD-10-CM

## 2024-07-29 PROCEDURE — 97110 THERAPEUTIC EXERCISES: CPT | Mod: GP | Performed by: PHYSICAL THERAPIST

## 2024-07-29 PROCEDURE — 97161 PT EVAL LOW COMPLEX 20 MIN: CPT | Mod: GP | Performed by: PHYSICAL THERAPIST

## 2024-07-29 NOTE — PROGRESS NOTES
Kerri BETTENCOURT  is a 89y/o F,  who is being seen via a billable video visit.       Patient has given verbal consent for Video visit? Yes     Video Start Time: 10:32am     Telehealth Visit Details     Type of Service:  Telehealth     Video End Time (time video stopped): 10:56 am    Originating Location (pt. location): Home     Additional Participants in Telehealth Visit: None     Distant Location (provider location): On-site     Mode of Communication (Audio Visual or Audio Only):  Audio Visual    PHYSICAL THERAPY EVALUATION  Type of Visit: Evaluation    Fall Risk Screen:  Fall screen completed by: PT  Have you fallen 2 or more times in the past year?: Yes  Have you fallen and had an injury in the past year?: No  Is patient a fall risk?: Yes    Subjective       Presenting condition or subjective complaint: Neurologicl eval ordered by Dr. Gillette  Date of onset:      Relevant medical history: Progressive neurological deficits   Dates & types of surgery: On file    Patient Active Problem List   Diagnosis    Essential hypertension    Idiopathic peripheral neuropathy    Pseudophakia of both eyes    Regular astigmatism of both eyes    Localized osteoporosis without current pathological fracture - 8/2020 waiting for dental work to start fosamax.     Mixed hyperlipidemia    Gait disorder    Vertigo    Chronic kidney disease, stage 3 (H)    Lumbar spondylosis    Lumbar facet arthropathy    Neural foraminal stenosis of lumbosacral spine    S/P lumbar spinal fusion    Rash and nonspecific skin eruption    Rash    Dermatomyositis (H)    Encounter for long-term (current) use of high-risk medication     Pt had a fall last week. She was feeling pretty well but had an MRI for  her spine. Her left buttocks/hip has been painful since. She is having more pain with walking now.    Prior diagnostic imaging/testing results:       Prior therapy history for the same diagnosis, illness or injury: Yes      Prior Level of Function  Transfers:    Ambulation:   ADL:   IADL:     Living Environment  Social support: With a significant other or spouse   Type of home: House   Stairs to enter the home: Yes 14     Ramp: No   Stairs inside the home: Yes 14 Is there a railing: Yes     Help at home: Self Cares (home health aide/personal care attendant, family, etc)  Equipment owned: Four-point cane; Grab bars; Raised toilet seat; Bath bench     Employment: No    Hobbies/Interests: Reading and gardening    Patient goals for therapy: Balance    Pain assessment:      Objective      Cognitive Status Examination  Orientation: Oriented to person, place and time   Level of Consciousness: Alert  Follows Commands and Answers Questions: 100% of the time  Personal Safety and Judgement: Intact  Memory: Intact    OBSERVATION: no visible distress with sit to stand  INTEGUMENTARY:   POSTURE: WFL  PALPATION: not able due to virtual nature of visit  RANGE OF MOTION: LE ROM WFL  STRENGTH:  pt reports impaired strength with STS due to L hip pain. Requires use of UE support from cane and arm rest now.    BED MOBILITY:  not assessed, pt reports no change    TRANSFERS:  STS now requires UE support due to L hip pain    WHEELCHAIR MOBILITY: n/a    GAIT:   Level of [place]:   Assistive Device(s):   Gait Deviations:   Gait Distance:   Stairs:     BALANCE:         SENSATION:  Impaired due to neuropathy, unable to formally assess due to virtual visit    REFLEXES:   COORDINATION:   MUSCLE TONE:         Assessment & Plan   CLINICAL IMPRESSIONS  Medical Diagnosis: Imbalance    Treatment Diagnosis: impairment of balance with household/community activities, L hip pain, neuropathy   Impression/Assessment: Patient is a 89 year old female with left posterior hip pain and balance complaints.  The following significant findings have been identified: Pain, Impaired balance, Impaired sensation, and Impaired gait. These impairments interfere with their ability to perform self care tasks, recreational  activities, household chores, household mobility, and community mobility as compared to previous level of function.     Clinical Decision Making (Complexity):  Clinical Presentation: Stable/Uncomplicated  Clinical Presentation Rationale: based on medical and personal factors listed in PT evaluation  Clinical Decision Making (Complexity): Low complexity    PLAN OF CARE  Treatment Interventions:  Interventions: Gait Training, Manual Therapy, Neuromuscular Re-education, Therapeutic Activity, Therapeutic Exercise, Self-Care/Home Management    Long Term Goals     PT Goal 1  Goal Identifier: HEP  Goal Description: Pt will demonstrate IND with strength, balance, and muscular and aerobic endurance HEP, while working to improve capacity for functional mobility.  Target Date: 10/26/24  PT Goal 2  Goal Identifier: [id number]  Goal Description: Pt will complete x5 STS in <12s without use of B UEs, to demonstrate increased B LE strength and reduced risk for falling.  Target Date: 10/26/24  PT Goal 3  Goal Identifier: Ambulation  Goal Description: pt will be able to walk >5 min with least resistrive AD to improve household and community mobility with <4/10 left hip pain  Target Date: 10/26/24      Frequency of Treatment: 1x/week - in 3-6 weeks once hip pain improves  Duration of Treatment: 90 days    Recommended Referrals to Other Professionals:  Orthopedist to address L hip pain- pt to reach out to PCP for referral if needed  Education Assessment:   Learner/Method: Patient;Listening;Demonstration;Pictures/Video;Reading;No Barriers to Learning  Education Comments: education on benefits of in person vs virtual PT to address both hip pain and balance impairments. Pt would like to follow up with orthopedics first to determine hip pain and then will consider PT.    Risks and benefits of evaluation/treatment have been explained.   Patient/Family/caregiver agrees with Plan of Care.     Evaluation Time:     PT [place], Low Complexity Minutes  (98496): 14       Signing Clinician: Radha Joshi, PT        [facility]                                                                                   OUTPATIENT PHYSICAL THERAPY      PLAN OF TREATMENT FOR OUTPATIENT REHABILITATION   Patient's Last Name, First Name, Kerri Price YOB: 1935   Provider's Name   [facility]   Medical Record No.  [id number]     Onset Date:   7/1/24 Start of Care Date: 07/29/24     Medical Diagnosis:  Imbalance      PT Treatment Diagnosis:  impairment of balance with household/community activities, L hip pain, neuropathy Plan of Treatment  Frequency/Duration: 1x/week - in 3-6 weeks once hip pain improves/ 90 days    Certification date from 07/29/24 to 10/26/24         See note for plan of treatment details and functional goals     Radha Joshi, PT                         I CERTIFY THE NEED FOR THESE SERVICES FURNISHED UNDER        THIS PLAN OF TREATMENT AND WHILE UNDER MY CARE     (Physician attestation of this document indicates review and certification of the therapy plan).              Referring Provider:  Jason Gillette    Initial Assessment  See Epic Evaluation- Start of Care Date: 07/29/24

## 2024-07-29 NOTE — TELEPHONE ENCOUNTER
Patient needs appointment in clinic for xrays/evaluation. Place on extender schedule. Or she can walk in to [facility].   Eliz Figueroa PA-C

## 2024-08-01 ENCOUNTER — ANCILLARY PROCEDURE (OUTPATIENT)
Dept: GENERAL RADIOLOGY | Facility: CLINIC | Age: 89
End: 2024-08-01
Attending: PHYSICIAN ASSISTANT
Payer: MEDICARE

## 2024-08-01 ENCOUNTER — OFFICE VISIT (OUTPATIENT)
Dept: PEDIATRICS | Facility: CLINIC | Age: 89
End: 2024-08-01
Payer: MEDICARE

## 2024-08-01 VITALS
BODY MASS INDEX: 22.81 KG/M2 | OXYGEN SATURATION: 98 % | SYSTOLIC BLOOD PRESSURE: 120 MMHG | DIASTOLIC BLOOD PRESSURE: 58 MMHG | TEMPERATURE: 98.4 F | HEIGHT: 60 IN | WEIGHT: 116.2 LBS | HEART RATE: 77 BPM | RESPIRATION RATE: 16 BRPM

## 2024-08-01 DIAGNOSIS — M25.552 HIP PAIN, LEFT: ICD-10-CM
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
## 2024-08-01 PROCEDURE — 73502 X-RAY EXAM HIP UNI 2-3 VIEWS: CPT | Mod: TC | Performed by: RADIOLOGY

## 2024-08-01 PROCEDURE — 99213 OFFICE O/P EST LOW 20 MIN: CPT | Performed by: PHYSICIAN ASSISTANT

## 2024-08-01 RX ORDER — TRAMADOL HYDROCHLORIDE 50 MG/1
50 TABLET ORAL EVERY 6 HOURS PRN
Qty: 15 TABLET | Refills: 0 | Status: SHIPPED | OUTPATIENT
Start: 2024-08-01 | End: 2024-08-07

## 2024-08-01 ASSESSMENT — PAIN SCALES - GENERAL: PAINLEVEL: EXTREME PAIN (8)

## 2024-08-01 NOTE — PROGRESS NOTES
Assessment & Plan     Fall, initial encounter  Hip pain, left  Patient fell 2 weeks ago. Has ongoing left hip/gluteal pain.   Suspect muscular injury from fall.  Taking tylenol 1000mg TID.   Can continue with tylenol and ice.   Will get x-ray today to make sure there is no fracture.  Pt requesting more for pain. Will send short course of tramadol to take when having severe pain.   If needing this more often, consider seeing orthopedics.  - traMADol (ULTRAM) 50 MG tablet  Dispense: 15 tablet; Refill: 0  - XR Hip Left 2-3 Views    FUTURE APPOINTMENTS:       - Follow-up visit in 1 month if not improved. Sooner if needed    Subjective   Kerri is a 89 year old, presenting for the following health issues:  Hip Problem (She fell a week ago and she reports having worse pain since the fall. )        8/1/2024    10:00 AM   Additional Questions   Roomed by Denisha   Accompanied by self         8/1/2024    10:00 AM   Patient Reported Additional Medications   Patient reports taking the following new medications no     History of Present Illness       Reason for visit:  I fell and injured my left buttock/hip region resulting in pain when walking.  Symptom onset:  3-7 days ago  Symptoms include:  Pain when walking, not not sitting or lying down.  Symptom intensity:  Moderate  Symptom progression:  Staying the same  Had these symptoms before:  No  What makes it worse:  Walking for a length of time.  What makes it better:  Heating pad or cold pad.    She eats 4 or more servings of fruits and vegetables daily.She consumes 0 sweetened beverage(s) daily.She exercises with enough effort to increase her heart rate 20 to 29 minutes per day.  She exercises with enough effort to increase her heart rate 7 days per week.   She is taking medications regularly.     Hip/thigh pain  -Fell and landed on left hip/thigh 2 on 7/22  -Reports pain since then  -states walking has been challenging -stabbing pain  -Without pressure or weight bearing, no  "pain  -No swelling or bruising  -No numbness or tingling  -Radiating pain down the side of the leg  -Taking 1000mg Tylenol without improvement  -Needs something for daytime relief.   -Pain 9/10  -Caregiver to her .  -Pt is in PT for balance concerns. Seeing Neurology. Had recent MRIs          Review of Systems  Constitutional, HEENT, cardiovascular, pulmonary, gi and gu systems are negative, except as otherwise noted.      Objective    /58 (BP Location: Right arm, Patient Position: Sitting, Cuff Size: Adult Regular)   Pulse 77   Temp 98.4  F (36.9  C) (Tympanic)   Resp 16   Ht 1.511 m (4' 11.5\")   Wt 52.7 kg (116 lb 3.2 oz)   LMP  (LMP Unknown)   SpO2 98%   BMI 23.08 kg/m    Body mass index is 23.08 kg/m .    Physical Exam   GENERAL: alert and no distress  RESP: lungs clear to auscultation - no rales, rhonchi or wheezes  CV: regular rate and rhythm, normal S1 S2, no S3 or S4, no murmur, click or rub, no peripheral edema  MS: no gross musculoskeletal defects noted, no edema  ORTHO: Full ROM of left hip. Pain with extension, lateral bending. Pain weight bearing but walking with cane. No swelling or ecchymosis. Tenderness to the gluteal region  SKIN: no suspicious lesions or rashes  NEURO: Normal strength and tone, mentation intact and speech normal  BACK: no CVA tenderness, no paralumbar tenderness  PSYCH: mentation appears normal, affect normal/bright        Signed Electronically by: Hali Aguirre PA-C    "

## 2024-08-01 NOTE — PATIENT INSTRUCTIONS
X-ray today    Continue with Tylenol  Take Tramadol as needed for severe pain    If Orthopedics is needed will send referral to [facility]
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
## 2024-08-07 DIAGNOSIS — M25.552 HIP PAIN, LEFT: ICD-10-CM

## 2024-08-07 RX ORDER — TRAMADOL HYDROCHLORIDE 50 MG/1
50 TABLET ORAL EVERY 6 HOURS PRN
Qty: 15 TABLET | Refills: 0 | Status: SHIPPED | OUTPATIENT
Start: 2024-08-07

## 2024-08-07 NOTE — TELEPHONE ENCOUNTER
Pt called stating she is going out of town this weekend and was hoping for a 4 day fill of this medication.     Ping Flowers on 8/7/2024 at 10:01 AM

## 2024-08-08 PROBLEM — F11.90 CHRONIC, CONTINUOUS USE OF OPIOIDS: Status: ACTIVE | Noted: 2024-08-08

## 2024-08-12 ENCOUNTER — TELEPHONE (OUTPATIENT)
Dept: NEUROLOGY | Facility: CLINIC | Age: 89
End: 2024-08-12

## 2024-08-12 NOTE — TELEPHONE ENCOUNTER
Called patient to help schedule Spine referral Dr. Gillette put in. Transferred patient to call center to help schedule    Magali Bates on 8/12/2024 at 4:21 PM

## 2024-08-19 ENCOUNTER — OFFICE VISIT (OUTPATIENT)
Dept: PHYSICAL MEDICINE AND REHAB | Facility: CLINIC | Age: 89
End: 2024-08-19
Attending: INTERNAL MEDICINE
Payer: MEDICARE

## 2024-08-19 VITALS
SYSTOLIC BLOOD PRESSURE: 119 MMHG | HEIGHT: 60 IN | HEART RATE: 81 BPM | DIASTOLIC BLOOD PRESSURE: 59 MMHG | WEIGHT: 120 LBS | BODY MASS INDEX: 23.56 KG/M2

## 2024-08-19 DIAGNOSIS — M80.00XA AGE-RELATED OSTEOPOROSIS WITH CURRENT PATHOLOGICAL FRACTURE, INITIAL ENCOUNTER: ICD-10-CM

## 2024-08-19 DIAGNOSIS — Z98.1 HISTORY OF LUMBAR FUSION: ICD-10-CM

## 2024-08-19 DIAGNOSIS — M79.18 MYOFASCIAL PAIN: ICD-10-CM

## 2024-08-19 DIAGNOSIS — S22.080A COMPRESSION FRACTURE OF T11 VERTEBRA, INITIAL ENCOUNTER (H): Primary | ICD-10-CM

## 2024-08-19 DIAGNOSIS — M33.13 DERMATOMYOSITIS (H): ICD-10-CM

## 2024-08-19 DIAGNOSIS — M53.3 SACRAL BACK PAIN: ICD-10-CM

## 2024-08-19 PROCEDURE — 99214 OFFICE O/P EST MOD 30 MIN: CPT | Performed by: PAIN MEDICINE

## 2024-08-19 RX ORDER — PREDNISONE 2.5 MG/1
5 TABLET ORAL DAILY
Qty: 20 TABLET | Refills: 0 | Status: SHIPPED | OUTPATIENT
Start: 2024-08-19 | End: 2024-08-19

## 2024-08-19 RX ORDER — CALCITONIN SALMON 200 [IU]/.09ML
1 SPRAY, METERED NASAL DAILY
Qty: 3.7 ML | Refills: 0 | Status: SHIPPED | OUTPATIENT
Start: 2024-08-19 | End: 2024-09-16

## 2024-08-19 RX ORDER — PREDNISONE 2.5 MG/1
5 TABLET ORAL DAILY
Qty: 20 TABLET | Refills: 0 | Status: SHIPPED | OUTPATIENT
Start: 2024-08-19 | End: 2024-09-05

## 2024-08-19 ASSESSMENT — PAIN SCALES - GENERAL: PAINLEVEL: EXTREME PAIN (8)

## 2024-08-19 NOTE — LETTER
8/19/2024      Kerri Rocha  [address]  [facility] [address]      Dear Colleague,    Thank you for referring your patient, Kerri Rocha, to the [facility]. Please see a copy of my visit note below.    ASSESSMENT: Kerri Rocha is a 89 year old female presents for consultation at the request of 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  X-ray of left hip was negative for fracture.  Patient has current T11 acute compression fracture.  The pain that she is currently having is well below the T11 area where she has acute/subacute compression fracture.  With her history of osteoporosis and falls I have ordered x-rays of the lumbar spine and flexion-extension views as well as MRI of pelvis to look for insufficiency fractures.  Overall patient's physical exam is reassuring that she has normal strength and reflexes in her lower extremities.    Patient is neurologically intact on exam. No myelopathic or red flag symptoms.      Oswestry (DAYLIN) Questionnaire        8/16/2024     4:15 PM   OSWESTRY DISABILITY INDEX   Count 9   Sum 21   Oswestry Score (%) 46.67 %       Neck Disability Index:      8/16/2024     4:18 PM   Neck Disability Index (  Heath H. and Tasha C. 1991. All rights reserved.; used with permission)   SECTION 1 - PAIN INTENSITY 1   SECTION 2 - PERSONAL CARE 0   SECTION 3 - LIFTING 3   SECTION 4 - READING 1   SECTION 5  - HEADACHES 1   SECTION 6 - CONCENTRATION 0   SECTION 7 - WORK 2   SECTION 8 - DRIVING 1   SECTION 9 - SLEEPING 5   SECTION 10 - RECREATION 1   Count 10   Sum 15   Raw Score: /50 15   Neck Disability Index Score: (%) 30 %     Diagnoses and all orders for this visit:  Compression fracture of T11 vertebra, initial encounter (H)  -     Spine  Referral  -     calcitonin, salmon, (MIACALCIN) 200 UNIT/ACT nasal spray; Spray 1 spray into one nostril alternating nostrils daily for 28 days Alternate nostril each day.  History of lumbar fusion  -     XR LUMBAR SPINE G/E 4 VW; Future  Sacral back pain  -     MR Pelvis Bone wo Contrast; Future  Myofascial pain  -     MR Pelvis Bone wo Contrast; Future  -     XR LUMBAR SPINE G/E 4 VW; Future  Age-related osteoporosis with current pathological fracture, initial encounter  -     MR Pelvis Bone wo Contrast; Future  -     XR LUMBAR SPINE G/E 4 VW; Future  -     calcitonin, salmon, (MIACALCIN) 200 UNIT/ACT nasal spray; Spray 1 spray into one nostril alternating nostrils daily for 28 days Alternate nostril each day.     PLAN:  Reviewed spine anatomy and disease process. Discussed diagnosis and treatment options with the patient today. A shared decision making model was used. The patient's values and choices were respected. The following represents what was discussed and decided upon by the provider and the patient.     -DIAGNOSTIC TESTS:  Images were personally reviewed and interpreted and explained to patient today using spine model.   -- MRI of thoracic spine dated 7/25/2024 is personally reviewed images interpreted and discussed with the patient.  It does show mild anterior superior endplate compression fracture with less than 25% height loss and no retropulsion.  There is edema along the superior aspect of the T11 vertebral body compatible with acute or recent fracture.  -- MRI of cervical spine dated 7/25/2024 is personally reviewed images interpreted and discussed with  the patient.  There is no cord compression or cord signal.  There is facet arthropathy at C4-5, C5-6 and C3-4.  -- X-ray of left hip dated 8/1/2024 shows mild left hip arthrosis.  -- I ordered MRI of the pelvis and x-ray of the lumbar spine.  Once have reviewed these I will have one of our nurses give her a call with results and recommendations.    -PHYSICAL THERAPY: Patient has been undergoing physical therapy for low back pain and imbalance.  Secondary to severity of pain she has been having a pause on physical therapy recently.  After imaging may order more physical therapy.    -MEDICATIONS: I ordered calcitonin nasal spray for which she should alternate between each nostril daily.  She may take acetaminophen 1000 mg 3 times daily.  She has been prescribed tramadol by her primary care provider.  -  Discussed multiple medication options today with patient. Discussed risks, side effects, and proper use of medications. Patient verbalized understanding.    -INTERVENTIONS: No interventions at this time.  Discussed risks and benefits of injections with patient today.    -PATIENT EDUCATION: We discussed pain management in a multiple fashion including physical therapy, medication management, possible future injections.    -FOLLOW-UP:   Patient will follow-up in 4 weeks.    Advised patient to call the Spine Center if symptoms worsen or you have problems controlling bladder and bowel function.   ______________________________________________________________________    SUBJECTIVE:   Kerri Rocha  is a 89 year old female who presents today for new patient evaluation of imbalance.  Patient was seen by neurology on 7/1/2024 with worsening balance.  She has a history of lumbar stenosis and decompression surgery and fusion in 2016.  MRI of cervical and thoracic spine were ordered and a physical therapy referral was placed.  After T11 compression fracture was found on MRI patient was referred to the spine center for further  evaluation.  Patient notes that she has a long history of low back pain with lumbar fusion in 2016.  She has had continued tailbone area pain thereafter which is worsened after fall.  Pain today is 7/10 is worst is 10/10 at its best is 5/10.  She notes that she has had 2 recent falls in the middle of May and in June.  She was prescribed tramadol at that time which she feels is helpful.  She is taking gabapentin for peripheral neuropathy.  She notes that the pain in her low back and left hip area as well as tailbone area have worsened since her falls.  Pain is sharp.  She used to have lumbar injections, however they were not helpful and no longer gets these.  She denies any bowel or bladder changes, fevers, chills, unintentional weight loss.    -Treatment to Date: MRI of cervical and thoracic spine on 7/25/2024.  X-ray of left hip dated 8/1/2024.  Lumbar fusion.  Current physical therapy.    -Medications:    Current Outpatient Medications   Medication Sig Dispense Refill     calcitonin, salmon, (MIACALCIN) 200 UNIT/ACT nasal spray Spray 1 spray into one nostril alternating nostrils daily for 28 days Alternate nostril each day. 3.7 mL 0     amLODIPine (NORVASC) 5 MG tablet Take 1 tablet (5 mg) by mouth daily 90 tablet 3     aspirin 81 MG EC tablet Take 81 mg by mouth daily       atorvastatin (LIPITOR) 20 MG tablet Take 1 tablet (20 mg) by mouth at bedtime 90 tablet 3     BOOSTRIX 5-2.5-18.5 LF-MCG/0.5 MYRIAM injection        calcium carbonate (OS-CAMMIE 600 MG [place]. [place]) 600 MG tablet Take 1,200 mg by mouth daily        Cholecalciferol (VITAMIN D3) 2000 UNITS CAPS 1,000 Int'l Units daily       Diphenhydramine-APAP, sleep, (TYLENOL PM EXTRA STRENGTH PO) Take by mouth nightly as needed       FLOWFLEX COVID-19 AG HOME TEST KIT USE TO TEST AT HOME FOR COVID       folic acid (FOLVITE) 1 MG tablet Take 1 tablet (1 mg) by mouth daily On days you don't take methotrexate 100 tablet 3     gabapentin (NEURONTIN) 400 MG capsule TAKE 2  CAPSULES IN THE MORNING, 2 CAPSULES AT NOON AND 1 CAPSULE IN THE EVENING 150 capsule 4     lisinopril (ZESTRIL) 10 MG tablet Take 1 tablet (10 mg) by mouth daily 90 tablet 3     methotrexate 2.5 MG tablet Take six tablets (15mg total) once WEEKLY. 24 tablet 1     omega 3 1000 MG CAPS Take 2 g by mouth       predniSONE (DELTASONE) 2.5 MG tablet This week: alternate 4 pills daily with 3 pills daily, every other day.  Week 2: 3 pills daily.  Week 3: alternate 3 pills daily and 2 pills daily.  Week 4 and beyond: 2 pills a day, until you return to clinic. 60 tablet 1     traMADol (ULTRAM) 50 MG tablet Take 1 tablet (50 mg) by mouth every 6 hours as needed for severe pain or moderate to severe pain 15 tablet 0     No current facility-administered medications for this visit.       Allergies   Allergen Reactions     Codeine      Sulfa Antibiotics      Sulfasalazine Other (See Comments)     Sulfate Rash       Past Medical History:   Diagnosis Date     HLD (hyperlipidemia)      HTN (hypertension)      Idiopathic neuropathy      Vitamin D deficiency         Patient Active Problem List   Diagnosis     Essential hypertension     Idiopathic peripheral neuropathy     Pseudophakia of both eyes     Regular astigmatism of both eyes     Localized osteoporosis without current pathological fracture - 8/2020 waiting for dental work to start fosamax.      Mixed hyperlipidemia     Gait disorder     Vertigo     Chronic kidney disease, stage 3 (H)     Lumbar spondylosis     Lumbar facet arthropathy     Neural foraminal stenosis of lumbosacral spine     S/P lumbar spinal fusion     Rash and nonspecific skin eruption     Rash     Dermatomyositis (H)     Encounter for long-term (current) use of high-risk medication     Imbalance     Hip pain, left     Chronic, continuous use of opioids - Tramadol       Past Surgical History:   Procedure Laterality Date     APPENDECTOMY       BREAST BIOPSY, CORE RT/LT       CARPAL TUNNEL RELEASE RT/LT        "CATARACT IOL, RT/LT        SECTION      x2     COLONOSCOPY N/A 8/10/2022    Procedure: COLONOSCOPY (fv);  Surgeon: Constantin Ahn MD;  Location: [facility]     HYSTERECTOMY TOTAL ABDOMINAL, BILATERAL SALPINGO-OOPHORECTOMY, COMBINED       INJECT BLOCK MEDIAL BRANCH CERVICAL/THORACIC/LUMBAR Bilateral 10/18/2021    Procedure: Bilateral L5-S1 FACET JOINT Injection;  Surgeon: Stephanie Momin MD;  Location: [facility] OR     INJECT EPIDURAL CAUDAL N/A 2022    Procedure: INJECTION, EPIDURAL, CAUDAL;  Surgeon: Stephanie Momin MD;  Location: [facility] OR     LAMINECTOMY LUMBAR THREE+ LEVELS  2016    L3-L5     TONSILLECTOMY & ADENOIDECTOMY         Family History   Problem Relation Age of Onset     Hypertension Mother      Colon Cancer Father      Heart Disease Father      Hypertension Father      Coronary Artery Disease Father      Heart Disease Brother      Hypertension Brother      Hypertension Sister      Diabetes No family hx of      Cancer No family hx of        Reviewed past medical, surgical, and family history with patient found on new patient intake packet located in EMR [employer] tab.     SOCIAL HX: She denies smoking, using alcohol or recreational drugs.    ROS:  Specifically negative for bowel/bladder dysfunction, balance changes, headache, dizziness, foot drop, fevers, chills, appetite changes, nausea/vomiting, unexplained weight loss. Otherwise 13 systems reviewed are negative. Please see the patient's intake questionnaire from today for details.    OBJECTIVE:  /59   Pulse 81   Ht 4' 11.5\" (1.511 m)   Wt 120 lb (54.4 kg)   LMP  (LMP Unknown)   BMI 23.83 kg/m      PHYSICAL EXAMINATION:  --CONSTITUTIONAL: Vital signs as above. No acute distress. The patient is well nourished and well groomed.  --PSYCHIATRIC: The patient is awake, alert, oriented to person, place, time and answering questions appropriately with clear speech. Appropriate mood and affect   --HEENT: Sclera are non-injected.  --SKIN: " Skin over the face and posterior torso is clean, dry, intact without rashes.  --RESPIRATORY: Normal rhythm and effort. No abnormal accessory muscle breathing patterns noted.   --GROSS MOTOR: Easily arises from a seated position.  Difficulty with toe walking however is able to walk on her heels.  -- Lumbar SPINE: Inspection reveals no evidence of deformity. Range of motion is mildly limited in lumbar flexion, extension, lateral rotation.  Tenderness to palpation in the low lumbar paraspinal muscles and PSIS area.  --Lower extremity motor testing:  Normal strength throughout bilaterally.  --NEUROLOGIC: 2/4 symmetric reflexes at patella and Achilles.  Decreased sensation to light touch right greater than left in both feet.  RESULTS: Prior medical records from [facility] neurology and primary care provider were reviewed today.     Imaging:  Cervical spine Imaging was personally reviewed and interpreted today. The images were shown to the patient and the findings were explained using a spine model.       XR Hip Left 2-3 Views    Result Date: 8/1/2024  HIP LEFT TWO TO THREE VIEWS 8/1/2024 11:18 AM HISTORY: Left hip/upper thigh pain after fall. Hip pain, left. Fall, initial encounter. COMPARISON: None.     IMPRESSION:  Mild left hip arthrosis. There is no evidence of acute fracture or dislocation. Bones are demineralized. Lower lumbar fusion. TIA HARVEY MD   SYSTEM ID:  [id number]    MR Thoracic Spine w/o Contrast    Result Date: 7/30/2024  EXAM: MR THORACIC SPINE W/O CONTRAST LOCATION: [facility] DATE: 7/25/2024 INDICATION:  Imbalance. COMPARISON: MRI thoracic spine 10/28/2020. TECHNIQUE: Routine Thoracic Spine MRI without IV contrast. FINDINGS: Slight convex right upper thoracic curvature. Mild accentuation of the normal thoracic kyphosis. There is new mild compression deformity involving the central and anterior superior endplate of T11 with associated superior endplate marrow edema  compatible  with a acute or recent compression fracture. Posterior cortex appears intact and there is no retropulsion. Stable mild chronic central superior endplate height loss at L1 and to a lesser extent L2. Generalized loss of thoracic disc T2 prolongation with moderate interspace narrowing within the midthoracic region. There is partial ankylosis across the interspaces at T5-6 and T6-T7. Mildly prominent ventral spurring T3-4 and T4-5. Shallow posterior disc bulges T12-L1 and L1-L2. Facets are unremarkable. No spinal canal stenosis. Moderate left and mild right L1-L2 and mild bilateral T12-L1 foraminal narrowing. Thoracic spinal cord demonstrates grossly normal signal characteristics and morphology. Dorsal paraspinal soft tissues are unremarkable. Visualized aorta is normal in caliber. Small amount of dependent atelectasis is seen bilaterally.     IMPRESSION: 1.  There is mild central and anterior superior endplate compression deformity at T11 with less than 25% height loss. No retropulsion. Edema along the superior aspect of the T11 vertebral body compatible with an acute or recent fracture. No significant edema within the prevertebral soft tissues. No retropulsion. 2.  Shallow posterior disc bulges T12-L1 and L1-L2. No spinal canal stenosis. Mild right and moderate left L1-L2 and mild bilateral T12-L1 neural foraminal stenosis. T11 fracture called to nurse Gonzalez at 1555 hours.    MRI Cervical spine w/o contrast    Result Date: 7/25/2024  EXAM: MR CERVICAL SPINE W/O CONTRAST LOCATION: [facility] DATE: 7/25/2024 INDICATION: Neck pain; hx Spine surgery; No suspected hardware failure; None of the following: New acute radiculopathy, weakness, or worsening neck pain; No suspected cervical disc disease COMPARISON: None. TECHNIQUE: MRI Cervical Spine without IV contrast. FINDINGS: Preserved vertebral body heights and marrow signal. Exaggerated cervical lordosis. Normal cord signal. No acute  extraspinal abnormality. Craniovertebral junction and C1-C2: Normal. C2-C3: Preserved disc height and signal for age. No cord compression. Normal facets for age. No high-grade neural foraminal stenosis. C3-C4: Preserved disc height and signal for age. No cord compression. Bilateral facet arthropathy contributes to moderate neural foraminal stenosis. C4-C5: Preserved disc height and signal for age. No cord compression. Bilateral facet arthropathy contributes to mild to moderate neural foraminal stenosis. C5-C6: Preserved disc height and signal for age. No cord compression. Bilateral uncovertebral, facet arthropathy contributes to moderate bilateral neural foraminal stenosis. C6-C7: Preserved disc height and signal for age. No cord compression. Normal facets for age. No high-grade neural foraminal stenosis. C7-T1: Preserved disc height and signal for age. No cord compression. Normal facets for age. No high-grade neural foraminal stenosis.     IMPRESSION: 1.  No cord compression. Normal cord signal. Multilevel cervical spondylosis, as above. 2.  No evidence of acute bone or soft tissue injury involving the cervical spine.           Again, thank you for allowing me to participate in the care of your patient.        Sincerely,        Donta Carlin, DO

## 2024-08-19 NOTE — TELEPHONE ENCOUNTER
Dr. Talley is out until Thursday, Pt notes she is going to run out of pills before she is seen by Dr. Talley..   Kerri is on the 2 pills daily dosage. Are you able to refill until she is seen on 8/27      # Clinical exam and subtle biopsy findings most consistent with dermatomyositis. Skin involvement improving, still no muscle involvement.  - Reviewed that patient's dermatomyositis is affecting the skin only (to date). Reviewed that this is an autoimmune condition. Discussed that we would like to rule out any underlying malignancy. Reviewed that labs and scans since last visit were reassuring. Recommended completing a few more blood tests today.  - Discussed that we would like to decrease prednisone dose today.  - Prednisone 2.5mg pills instructions:              - Week 1: Alternate 4 pills daily with 3 pills daily              - Week 2: Take 3 pills daily              - Week 3: Alternate 3 pills daily with 2 pills daily              - Week 4 and beyond: Take 2 pills daily until you return to clinic  - Continue methotrexate 15 mg weekly.  - As needed, resume clobetasol ointment BID for itching.  - Will complete labs today: CBC with diff, CMP, peripheral smear, SPEP.

## 2024-08-19 NOTE — PATIENT INSTRUCTIONS
I ordered an x-ray of your lumbar spine as well as MRI of your pelvis.  Radiology will reach out to you to get these scheduled.    I also ordered calcitonin nasal spray for you.    ~Please call Nurse Navigation line [phone] with any questions or concerns about your treatment plan, if symptoms worsen and you would like to be seen urgently, or if you have problems controlling bladder and bowel function.

## 2024-08-19 NOTE — PROGRESS NOTES
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  X-ray of left hip was negative for fracture.  Patient has current T11 acute compression fracture.  The pain that she is currently having is well below the T11 area where she has acute/subacute compression fracture.  With her history of osteoporosis and falls I have ordered x-rays of the lumbar spine and flexion-extension views as well as MRI of pelvis to look for insufficiency fractures.  Overall patient's physical exam is reassuring that she has normal strength and reflexes in her lower extremities.    Patient is neurologically intact on exam. No myelopathic or red flag symptoms.      Oswestry (DAYLIN) Questionnaire        8/16/2024     4:15 PM   OSWESTRY DISABILITY INDEX   Count 9   Sum 21   Oswestry Score (%) 46.67 %       Neck Disability Index:      8/16/2024     4:18 PM   Neck Disability Index (  Heath H. and Tasha ANGELES. 1991. All rights reserved.; used with permission)   SECTION 1 - PAIN INTENSITY 1   SECTION 2 - PERSONAL CARE 0   SECTION 3 - LIFTING 3   SECTION 4 - READING 1   SECTION 5 - HEADACHES 1   SECTION 6 - CONCENTRATION 0   SECTION 7 - WORK 2   SECTION 8 - DRIVING 1   SECTION 9 - SLEEPING 5   SECTION 10 - RECREATION 1   Count 10   Sum 15   Raw Score: /50 15   Neck Disability Index Score: (%) 30 %     Diagnoses and all orders  for this visit:  Compression fracture of T11 vertebra, initial encounter (H)  -     Spine  Referral  -     calcitonin, salmon, (MIACALCIN) 200 UNIT/ACT nasal spray; Spray 1 spray into one nostril alternating nostrils daily for 28 days Alternate nostril each day.  History of lumbar fusion  -     XR LUMBAR SPINE G/E 4 VW; Future  Sacral back pain  -     MR Pelvis Bone wo Contrast; Future  Myofascial pain  -     MR Pelvis Bone wo Contrast; Future  -     XR LUMBAR SPINE G/E 4 VW; Future  Age-related osteoporosis with current pathological fracture, initial encounter  -     MR Pelvis Bone wo Contrast; Future  -     XR LUMBAR SPINE G/E 4 VW; Future  -     calcitonin, salmon, (MIACALCIN) 200 UNIT/ACT nasal spray; Spray 1 spray into one nostril alternating nostrils daily for 28 days Alternate nostril each day.     PLAN:  Reviewed spine anatomy and disease process. Discussed diagnosis and treatment options with the patient today. A shared decision making model was used. The patient's values and choices were respected. The following represents what was discussed and decided upon by the provider and the patient.     -DIAGNOSTIC TESTS:  Images were personally reviewed and interpreted and explained to patient today using spine model.   -- MRI of thoracic spine dated 7/25/2024 is personally reviewed images interpreted and discussed with the patient.  It does show mild anterior superior endplate compression fracture with less than 25% height loss and no retropulsion.  There is edema along the superior aspect of the T11 vertebral body compatible with acute or recent fracture.  -- MRI of cervical spine dated 7/25/2024 is personally reviewed images interpreted and discussed with the patient.  There is no cord compression or cord signal.  There is facet arthropathy at C4-5, C5-6 and C3-4.  -- X-ray of left hip dated 8/1/2024 shows mild left hip arthrosis.  -- I ordered MRI of the pelvis and x-ray of the lumbar spine.  Once  have reviewed these I will have one of our nurses give her a call with results and recommendations.    -PHYSICAL THERAPY: Patient has been undergoing physical therapy for low back pain and imbalance.  Secondary to severity of pain she has been having a pause on physical therapy recently.  After imaging may order more physical therapy.    -MEDICATIONS: I ordered calcitonin nasal spray for which she should alternate between each nostril daily.  She may take acetaminophen 1000 mg 3 times daily.  She has been prescribed tramadol by her primary care provider.  -  Discussed multiple medication options today with patient. Discussed risks, side effects, and proper use of medications. Patient verbalized understanding.    -INTERVENTIONS: No interventions at this time.  Discussed risks and benefits of injections with patient today.    -PATIENT EDUCATION: We discussed pain management in a multiple fashion including physical therapy, medication management, possible future injections.    -FOLLOW-UP:   Patient will follow-up in 4 weeks.    Advised patient to call the [facility]ine Center if symptoms worsen or you have problems controlling bladder and bowel function.   ______________________________________________________________________    SUBJECTIVE:   Kerri Rocha  is a 89 year old female who presents today for new patient evaluation of imbalance.  Patient was seen by neurology on 7/1/2024 with worsening balance.  She has a history of lumbar stenosis and decompression surgery and fusion in 2016.  MRI of cervical and thoracic spine were ordered and a physical therapy referral was placed.  After T11 compression fracture was found on MRI patient was referred to the spine center for further evaluation.  Patient notes that she has a long history of low back pain with lumbar fusion in 2016.  She has had continued tailbone area pain thereafter which is worsened after fall.  Pain today is 7/10 is worst is 10/10 at its best is 5/10.  She  notes that she has had 2 recent falls in the middle of May and in June.  She was prescribed tramadol at that time which she feels is helpful.  She is taking gabapentin for peripheral neuropathy.  She notes that the pain in her low back and left hip area as well as tailbone area have worsened since her falls.  Pain is sharp.  She used to have lumbar injections, however they were not helpful and no longer gets these.  She denies any bowel or bladder changes, fevers, chills, unintentional weight loss.    -Treatment to Date: MRI of cervical and thoracic spine on 7/25/2024.  X-ray of left hip dated 8/1/2024.  Lumbar fusion.  Current physical therapy.    -Medications:    Current Outpatient Medications   Medication Sig Dispense Refill    calcitonin, salmon, (MIACALCIN) 200 UNIT/ACT nasal spray Spray 1 spray into one nostril alternating nostrils daily for 28 days Alternate nostril each day. 3.7 mL 0    amLODIPine (NORVASC) 5 MG tablet Take 1 tablet (5 mg) by mouth daily 90 tablet 3    aspirin 81 MG EC tablet Take 81 mg by mouth daily      atorvastatin (LIPITOR) 20 MG tablet Take 1 tablet (20 mg) by mouth at bedtime 90 tablet 3    BOOSTRIX 5-2.5-18.5 LF-MCG/0.5 MYRIAM injection       calcium carbonate (OS-CAMMIE 600 MG [place]. [place]) 600 MG tablet Take 1,200 mg by mouth daily       Cholecalciferol (VITAMIN D3) 2000 UNITS CAPS 1,000 Int'l Units daily      Diphenhydramine-APAP, sleep, (TYLENOL PM EXTRA STRENGTH PO) Take by mouth nightly as needed      FLOWFLEX COVID-19 AG HOME TEST KIT USE TO TEST AT HOME FOR COVID      folic acid (FOLVITE) 1 MG tablet Take 1 tablet (1 mg) by mouth daily On days you don't take methotrexate 100 tablet 3    gabapentin (NEURONTIN) 400 MG capsule TAKE 2 CAPSULES IN THE MORNING, 2 CAPSULES AT NOON AND 1 CAPSULE IN THE EVENING 150 capsule 4    lisinopril (ZESTRIL) 10 MG tablet Take 1 tablet (10 mg) by mouth daily 90 tablet 3    methotrexate 2.5 MG tablet Take six tablets (15mg total) once WEEKLY. 24 tablet 1     omega 3 1000 MG CAPS Take 2 g by mouth      predniSONE (DELTASONE) 2.5 MG tablet This week: alternate 4 pills daily with 3 pills daily, every other day.  Week 2: 3 pills daily.  Week 3: alternate 3 pills daily and 2 pills daily.  Week 4 and beyond: 2 pills a day, until you return to clinic. 60 tablet 1    traMADol (ULTRAM) 50 MG tablet Take 1 tablet (50 mg) by mouth every 6 hours as needed for severe pain or moderate to severe pain 15 tablet 0     No current facility-administered medications for this visit.       Allergies   Allergen Reactions    Codeine     Sulfa Antibiotics     Sulfasalazine Other (See Comments)    Sulfate Rash       Past Medical History:   Diagnosis Date    HLD (hyperlipidemia)     HTN (hypertension)     Idiopathic neuropathy     Vitamin D deficiency         Patient Active Problem List   Diagnosis    Essential hypertension    Idiopathic peripheral neuropathy    Pseudophakia of both eyes    Regular astigmatism of both eyes    Localized osteoporosis without current pathological fracture - 2020 waiting for dental work to start fosamax.     Mixed hyperlipidemia    Gait disorder    Vertigo    Chronic kidney disease, stage 3 (H)    Lumbar spondylosis    Lumbar facet arthropathy    Neural foraminal stenosis of lumbosacral spine    S/P lumbar spinal fusion    Rash and nonspecific skin eruption    Rash    Dermatomyositis (H)    Encounter for long-term (current) use of high-risk medication    Imbalance    Hip pain, left    Chronic, continuous use of opioids - Tramadol       Past Surgical History:   Procedure Laterality Date    APPENDECTOMY      BREAST BIOPSY, CORE RT/LT      CARPAL TUNNEL RELEASE RT/LT      CATARACT IOL, RT/LT       SECTION      x2    COLONOSCOPY N/A 8/10/2022    Procedure: COLONOSCOPY (fv);  Surgeon: Constantin Ahn MD;  Location: [facility]    HYSTERECTOMY TOTAL ABDOMINAL, BILATERAL SALPINGO-OOPHORECTOMY, COMBINED      INJECT BLOCK MEDIAL BRANCH CERVICAL/THORACIC/LUMBAR  "Bilateral 10/18/2021    Procedure: Bilateral L5-S1 FACET JOINT Injection;  Surgeon: Stephanie Momin MD;  Location: [facility] OR    INJECT EPIDURAL CAUDAL N/A 9/14/2022    Procedure: INJECTION, EPIDURAL, CAUDAL;  Surgeon: Stephanie Momin MD;  Location: [facility] OR    LAMINECTOMY LUMBAR THREE+ LEVELS  03/22/2016    L3-L5    TONSILLECTOMY & ADENOIDECTOMY         Family History   Problem Relation Age of Onset    Hypertension Mother     Colon Cancer Father     Heart Disease Father     Hypertension Father     Coronary Artery Disease Father     Heart Disease Brother     Hypertension Brother     Hypertension Sister     Diabetes No family hx of     Cancer No family hx of        Reviewed past medical, surgical, and family history with patient found on new patient intake packet located in EMR [employer] tab.     SOCIAL HX: She denies smoking, using alcohol or recreational drugs.    ROS:  Specifically negative for bowel/bladder dysfunction, balance changes, headache, dizziness, foot drop, fevers, chills, appetite changes, nausea/vomiting, unexplained weight loss. Otherwise 13 systems reviewed are negative. Please see the patient's intake questionnaire from today for details.    OBJECTIVE:  /59   Pulse 81   Ht 4' 11.5\" (1.511 m)   Wt 120 lb (54.4 kg)   LMP  (LMP Unknown)   BMI 23.83 kg/m      PHYSICAL EXAMINATION:  --CONSTITUTIONAL: Vital signs as above. No acute distress. The patient is well nourished and well groomed.  --PSYCHIATRIC: The patient is awake, alert, oriented to person, place, time and answering questions appropriately with clear speech. Appropriate mood and affect   --HEENT: Sclera are non-injected.  --SKIN: Skin over the face and posterior torso is clean, dry, intact without rashes.  --RESPIRATORY: Normal rhythm and effort. No abnormal accessory muscle breathing patterns noted.   --GROSS MOTOR: Easily arises from a seated position.  Difficulty with toe walking however is able to walk on her heels.  -- Lumbar " SPINE: Inspection reveals no evidence of deformity. Range of motion is mildly limited in lumbar flexion, extension, lateral rotation.  Tenderness to palpation in the low lumbar paraspinal muscles and PSIS area.  --Lower extremity motor testing:  Normal strength throughout bilaterally.  --NEUROLOGIC: 2/4 symmetric reflexes at patella and Achilles.  Decreased sensation to light touch right greater than left in both feet.  RESULTS: Prior medical records from [facility] neurology and primary care provider were reviewed today.     Imaging:  Cervical spine Imaging was personally reviewed and interpreted today. The images were shown to the patient and the findings were explained using a spine model.       XR Hip Left 2-3 Views    Result Date: 8/1/2024  HIP LEFT TWO TO THREE VIEWS 8/1/2024 11:18 AM HISTORY: Left hip/upper thigh pain after fall. Hip pain, left. Fall, initial encounter. COMPARISON: None.     IMPRESSION:  Mild left hip arthrosis. There is no evidence of acute fracture or dislocation. Bones are demineralized. Lower lumbar fusion. TIA HARVEY MD   SYSTEM ID:  [id number]    MR Thoracic Spine w/o Contrast    Result Date: 7/30/2024  EXAM: MR THORACIC SPINE W/O CONTRAST LOCATION: [facility] DATE: 7/25/2024 INDICATION:  Imbalance. COMPARISON: MRI thoracic spine 10/28/2020. TECHNIQUE: Routine Thoracic Spine MRI without IV contrast. FINDINGS: Slight convex right upper thoracic curvature. Mild accentuation of the normal thoracic kyphosis. There is new mild compression deformity involving the central and anterior superior endplate of T11 with associated superior endplate marrow edema compatible  with a acute or recent compression fracture. Posterior cortex appears intact and there is no retropulsion. Stable mild chronic central superior endplate height loss at L1 and to a lesser extent L2. Generalized loss of thoracic disc T2 prolongation with moderate interspace narrowing within the  midthoracic region. There is partial ankylosis across the interspaces at T5-6 and T6-T7. Mildly prominent ventral spurring T3-4 and T4-5. Shallow posterior disc bulges T12-L1 and L1-L2. Facets are unremarkable. No spinal canal stenosis. Moderate left and mild right L1-L2 and mild bilateral T12-L1 foraminal narrowing. Thoracic spinal cord demonstrates grossly normal signal characteristics and morphology. Dorsal paraspinal soft tissues are unremarkable. Visualized aorta is normal in caliber. Small amount of dependent atelectasis is seen bilaterally.     IMPRESSION: 1.  There is mild central and anterior superior endplate compression deformity at T11 with less than 25% height loss. No retropulsion. Edema along the superior aspect of the T11 vertebral body compatible with an acute or recent fracture. No significant edema within the prevertebral soft tissues. No retropulsion. 2.  Shallow posterior disc bulges T12-L1 and L1-L2. No spinal canal stenosis. Mild right and moderate left L1-L2 and mild bilateral T12-L1 neural foraminal stenosis. T11 fracture called to nurse Gnozalez at 1555 hours.    MRI Cervical spine w/o contrast    Result Date: 7/25/2024  EXAM: MR CERVICAL SPINE W/O CONTRAST LOCATION: [facility] DATE: 7/25/2024 INDICATION: Neck pain; hx Spine surgery; No suspected hardware failure; None of the following: New acute radiculopathy, weakness, or worsening neck pain; No suspected cervical disc disease COMPARISON: None. TECHNIQUE: MRI Cervical Spine without IV contrast. FINDINGS: Preserved vertebral body heights and marrow signal. Exaggerated cervical lordosis. Normal cord signal. No acute extraspinal abnormality. Craniovertebral junction and C1-C2: Normal. C2-C3: Preserved disc height and signal for age. No cord compression. Normal facets for age. No high-grade neural foraminal stenosis. C3-C4: Preserved disc height and signal for age. No cord compression. Bilateral facet arthropathy  contributes to moderate neural foraminal stenosis. C4-C5: Preserved disc height and signal for age. No cord compression. Bilateral facet arthropathy contributes to mild to moderate neural foraminal stenosis. C5-C6: Preserved disc height and signal for age. No cord compression. Bilateral uncovertebral, facet arthropathy contributes to moderate bilateral neural foraminal stenosis. C6-C7: Preserved disc height and signal for age. No cord compression. Normal facets for age. No high-grade neural foraminal stenosis. C7-T1: Preserved disc height and signal for age. No cord compression. Normal facets for age. No high-grade neural foraminal stenosis.     IMPRESSION: 1.  No cord compression. Normal cord signal. Multilevel cervical spondylosis, as above. 2.  No evidence of acute bone or soft tissue injury involving the cervical spine.

## 2024-08-27 ENCOUNTER — HOSPITAL ENCOUNTER (OUTPATIENT)
Dept: RADIOLOGY | Facility: CLINIC | Age: 89
Discharge: HOME | End: 2024-08-27
Attending: PAIN MEDICINE
Payer: MEDICARE

## 2024-08-27 ENCOUNTER — HOSPITAL ENCOUNTER (OUTPATIENT)
Dept: MRI IMAGING | Facility: CLINIC | Age: 89
Discharge: HOME | End: 2024-08-27
Attending: PAIN MEDICINE
Payer: MEDICARE

## 2024-08-27 DIAGNOSIS — M79.18 MYOFASCIAL PAIN: ICD-10-CM

## 2024-08-27 DIAGNOSIS — Z98.1 HISTORY OF LUMBAR FUSION: ICD-10-CM

## 2024-08-27 DIAGNOSIS — M80.00XA AGE-RELATED OSTEOPOROSIS WITH CURRENT PATHOLOGICAL FRACTURE, INITIAL ENCOUNTER: ICD-10-CM

## 2024-08-27 DIAGNOSIS — M53.3 SACRAL BACK PAIN: ICD-10-CM

## 2024-08-27 PROCEDURE — 72110 X-RAY EXAM L-2 SPINE 4/>VWS: CPT

## 2024-08-27 PROCEDURE — 72195 MRI PELVIS W/O DYE: CPT | Mod: MG

## 2024-08-28 ENCOUNTER — TELEPHONE (OUTPATIENT)
Dept: DERMATOLOGY | Facility: CLINIC | Age: 89
End: 2024-08-28
Payer: MEDICARE

## 2024-08-28 ENCOUNTER — TELEPHONE (OUTPATIENT)
Dept: PHYSICAL MEDICINE AND REHAB | Facility: CLINIC | Age: 89
End: 2024-08-28
Payer: MEDICARE

## 2024-08-28 NOTE — TELEPHONE ENCOUNTER
----- Message from Donta Carlin sent at 8/28/2024  7:49 AM CDT -----  Please call the patient and let her know that I reviewed x-ray of your lumbar spine.  It does show unchanged fractures as well as solid fusion.  We will wait MRI of pelvis prior to having you restart physical therapy.

## 2024-08-28 NOTE — TELEPHONE ENCOUNTER
Spoke with patient and informed her we can't tell her she can't come in but ask her to wear a mask. Patient asked to be rescheduled. Patient has been rescheduled.    Jaky VALDEZ CMA

## 2024-08-28 NOTE — TELEPHONE ENCOUNTER
[facility]    Phone Message    May a detailed message be left on voicemail: yes     Reason for Call: Other: Pt is calling because she would like to know if she needs to reschedule her appt for 8/29, she was exposed to covid and has a runny nose and had a fever last night, please call back thanks!     Action Taken: Message routed to:  [facility] DERM    Travel Screening: Not Applicable     Date of Service:

## 2024-08-29 DIAGNOSIS — M33.13 DERMATOMYOSITIS (H): ICD-10-CM

## 2024-08-29 DIAGNOSIS — R21 RASH: ICD-10-CM

## 2024-09-05 RX ORDER — PREDNISONE 2.5 MG/1
TABLET ORAL
Qty: 64 TABLET | Refills: 0 | Status: SHIPPED | OUTPATIENT
Start: 2024-09-05 | End: 2024-09-10

## 2024-09-05 RX ORDER — METHOTREXATE 2.5 MG/1
TABLET ORAL
Qty: 24 TABLET | Refills: 0 | Status: SHIPPED | OUTPATIENT
Start: 2024-09-05 | End: 2024-09-10

## 2024-09-10 ENCOUNTER — LAB (OUTPATIENT)
Dept: LAB | Facility: CLINIC | Age: 89
End: 2024-09-10
Payer: MEDICARE

## 2024-09-10 ENCOUNTER — OFFICE VISIT (OUTPATIENT)
Dept: DERMATOLOGY | Facility: CLINIC | Age: 89
End: 2024-09-10
Attending: DERMATOLOGY
Payer: MEDICARE

## 2024-09-10 DIAGNOSIS — R21 RASH: ICD-10-CM

## 2024-09-10 DIAGNOSIS — M33.13 DERMATOMYOSITIS (H): ICD-10-CM

## 2024-09-10 DIAGNOSIS — Z79.899 ENCOUNTER FOR LONG-TERM (CURRENT) USE OF HIGH-RISK MEDICATION: Primary | ICD-10-CM

## 2024-09-10 DIAGNOSIS — Z79.899 ENCOUNTER FOR LONG-TERM (CURRENT) USE OF HIGH-RISK MEDICATION: ICD-10-CM

## 2024-09-10 LAB
ALBUMIN SERPL BCG-MCNC: 4.4 G/DL (ref 3.5–5.2)
ALP SERPL-CCNC: 84 U/L (ref 40–150)
ALT SERPL W P-5'-P-CCNC: 18 U/L (ref 0–50)
ANION GAP SERPL CALCULATED.3IONS-SCNC: 13 MMOL/L (ref 7–15)
AST SERPL W P-5'-P-CCNC: 31 U/L (ref 0–45)
BASOPHILS # BLD AUTO: 0.1 10E3/UL (ref 0–0.2)
BASOPHILS NFR BLD AUTO: 1 %
BILIRUB SERPL-MCNC: 0.3 MG/DL
BUN SERPL-MCNC: 32.3 MG/DL (ref 8–23)
CALCIUM SERPL-MCNC: 10.1 MG/DL (ref 8.8–10.4)
CHLORIDE SERPL-SCNC: 101 MMOL/L (ref 98–107)
CREAT SERPL-MCNC: 0.94 MG/DL (ref 0.51–0.95)
EGFRCR SERPLBLD CKD-EPI 2021: 58 ML/MIN/1.73M2
EOSINOPHIL # BLD AUTO: 0 10E3/UL (ref 0–0.7)
EOSINOPHIL NFR BLD AUTO: 0 %
ERYTHROCYTE [DISTWIDTH] IN BLOOD BY AUTOMATED COUNT: 14.5 % (ref 10–15)
GLUCOSE SERPL-MCNC: 116 MG/DL (ref 70–99)
HCO3 SERPL-SCNC: 24 MMOL/L (ref 22–29)
HCT VFR BLD AUTO: 36.3 % (ref 35–47)
HGB BLD-MCNC: 12.1 G/DL (ref 11.7–15.7)
IMM GRANULOCYTES # BLD: 0 10E3/UL
IMM GRANULOCYTES NFR BLD: 0 %
LYMPHOCYTES # BLD AUTO: 0.9 10E3/UL (ref 0.8–5.3)
LYMPHOCYTES NFR BLD AUTO: 12 %
MCH RBC QN AUTO: 33.7 PG (ref 26.5–33)
MCHC RBC AUTO-ENTMCNC: 33.3 G/DL (ref 31.5–36.5)
MCV RBC AUTO: 101 FL (ref 78–100)
MONOCYTES # BLD AUTO: 0.5 10E3/UL (ref 0–1.3)
MONOCYTES NFR BLD AUTO: 7 %
NEUTROPHILS # BLD AUTO: 5.9 10E3/UL (ref 1.6–8.3)
NEUTROPHILS NFR BLD AUTO: 80 %
NRBC # BLD AUTO: 0 10E3/UL
NRBC BLD AUTO-RTO: 0 /100
PLATELET # BLD AUTO: 338 10E3/UL (ref 150–450)
POTASSIUM SERPL-SCNC: 4.4 MMOL/L (ref 3.4–5.3)
PROT SERPL-MCNC: 7.2 G/DL (ref 6.4–8.3)
RBC # BLD AUTO: 3.59 10E6/UL (ref 3.8–5.2)
SODIUM SERPL-SCNC: 138 MMOL/L (ref 135–145)
WBC # BLD AUTO: 7.3 10E3/UL (ref 4–11)

## 2024-09-10 PROCEDURE — 99214 OFFICE O/P EST MOD 30 MIN: CPT | Performed by: DERMATOLOGY

## 2024-09-10 PROCEDURE — 85025 COMPLETE CBC W/AUTO DIFF WBC: CPT | Performed by: PATHOLOGY

## 2024-09-10 PROCEDURE — 80053 COMPREHEN METABOLIC PANEL: CPT | Performed by: PATHOLOGY

## 2024-09-10 PROCEDURE — 36415 COLL VENOUS BLD VENIPUNCTURE: CPT | Performed by: PATHOLOGY

## 2024-09-10 RX ORDER — PREDNISONE 2.5 MG/1
TABLET ORAL
Qty: 30 TABLET | Refills: 3 | Status: SHIPPED | OUTPATIENT
Start: 2024-09-10

## 2024-09-10 RX ORDER — METHOTREXATE 2.5 MG/1
TABLET ORAL
Qty: 24 TABLET | Refills: 0 | Status: SHIPPED | OUTPATIENT
Start: 2024-09-10

## 2024-09-10 NOTE — PATIENT INSTRUCTIONS
Please return in 3 months for a follow-up appointment.     Continue to take folic acid, take one prednisone daily, and methotrexate five times per week. Can use clobetazole and ketoconazole as needed for symptom relief.      Please also go downstairs to get blood labs drawn.

## 2024-09-10 NOTE — PROGRESS NOTES
[facility] Dermatology Note  Encounter Date: Sep 10, 2024  Office Visit     Dermatology Problem List:  1. 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 Continue to rule out underlying malignancy.   Reduced prednisone (current 1 2.5 mg tablet daily, reduced from previous two 2.5 mg tablet daily) and methotrexate (current 12.5 mg weekly, reduced from previous 15 mg weekly) dose today. Continue folic acid.   Discussed ketoconazole and clobetasol as needed for symptom (itch) relief.   Will complete labs today for methotrexate monitoring: CBC with diff, CMP    Follow-up: 3 month(s) in-person, or earlier for new or changing lesions    Staff and Medical Student:     Minoo Gayle, MS3    I was present with the medical student who participated in the service and in the documentation.  I have verified the history and personally performed the physical exam and medical decision making.  I agree with the assessment and plan of care as documented in the note.      Rakesh Talley MD  Dermatology Attending    ____________________________________________    CC: Derm Problem (Dermatomyositis:/Rash on scalp= itching is seldom, only at night time/Check areas on fingers/)    HPI:  Ms. Kerri Rocha is a(n) 89 year old female who presents today as a return patient for dermatomyositis medication concerns (would like to reduce medications).     Kerri's dermatomyositis is overall improving with reduced itching- although some itching still on some nights.     No change in muscle weakness. No chills or weight loss.     Patient is taking prednisone (5 mg daily), methotrexate 15 mg, and folic acid. She is unsure if she should start taking the prescribed ketaconazole. Has not used clobetasol since last visit in June 2024.     Summer 2024 had a  compressed fracture in tailbone and was prescribed tramadole.     Patient is otherwise feeling well, without additional skin concerns.    Labs:  Ordered CBC with differential.     Physical Exam:  Vitals: LMP  (LMP Unknown)   SKIN: Focused examination of face, hands, arms, and scalp was performed.  - mild erythema on scale with a few yellow-white scaling patches  - pink to red patches on bilateral knuckles  - pink patch on right elbow  - No other lesions of concern on areas examined.     Medications:  Current Outpatient Medications   Medication Sig Dispense Refill    amLODIPine (NORVASC) 5 MG tablet Take 1 tablet (5 mg) by mouth daily 90 tablet 3    aspirin 81 MG EC tablet Take 81 mg by mouth daily      atorvastatin (LIPITOR) 20 MG tablet Take 1 tablet (20 mg) by mouth at bedtime 90 tablet 3    BOOSTRIX 5-2.5-18.5 LF-MCG/0.5 MYRIAM injection       calcitonin, salmon, (MIACALCIN) 200 UNIT/ACT nasal spray Spray 1 spray into one nostril alternating nostrils daily for 28 days Alternate nostril each day. 3.7 mL 0    calcium carbonate (OS-CAMMIE 600 MG [place]. [place]) 600 MG tablet Take 1,200 mg by mouth daily       Cholecalciferol (VITAMIN D3) 2000 UNITS CAPS 1,000 Int'l Units daily      Diphenhydramine-APAP, sleep, (TYLENOL PM EXTRA STRENGTH PO) Take by mouth nightly as needed      FLOWFLEX COVID-19 AG HOME TEST KIT USE TO TEST AT HOME FOR COVID      folic acid (FOLVITE) 1 MG tablet Take 1 tablet (1 mg) by mouth daily On days you don't take methotrexate 100 tablet 3    gabapentin (NEURONTIN) 400 MG capsule TAKE 2 CAPSULES IN THE MORNING, 2 CAPSULES AT NOON AND 1 CAPSULE IN THE EVENING 150 capsule 4    lisinopril (ZESTRIL) 10 MG tablet Take 1 tablet (10 mg) by mouth daily 90 tablet 3    methotrexate 2.5 MG tablet Take six tablets (15mg total) by mouth once WEEKLY 24 tablet 0    omega 3 1000 MG CAPS Take 2 g by mouth      predniSONE (DELTASONE) 2.5 MG tablet TAKE 4 TABS BY MOUTH ALTERNATING with 3 TABS every other day X1 WEEK. THEN 3  TABS DAILY X1  WEEK. THEN 3 TABS ALTERNATING WITH 2 TABS X1 WEEK. THEN 2 TABS DAILY until you return to clinic. 64 tablet 0    traMADol (ULTRAM) 50 MG tablet Take 1 tablet (50 mg) by mouth every 6 hours as needed for severe pain or moderate to severe pain 15 tablet 0     No current facility-administered medications for this visit.      Past Medical History:   Patient Active Problem List   Diagnosis    Essential hypertension    Idiopathic peripheral neuropathy    Pseudophakia of both eyes    Regular astigmatism of both eyes    Localized osteoporosis without current pathological fracture - 8/2020 waiting for dental work to start fosamax.     Mixed hyperlipidemia    Gait disorder    Vertigo    Chronic kidney disease, stage 3 (H)    Lumbar spondylosis    Lumbar facet arthropathy    Neural foraminal stenosis of lumbosacral spine    S/P lumbar spinal fusion    Rash and nonspecific skin eruption    Rash    Dermatomyositis (H)    Encounter for long-term (current) use of high-risk medication    Imbalance    Hip pain, left    Chronic, continuous use of opioids - Tramadol     Past Medical History:   Diagnosis Date    HLD (hyperlipidemia)     HTN (hypertension)     Idiopathic neuropathy     Vitamin D deficiency         CC Rakesh Talley MD  [address]  [place] [address] on close of this encounter.

## 2024-09-10 NOTE — LETTER
9/10/2024       RE: Kerri Rocha  [address]  [facility] [place] [address]     Dear Colleague,    Thank you for referring your patient, Kerri Rocha, to the [facility] [place] at [facility]. Please see a copy of my visit note below.    [facility] Dermatology Note  Encounter Date: Sep 10, 2024  Office Visit     Dermatology Problem List:  1. 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 Continue to rule out underlying malignancy.   Reduced prednisone (current 1 2.5 mg tablet daily, reduced from previous two 2.5 mg tablet daily) and methotrexate (current 12.5 mg weekly, reduced from previous 15 mg weekly) dose today. Continue folic acid.   Discussed ketoconazole and clobetasol as needed for symptom (itch) relief.   Will complete labs today for methotrexate monitoring: CBC with diff, CMP    Follow-up: 3 month(s) in-person, or earlier for new or changing lesions    Staff and Medical Student:     Minoo Gayle, MS3    I was present with the medical student who participated in the service and in the documentation.  I have verified the history and personally performed the physical exam and medical decision making.  I agree with the assessment and plan of care as documented in the note.      Rakesh Talley MD  Dermatology Attending    ____________________________________________    CC: Derm Problem (Dermatomyositis:/Rash on scalp= itching is seldom, only at night time/Check areas on fingers/)    HPI:  Ms. Kerri Rocha is a(n) 89 year old female who presents today as a return patient for dermatomyositis medication concerns (would like to reduce medications).     Peggy dermatomyositis is overall improving with reduced itching- although some  itching still on some nights.     No change in muscle weakness. No chills or weight loss.     Patient is taking prednisone (5 mg daily), methotrexate 15 mg, and folic acid. She is unsure if she should start taking the prescribed ketaconazole. Has not used clobetasol since last visit in June 2024.     Summer 2024 had a compressed fracture in tailbone and was prescribed tramadole.     Patient is otherwise feeling well, without additional skin concerns.    Labs:  Ordered CBC with differential.     Physical Exam:  Vitals: LMP  (LMP Unknown)   SKIN: Focused examination of face, hands, arms, and scalp was performed.  - mild erythema on scale with a few yellow-white scaling patches  - pink to red patches on bilateral knuckles  - pink patch on right elbow  - No other lesions of concern on areas examined.     Medications:  Current Outpatient Medications   Medication Sig Dispense Refill     amLODIPine (NORVASC) 5 MG tablet Take 1 tablet (5 mg) by mouth daily 90 tablet 3     aspirin 81 MG EC tablet Take 81 mg by mouth daily       atorvastatin (LIPITOR) 20 MG tablet Take 1 tablet (20 mg) by mouth at bedtime 90 tablet 3     BOOSTRIX 5-2.5-18.5 LF-MCG/0.5 MYRIAM injection        calcitonin, salmon, (MIACALCIN) 200 UNIT/ACT nasal spray Spray 1 spray into one nostril alternating nostrils daily for 28 days Alternate nostril each day. 3.7 mL 0     calcium carbonate (OS-CAMMIE 600 MG [place]. [place]) 600 MG tablet Take 1,200 mg by mouth daily        Cholecalciferol (VITAMIN D3) 2000 UNITS CAPS 1,000 Int'l Units daily       Diphenhydramine-APAP, sleep, (TYLENOL PM EXTRA STRENGTH PO) Take by mouth nightly as needed       FLOWFLEX COVID-19 AG HOME TEST KIT USE TO TEST AT HOME FOR COVID       folic acid (FOLVITE) 1 MG tablet Take 1 tablet (1 mg) by mouth daily On days you don't take methotrexate 100 tablet 3     gabapentin (NEURONTIN) 400 MG capsule TAKE 2 CAPSULES IN THE MORNING, 2 CAPSULES AT NOON AND 1 CAPSULE IN THE EVENING 150 capsule 4      lisinopril (ZESTRIL) 10 MG tablet Take 1 tablet (10 mg) by mouth daily 90 tablet 3     methotrexate 2.5 MG tablet Take six tablets (15mg total) by mouth once WEEKLY 24 tablet 0     omega 3 1000 MG CAPS Take 2 g by mouth       predniSONE (DELTASONE) 2.5 MG tablet TAKE 4 TABS BY MOUTH ALTERNATING with 3 TABS every other day X1 WEEK. THEN 3 TABS DAILY X1  WEEK. THEN 3 TABS ALTERNATING WITH 2 TABS X1 WEEK. THEN 2 TABS DAILY until you return to clinic. 64 tablet 0     traMADol (ULTRAM) 50 MG tablet Take 1 tablet (50 mg) by mouth every 6 hours as needed for severe pain or moderate to severe pain 15 tablet 0     No current facility-administered medications for this visit.      Past Medical History:   Patient Active Problem List   Diagnosis     Essential hypertension     Idiopathic peripheral neuropathy     Pseudophakia of both eyes     Regular astigmatism of both eyes     Localized osteoporosis without current pathological fracture - 8/2020 waiting for dental work to start fosamax.      Mixed hyperlipidemia     Gait disorder     Vertigo     Chronic kidney disease, stage 3 (H)     Lumbar spondylosis     Lumbar facet arthropathy     Neural foraminal stenosis of lumbosacral spine     S/P lumbar spinal fusion     Rash and nonspecific skin eruption     Rash     Dermatomyositis (H)     Encounter for long-term (current) use of high-risk medication     Imbalance     Hip pain, left     Chronic, continuous use of opioids - Tramadol     Past Medical History:   Diagnosis Date     HLD (hyperlipidemia)      HTN (hypertension)      Idiopathic neuropathy      Vitamin D deficiency         CC Rakesh Talley MD  [address]  [place] [address] on close of this encounter.       Again, thank you for allowing me to participate in the care of your patient.      Sincerely,    Rakesh Talley MD

## 2024-09-10 NOTE — NURSING NOTE
Dermatology Rooming Note    Kerri Rocha's goals for this visit include:   Chief Complaint   Patient presents with    Derm Problem     Dermatomyositis:  Rash on scalp= itching is seldom, only at night time  Check areas on fingers       Agatha Duong LPN

## 2024-09-17 ENCOUNTER — OFFICE VISIT (OUTPATIENT)
Dept: PHYSICAL MEDICINE AND REHAB | Facility: CLINIC | Age: 89
End: 2024-09-17
Payer: MEDICARE

## 2024-09-17 VITALS
WEIGHT: 120 LBS | BODY MASS INDEX: 23.56 KG/M2 | SYSTOLIC BLOOD PRESSURE: 147 MMHG | HEART RATE: 85 BPM | OXYGEN SATURATION: 94 % | HEIGHT: 60 IN | DIASTOLIC BLOOD PRESSURE: 67 MMHG

## 2024-09-17 DIAGNOSIS — S22.080A COMPRESSION FRACTURE OF T11 VERTEBRA, INITIAL ENCOUNTER (H): ICD-10-CM

## 2024-09-17 DIAGNOSIS — Z98.1 HISTORY OF LUMBAR FUSION: ICD-10-CM

## 2024-09-17 DIAGNOSIS — M84.48XA SACRAL INSUFFICIENCY FRACTURE, INITIAL ENCOUNTER: Primary | ICD-10-CM

## 2024-09-17 DIAGNOSIS — M79.18 MYOFASCIAL PAIN: ICD-10-CM

## 2024-09-17 DIAGNOSIS — M80.00XD AGE-RELATED OSTEOPOROSIS WITH CURRENT PATHOLOGICAL FRACTURE WITH ROUTINE HEALING, SUBSEQUENT ENCOUNTER: ICD-10-CM

## 2024-09-17 PROCEDURE — 99214 OFFICE O/P EST MOD 30 MIN: CPT | Performed by: PAIN MEDICINE

## 2024-09-17 RX ORDER — CYCLOBENZAPRINE HCL 5 MG
5-10 TABLET ORAL 3 TIMES DAILY PRN
Qty: 30 TABLET | Refills: 3 | Status: SHIPPED | OUTPATIENT
Start: 2024-09-17

## 2024-09-17 ASSESSMENT — PAIN SCALES - GENERAL: PAINLEVEL: EXTREME PAIN (8)

## 2024-09-17 NOTE — PROGRESS NOTES
Assessment:     Diagnoses and all orders for this visit:  Sacral insufficiency fracture, initial encounter  -     Physical Therapy  Referral; Future  -     cyclobenzaprine (FLEXERIL) 5 MG tablet; Take 1-2 tablets (5-10 mg) by mouth 3 times daily as needed for muscle spasms.  Compression fracture of T11 vertebra, initial encounter (H)  -     Physical Therapy  Referral; Future  -     cyclobenzaprine (FLEXERIL) 5 MG tablet; Take 1-2 tablets (5-10 mg) by mouth 3 times daily as needed for muscle spasms.  History of lumbar fusion  -     Physical Therapy  Referral; Future  -     cyclobenzaprine (FLEXERIL) 5 MG tablet; Take 1-2 tablets (5-10 mg) by mouth 3 times daily as needed for muscle spasms.  Myofascial pain  -     Physical Therapy  Referral; Future  -     cyclobenzaprine (FLEXERIL) 5 MG tablet; Take 1-2 tablets (5-10 mg) by mouth 3 times daily as needed for muscle spasms.  Age-related osteoporosis with current pathological fracture with routine healing, subsequent encounter  -     Physical Therapy  Referral; Future  -     cyclobenzaprine (FLEXERIL) 5 MG tablet; Take 1-2 tablets (5-10 mg) by mouth 3 times daily as needed for muscle spasms.     Kerri Rocha is a 89 year old y.o. female with past medical history significant for idiopathic peripheral neuropathy, left hip pain, hyperlipidemia, hypertension, osteoporosis with current pathologic fracture, lumbar spondylosis, lumbar facet arthropathy, neuroforaminal stenosis in the lumbosacral spine, rash, gait disorder, vertigo, status post lumbar spinal fusion, imbalance, chronic continuous use of opioids who presents today for follow-up regarding T11 compression fracture and sacral insufficiency fractures:    -Patient continues to have sacral area pain likely secondary to her insufficiency fractures.     Plan:     A shared decision making plan was used. The patient's values and choices were respected. Prior medical records  from our last visit on 8/19/2024.  Were reviewed today. The following represents what was discussed and decided upon by the provider and the patient.        -DIAGNOSTIC TESTS: Images were personally reviewed and interpreted.   --MRI of thoracic spine dated 7/25/2024 is personally reviewed images interpreted and discussed with the patient.  It does show mild anterior superior endplate compression fracture with less than 25% height loss and no retropulsion.  There is edema along the superior aspect of the T11 vertebral body compatible with acute or recent fracture.  -- MRI of cervical spine dated 7/25/2024 is personally reviewed images interpreted and discussed with the patient.  There is no cord compression or cord signal.  There is facet arthropathy at C4-5, C5-6 and C3-4.  -- X-ray of left hip dated 8/1/2024 shows mild left hip arthrosis.  -- X-ray of lumbar spine dated 8/27/2024 is personally viewed images interpreted and discussed with the patient.  There is postsurgical changes with posterior spinal fusion and discectomy at L4-5.  There is surgical hardware intact without fracture or loosening.  There are grade 1 retrolisthesis of T12 on L1 and L1 on L2 as well as L2 and L3.  There is a grade 1 anterolisthesis of L5 on S1.  There are chronic superior endplate compression fractures at T11, T12 and L1.  They appear to be similar to MRI those done on 7/25/2024.  -- MRI of pelvis bones dated 8/27/2024 is personally viewed images interpreted discussed with patient.  There are nondisplaced insufficiency fractures involving the left greater then right sacral ala with surrounding marrow edema.  There are additional small nondisplaced insufficiency fractures involving the right and left superior pubic rami.  There is bilateral distal gluteal tendinopathy with a chronic partial tear of the left gluteus minimus tendon insertion.  -- Bone density scan dated 10/23/2023 report is reviewed and shows  osteoporosis.    -INTERVENTIONS: No interventions at this time.    -MEDICATIONS: I ordered cyclobenzaprine 5 mg 3 times daily as needed.  -  Discussed side effects of medications and proper use. Patient verbalized understanding.    -PHYSICAL THERAPY: I also ordered physical therapy for her and would like her to go to at least 1 in person physical therapy appointment before switching to virtual therapy.  This is what she has done in the past.  Discussed the importance of core strengthening, ROM, stretching exercises with the patient and how each of these entities is important in decreasing pain.  Explained to the patient that the purpose of physical therapy is to teach the patient a home exercise program.  These exercises need to be performed every day in order to decrease pain and prevent future occurrences of pain.        -PATIENT EDUCATION: We discussed pain management in a multiple to fashion including physical therapy, medication management, possible future injections.    -FOLLOW UP: Patient will follow-up in 6 weeks.  Advised to contact clinic if symptoms worsen or change.    Subjective:     Kerri Rocha is a 89 year old female who presents today for follow-up regarding sacral area pain.  Patient notes that she still has pain in the tailbone area.  Pain seems to be worsening.  She tried the calcitonin nasal spray which gave her headaches and she stopped after 4 days.  Tylenol is not helping very much.  She has had physical therapy in the past which was virtual.  We discussed that I would like her to have physical therapy again but to go in person for at least 1 visit so the therapist came to check on movement and things that may be more helpful for her.  Pain is worse with walking and sitting.  She has not found anything has been helpful as of yet.  Pain today is 8/10 its worst is 8/10 as best as 3/10.  She has had discussions with her primary care provider regarding potential medications for her  osteoporosis, however has not liked the side effect profile and not tried any of them as of yet.  She will see her primary care provider next week we discussed that she should discuss with her primary care provider whether or not there are other things that can be done to treat her osteoporosis.  She denies any bowel or bladder changes, fevers, chills, unintentional weight loss.    -Treatment to Date: MRI of cervical and thoracic spine on 7/25/2024.  X-ray of left hip dated 8/1/2024.  Lumbar fusion.  Current physical therapy.  X-ray of lumbar spine dated 8/27/2024.  MRI of the pelvis dated 8/27/2024.    Patient Active Problem List   Diagnosis    Essential hypertension    Idiopathic peripheral neuropathy    Pseudophakia of both eyes    Regular astigmatism of both eyes    Localized osteoporosis without current pathological fracture - 8/2020 waiting for dental work to start fosamax.     Mixed hyperlipidemia    Gait disorder    Vertigo    Chronic kidney disease, stage 3 (H)    Lumbar spondylosis    Lumbar facet arthropathy    Neural foraminal stenosis of lumbosacral spine    S/P lumbar spinal fusion    Rash and nonspecific skin eruption    Rash    Dermatomyositis (H)    Encounter for long-term (current) use of high-risk medication    Imbalance    Hip pain, left    Chronic, continuous use of opioids - Tramadol       Current Outpatient Medications   Medication Sig Dispense Refill    amLODIPine (NORVASC) 5 MG tablet Take 1 tablet (5 mg) by mouth daily 90 tablet 3    aspirin 81 MG EC tablet Take 81 mg by mouth daily      atorvastatin (LIPITOR) 20 MG tablet Take 1 tablet (20 mg) by mouth at bedtime 90 tablet 3    BOOSTRIX 5-2.5-18.5 LF-MCG/0.5 MYRIAM injection       calcium carbonate (OS-CAMMIE 600 MG [place]. [place]) 600 MG tablet Take 1,200 mg by mouth daily       Cholecalciferol (VITAMIN D3) 2000 UNITS CAPS 1,000 Int'l Units daily      cyclobenzaprine (FLEXERIL) 5 MG tablet Take 1-2 tablets (5-10 mg) by mouth 3 times daily as needed  for muscle spasms. 30 tablet 3    Diphenhydramine-APAP, sleep, (TYLENOL PM EXTRA STRENGTH PO) Take by mouth nightly as needed      FLOWFLEX COVID-19 AG HOME TEST KIT USE TO TEST AT HOME FOR COVID      folic acid (FOLVITE) 1 MG tablet Take 1 tablet (1 mg) by mouth daily On days you don't take methotrexate 100 tablet 3    gabapentin (NEURONTIN) 400 MG capsule TAKE 2 CAPSULES IN THE MORNING, 2 CAPSULES AT NOON AND 1 CAPSULE IN THE EVENING 150 capsule 4    lisinopril (ZESTRIL) 10 MG tablet Take 1 tablet (10 mg) by mouth daily 90 tablet 3    methotrexate 2.5 MG tablet Take five tablets (12.5mg total) by mouth once WEEKLY 24 tablet 0    omega 3 1000 MG CAPS Take 2 g by mouth      predniSONE (DELTASONE) 2.5 MG tablet Take one pill daily 30 tablet 3    calcitonin, salmon, (MIACALCIN) 200 UNIT/ACT nasal spray Spray 1 spray into one nostril alternating nostrils daily for 28 days Alternate nostril each day. 3.7 mL 0    traMADol (ULTRAM) 50 MG tablet Take 1 tablet (50 mg) by mouth every 6 hours as needed for severe pain or moderate to severe pain (Patient not taking: Reported on 9/17/2024) 15 tablet 0     No current facility-administered medications for this visit.       Allergies   Allergen Reactions    Codeine     Sulfa Antibiotics     Sulfasalazine Other (See Comments)    Sulfate Rash       Past Medical History:   Diagnosis Date    HLD (hyperlipidemia)     HTN (hypertension)     Idiopathic neuropathy     Vitamin D deficiency         Review of Systems  ROS:  Specifically negative for bowel/bladder dysfunction, balance changes, headache, dizziness, foot drop, fevers, chills, appetite changes, nausea/vomiting, unexplained weight loss. Otherwise 13 systems reviewed are negative. Please see the patient's intake questionnaire from today for details.    Reviewed Social, Family, Past Medical and Past Surgical history with patient, no significant changes noted since prior visit.     Objective:     BP (!) 147/67 (BP Location: Left  "arm, Patient Position: Sitting, Cuff Size: Adult Regular)   Pulse 85   Ht 4' 11.5\" (1.511 m)   Wt 120 lb (54.4 kg)   LMP  (LMP Unknown)   SpO2 94%   BMI 23.83 kg/m      PHYSICAL EXAMINATION:    --CONSTITUTIONAL: Well developed, well nourished, healthy appearing individual.  --PSYCHIATRIC: Appropriate mood and affect. No difficulty interacting due to temper, social withdrawal, or memory issues.  --SKIN: Lumbar region is dry and intact.   --RESPIRATORY: Normal rhythm and effort. No abnormal accessory muscle breathing patterns noted.   --MUSCULOSKELETAL:  Normal lumbar lordosis noted, no lateral shift.  --GROSS MOTOR: Easily arises from a seated position. Gait is non-antalgic  --LUMBAR SPINE:  Inspection reveals no evidence of deformity.  Tenderness to palpation in the sacral area.  --SACROILIAC JOINT: One Finger point test negative.  --LOWER EXTREMITY MOTOR TESTING:  Plantar flexion left 5/5, right 5/5   Dorsiflexion left 5/5, right 5/5   Great toe MTP extension left 5/5, right 5/5  Knee flexion left 5/5, right 5/5  Knee extension left 5/5, right 5/5   Hip flexion left 5/5, right 5/5  Hip abduction left 5/5, right 5/5  Hip adduction left 5/5, right 5/5   --NEUROLOGIC: Sensation to light touch is intact in the bilateral L4, L5, and S1 dermatomes.    RESULTS:   Imaging: Pelvis and lumbar imaging was reviewed today.     MR Pelvis Bone wo Contrast    Result Date: 8/28/2024  EXAM: MR PELVIC BONES WITHOUT CONTRAST LOCATION: [facility] DATE: 08/27/2024 INDICATION: Insufficiency fracture?  History of fall. COMPARISON: None. TECHNIQUE: Unenhanced. FINDINGS: HIPS: -No fracture, osteonecrosis or joint effusion involving either hip. Mild degenerative changes about both hips. MUSCLES AND TENDONS: -Gluteal: Mild bilateral distal gluteal tendinopathy. Superimposed chronic partial tear of the left gluteus minimus tendon with associated advanced fatty muscular atrophy. Small amount of fluid within the " subgluteus minimus bursa. No trochanteric bursitis.  -Proximal hamstring: Mild proximal hamstrings tendinopathy. -Iliopsoas: No tendon tear or tendinopathy. No bursitis. BONES: -Nondisplaced insufficiency fracture involving the left sacral ala at the level of S1 and S2 with marked surrounding marrow edema (series 5 and 6 image 10). Additional smaller displaced insufficiency fractures involving the right sacral ala at the level of S1 and S2 (series 5 and 6 image 12). Patchy marrow edema with corresponding ill-defined T1 hypointense signal within the right greater than left superior pubic rami (series 2 images 27 and 28 and series 5 and 6 images 34 and 35) suspicious for additional nondisplaced sacral insufficiency fractures. - Serpiginous low T1 and high T2 signal within the left lesser trochanter which may represent a small bone infarct. -SI joints are normal. Posterior decompression and instrumented fusion of the lower lumbar spine with associated metallic susceptibility artifact. SOFT TISSUES: -Fatty atrophy of bilateral gluteus minimus muscles, moderate on the right and advanced on the left. No acute muscular injury. INTRAPELVIC CONTENTS: -Normal appearance of the visualized pelvic viscera. Prior hysterectomy.     IMPRESSION: 1.  Nondisplaced insufficiency fractures involving the left greater than right sacral ala with surrounding marrow edema. 2.  Additional small nondisplaced insufficiency fractures involving the right and left superior pubic rami. 3.  Mild bilateral distal gluteal tendinopathy with superimposed chronic partial tear of the left gluteus minimus tendon insertion.     XR LUMBAR SPINE G/E 4 VW    Result Date: 8/27/2024  EXAM: XR LUMBAR SPINE G/E 4 VIEWS LOCATION: [facility] DATE: 8/27/2024 INDICATION: Low back pain; Trauma and or suspected fracture; Insignificant trauma, r o compression fracture; Known osteoporosis; No lumbar x ray result available COMPARISON: MRI lumbar  spine 7/31/2022 and MRI thoracic spine 7/25/2024     IMPRESSION: There are postsurgical changes including posterior spinal fusion and discectomy at L4-L5. Surgical hardware is intact without evidence of fracture or loosening. Mild lumbar dextroscoliosis. Grade 1 retrolisthesis of T12 on L1, L1 on L2, L2 on  L3. Grade 1 anterolisthesis of L5 on S1. Alignment is preserved throughout range of motion. There are chronic superior endplate compression fractures at T11, T12, and L1. These appear similar to MRI of thoracic spine 7/25/2024. Remaining lumbar vertebral body heights are maintained. Mild multilevel disc height loss. Aortic atherosclerosis is present.

## 2024-09-17 NOTE — PATIENT INSTRUCTIONS
I ordered physical therapy for you.  Like you to go to at least 1 in person visit and then you may have them virtually if they feel that it is best.    I also ordered cyclobenzaprine 5 mg that you can take up to 3 times a day as needed.    Please discuss with your primary care provider regarding medications that might be helpful for your osteoporosis.    ~Please call Nurse Navigation line [phone] with any questions or concerns about your treatment plan, if symptoms worsen and you would like to be seen urgently, or if you have problems controlling bladder and bowel function.

## 2024-09-17 NOTE — LETTER
9/17/2024      Kerri Rocha  [address]  [facility] [place] [address]      Dear Colleague,    Thank you for referring your patient, Kerri Rocha, to the [facility]. Please see a copy of my visit note below.      Assessment:     Diagnoses and all orders for this visit:  Sacral insufficiency fracture, initial encounter  -     Physical Therapy  Referral; Future  -     cyclobenzaprine (FLEXERIL) 5 MG tablet; Take 1-2 tablets (5-10 mg) by mouth 3 times daily as needed for muscle spasms.  Compression fracture of T11 vertebra, initial encounter (H)  -     Physical Therapy  Referral; Future  -     cyclobenzaprine (FLEXERIL) 5 MG tablet; Take 1-2 tablets (5-10 mg) by mouth 3 times daily as needed for muscle spasms.  History of lumbar fusion  -     Physical Therapy  Referral; Future  -     cyclobenzaprine (FLEXERIL) 5 MG tablet; Take 1-2 tablets (5-10 mg) by mouth 3 times daily as needed for muscle spasms.  Myofascial pain  -     Physical Therapy  Referral; Future  -     cyclobenzaprine (FLEXERIL) 5 MG tablet; Take 1-2 tablets (5-10 mg) by mouth 3 times daily as needed for muscle spasms.  Age-related osteoporosis with current pathological fracture with routine healing, subsequent encounter  -     Physical Therapy  Referral; Future  -     cyclobenzaprine (FLEXERIL) 5 MG tablet; Take 1-2 tablets (5-10 mg) by mouth 3 times daily as needed for muscle spasms.     Kerri Rocha is a 89 year old y.o. 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     Plan:     A shared decision making plan was used. The patient's values and choices were respected. Prior medical records from our last visit on 8/19/2024.  Were reviewed today. The following represents what was discussed and decided upon by the provider and the patient.        -DIAGNOSTIC TESTS: Images were personally reviewed and interpreted.   --MRI of thoracic spine dated 7/25/2024 is personally reviewed images interpreted and discussed with the patient.  It does show mild anterior superior endplate compression fracture with less than 25% height loss and no retropulsion.  There is edema along the superior aspect of the T11 vertebral body compatible with acute or recent fracture.  -- MRI of cervical spine dated 7/25/2024 is personally reviewed images interpreted and discussed with the patient.  There is no cord compression or cord signal.  There is facet arthropathy at C4-5, C5-6 and C3-4.  -- X-ray of left hip dated 8/1/2024 shows mild left hip arthrosis.  -- X-ray of lumbar spine dated 8/27/2024 is personally viewed images interpreted and discussed with the patient.  There is postsurgical changes with posterior spinal fusion and discectomy at L4-5.  There is surgical hardware intact without fracture or loosening.  There are grade 1 retrolisthesis of T12 on L1 and L1 on L2 as well as L2 and L3.  There is a grade 1 anterolisthesis of L5 on S1.  There are chronic superior endplate compression fractures at T11, T12 and L1.  They appear to be similar to MRI those done on 7/25/2024.  -- MRI of pelvis bones dated 8/27/2024 is personally viewed images interpreted discussed with patient.  There are nondisplaced insufficiency fractures involving the left greater then right sacral ala with surrounding marrow edema.  There are additional small nondisplaced insufficiency fractures involving the  right and left superior pubic rami.  There is bilateral distal gluteal tendinopathy with a chronic partial tear of the left gluteus minimus tendon insertion.  -- Bone density scan dated 10/23/2023 report is reviewed and shows osteoporosis.    -INTERVENTIONS: No interventions at this time.    -MEDICATIONS: I ordered cyclobenzaprine 5 mg 3 times daily as needed.  -  Discussed side effects of medications and proper use. Patient verbalized understanding.    -PHYSICAL THERAPY: I also ordered physical therapy for her and would like her to go to at least 1 in person physical therapy appointment before switching to virtual therapy.  This is what she has done in the past.  Discussed the importance of core strengthening, ROM, stretching exercises with the patient and how each of these entities is important in decreasing pain.  Explained to the patient that the purpose of physical therapy is to teach the patient a home exercise program.  These exercises need to be performed every day in order to decrease pain and prevent future occurrences of pain.        -PATIENT EDUCATION: We discussed pain management in a multiple to fashion including physical therapy, medication management, possible future injections.    -FOLLOW UP: Patient will follow-up in 6 weeks.  Advised to contact clinic if symptoms worsen or change.    Subjective:     Kerri Rocha is a 89 year old female who presents today for follow-up regarding sacral area pain.  Patient notes that she still has pain in the tailbone area.  Pain seems to be worsening.  She tried the calcitonin nasal spray which gave her headaches and she stopped after 4 days.  Tylenol is not helping very much.  She has had physical therapy in the past which was virtual.  We discussed that I would like her to have physical therapy again but to go in person for at least 1 visit so the therapist came to check on movement and things that may be more helpful for her.  Pain is worse with walking and  sitting.  She has not found anything has been helpful as of yet.  Pain today is 8/10 its worst is 8/10 as best as 3/10.  She has had discussions with her primary care provider regarding potential medications for her osteoporosis, however has not liked the side effect profile and not tried any of them as of yet.  She will see her primary care provider next week we discussed that she should discuss with her primary care provider whether or not there are other things that can be done to treat her osteoporosis.  She denies any bowel or bladder changes, fevers, chills, unintentional weight loss.    -Treatment to Date: MRI of cervical and thoracic spine on 7/25/2024.  X-ray of left hip dated 8/1/2024.  Lumbar fusion.  Current physical therapy.  X-ray of lumbar spine dated 8/27/2024.  MRI of the pelvis dated 8/27/2024.    Patient Active Problem List   Diagnosis     Essential hypertension     Idiopathic peripheral neuropathy     Pseudophakia of both eyes     Regular astigmatism of both eyes     Localized osteoporosis without current pathological fracture - 8/2020 waiting for dental work to start fosamax.      Mixed hyperlipidemia     Gait disorder     Vertigo     Chronic kidney disease, stage 3 (H)     Lumbar spondylosis     Lumbar facet arthropathy     Neural foraminal stenosis of lumbosacral spine     S/P lumbar spinal fusion     Rash and nonspecific skin eruption     Rash     Dermatomyositis (H)     Encounter for long-term (current) use of high-risk medication     Imbalance     Hip pain, left     Chronic, continuous use of opioids - Tramadol       Current Outpatient Medications   Medication Sig Dispense Refill     amLODIPine (NORVASC) 5 MG tablet Take 1 tablet (5 mg) by mouth daily 90 tablet 3     aspirin 81 MG EC tablet Take 81 mg by mouth daily       atorvastatin (LIPITOR) 20 MG tablet Take 1 tablet (20 mg) by mouth at bedtime 90 tablet 3     BOOSTRIX 5-2.5-18.5 LF-MCG/0.5 MYRIAM injection        calcium carbonate  (OS-CAMMIE 600 MG [employer]. CA) 600 MG tablet Take 1,200 mg by mouth daily        Cholecalciferol (VITAMIN D3) 2000 UNITS CAPS 1,000 Int'l Units daily       cyclobenzaprine (FLEXERIL) 5 MG tablet Take 1-2 tablets (5-10 mg) by mouth 3 times daily as needed for muscle spasms. 30 tablet 3     Diphenhydramine-APAP, sleep, (TYLENOL PM EXTRA STRENGTH PO) Take by mouth nightly as needed       FLOWFLEX COVID-19 AG HOME TEST KIT USE TO TEST AT HOME FOR COVID       folic acid (FOLVITE) 1 MG tablet Take 1 tablet (1 mg) by mouth daily On days you don't take methotrexate 100 tablet 3     gabapentin (NEURONTIN) 400 MG capsule TAKE 2 CAPSULES IN THE MORNING, 2 CAPSULES AT NOON AND 1 CAPSULE IN THE EVENING 150 capsule 4     lisinopril (ZESTRIL) 10 MG tablet Take 1 tablet (10 mg) by mouth daily 90 tablet 3     methotrexate 2.5 MG tablet Take five tablets (12.5mg total) by mouth once WEEKLY 24 tablet 0     omega 3 1000 MG CAPS Take 2 g by mouth       predniSONE (DELTASONE) 2.5 MG tablet Take one pill daily 30 tablet 3     calcitonin, salmon, (MIACALCIN) 200 UNIT/ACT nasal spray Spray 1 spray into one nostril alternating nostrils daily for 28 days Alternate nostril each day. 3.7 mL 0     traMADol (ULTRAM) 50 MG tablet Take 1 tablet (50 mg) by mouth every 6 hours as needed for severe pain or moderate to severe pain (Patient not taking: Reported on 9/17/2024) 15 tablet 0     No current facility-administered medications for this visit.       Allergies   Allergen Reactions     Codeine      Sulfa Antibiotics      Sulfasalazine Other (See Comments)     Sulfate Rash       Past Medical History:   Diagnosis Date     HLD (hyperlipidemia)      HTN (hypertension)      Idiopathic neuropathy      Vitamin D deficiency         Review of Systems  ROS:  Specifically negative for bowel/bladder dysfunction, balance changes, headache, dizziness, foot drop, fevers, chills, appetite changes, nausea/vomiting, unexplained weight loss. Otherwise 13 systems reviewed  "are negative. Please see the patient's intake questionnaire from today for details.    Reviewed Social, Family, Past Medical and Past Surgical history with patient, no significant changes noted since prior visit.     Objective:     BP (!) 147/67 (BP Location: Left arm, Patient Position: Sitting, Cuff Size: Adult Regular)   Pulse 85   Ht 4' 11.5\" (1.511 m)   Wt 120 lb (54.4 kg)   LMP  (LMP Unknown)   SpO2 94%   BMI 23.83 kg/m      PHYSICAL EXAMINATION:    --CONSTITUTIONAL: Well developed, well nourished, healthy appearing individual.  --PSYCHIATRIC: Appropriate mood and affect. No difficulty interacting due to temper, social withdrawal, or memory issues.  --SKIN: Lumbar region is dry and intact.   --RESPIRATORY: Normal rhythm and effort. No abnormal accessory muscle breathing patterns noted.   --MUSCULOSKELETAL:  Normal lumbar lordosis noted, no lateral shift.  --GROSS MOTOR: Easily arises from a seated position. Gait is non-antalgic  --LUMBAR SPINE:  Inspection reveals no evidence of deformity.  Tenderness to palpation in the sacral area.  --SACROILIAC JOINT: One Finger point test negative.  --LOWER EXTREMITY MOTOR TESTING:  Plantar flexion left 5/5, right 5/5   Dorsiflexion left 5/5, right 5/5   Great toe MTP extension left 5/5, right 5/5  Knee flexion left 5/5, right 5/5  Knee extension left 5/5, right 5/5   Hip flexion left 5/5, right 5/5  Hip abduction left 5/5, right 5/5  Hip adduction left 5/5, right 5/5   --NEUROLOGIC: Sensation to light touch is intact in the bilateral L4, L5, and S1 dermatomes.    RESULTS:   Imaging: Pelvis and lumbar imaging was reviewed today.     MR Pelvis Bone wo Contrast    Result Date: 8/28/2024  EXAM: MR PELVIC BONES WITHOUT CONTRAST LOCATION: [facility] DATE: 08/27/2024 INDICATION: Insufficiency fracture?  History of fall. COMPARISON: None. TECHNIQUE: Unenhanced. FINDINGS: HIPS: -No fracture, osteonecrosis or joint effusion involving either hip. Mild " degenerative changes about both hips. MUSCLES AND TENDONS: -Gluteal: Mild bilateral distal gluteal tendinopathy. Superimposed chronic partial tear of the left gluteus minimus tendon with associated advanced fatty muscular atrophy. Small amount of fluid within the subgluteus minimus bursa. No trochanteric bursitis.  -Proximal hamstring: Mild proximal hamstrings tendinopathy. -Iliopsoas: No tendon tear or tendinopathy. No bursitis. BONES: -Nondisplaced insufficiency fracture involving the left sacral ala at the level of S1 and S2 with marked surrounding marrow edema (series 5 and 6 image 10). Additional smaller displaced insufficiency fractures involving the right sacral ala at the level of S1 and S2 (series 5 and 6 image 12). Patchy marrow edema with corresponding ill-defined T1 hypointense signal within the right greater than left superior pubic rami (series 2 images 27 and 28 and series 5 and 6 images 34 and 35) suspicious for additional nondisplaced sacral insufficiency fractures. - Serpiginous low T1 and high T2 signal within the left lesser trochanter which may represent a small bone infarct. -SI joints are normal. Posterior decompression and instrumented fusion of the lower lumbar spine with associated metallic susceptibility artifact. SOFT TISSUES: -Fatty atrophy of bilateral gluteus minimus muscles, moderate on the right and advanced on the left. No acute muscular injury. INTRAPELVIC CONTENTS: -Normal appearance of the visualized pelvic viscera. Prior hysterectomy.     IMPRESSION: 1.  Nondisplaced insufficiency fractures involving the left greater than right sacral ala with surrounding marrow edema. 2.  Additional small nondisplaced insufficiency fractures involving the right and left superior pubic rami. 3.  Mild bilateral distal gluteal tendinopathy with superimposed chronic partial tear of the left gluteus minimus tendon insertion.     XR LUMBAR SPINE G/E 4 VW    Result Date: 8/27/2024  EXAM: XR LUMBAR  SPINE G/E 4 VIEWS LOCATION: [facility] DATE: 8/27/2024 INDICATION: Low back pain; Trauma and or suspected fracture; Insignificant trauma, r o compression fracture; Known osteoporosis; No lumbar x ray result available COMPARISON: MRI lumbar spine 7/31/2022 and MRI thoracic spine 7/25/2024     IMPRESSION: There are postsurgical changes including posterior spinal fusion and discectomy at L4-L5. Surgical hardware is intact without evidence of fracture or loosening. Mild lumbar dextroscoliosis. Grade 1 retrolisthesis of T12 on L1, L1 on L2, L2 on  L3. Grade 1 anterolisthesis of L5 on S1. Alignment is preserved throughout range of motion. There are chronic superior endplate compression fractures at T11, T12, and L1. These appear similar to MRI of thoracic spine 7/25/2024. Remaining lumbar vertebral body heights are maintained. Mild multilevel disc height loss. Aortic atherosclerosis is present.                            Again, thank you for allowing me to participate in the care of your patient.        Sincerely,        Donta Carlin, DO

## 2024-09-28 DIAGNOSIS — I10 ESSENTIAL HYPERTENSION: ICD-10-CM

## 2024-09-30 RX ORDER — AMLODIPINE BESYLATE 5 MG/1
5 TABLET ORAL DAILY
Qty: 90 TABLET | Refills: 3 | OUTPATIENT
Start: 2024-09-30

## 2024-10-07 ENCOUNTER — THERAPY VISIT (OUTPATIENT)
Dept: PHYSICAL THERAPY | Facility: CLINIC | Age: 89
End: 2024-10-07
Attending: PAIN MEDICINE
Payer: MEDICARE

## 2024-10-07 DIAGNOSIS — M53.3 SACRAL BACK PAIN: Primary | ICD-10-CM

## 2024-10-07 DIAGNOSIS — M79.18 MYOFASCIAL PAIN: ICD-10-CM

## 2024-10-07 DIAGNOSIS — Z98.1 HISTORY OF LUMBAR FUSION: ICD-10-CM

## 2024-10-07 DIAGNOSIS — M84.48XA SACRAL INSUFFICIENCY FRACTURE, INITIAL ENCOUNTER: ICD-10-CM

## 2024-10-07 DIAGNOSIS — M80.00XD AGE-RELATED OSTEOPOROSIS WITH CURRENT PATHOLOGICAL FRACTURE WITH ROUTINE HEALING, SUBSEQUENT ENCOUNTER: ICD-10-CM

## 2024-10-07 DIAGNOSIS — S22.080A COMPRESSION FRACTURE OF T11 VERTEBRA, INITIAL ENCOUNTER (H): ICD-10-CM

## 2024-10-07 PROCEDURE — 97110 THERAPEUTIC EXERCISES: CPT | Mod: GP | Performed by: PHYSICAL THERAPIST

## 2024-10-07 PROCEDURE — 97161 PT EVAL LOW COMPLEX 20 MIN: CPT | Mod: GP | Performed by: PHYSICAL THERAPIST

## 2024-10-07 NOTE — PROGRESS NOTES
PHYSICAL THERAPY EVALUATION  Type of Visit: Evaluation        Fall Risk Screen:  Fall screen completed by: PT  Have you fallen 2 or more times in the past year?: Yes  Have you fallen and had an injury in the past year?: Yes  Is patient a fall risk?: Yes  Fall screen comments: will continue to assess balance as progresses, and sacral/back pain decreases    Subjective       Presenting condition or subjective complaint: Spinal doctor recommends evaluation of back re compressioin fractures    States fell twice in past year, states was in May and June, and they think the sacral and compression fractures occurred with fall in June. States with last fall was wearing longer robe and tripped on robe. Did have PT for balance. Has neuropathy in both legs that is worsening and that is contributing to her balance issues.   States pain low back and sacral region. Worse with prolonged positioning including sitting and standing.  Date of onset: 09/17/24 (md referral date)    Relevant medical history: Osteoporosis   Dates & types of surgery:      Prior diagnostic imaging/testing results: MRI; X-ray; Bone scan     Per MD note 9/17/2024:  MRI of thoracic spine dated 7/25/2024 shows mild anterior superior endplate compression fracture with less than 25% height loss and no retropulsion.  There is edema along the superior aspect of the T11 vertebral body compatible with acute or recent fracture.  -- MRI of cervical spine dated 7/25/2024   There is no cord compression or cord signal.  There is facet arthropathy at C4-5, C5-6 and C3-4.  -- X-ray of left hip dated 8/1/2024 shows mild left hip arthrosis.  -- X-ray of lumbar spine dated 8/27/2024 There is postsurgical changes with posterior spinal fusion and discectomy at L4-5.  There is surgical hardware intact without fracture or loosening.  There are grade 1 retrolisthesis of T12 on L1 and L1 on L2 as well as L2 and L3.  There is a grade 1 anterolisthesis of L5 on S1.  There are chronic  superior endplate compression fractures at T11, T12 and L1.  They appear to be similar to MRI those done on 7/25/2024.  -- MRI of pelvis bones dated 8/27/2024 There are nondisplaced insufficiency fractures involving the left greater then right sacral ala with surrounding marrow edema.  There are additional small nondisplaced insufficiency fractures involving the right and left superior pubic rami.  There is bilateral distal gluteal tendinopathy with a chronic partial tear of the left gluteus minimus tendon insertion.  -- Bone density scan dated 10/23/2023 report is reviewed and shows osteoporosis.     Prior therapy history for the same diagnosis, illness or injury: No   Has had therapy for balance last year.    Pt reports therapy in past, but virtually. States some difficulty as has to get ride to get to therapy. Would like exercises can perform at  home, then can follow up in a few weeks for recheck. Will try and schedule every 2 weeks to progress exercises.      Living Environment  Social support: With a significant other or spouse   Type of home: House; 2-story   Stairs to enter the home: Yes 14 Is there a railing: Yes     Ramp: No   Stairs inside the home: Yes 14 Is there a railing: Yes     Help at home: Home management tasks (cooking, cleaning)  Equipment owned: Straight Cane; Walker with wheels; Grab bars; Bath bench     Employment: No    Hobbies/Interests:      Patient goals for therapy: Have a few hours at least per day to do  normal  responsibilities and activities.         Objective   LUMBAR SPINE EVALUATION  PAIN: Pain Level at Rest: 2/10  Pain Level with Use: 7/10  Pain Location: central low back and tailbone, and then into bilateral low back  Pain Frequency: constant  Pain is Exacerbated By: sitting, standing, walking, bending  Pain is Relieved By: rest    POSTURE: Sitting Posture: Rounded shoulders, Forward head, Lordosis decreased  GAIT:   Weightbearing Status: WBAT  Assistive Device(s): Cane (single  end)  Gait Deviations: Stride length decreased  Debbie decreased    ROM:   (Degrees) Left AROM Left PROM  Right AROM Right PROM   Hip Flexion WFL  WFL    Hip Extension       Hip Abduction       Hip Adduction       Hip Internal Rotation       Hip External Rotation       Knee Flexion       Knee Extension       Lumbar Side glide 75% with end range pain 75% with end range pain   Lumbar Flexion Finger tips to level of lower calf with tightness and pain lower back and sacral   Lumbar Extension 50 % with end range pain       STRENGTH:  bilateral hip abduction 4+/5 with pain; hip extension 4/5 with pain; hip flexion 4+/5    NEURAL TENSION: Lumbar WNL    PALPATION:  tenderness bilateral lumbar paraspinals      Assessment & Plan   CLINICAL IMPRESSIONS  Medical Diagnosis: Sacral insufficiency fracture, initial encounter  Compression fracture of T11 vertebra, initial encounter (H)  History of lumbar fusion  Myofascial pain  Age-related osteoporosis with current pathological fracture with routine healing, subsequent encounter    Treatment Diagnosis: acute low back pain; sacral pain   Impression/Assessment: Patient is a 89 year old female with low back and sacral complaints.  The following significant findings have been identified: Pain, Decreased ROM/flexibility, Decreased joint mobility, Decreased strength, Impaired balance, Impaired gait, Impaired muscle performance, and Decreased activity tolerance. These impairments interfere with their ability to perform self care tasks, recreational activities, household chores, driving , household mobility, and community mobility as compared to previous level of function.     Clinical Decision Making (Complexity):  Clinical Presentation: Stable/Uncomplicated  Clinical Presentation Rationale: based on medical and personal factors listed in PT evaluation  Clinical Decision Making (Complexity): Low complexity    PLAN OF CARE  Treatment Interventions:  Interventions: Manual Therapy,  Neuromuscular Re-education, Therapeutic Activity, Therapeutic Exercise, Self-Care/Home Management    Long Term Goals     PT Goal 1  Goal Identifier: Standing  Goal Description: Minute able to stand for daily activities 30 min with pain 1/10  Rationale: to maximize safety and independence with performance of ADLs and functional tasks;to maximize safety and independence within the home;to maximize safety and independence within the community;to maximize safety and independence with transportation;to maximize safety and independence with self cares  Target Date: 12/02/24  PT Goal 2  Goal Identifier: Sitting  Goal Description: Able to sit for 60 min for daily activities and cares pain 1/10  Rationale: to maximize safety and independence with performance of ADLs and functional tasks;to maximize safety and independence within the home;to maximize safety and independence within the community;to maximize safety and independence with transportation;to maximize safety and independence with self cares  Target Date: 12/02/24      Frequency of Treatment: every 2 weeks  Duration of Treatment: 8 weeks      Education Assessment:   Learner/Method: Patient;Pictures/Video;No Barriers to Learning  Education Comments: Educated pt on findings of the evaluation and reasoning for plan of care.  Pt was able to understand how treatment plan aligns with goals.    Risks and benefits of evaluation/treatment have been explained.   Patient/Family/caregiver agrees with Plan of Care.     Evaluation Time:     PT Eval, Low Complexity Minutes (89649): 20       Signing Clinician: Amy Adames, PT        [facility]                                                                                   OUTPATIENT PHYSICAL THERAPY      PLAN OF TREATMENT FOR OUTPATIENT REHABILITATION   Patient's Last Name, First Name, Kerri Price YOB: 1935   Provider's Name   [facility]    Medical Record No.  [id number]     Onset Date: 09/17/24 (md referral date)  Start of Care Date: 10/07/24     Medical Diagnosis:  Sacral insufficiency fracture, initial encounter  Compression fracture of T11 vertebra, initial encounter (H)  History of lumbar fusion  Myofascial pain  Age-related osteoporosis with current pathological fracture with routine healing, subsequent encounter      PT Treatment Diagnosis:  acute low back pain; sacral pain Plan of Treatment  Frequency/Duration: every 2 weeks/ 8 weeks    Certification date from 10/07/24 to 12/02/24         See note for plan of treatment details and functional goals     Amy Adames, PT                         I CERTIFY THE NEED FOR THESE SERVICES FURNISHED UNDER        THIS PLAN OF TREATMENT AND WHILE UNDER MY CARE     (Physician attestation of this document indicates review and certification of the therapy plan).              Referring Provider:  Donta Carlin    Initial Assessment  See Epic Evaluation- Start of Care Date: 10/07/24

## 2024-10-09 SDOH — HEALTH STABILITY: PHYSICAL HEALTH: ON AVERAGE, HOW MANY MINUTES DO YOU ENGAGE IN EXERCISE AT THIS LEVEL?: 30 MIN

## 2024-10-09 SDOH — HEALTH STABILITY: PHYSICAL HEALTH: ON AVERAGE, HOW MANY DAYS PER WEEK DO YOU ENGAGE IN MODERATE TO STRENUOUS EXERCISE (LIKE A BRISK WALK)?: 7 DAYS

## 2024-10-09 ASSESSMENT — SOCIAL DETERMINANTS OF HEALTH (SDOH): HOW OFTEN DO YOU GET TOGETHER WITH FRIENDS OR RELATIVES?: THREE TIMES A WEEK

## 2024-10-09 NOTE — PROGRESS NOTES
(I10) Essential hypertension  (M25.471,  M25.474,  M25.475,  M25.472) Bilateral swelling of feet and ankles  BP Readings from Last 6 Encounters:   10/11/24 122/60   09/17/24 (!) 147/67   08/19/24 119/59   08/01/24 120/58   07/01/24 139/84   05/22/24 115/72     Adjusting amlodipline.  Plan: amLODIPine (NORVASC) 5 MG tablet, lisinopril         (ZESTRIL) 10 MG tablet     (E78.2) Mixed hyperlipidemia   Latest Reference Range & Units 10/13/23 08:51   Cholesterol <200 mg/dL 148   HDL Cholesterol >=50 mg/dL 37 (L)   LDL Cholesterol Calculated <=100 mg/dL 84   Triglycerides <150 mg/dL 136   (L): Data is abnormally low  Plan: atorvastatin         (LIPITOR) 20 MG tablet     (N18.31) Stage 3a chronic kidney disease (H)   Latest Reference Range & Units 03/21/24 14:57 04/08/24 13:35 05/28/24 09:29 06/25/24 10:00 09/10/24 15:02   Creatinine 0.51 - 0.95 mg/dL 1.00 (H) 1.06 (H) 0.97 (H) 1.05 (H) 0.94   GFR Estimate >60 mL/min/1.73m2 54 (L) 51 (L) 56 (L) 51 (L) 58 (L)   (H): Data is abnormally high  (L): Data is abnormally low     (G60.9) Idiopathic peripheral neuropathy  Comment: Following with Neuro, f/up 1 year. Recently lower copper and waiting to hear from team.  Plan: gabapentin (NEURONTIN) 400 MG capsule  Lowering gabapentin dose to see if helps w/ AM unsteadiness.     (M47.816) Lumbar facet arthropathy  (M47.816) Lumbar spondylosis  (Z98.1) S/P lumbar spinal fusion  Comment: In cmobo with neuropathy limits what she can do.   Recently eval'd by [facility].   Last injection by PMR only lasted a few days  - last PMR 9/2024 - cyclobenzaprine 5 mg 3 times daily as needed, physical therapy   Doing physical therapy  - will [place] back with PMR       (M81.6) Localized osteoporosis without current pathological fracture - 8/2020 waiting for dental work to start fosamax.   Vitamin D Deficiency Screening Results:  Lab Results   Component Value Date    VITDT 54 (H) 10/13/2023    VITDT 70 08/10/2020    VITDT 66 08/06/2019    VITDT 61  "04/03/2017      Last dexa 10/2023 - There has been no significant change in bone density of the hip(s). There has been significant increase in bone density of the forearm.   Fosamax after dental work     (F51.01) Primary insomnia  Comment: Tried trazodone, better with Tylenol PM.     --------  PATIENT INSTRUCTIONS  For the back  - I think the next step is circling back with Dr. Momin (PM&R)  - I'll send in a topical compound to see if it helps - let me know if too expensive   - can try topical CBD lotion too     Swollen ankles  Blood pressure  - I wonder if the swollen ankles are from amlodipine  - Lower your amlodipine dose to 5 mg daily (take 1/2 tab of what you have at home, new prescription sent to your pharmacy)  - Send me message in 2 weeks with how you're doing and send me a few blood pressure reasons (calm, sitting for 10 min, take it 3x in a row and then take the average)  - If we need to we can increase other blood pressure meds  - If not helpful - sometimes we do a low dose water pill      For the finger  - they last saw you 3/2023 - Janine Thomas PA-C (Orthopedic Surgery)  - can call and ask them if you're ready to schedule: right index finger mass excision under local anesthesia only with Dr. More Norman,      Department Address: [address] [place] [id number]   Department Phone: [phone]         Morning feeling off  - let's try lowering your night gabapentin to 400 mg (1 capsule) and see if you feel any better in the morning.   - send me a message in a few weeks with how you're doing - both in terms of \"feeling off\" and the neuropathy      "

## 2024-10-11 ENCOUNTER — TELEPHONE (OUTPATIENT)
Dept: PEDIATRICS | Facility: CLINIC | Age: 89
End: 2024-10-11

## 2024-10-11 ENCOUNTER — MYC MEDICAL ADVICE (OUTPATIENT)
Dept: PEDIATRICS | Facility: CLINIC | Age: 89
End: 2024-10-11

## 2024-10-11 ENCOUNTER — OFFICE VISIT (OUTPATIENT)
Dept: PEDIATRICS | Facility: CLINIC | Age: 89
End: 2024-10-11
Attending: INTERNAL MEDICINE
Payer: MEDICARE

## 2024-10-11 VITALS
WEIGHT: 116 LBS | OXYGEN SATURATION: 97 % | DIASTOLIC BLOOD PRESSURE: 60 MMHG | SYSTOLIC BLOOD PRESSURE: 122 MMHG | TEMPERATURE: 98.2 F | BODY MASS INDEX: 23.39 KG/M2 | RESPIRATION RATE: 16 BRPM | HEIGHT: 59 IN | HEART RATE: 77 BPM

## 2024-10-11 DIAGNOSIS — I10 ESSENTIAL HYPERTENSION: ICD-10-CM

## 2024-10-11 DIAGNOSIS — S22.080D COMPRESSION FRACTURE OF T11 VERTEBRA WITH ROUTINE HEALING, SUBSEQUENT ENCOUNTER: ICD-10-CM

## 2024-10-11 DIAGNOSIS — Z00.00 ENCOUNTER FOR MEDICARE ANNUAL WELLNESS EXAM: Primary | ICD-10-CM

## 2024-10-11 DIAGNOSIS — M81.6 LOCALIZED OSTEOPOROSIS WITHOUT CURRENT PATHOLOGICAL FRACTURE: ICD-10-CM

## 2024-10-11 DIAGNOSIS — M47.816 LUMBAR FACET ARTHROPATHY: ICD-10-CM

## 2024-10-11 DIAGNOSIS — Z98.1 S/P LUMBAR SPINAL FUSION: ICD-10-CM

## 2024-10-11 DIAGNOSIS — N18.31 STAGE 3A CHRONIC KIDNEY DISEASE (H): ICD-10-CM

## 2024-10-11 DIAGNOSIS — M47.816 LUMBAR SPONDYLOSIS: ICD-10-CM

## 2024-10-11 DIAGNOSIS — E55.9 VITAMIN D DEFICIENCY: ICD-10-CM

## 2024-10-11 DIAGNOSIS — E78.2 MIXED HYPERLIPIDEMIA: ICD-10-CM

## 2024-10-11 DIAGNOSIS — R31.29 MICROSCOPIC HEMATURIA: ICD-10-CM

## 2024-10-11 DIAGNOSIS — G60.9 IDIOPATHIC PERIPHERAL NEUROPATHY: ICD-10-CM

## 2024-10-11 DIAGNOSIS — Z51.81 ENCOUNTER FOR THERAPEUTIC DRUG LEVEL MONITORING: ICD-10-CM

## 2024-10-11 LAB
ALBUMIN UR-MCNC: NEGATIVE MG/DL
APPEARANCE UR: CLEAR
BACTERIA #/AREA URNS HPF: ABNORMAL /HPF
BILIRUB UR QL STRIP: NEGATIVE
CHOLEST SERPL-MCNC: 154 MG/DL
COLOR UR AUTO: YELLOW
CREAT UR-MCNC: 62.8 MG/DL
CREAT UR-MCNC: 63 MG/DL
FASTING STATUS PATIENT QL REPORTED: ABNORMAL
GLUCOSE UR STRIP-MCNC: NEGATIVE MG/DL
HDLC SERPL-MCNC: 46 MG/DL
HGB UR QL STRIP: ABNORMAL
HYALINE CASTS #/AREA URNS LPF: ABNORMAL /LPF
KETONES UR STRIP-MCNC: NEGATIVE MG/DL
LDLC SERPL CALC-MCNC: 79 MG/DL
LEUKOCYTE ESTERASE UR QL STRIP: NEGATIVE
MICROALBUMIN UR-MCNC: <12 MG/L
MICROALBUMIN/CREAT UR: NORMAL MG/G{CREAT}
MUCOUS THREADS #/AREA URNS LPF: PRESENT /LPF
NITRATE UR QL: NEGATIVE
NONHDLC SERPL-MCNC: 108 MG/DL
PH UR STRIP: 7 [PH] (ref 5–7)
RBC #/AREA URNS AUTO: ABNORMAL /HPF
SP GR UR STRIP: 1.01 (ref 1–1.03)
SQUAMOUS #/AREA URNS AUTO: ABNORMAL /LPF
TRIGL SERPL-MCNC: 145 MG/DL
UROBILINOGEN UR STRIP-ACNC: 0.2 E.U./DL
VIT D+METAB SERPL-MCNC: 46 NG/ML (ref 20–50)
WBC #/AREA URNS AUTO: ABNORMAL /HPF

## 2024-10-11 PROCEDURE — G0439 PPPS, SUBSEQ VISIT: HCPCS | Performed by: INTERNAL MEDICINE

## 2024-10-11 PROCEDURE — 80061 LIPID PANEL: CPT | Performed by: INTERNAL MEDICINE

## 2024-10-11 PROCEDURE — 81001 URINALYSIS AUTO W/SCOPE: CPT | Performed by: INTERNAL MEDICINE

## 2024-10-11 PROCEDURE — 36415 COLL VENOUS BLD VENIPUNCTURE: CPT | Performed by: INTERNAL MEDICINE

## 2024-10-11 PROCEDURE — 82306 VITAMIN D 25 HYDROXY: CPT | Performed by: INTERNAL MEDICINE

## 2024-10-11 PROCEDURE — 99214 OFFICE O/P EST MOD 30 MIN: CPT | Mod: 25 | Performed by: INTERNAL MEDICINE

## 2024-10-11 PROCEDURE — G0481 DRUG TEST DEF 8-14 CLASSES: HCPCS | Performed by: INTERNAL MEDICINE

## 2024-10-11 PROCEDURE — 82570 ASSAY OF URINE CREATININE: CPT | Performed by: INTERNAL MEDICINE

## 2024-10-11 PROCEDURE — 82043 UR ALBUMIN QUANTITATIVE: CPT | Performed by: INTERNAL MEDICINE

## 2024-10-11 RX ORDER — ATORVASTATIN CALCIUM 20 MG/1
20 TABLET, FILM COATED ORAL AT BEDTIME
Qty: 90 TABLET | Refills: 3 | Status: SHIPPED | OUTPATIENT
Start: 2024-10-11

## 2024-10-11 RX ORDER — TRAMADOL HYDROCHLORIDE 50 MG/1
25 TABLET ORAL DAILY PRN
Qty: 30 TABLET | Refills: 0 | Status: SHIPPED | OUTPATIENT
Start: 2024-10-11

## 2024-10-11 ASSESSMENT — ANXIETY QUESTIONNAIRES
7. FEELING AFRAID AS IF SOMETHING AWFUL MIGHT HAPPEN: NOT AT ALL
5. BEING SO RESTLESS THAT IT IS HARD TO SIT STILL: SEVERAL DAYS
1. FEELING NERVOUS, ANXIOUS, OR ON EDGE: NOT AT ALL
2. NOT BEING ABLE TO STOP OR CONTROL WORRYING: NOT AT ALL
4. TROUBLE RELAXING: NOT AT ALL
6. BECOMING EASILY ANNOYED OR IRRITABLE: NOT AT ALL
IF YOU CHECKED OFF ANY PROBLEMS ON THIS QUESTIONNAIRE, HOW DIFFICULT HAVE THESE PROBLEMS MADE IT FOR YOU TO DO YOUR WORK, TAKE CARE OF THINGS AT HOME, OR GET ALONG WITH OTHER PEOPLE: NOT DIFFICULT AT ALL
GAD7 TOTAL SCORE: 1
GAD7 TOTAL SCORE: 1
3. WORRYING TOO MUCH ABOUT DIFFERENT THINGS: NOT AT ALL

## 2024-10-11 ASSESSMENT — PATIENT HEALTH QUESTIONNAIRE - PHQ9: SUM OF ALL RESPONSES TO PHQ QUESTIONS 1-9: 0

## 2024-10-11 NOTE — PROGRESS NOTES
Preventive Care Visit  [facility] SHANTELLE Arizmendi MD, Internal Medicine - Pediatrics  Oct 11, 2024      Assessment & Plan     Encounter for Medicare annual wellness exam  Already received Covid and flu vaccines at pharmacy. Will plan to check to see if she had RSV vaccine already and get vaccine at pharmacy if necessary. Up to date on DEXA scan.     Chronic pain due to trauma  Localized osteoporosis without current pathological fracture - 8/2020 waiting for dental work to start fosamax.   Lumbar facet arthropathy  Lumbar spondylosis  S/P lumbar spinal fusion  Encounter for therapeutic drug level monitoring  History of lumbar spondylosis and osteoporosis. Saw spinal doctor and was found to have spinal compression fracture on imaging. Pain is not well-managed on tylenol. Prescribed tramadol for severe pain that limits her daily activities. She reports that she has severe pain about 2x per week. Anticipate that current medication supply will last for up to 6 months. Patient is low risk for opioid dependence. Provided education about side effects of medication including that it could make her more unsteady on her feet. Patient is aware of this risk and will take extra precautions while ambulating with medication.   - traMADol (ULTRAM) 50 MG tablet; Take 0.5 tablets (25 mg) by mouth daily as needed for severe pain.  - [id number] - Urine Drug Confirmation Panel (Comprehensive)   - CSA signed    Essential hypertension  Stage 3a chronic kidney disease (H)  BP well-controlled today at 122/60. Current medications are amlodipine 5mg and lisinopril 10mg. Given patient's history of peripheral neuropathy, falls, and osteoporosis, want to monitor that blood pressure does not go too low.  Advised her to check periodically at home and to check in if seeing readings of systolic below 120. BMP in September showed stable kidney function. Will check urine labs today.   - UA Macroscopic with reflex to  Microscopic and Culture  - Albumin Random Urine Quantitative with Creat Ratio    Mixed hyperlipidemia  Will check lipid panel today. Recommended that she stop baby aspirin given age/fall risk. No history of MI/stroke.   - atorvastatin (LIPITOR) 20 MG tablet; Take 1 tablet (20 mg) by mouth at bedtime.  - Lipid panel reflex to direct LDL Fasting    Vitamin D deficiency  Will check Vitamin D level today. History of Vit D deficiency and osteoporosis, but level was high on last lab so discontinued supplement. Will re-start Vit D supplement if level is low.   - Vitamin D Deficiency; Future    Idiopathic peripheral neuropathy  -followed by neurology. Has cane and guard rails at home for fall prevention.     Counseling  Appropriate preventive services were addressed with this patient via screening, questionnaire, or discussion as appropriate for fall prevention, nutrition, physical activity, Tobacco-use cessation, social engagement, weight loss and cognition.  Checklist reviewing preventive services available has been given to the patient.  Reviewed patient's diet, addressing concerns and/or questions.   She is at risk for psychosocial distress and has been provided with information to reduce risk.   Updated plan of care.  Patient reported difficulty with activities of daily living were addressed today.The patient was provided with written information regarding signs of hearing loss.       Subjective   Kerri is a 89 year old, presenting for the following:  Physical  -saw neurologist. Said her Neuropathy is a bit worse. Sent to spinal doctor for MRI/Xray. Xray showeed compression fracture. Said it would take 3-6 months and up to a year to heal. Her pain is not well-controlled, and it makes it hard for her to care for her  at home. Prescribed Muscle relaxant that didn't work. Started PT. Tylenol by itself doesn't work. Cannot take NSAIDS.  -She was seen by Derm for an itch that started in February. They ruled out  cancer. She was on prednisone, methotrexate, and folic acid. It is mostly resolved, but she's still on small doses of the medications.  -tooth extraction in August. Got tramodol. Used 1/2 pill.  Helped with pain in tooth and back. Dentist said she shouldn't have implant if she was going to take bone med.   -wondering about centrum, tylenol PM, baby aspirin 81, and if she should take fish oil and vit D, wondering if she needs more heart testing because her heart has not been checked in a long time.      (I10) Essential hypertension  (M25.471,  M25.474,  M25.475,  M25.472) Bilateral swelling of feet and ankles  BP Readings from Last 6 Encounters:   10/11/24 122/60   09/17/24 (!) 147/67   08/19/24 119/59   08/01/24 120/58   07/01/24 139/84   05/22/24 115/72     BP at home - highest 145, lowest 65    Plan: amLODIPine (NORVASC) 5 MG tablet, lisinopril         (ZESTRIL) 10 MG tablet     (E78.2) Mixed hyperlipidemia   Latest Reference Range & Units 10/13/23 08:51   Cholesterol <200 mg/dL 148   HDL Cholesterol >=50 mg/dL 37 (L)   LDL Cholesterol Calculated <=100 mg/dL 84   Triglycerides <150 mg/dL 136   (L): Data is abnormally low  Plan: atorvastatin         (LIPITOR) 20 MG tablet     (N18.31) Stage 3a chronic kidney disease (H)   Latest Reference Range & Units 03/21/24 14:57 04/08/24 13:35 05/28/24 09:29 06/25/24 10:00 09/10/24 15:02   Creatinine 0.51 - 0.95 mg/dL 1.00 (H) 1.06 (H) 0.97 (H) 1.05 (H) 0.94   GFR Estimate >60 mL/min/1.73m2 54 (L) 51 (L) 56 (L) 51 (L) 58 (L)   (H): Data is abnormally high  (L): Data is abnormally low     (G60.9) Idiopathic peripheral neuropathy  Comment: Following with Neuro, f/up 1 year. Recently lower copper and waiting to hear from team.  Plan: gabapentin (NEURONTIN) 400 MG capsule  Lowering gabapentin dose to see if helps w/ AM unsteadiness.     (M47.816) Lumbar facet arthropathy  (M47.816) Lumbar spondylosis  (Z98.1) S/P lumbar spinal fusion  Comment: In cmobo with neuropathy limits what  she can do.   Recently eval'd by [facility].   Last injection by PMR only lasted a few days  - last PMR 9/2024 - cyclobenzaprine 5 mg 3 times daily as needed, physical therapy   Doing physical therapy  - will [place] back with PMR       (M81.6) Localized osteoporosis without current pathological fracture - 8/2020 waiting for dental work to start fosamax.   Vitamin D Deficiency Screening Results:  Lab Results   Component Value Date    VITDT 54 (H) 10/13/2023    VITDT 70 08/10/2020    VITDT 66 08/06/2019    VITDT 61 04/03/2017      Last dexa 10/2023 - There has been no significant change in bone density of the hip(s). There has been significant increase in bone density of the forearm.   Fosamax after dental work     (F51.01) Primary insomnia  Comment: Tried trazodone, better with Tylenol PM.          10/11/2024     8:09 AM   Additional Questions   Roomed by Duyen Greenfield   Accompanied by DEMETRIA         Health Care Directive  Patient does not have a Health Care Directive or Living Will: Discussed advance care planning with patient; however, patient declined at this time.    HPI    Back pain is pretty severe and limiting her activities. 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         10/9/2024   General Health   How would you rate your overall physical health? Good   Feel stress (tense, anxious, or unable to sleep) Only a little      (!) STRESS CONCERN      10/9/2024   Nutrition   Diet: Regular (no restrictions)            10/9/2024   Exercise   Days per week of moderate/strenous exercise 7 days   Average minutes spent exercising at this level 30 min            10/9/2024   Social Factors   Frequency of gathering with friends or relatives Three times a week   Worry food won't last until get money to buy more No   Food not last or not have enough money for food? No   Do you have housing? (Housing is defined as stable permanent housing and does not include staying ouside in a car, in a tent, in an abandoned building, in an  overnight shelter, or couch-surfing.) Yes   Are you worried about losing your housing? No   Lack of transportation? No   Unable to get utilities (heat,electricity)? No            10/11/2024   Fall Risk   Gait Speed Test (Document in seconds) 6   Gait Speed Test Interpretation Greater than 5.01 seconds - ABNORMAL               10/9/2024   Activities of Daily Living- Home Safety   Needs help with the following daily activites Transportation   Safety concerns in the home None of the above            10/9/2024   Dental   Dentist two times every year? Yes            10/9/2024   Hearing Screening   Hearing concerns? (!) IT'S HARD TO FOLLOW A CONVERSATION IN A NOISY RESTAURANT OR CROWDED ROOM.            10/9/2024   Driving Risk Screening   Patient/family members have concerns about driving No            10/9/2024   General Alertness/Fatigue Screening   Have you been more tired than usual lately? No            10/9/2024   Urinary Incontinence Screening   Bothered by leaking urine in past 6 months No               Today's PHQ-2 Score:       10/11/2024     8:05 AM   PHQ-2 ( 1999 [employer])   Q1: Little interest or pleasure in doing things 0   Q2: Feeling down, depressed or hopeless 0   PHQ-2 Score 0   Q1: Little interest or pleasure in doing things Not at all   Q2: Feeling down, depressed or hopeless Not at all   PHQ-2 Score 0           10/9/2024   Substance Use   Alcohol more than 3/day or more than 7/wk Not Applicable   Do you have a current opioid prescription? No   How severe/bad is pain from 1 to 10? 7/10   Do you use any other substances recreationally? No        Social History     Tobacco Use    Smoking status: Never     Passive exposure: Never    Smokeless tobacco: Never   Vaping Use    Vaping status: Never Used   Substance Use Topics    Alcohol use: Yes     Alcohol/week: 1.0 standard drink of alcohol     Comment: 1 or 2 per month    Drug use: No           6/4/2024   LAST FHS-7 RESULTS   1st degree relative breast or  ovarian cancer Yes   Any relative bilateral breast cancer No   Any male have breast cancer No   Any ONE woman have BOTH breast AND ovarian cancer No   Any woman with breast cancer before 50yrs Yes   2 or more relatives with breast AND/OR ovarian cancer No   2 or more relatives with breast AND/OR bowel cancer No           Mammogram Screening - After age 74- determine frequency with patient based on health status, life expectancy and patient goals          Reviewed and updated as needed this visit by Provider                    Current providers sharing in care for this patient include:  Patient Care Team:  Parrish Arizmendi MD as PCP - General (Internal Medicine)  Raymundo Solorio MD as MD (Neurology)  Arabella Purvis MD as Hospitalist (Endocrinology, Diabetes, and Metabolism)  Jason Gillette MD as MD (Neurology)  Parrish Arizmendi MD as Assigned PCP  Jason Gillette MD as Assigned Neuroscience Provider  Aster Cordon MD as MD (Dermatology)  Rakesh Talley MD as Assigned Surgical Provider  Eb Cuevas MD as Assigned Pain Medication Provider    The following health maintenance items are reviewed in Epic and correct as of today:  Health Maintenance   Topic Date Due    URINE DRUG SCREEN  Never done    CONTROLLED SUBSTANCE AGREEMENT FOR CHRONIC PAIN MANAGEMENT  Never done    RSV VACCINE (1 - 1-dose 75+ series) Never done    ANNUAL REVIEW OF HM ORDERS  09/22/2022    MICROALBUMIN  09/29/2023    LIPID  10/13/2024    COVID-19 Vaccine (8 - 2024-25 season) 11/22/2024    BMP  09/10/2025    HEMOGLOBIN  09/10/2025    MEDICARE ANNUAL WELLNESS VISIT  10/11/2025    JERRY ASSESSMENT  10/11/2025    FALL RISK ASSESSMENT  10/11/2025    PHQ-9  10/11/2025    ADVANCE CARE PLANNING  10/03/2028    DTAP/TDAP/TD IMMUNIZATION (4 - Td or Tdap) 06/16/2033    DEXA  10/23/2038    PHQ-2 (once per calendar year)  Completed    INFLUENZA VACCINE  Completed    Pneumococcal Vaccine: 65+ Years  Completed     "URINALYSIS  Completed    ZOSTER IMMUNIZATION  Completed    HPV IMMUNIZATION  Aged Out    MENINGITIS IMMUNIZATION  Aged Out    RSV MONOCLONAL ANTIBODY  Aged Out         Review of Systems  Constitutional, HEENT, cardiovascular, pulmonary, gi and gu systems are negative, except as otherwise noted.     Objective    Exam  /60 (BP Location: Right arm, Patient Position: Sitting, Cuff Size: Adult Regular)   Pulse 77   Temp 98.2  F (36.8  C) (Temporal)   Resp 16   Ht 1.499 m (4' 11\")   Wt 52.6 kg (116 lb)   LMP  (LMP Unknown)   SpO2 97%   BMI 23.43 kg/m     Estimated body mass index is 23.43 kg/m  as calculated from the following:    Height as of this encounter: 1.499 m (4' 11\").    Weight as of this encounter: 52.6 kg (116 lb).    Physical Exam  GENERAL: alert and no distress  NECK: no adenopathy, no asymmetry, masses, or scars  RESP: lungs clear to auscultation - no rales, rhonchi or wheezes  CV: regular rate and rhythm, normal S1 S2, no S3 or S4, no murmur, click or rub, no peripheral edema  ABDOMEN: soft, nontender, no hepatosplenomegaly, no masses and bowel sounds normal  MS: no gross musculoskeletal defects noted, no edema           10/11/2024   Mini Cog   Clock Draw Score 0 Abnormal   3 Item Recall 3 objects recalled   Mini Cog Total Score 3               Savannah Barajas, MS3  Medical Student    Physician Attestation   I, Parrish Arizmendi MD, was present with the medical/ALYSSA student who participated in the service and in the documentation of the note.  I have verified the history and personally performed the physical exam and medical decision making.  I agree with the assessment and plan of care as documented in the note.      Items personally reviewed: vitals and labs.    Parrish Arizmendi MD    Signed Electronically by: Parrish Arizmendi MD    "

## 2024-10-11 NOTE — LETTER
October 14, 2024      Kerri Rocha  [address] [facility] [address]        Dear ,    We are writing to inform you of your test results.    {results letter list:[id number]}    Resulted Orders   UA Macroscopic with reflex to Microscopic and Culture   Result Value Ref Range    Color Urine Yellow Colorless, Straw, Light Yellow, Yellow    Appearance Urine Clear Clear    Glucose Urine Negative Negative mg/dL    Bilirubin Urine Negative Negative    Ketones Urine Negative Negative mg/dL    Specific Gravity Urine 1.015 1.003 - 1.035    Blood Urine Trace (A) Negative    pH Urine 7.0 5.0 - 7.0    Protein Albumin Urine Negative Negative mg/dL    Urobilinogen Urine 0.2 0.2, 1.0 E.U./dL    Nitrite Urine Negative Negative    Leukocyte Esterase Urine Negative Negative   UA Microscopic with Reflex to Culture   Result Value Ref Range    Bacteria Urine Few (A) None Seen /HPF    RBC Urine 5-10 (A) 0-2 /HPF /HPF    WBC Urine 0-5 0-5 /HPF /HPF    Squamous Epithelials Urine Few (A) None Seen /LPF    Mucus Urine Present (A) None Seen /LPF    Hyaline Casts Urine 0-2 (A) None Seen /LPF    Narrative    Urine Culture not indicated       If you have any questions or concerns, please call the clinic at the number listed above.       Sincerely,      Parrish Arizmendi MD

## 2024-10-11 NOTE — TELEPHONE ENCOUNTER
Received call from Rozina with [facility] in [place]. States the pharmacy need to confirm with ordering provider if traMADol (ULTRAM) 50 MG tablet is for acute or chronic issues as it is over a 7 day supply.    Dr. Arizmendi please review and advise.      traMADol (ULTRAM) 50 MG tablet 30 tablet 0 10/11/2024 -- No   Sig - Route: Take 0.5 tablets (25 mg) by mouth daily as needed for severe pain. - Oral   Sent to pharmacy as: traMADol HCl 50 MG Oral Tablet (ULTRAM)     [id number] - [address]     Rose Matute RN

## 2024-10-11 NOTE — LETTER
October 11, 2024      Esau Manning,     Great to see you today. A few skin cells snuck into your urine test (I'm not worried about these) but it also had a few red cells. I'd like to make sure the red cells go away - I'll have my team schedule for another urine test in a month or so. If you notice any blood in your urine or vaginal bleeding please let me know.       Resulted Orders   UA Macroscopic with reflex to Microscopic and Culture   Result Value Ref Range    Color Urine Yellow Colorless, Straw, Light Yellow, Yellow    Appearance Urine Clear Clear    Glucose Urine Negative Negative mg/dL    Bilirubin Urine Negative Negative    Ketones Urine Negative Negative mg/dL    Specific Gravity Urine 1.015 1.003 - 1.035    Blood Urine Trace (A) Negative    pH Urine 7.0 5.0 - 7.0    Protein Albumin Urine Negative Negative mg/dL    Urobilinogen Urine 0.2 0.2, 1.0 E.U./dL    Nitrite Urine Negative Negative    Leukocyte Esterase Urine Negative Negative   UA Microscopic with Reflex to Culture   Result Value Ref Range    Bacteria Urine Few (A) None Seen /HPF    RBC Urine 5-10 (A) 0-2 /HPF /HPF    WBC Urine 0-5 0-5 /HPF /HPF    Squamous Epithelials Urine Few (A) None Seen /LPF    Mucus Urine Present (A) None Seen /LPF    Hyaline Casts Urine 0-2 (A) None Seen /LPF    Narrative    Urine Culture not indicated       IVIS Arizmendi MD   Internal Medicine-Pediatrics

## 2024-10-11 NOTE — LETTER
Opioid / Opioid Plus Controlled Substance Agreement    This is an agreement between you and your provider about the safe and appropriate use of controlled substance/opioids prescribed by your care team. Controlled substances are medicines that can cause physical and mental dependence (abuse).    There are strict laws about having and using these medicines. We here at [facility] are committing to working with you in your efforts to get better. To support you in this work, we ll help you schedule regular office appointments for medicine refills. If we must cancel or change your appointment for any reason, we ll make sure you have enough medicine to last until your next appointment.     As a Provider, I will:  Listen carefully to your concerns and treat you with respect.   Recommend a treatment plan that I believe is in your best interest. This plan may involve therapies other than opioid pain medication.   Talk with you often about the possible benefits, and the risk of harm of any medicine that we prescribe for you.   Provide a plan on how to taper (discontinue or go off) using this medicine if the decision is made to stop its use.    As a Patient, I understand that opioid(s):   Are a controlled substance prescribed by my care team to help me function or work and manage my condition(s).   Are strong medicines and can cause serious side effects such as:  Drowsiness, which can seriously affect my driving ability  A lower breathing rate, enough to cause death  Harm to my thinking ability   Depression   Abuse of and addiction to this medicine  Need to be taken exactly as prescribed. Combining opioids with certain medicines or chemicals (such as illegal drugs, sedatives, sleeping pills, and benzodiazepines) can be dangerous or even fatal. If I stop opioids suddenly, I may have severe withdrawal symptoms.  Do not work for all types of pain nor for all patients. If they re not helpful, I may be asked to stop  them.      The risks, benefits and side effects of these medicine(s) were explained to me. I agree that:  I will take part in other treatments as advised by my care team. This may be psychiatry or counseling, physical therapy, behavioral therapy, group treatment or a referral to a specialist.     I will keep all my appointments. I understand that this is part of the monitoring of opioids. My care team may require an office visit for EVERY opioid/controlled substance refill. If I miss appointments or don t follow instructions, my care team may stop my medicine.    I will take my medicines as prescribed. I will not change the dose or schedule unless my care team tells me to. There will be no refills if I run out early.     I may be asked to come to the clinic and complete a urine drug test or complete a pill count at any time. If I don t give a urine sample or participate in a pill count, the care team may stop my medicine.    I will only receive prescriptions from this clinic for chronic pain. If I am treated by another provider for acute pain issues, I will tell them that I am taking opioid pain medication for chronic pain and that I have a treatment agreement with this provider. I will inform my [facility] care team within one business day if I am given a prescription for any pain medication by another healthcare provider. My [facility] care team can contact other providers and pharmacists about my use of any medicines.    It is up to me to make sure that I don t run out of my medicines on weekends or holidays. If my care team is willing to refill my opioid prescription without a visit, I must request refills only during office hours. Refills may take up to 3 business days to process. I will use one pharmacy to fill all my opioid and other controlled substance prescriptions. I will notify the clinic about any changes to my insurance or medication availability.    I am responsible for my prescriptions.  If the medicine/prescription is lost, stolen or destroyed, it will not be replaced. I also agree not to share controlled substance medicines with anyone.    I am aware I should not use any illegal or recreational drugs. I agree not to drink alcohol unless my care team says I can.       If I enroll in the [place] [facility] Cann[facility] program, I will tell my care team prior to my next refill.     I will tell my care team right away if I become pregnant, have a new medical problem treated outside of my regular clinic, or have a change in my medications.    I understand that this medicine can affect my thinking, judgment and reaction time. Alcohol and drugs affect the brain and body, which can affect the safety of my driving. Being under the influence of alcohol or drugs can affect my decision-making, behaviors, personal safety, and the safety of others. Driving while impaired (DWI) can occur if a person is driving, operating, or in physical control of a car, motorcycle, boat, snowmobile, ATV, motorbike, off-road vehicle, or any other motor vehicle ([place] Statute 169A.20). I understand the risk if I choose to drive or operate any vehicle or machinery.    I understand that if I do not follow any of the conditions above, my prescriptions or treatment may be stopped or changed.          Opioids  What You Need to Know    What are opioids?   Opioids are pain medicines that must be prescribed by a doctor. They are also known as narcotics.     Examples are:   morphine (MS Contin, Reba)  oxycodone (Oxycontin)  oxycodone and acetaminophen (Percocet)  hydrocodone and acetaminophen (Vicodin, Norco)   fentanyl patch (Duragesic)   hydromorphone (Dilaudid)   methadone  codeine (Tylenol #3)     What do opioids do well?   Opioids are best for severe short-term pain such as after a surgery or injury. They may work well for cancer pain. They may help some people with long-lasting (chronic) pain.     What do opioids NOT do well?   Opioids  never get rid of pain entirely, and they don t work well for most patients with chronic pain. Opioids don t reduce swelling, one of the causes of pain.                                    Other ways to manage chronic pain and improve function include:     Treat the health problem that may be causing pain  Anti-inflammation medicines, which reduce swelling and tenderness, such as ibuprofen (Advil, Motrin) or naproxen (Aleve)  Acetaminophen (Tylenol)  Antidepressants and anti-seizure medicines, especially for nerve pain  Topical treatments such as patches or creams  Injections or nerve blocks  Chiropractic or osteopathic treatment  Acupuncture, massage, deep breathing, meditation, visual imagery, aromatherapy  Use heat or ice at the pain site  Physical therapy   Exercise  Stop smoking  Take part in therapy       Risks and side effects     Talk to your doctor before you start or decide to keep taking opioids. Possible side effects include:    Lowering your breathing rate enough to cause death  Overdose, including death, especially if taking higher than prescribed doses  Worse depression symptoms; less pleasure in things you usually enjoy  Feeling tired or sluggish  Slower thoughts or cloudy thinking  Being more sensitive to pain over time; pain is harder to control  Trouble sleeping or restless sleep  Changes in hormone levels (for example, less testosterone)  Changes in sex drive or ability to have sex  Constipation  Unsafe driving  Itching and sweating  Dizziness  Nausea, throwing up and dry mouth    What else should I know about opioids?    Opioids may lead to dependence, tolerance, or addiction.    Dependence means that if you stop or reduce the medicine too quickly, you will have withdrawal symptoms. These include loose poop (diarrhea), jitters, flu-like symptoms, nervousness and tremors. Dependence is not the same as addiction.                     Tolerance means needing higher doses over time to get the same  effect. This may increase the chance of serious side effects.    Addiction is when people improperly use a substance that harms their body, their mind or their relations with others. Use of opiates can cause a relapse of addiction if you have a history of drug or alcohol abuse.    People who have used opioids for a long time may have a lower quality of life, worse depression, higher levels of pain and more visits to doctors.    You can overdose on opioids. Take these steps to lower your risk of overdose:    Recognize the signs:  Signs of overdose include decrease or loss of consciousness (blackout), slowed breathing, trouble waking up and blue lips. If someone is worried about overdose, they should call 911.    Talk to your doctor about Narcan (naloxone).   If you are at risk for overdose, you may be given a prescription for Narcan. This medicine very quickly reverses the effects of opioids.   If you overdose, a friend or family member can give you Narcan while waiting for the ambulance. They need to know the signs of overdose and how to give Narcan.     Don't use alcohol or street drugs.   Taking them with opioids can cause death.    Do not take any of these medicines unless your doctor says it s OK. Taking these with opioids can cause death:  Benzodiazepines, such as lorazepam (Ativan), alprazolam (Xanax) or diazepam (Valium)  Muscle relaxers, such as cyclobenzaprine (Flexeril)  Sleeping pills like zolpidem (Ambien)   Other opioids      How to keep you and other people safe while taking opioids:    Never share your opioids with others.  Opioid medicines are regulated by the Drug Enforcement Agency ([place]ELANI). Selling or sharing medications is a criminal act.    2. Be sure to store opioids in a secure place, locked up if possible. Young children can easily swallow them and overdose.    3. When you are traveling with your medicines, keep them in the original bottles. If you use a pill box, be sure you also carry a copy  of your medicine list from your clinic or pharmacy.    4. Safe disposal of opioids    Most pharmacies have places to get rid of medicine, called disposal kiosks. Medicine disposal options are also available in every [facility]. Search your county and  medication disposal  to find more options. You can find more details at:  https://www.pca.[facility].[place]./living-green/managing-unwanted-medications     I agree that my provider, clinic care team, and pharmacy may work with any city, state or federal law enforcement agency that investigates the misuse, sale, or other diversion of my controlled medicine. I will allow my provider to discuss my care with, or share a copy of, this agreement with any other treating provider, pharmacy or emergency room where I receive care.    I have read this agreement and have asked questions about anything I did not understand.    _______________________________________________________  Patient Signature - Kerri Rocha _____________________                   Date     _______________________________________________________  Provider Signature - Parrish Arizmendi MD   _____________________                   Date     _______________________________________________________  Witness Signature (required if provider not present while patient signing)   _____________________                   Date

## 2024-10-11 NOTE — PATIENT INSTRUCTIONS
Great to see you - thank you for talking to Savannah!    Vitamin D - checking this today.    No need for the fish oil. Stop aspirin.     Teeth - I think we can use the bone medication a few months after any implant.     Blood pressure - occasionally it at home. If consistently <120/70ish let me know, especially if you're getting dizzy when you stand up.     Back pain   - sending in some tramadol for you to have for those days when it's pretty debilitating and you need to get things done.   - we talked about taking it a few times a week so this should last you closer to 6 months and we'll meet again to see how things are going.     Patient Education   Preventive Care Advice   This is general advice given by our system to help you stay healthy. However, your care team may have specific advice just for you. Please talk to your care team about your preventive care needs.  Nutrition  Eat 5 or more servings of fruits and vegetables each day.  Try wheat bread, brown rice and whole grain pasta (instead of white bread, rice, and pasta).  Get enough calcium and vitamin D. Check the label on foods and aim for 100% of the RDA (recommended daily allowance).  Lifestyle  Exercise at least 150 minutes each week  (30 minutes a day, 5 days a week).  Do muscle strengthening activities 2 days a week. These help control your weight and prevent disease.  No smoking.  Wear sunscreen to prevent skin cancer.  Have a dental exam and cleaning every 6 months.  Yearly exams  See your health care team every year to talk about:  Any changes in your health.  Any medicines your care team has prescribed.  Preventive care, family planning, and ways to prevent chronic diseases.  Shots (vaccines)   HPV shots (up to age 26), if you've never had them before.  Hepatitis B shots (up to age 59), if you've never had them before.  COVID-19 shot: Get this shot when it's due.  Flu shot: Get a flu shot every year.  Tetanus shot: Get a tetanus shot every 10  years.  Pneumococcal, hepatitis A, and RSV shots: Ask your care team if you need these based on your risk.  Shingles shot (for age 50 and up)  General health tests  Diabetes screening:  Starting at age 35, Get screened for diabetes at least every 3 years.  If you are younger than age 35, ask your care team if you should be screened for diabetes.  Cholesterol test: At age 39, start having a cholesterol test every 5 years, or more often if advised.  Bone density scan (DEXA): At age 50, ask your care team if you should have this scan for osteoporosis (brittle bones).  Hepatitis C: Get tested at least once in your life.  STIs (sexually transmitted infections)  Before age 24: Ask your care team if you should be screened for STIs.  After age 24: Get screened for STIs if you're at risk. You are at risk for STIs (including HIV) if:  You are sexually active with more than one person.  You don't use condoms every time.  You or a partner was diagnosed with a sexually transmitted infection.  If you are at risk for HIV, ask about PrEP medicine to prevent HIV.  Get tested for HIV at least once in your life, whether you are at risk for HIV or not.  Cancer screening tests  Cervical cancer screening: If you have a cervix, begin getting regular cervical cancer screening tests starting at age 21.  Breast cancer scan (mammogram): If you've ever had breasts, begin having regular mammograms starting at age 40. This is a scan to check for breast cancer.  Colon cancer screening: It is important to start screening for colon cancer at age 45.  Have a colonoscopy test every 10 years (or more often if you're at risk) Or, ask your provider about stool tests like a FIT test every year or Cologuard test every 3 years.  To learn more about your testing options, visit:   .  For help making a decision, visit:   https://bit.ly/wp11063.  Prostate cancer screening test: If you have a prostate, ask your care team if a prostate cancer screening test  (PSA) at age 55 is right for you.  Lung cancer screening: If you are a current or former smoker ages 50 to 80, ask your care team if ongoing lung cancer screenings are right for you.  For informational purposes only. Not to replace the advice of your health care provider. Copyright   2023 [employer]. All rights reserved. Clinically reviewed by the [facility] Transitions Program. [employer] [id number] - REV 01/24.

## 2024-10-14 ENCOUNTER — TELEPHONE (OUTPATIENT)
Dept: PEDIATRICS | Facility: CLINIC | Age: 89
End: 2024-10-14

## 2024-10-14 NOTE — TELEPHONE ENCOUNTER
Attempt 1 - mychart to patient with lab results and request to schedule done per Kamila Whatley         Schedule nonfasting lab in 1 month   --------     Esau Manning,     Great to see you today. A few skin cells snuck into your urine test (I'm not worried about these) but it also had a few red cells. I'd like to make sure the red cells go away - I'll have my team schedule for another urine test in a month or so. If you notice any blood in your urine or vaginal bleeding please let me know.     IVIS Arizmendi MD   Internal Medicine-Pediatrics

## 2024-10-15 DIAGNOSIS — I10 ESSENTIAL HYPERTENSION: ICD-10-CM

## 2024-10-15 LAB — GABAPENTIN UR QL CFM: PRESENT

## 2024-10-15 NOTE — TELEPHONE ENCOUNTER
Sent a [employer] message to the patient   - Informed the patient of Dr. Arizmendi's comments/recommendations     Rose MCCLAIN RN   [facility] [place]

## 2024-10-15 NOTE — TELEPHONE ENCOUNTER
We check urine once a year on everyone who takes medication for pain - there was nothing that was unexpected - she's on gabapentin so that's why it was in her urine. No changes from my end.

## 2024-10-15 NOTE — TELEPHONE ENCOUNTER
Patient is concerned regarding drug levels in urine. Please review and advise!     Thanks,  Marilyn Barros RN

## 2024-10-16 RX ORDER — AMLODIPINE BESYLATE 5 MG/1
5 TABLET ORAL DAILY
Qty: 90 TABLET | Refills: 3 | OUTPATIENT
Start: 2024-10-16

## 2024-10-21 ENCOUNTER — VIRTUAL VISIT (OUTPATIENT)
Dept: PHYSICAL THERAPY | Facility: CLINIC | Age: 89
End: 2024-10-21
Attending: PAIN MEDICINE
Payer: MEDICARE

## 2024-10-21 DIAGNOSIS — M79.18 MYOFASCIAL PAIN: ICD-10-CM

## 2024-10-21 DIAGNOSIS — M84.48XA SACRAL INSUFFICIENCY FRACTURE, INITIAL ENCOUNTER: Primary | ICD-10-CM

## 2024-10-21 DIAGNOSIS — Z98.1 HISTORY OF LUMBAR FUSION: ICD-10-CM

## 2024-10-21 DIAGNOSIS — M53.3 SACRAL BACK PAIN: ICD-10-CM

## 2024-10-21 DIAGNOSIS — S22.080D COMPRESSION FRACTURE OF T11 VERTEBRA WITH ROUTINE HEALING, SUBSEQUENT ENCOUNTER: ICD-10-CM

## 2024-10-21 DIAGNOSIS — M80.00XD AGE-RELATED OSTEOPOROSIS WITH CURRENT PATHOLOGICAL FRACTURE WITH ROUTINE HEALING, SUBSEQUENT ENCOUNTER: ICD-10-CM

## 2024-10-21 PROCEDURE — 97110 THERAPEUTIC EXERCISES: CPT | Mod: GP | Performed by: PHYSICAL THERAPIST

## 2024-11-04 ENCOUNTER — VIRTUAL VISIT (OUTPATIENT)
Dept: PHYSICAL THERAPY | Facility: CLINIC | Age: 89
End: 2024-11-04
Payer: MEDICARE

## 2024-11-04 DIAGNOSIS — M53.3 SACRAL BACK PAIN: ICD-10-CM

## 2024-11-04 DIAGNOSIS — S22.080D COMPRESSION FRACTURE OF T11 VERTEBRA WITH ROUTINE HEALING, SUBSEQUENT ENCOUNTER: ICD-10-CM

## 2024-11-04 DIAGNOSIS — M80.00XD AGE-RELATED OSTEOPOROSIS WITH CURRENT PATHOLOGICAL FRACTURE WITH ROUTINE HEALING, SUBSEQUENT ENCOUNTER: ICD-10-CM

## 2024-11-04 DIAGNOSIS — M79.18 MYOFASCIAL PAIN: ICD-10-CM

## 2024-11-04 DIAGNOSIS — Z98.1 HISTORY OF LUMBAR FUSION: ICD-10-CM

## 2024-11-04 DIAGNOSIS — M84.48XA SACRAL INSUFFICIENCY FRACTURE, INITIAL ENCOUNTER: Primary | ICD-10-CM

## 2024-11-04 PROCEDURE — 97110 THERAPEUTIC EXERCISES: CPT | Mod: GP | Performed by: PHYSICAL THERAPIST

## 2024-11-06 DIAGNOSIS — I10 ESSENTIAL HYPERTENSION: ICD-10-CM

## 2024-11-06 RX ORDER — AMLODIPINE BESYLATE 5 MG/1
5 TABLET ORAL DAILY
Qty: 90 TABLET | Refills: 3 | Status: SHIPPED | OUTPATIENT
Start: 2024-11-06

## 2024-11-06 NOTE — TELEPHONE ENCOUNTER
[facility] called stating they do not have refills on file for patients amLODIPine (NORVASC) 5 MG tablet prescription.    Patient will be running out soon.        Aminata WILLAMS, - [place] [id number]  [facility] [place]

## 2024-11-08 ENCOUNTER — LAB (OUTPATIENT)
Dept: LAB | Facility: CLINIC | Age: 89
End: 2024-11-08
Attending: INTERNAL MEDICINE
Payer: MEDICARE

## 2024-11-08 DIAGNOSIS — R31.29 MICROSCOPIC HEMATURIA: ICD-10-CM

## 2024-11-08 LAB
ALBUMIN UR-MCNC: NEGATIVE MG/DL
APPEARANCE UR: CLEAR
BILIRUB UR QL STRIP: NEGATIVE
COLOR UR AUTO: YELLOW
GLUCOSE UR STRIP-MCNC: NEGATIVE MG/DL
HGB UR QL STRIP: ABNORMAL
KETONES UR STRIP-MCNC: NEGATIVE MG/DL
LEUKOCYTE ESTERASE UR QL STRIP: NEGATIVE
NITRATE UR QL: NEGATIVE
PH UR STRIP: 6.5 [PH] (ref 5–7)
RBC #/AREA URNS AUTO: NORMAL /HPF
SP GR UR STRIP: 1.01 (ref 1–1.03)
UROBILINOGEN UR STRIP-ACNC: 0.2 E.U./DL
WBC #/AREA URNS AUTO: NORMAL /HPF

## 2024-11-08 PROCEDURE — 81001 URINALYSIS AUTO W/SCOPE: CPT

## 2024-11-12 DIAGNOSIS — R21 RASH: ICD-10-CM

## 2024-11-12 DIAGNOSIS — M33.13 DERMATOMYOSITIS (H): ICD-10-CM

## 2024-11-12 NOTE — TELEPHONE ENCOUNTER
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   Adult

## 2024-11-14 NOTE — TELEPHONE ENCOUNTER
Medication Requested:   Disp Refills Start End LUCILA   methotrexate 2.5 MG tablet 24 tablet 0 9/10/2024 -- No   Sig: Take five tablets (12.5mg total) by mouth once WEEKLY     ----------------------  Last Office Visit : 9/10/2024  [facility] Rakesh Jeffers MD  Dermatology    Future Office visit:     1/28/2025 1:30 PM (15 min)  [place]   Arrive by:  1:15 PM   RETURN DERMATOLOGY   [facility] ([facility])   Rakesh Talley MD     ----------------------        Refill decision: Refill pended and routed to the provider for review/determination due to the following criteria not met:     Medication unable to be refilled by RN due to criteria not met as indicated.                 []Supervision - Pt not seen within past 12 months, no future appointment              []Compliance - lapse in therapy/gap in refills              []Verification - order discrepancy, clarification needed              []Controlled medication              [x]Medication not on [facility], [facility], [facility] refill protocol   [] Abnormal labs/test:  [] Overdue labs/test:    []Rose refill given x1, Pt did not follow-up, pended to care team for decision  [] Medication not active on Pt's med list  [] Drug interaction Warning  [] Medication is Reported/Historical              []Other:

## 2024-11-18 ENCOUNTER — VIRTUAL VISIT (OUTPATIENT)
Dept: PHYSICAL THERAPY | Facility: CLINIC | Age: 89
End: 2024-11-18
Payer: MEDICARE

## 2024-11-18 DIAGNOSIS — M80.00XD AGE-RELATED OSTEOPOROSIS WITH CURRENT PATHOLOGICAL FRACTURE WITH ROUTINE HEALING, SUBSEQUENT ENCOUNTER: ICD-10-CM

## 2024-11-18 DIAGNOSIS — M79.18 MYOFASCIAL PAIN: ICD-10-CM

## 2024-11-18 DIAGNOSIS — M84.48XA SACRAL INSUFFICIENCY FRACTURE, INITIAL ENCOUNTER: Primary | ICD-10-CM

## 2024-11-18 DIAGNOSIS — Z98.1 HISTORY OF LUMBAR FUSION: ICD-10-CM

## 2024-11-18 DIAGNOSIS — S22.080D COMPRESSION FRACTURE OF T11 VERTEBRA WITH ROUTINE HEALING, SUBSEQUENT ENCOUNTER: ICD-10-CM

## 2024-11-18 DIAGNOSIS — M53.3 SACRAL BACK PAIN: ICD-10-CM

## 2024-11-18 PROCEDURE — 97112 NEUROMUSCULAR REEDUCATION: CPT | Mod: GP | Performed by: PHYSICAL THERAPIST

## 2024-11-18 PROCEDURE — 97110 THERAPEUTIC EXERCISES: CPT | Mod: GP | Performed by: PHYSICAL THERAPIST

## 2024-11-18 NOTE — PROGRESS NOTES
11/18/24 0500   Appointment Info   Signing clinician's name / credentials Amy Adames, PT   Total/Authorized Visits E &T 4   Visits Used 4   Medical Diagnosis Sacral insufficiency fracture, initial encounter  Compression fracture of T11 vertebra, initial encounter (H)  History of lumbar fusion  Myofascial pain  Age-related osteoporosis with current pathological fracture with routine healing, subsequent encounter   PT Tx Diagnosis acute low back pain; sacral pain   Other pertinent information has blind  helps take care of; and has to bring him to appt if comes in to clinic, and needs to get transportation   Quick Adds Virtual Visit   Virtual Visit   Time of service begin: 1131  (unable to connect by video, so performed over the phone with patient.)   Time of service end: 1213   Provider Location (Distant Site) Office   Patient Location (Originating Site) Home/other residence   Virtual Visit Rationale The virtual visit will provide the care the patient needs. We reviewed the patient's chart, and PTRx prescription to determine the following telemedicine visit is appropriate and effective for the patient's care.   Progress Note/Certification   Start of Care Date 10/07/24   Onset of illness/injury or Date of Surgery 09/17/24  (md referral date)   Therapy Frequency every 3 weeks   Predicted Duration 12 weeks   Certification date from 12/03/24   Certification date to 02/24/25   Progress Note Due Date 12/02/24   Progress Note Completed Date 10/07/24   GOALS   PT Goals 2   PT Goal 1   Goal Identifier Standing   Goal Description Minute able to stand for daily activities 30 min with pain 1/10   Rationale to maximize safety and independence with performance of ADLs and functional tasks;to maximize safety and independence within the home;to maximize safety and independence within the community;to maximize safety and independence with transportation;to maximize safety and independence with self cares   Goal Progress  limited more by balance/neuropathy issues rather than lowback/sacral pain at this point, improved standing with less pain; goal is appropriate and ongoing   Target Date 02/24/25   PT Goal 2   Goal Identifier Sitting   Goal Description Able to sit for 60 min for daily activities and cares pain 1/10   Rationale to maximize safety and independence with performance of ADLs and functional tasks;to maximize safety and independence within the home;to maximize safety and independence within the community;to maximize safety and independence with transportation;to maximize safety and independence with self cares   Goal Progress improving, less pain with sitting; goal is appropriate and ongoing   Target Date 02/24/25   Subjective Report   Subjective Report Reports that back/sacral pain is much better, doesn't have the sharp pain like did initially. Exercises are going well. Can sit for longer periods without increased pain. States balance still isn't great, but states this is more limited by her neuropathy. Would like to follow up in 3-4 week to progress HEP as able.   Objective Measures   Objective Measures Objective Measure 1;Objective Measure 2   Objective Measure 1   Objective Measure Lumbar AROM   Details can perform standing lumbar flexion finger tips mid calf level without increased pain; improved from initial eval.   Objective Measure 2   Objective Measure strength   Details over core and LE strength improving, able to perform sit to stand from chair with light arm support with decreased pain.   Treatment Interventions (PT)   Interventions Therapeutic Procedure/Exercise;Neuromuscular Re-education   Therapeutic Procedure/Exercise   Therapeutic Procedures: strength, endurance, ROM, flexibility minutes (77424) 25   PTRx Ther Proc 1 Single Knee to Chest   PTRx Ther Proc 1 - Details brief review these are going well   PTRx Ther Proc 2 Supine Lumbar Hip Roll   PTRx Ther Proc 2 - Details brief review these are going well    PTRx Ther Proc 3 Roll Ins   PTRx Ther Proc 3 - Details 5 sec hold x 10 x 2 reminders for tightening core/TA then perform motion   PTRx Ther Proc 4 Roll Outs   PTRx Ther Proc 4 - Details yellow x 10 x 2 reminders for tightening core/TA then perform motion; progress to red with this exercise   PTRx Ther Proc 5 Standing Hip Abduction   PTRx Ther Proc 5 - Details x 5 right and left x 2; pt standing at railing  cueing for small range and staying in painfree range keeping spine in neutral   PTRx Ther Proc 6 Sit to Stand   PTRx Ther Proc 6 - Details from chair x 5 with slight difficulty. discussed could try from edge of bed that is higher could have walker in front so when stands up has something to hang on to   PTRx Ther Proc 7 Alternating Front Shoulder Raise   PTRx Ther Proc 7 - Details x 10 alternating right and left cueing to just shoulder height; and for tightening  core/TA prior to motion.  has 1 lb weights can add as gets easier   PTRx Ther Proc 8 Sit to Stand   PTRx Ther Proc 8 - Details from chair x 5 with improved form   PTRx Ther Proc 9 Alternating Front Shoulder Raise   PTRx Ther Proc 9 - Details using 1 lb with HEP, able to demonstrate with good form   Skilled Intervention Verbal cues and demonstration used for proper performance of exercise including cues for proper form to decrease substitutions and isolate muscle being targeted; cues for slow and controlled motion both concentrically and eccentrically; cueing for good starting position and posture, and to ensure safe performance of motion   Patient Response/Progress good overall progress with HEP   Neuromuscular Re-education   Neuromuscular re-ed of mvmt, balance, coord, kinesthetic sense, posture, proprioception minutes (14510) 15   PTRx Neuro Re-ed 1 Sidestep   PTRx Neuro Re-ed 1 - Details at railing in home down and back x 2 cueing for starting with smaller step and work on good posture   PTRx Neuro Re-ed 2 Shoulder Theraband Rows   PTRx Neuro Re-ed 2  - Details demonstrated with yellow theraband for patient can try at home can loop around railing and sit in chair to perform   Neuro Re-ed 1 HEP and education   Neuro Re-ed 1 - Details reviewed previous balance exercsises and which ones would be alright to add in including  semi-tandem stance, weight shifting.  start with 2 legged exercises, i.e weight shift and semi -tamdem, rather than SLS so not irritating sacral fax.   Skilled Intervention Verbal cues and demonstration used for proper performance of exercise including cues for proper form to decrease substitutions and isolate muscle being targeted; cues for slow and controlled motion both concentrically and eccentrically; cueing for good starting position and posture, and to ensure safe performance of motion   Patient Response/Progress good, able to demonstrate with good form   Education   Learner/Method Patient;Pictures/Video;No Barriers to Learning   Education Comments pt likes handouts for HEP   Plan   Home program see PTRX   Updates to plan of care PN and recert completed today ;good overall progress and good compliance with HEP, recheck in 3-4 weeks for HEP progression   Plan for next session progress with  stretching as able; core and trunk stabilizatin as tolerated; gluteal strengthening; balance and proprioception activities as tolerated.   Total Session Time   Timed Code Treatment Minutes 40   Total Treatment Time (sum of timed and untimed services) 40       [facility]                                                                                   OUTPATIENT PHYSICAL THERAPY    PLAN OF TREATMENT FOR OUTPATIENT REHABILITATION   Patient's Last Name, First Name, Kerri Price YOB: 1935   Provider's Name   [facility]   Medical Record No.  [id number]     Onset Date: 09/17/24 (md referral date)  Start of Care Date: 10/07/24     Medical Diagnosis:  Sacral insufficiency  fracture, initial encounter  Compression fracture of T11 vertebra, initial encounter (H)  History of lumbar fusion  Myofascial pain  Age-related osteoporosis with current pathological fracture with routine healing, subsequent encounter      PT Treatment Diagnosis:  acute low back pain; sacral pain Plan of Treatment  Frequency/Duration: every 3 weeks/ 12 weeks    Certification date from 12/03/24 to 02/24/25         See note for plan of treatment details and functional goals     Amy Adames, PT                         I CERTIFY THE NEED FOR THESE SERVICES FURNISHED UNDER        THIS PLAN OF TREATMENT AND WHILE UNDER MY CARE     (Physician attestation of this document indicates review and certification of the therapy plan).              Referring Provider:  Donta Carlin    Initial Assessment  See Epic Evaluation- Start of Care Date: 10/07/24            PLAN  Continue therapy per current plan of care.    Beginning/End Dates of Progress Note Reporting Period:  10/07/24 to 11/18/2024    Referring Provider:  Donta Carlin

## 2024-11-19 NOTE — TELEPHONE ENCOUNTER
[facility] Call Center    Phone Message    May a detailed message be left on voicemail: yes     Reason for Call: Medication Refill Request    Has the patient contacted the pharmacy for the refill? Yes   Name of medication being requested: methotrexate 2.5 MG tablet   Provider who prescribed the medication: Dr. Talley   Pharmacy[place] [id number] [place] [facility] [place] [address]  Date medication is needed: 11/25/24   Thanks     Action Taken: Message routed to:  [facility] Derm    Travel Screening: Not Applicable     Date of Service:

## 2024-11-19 NOTE — TELEPHONE ENCOUNTER
Disp Refills Start End LUCILA   methotrexate 2.5 MG tablet 24 tablet 0 9/10/2024 -- No   Sig: Take five tablets (12.5mg total) by mouth once WEEKLY     Last Office Visit: 9/10/2024  [facility] [place]    Future Office visit:     1/28/2025 1:30 PM (15 min)  [place]   Arrive by:  1:15 PM   RETURN DERMATOLOGY   [facility] ([facility])   Rakesh Talley MD       CBC RESULTS:   Recent Labs   Lab Test 09/10/24  1502   WBC 7.3   RBC 3.59*   HGB 12.1   HCT 36.3   *   MCH 33.7*   MCHC 33.3   RDW 14.5          Creatinine   Date Value Ref Range Status   09/10/2024 0.94 0.51 - 0.95 mg/dL Final   08/10/2020 0.97 0.52 - 1.04 mg/dL Final   ]    Liver Function Studies -   Recent Labs   Lab Test 09/10/24  1502   PROTTOTAL 7.2   ALBUMIN 4.4   BILITOTAL 0.3   ALKPHOS 84   AST 31   ALT 18       Routing refill request to provider for review/approval because:   DMARD.  Rheum labs completed: 09/10/24 Current thru: 12/10/24  LOV: 9/10/2024  FOV: 1/28/25

## 2024-11-21 NOTE — TELEPHONE ENCOUNTER
[facility] Call Center    Phone Message    May a detailed message be left on voicemail: yes     Reason for Call: Medication Refill Request    Has the patient contacted the pharmacy for the refill? Yes   Name of medication being requested: methotrexate 2.5 MG tablet   Provider who prescribed the medication: Dr. Talley  Pharmacy: [facility][id number] - [facility] [place] [address]   Date medication is needed: 11/25/24   thanks    Action Taken: Message routed to:  [facility] Derm    Travel Screening: Not Applicable     Date of Service:

## 2024-11-22 NOTE — TELEPHONE ENCOUNTER
Pt is calling about this medication. Please call pt. She needs it filled by Monday. It was sent in on 11/12/24.

## 2024-11-25 RX ORDER — METHOTREXATE 2.5 MG/1
12.5 TABLET ORAL
Qty: 20 TABLET | Refills: 0 | Status: SHIPPED | OUTPATIENT
Start: 2024-11-25

## 2024-11-25 NOTE — TELEPHONE ENCOUNTER
Received refill request as ULYSSES. Chart (including notes and pertinent labs) reviewed, refill appropriate. One month refill only provided as pt will be due for labs in December prior to refills. Will route to attending physician Dr. Talley as MIGDALIA.     Divine Salinas MD (PGY-4)  Dermatology Resident

## 2024-12-09 ENCOUNTER — VIRTUAL VISIT (OUTPATIENT)
Dept: PHYSICAL THERAPY | Facility: CLINIC | Age: 89
End: 2024-12-09
Payer: MEDICARE

## 2024-12-09 DIAGNOSIS — S22.080D COMPRESSION FRACTURE OF T11 VERTEBRA WITH ROUTINE HEALING, SUBSEQUENT ENCOUNTER: ICD-10-CM

## 2024-12-09 DIAGNOSIS — M53.3 SACRAL BACK PAIN: ICD-10-CM

## 2024-12-09 DIAGNOSIS — M84.48XA SACRAL INSUFFICIENCY FRACTURE, INITIAL ENCOUNTER: Primary | ICD-10-CM

## 2024-12-09 DIAGNOSIS — M79.18 MYOFASCIAL PAIN: ICD-10-CM

## 2024-12-09 DIAGNOSIS — M80.00XD AGE-RELATED OSTEOPOROSIS WITH CURRENT PATHOLOGICAL FRACTURE WITH ROUTINE HEALING, SUBSEQUENT ENCOUNTER: ICD-10-CM

## 2024-12-09 DIAGNOSIS — Z98.1 HISTORY OF LUMBAR FUSION: ICD-10-CM

## 2024-12-09 PROCEDURE — 97112 NEUROMUSCULAR REEDUCATION: CPT | Mod: GP | Performed by: PHYSICAL THERAPIST

## 2024-12-09 PROCEDURE — 97110 THERAPEUTIC EXERCISES: CPT | Mod: GP | Performed by: PHYSICAL THERAPIST

## 2024-12-13 DIAGNOSIS — M33.13 DERMATOMYOSITIS (H): ICD-10-CM

## 2024-12-13 DIAGNOSIS — R21 RASH: ICD-10-CM

## 2024-12-13 DIAGNOSIS — Z51.81 MEDICATION MONITORING ENCOUNTER: Primary | ICD-10-CM

## 2024-12-18 NOTE — TELEPHONE ENCOUNTER
methotrexate 2.5 MG tablet       Last Written Prescription Date:  11/25/2024  Last Fill Quantity: 20,   # refills: 0  Last Office Visit: 9/10/2024 [facility] [place]  Future Office visit:  1/28/2025  Routing methotrexate 2.5 MG tablet   refill request to provider for review/approval because: Medication not on the Derm refill protocol.  - labs last completed 9/10/2024    CBC RESULTS:   Recent Labs   Lab Test 09/10/24  1502   WBC 7.3   RBC 3.59*   HGB 12.1   HCT 36.3   *   MCH 33.7*   MCHC 33.3   RDW 14.5          Creatinine   Date Value Ref Range Status   09/10/2024 0.94 0.51 - 0.95 mg/dL Final   08/10/2020 0.97 0.52 - 1.04 mg/dL Final   ]    Liver Function Studies -   Recent Labs   Lab Test 09/10/24  1502   PROTTOTAL 7.2   ALBUMIN 4.4   BILITOTAL 0.3   ALKPHOS 84   AST 31   ALT 18

## 2024-12-20 NOTE — TELEPHONE ENCOUNTER
[facility] Call Center    Phone Message    May a detailed message be left on voicemail: yes     Reason for Call: Other: Pt would like to discuss why this hasn't been refilled yet. Pt states she has one pill left. Please call Pt back at [phone]. Thank you.      Action Taken: Message routed to:  [facility] Derm    Travel Screening: Not Applicable     Date of Service:

## 2024-12-24 DIAGNOSIS — M33.13 DERMATOMYOSITIS (H): ICD-10-CM

## 2024-12-24 DIAGNOSIS — R21 RASH: ICD-10-CM

## 2024-12-24 NOTE — TELEPHONE ENCOUNTER
[facility] Call Center    Phone Message    May a detailed message be left on voicemail: yes     Reason for Call: Medication Refill Request    Has the patient contacted the pharmacy for the refill? Yes   Name of medication being requested: methotrexate 2.5 MG tablet [15039] (Order [id number])   Provider who prescribed the medication: Dr. Talley  Pharmacy:   [facility][id number] - [address]     Date medication is needed: asap       It looks like there was a previous TE but is currently in use, please call pt to advise if she needs to come in for any labs.    Pt does have a follow up with Dr. Talley on 01/28    Action Taken: Message routed to:  [facility] Derm    Travel Screening: Not Applicable     Date of Service:

## 2024-12-26 NOTE — TELEPHONE ENCOUNTER
methotrexate 2.5 MG tablet Take 5 tablets (12.5 mg) by mouth every 7 days. take 5 tablets by mouth once weekly -       Last Written Prescription Date:  11/25/24  Last Fill Quantity: 20,   # refills: 0  Last Office Visit : 9/10/24  Future Office visit:  1/28/25       CBC RESULTS:   Recent Labs   Lab Test 09/10/24  1502   WBC 7.3   RBC 3.59*   HGB 12.1   HCT 36.3   *   MCH 33.7*   MCHC 33.3   RDW 14.5          Creatinine   Date Value Ref Range Status   09/10/2024 0.94 0.51 - 0.95 mg/dL Final   08/10/2020 0.97 0.52 - 1.04 mg/dL Final   ]    Liver Function Studies -   Recent Labs   Lab Test 09/10/24  1502   PROTTOTAL 7.2   ALBUMIN 4.4   BILITOTAL 0.3   ALKPHOS 84   AST 31   ALT 18          Routing refill request to provider for review/approval because:  Labs due and ordered, no lab appt noted: pended refill request in alternate encounter dated 12/13/24. Patient requesting refill ASAP     Provider who prescribed the medication: Dr. Talley  Pharmacy:   [facility][id number] [place] [address]

## 2024-12-27 RX ORDER — METHOTREXATE 2.5 MG/1
12.5 TABLET ORAL
Qty: 20 TABLET | Refills: 1 | OUTPATIENT
Start: 2024-12-27

## 2024-12-27 RX ORDER — METHOTREXATE 2.5 MG/1
12.5 TABLET ORAL
Qty: 20 TABLET | Refills: 0 | Status: SHIPPED | OUTPATIENT
Start: 2024-12-27

## 2024-12-27 NOTE — TELEPHONE ENCOUNTER
methotrexate 2.5 MG tablet [[id number]] (Order [id number]) / Labs completed today 12/27 in [place].  Patient is out of Medication.  Please work asa.  [phone] Kerri's #/ Pharmacy is: [phone]

## 2024-12-27 NOTE — TELEPHONE ENCOUNTER
Duplicate, see 12/24/2024 TE for more information. Being handled in that encounter.    Mitch Weston, EMT

## 2024-12-27 NOTE — TELEPHONE ENCOUNTER
Thanks for the update.  Once her CMP returns back, hopefully normal. Will refill medication.     Thanks,  Thad Barajas

## 2024-12-27 NOTE — TELEPHONE ENCOUNTER
Pt has completed labs and would like the methotrexate 2.5 MG tablet to be sent to [facility] #[id number] - [facility] [place] - [address], she said she is out and needs it by Monday if possible thanks!

## 2024-12-30 DIAGNOSIS — I10 ESSENTIAL HYPERTENSION: ICD-10-CM

## 2024-12-30 DIAGNOSIS — G60.9 IDIOPATHIC PERIPHERAL NEUROPATHY: ICD-10-CM

## 2024-12-30 RX ORDER — LISINOPRIL 10 MG/1
10 TABLET ORAL DAILY
Qty: 90 TABLET | Refills: 0 | Status: SHIPPED | OUTPATIENT
Start: 2024-12-30

## 2024-12-30 RX ORDER — GABAPENTIN 400 MG/1
CAPSULE ORAL
Qty: 150 CAPSULE | Refills: 4 | Status: SHIPPED | OUTPATIENT
Start: 2024-12-30

## 2025-01-13 ENCOUNTER — VIRTUAL VISIT (OUTPATIENT)
Dept: PHYSICAL THERAPY | Facility: CLINIC | Age: 89
End: 2025-01-13
Payer: MEDICARE

## 2025-01-13 DIAGNOSIS — M80.00XD AGE-RELATED OSTEOPOROSIS WITH CURRENT PATHOLOGICAL FRACTURE WITH ROUTINE HEALING, SUBSEQUENT ENCOUNTER: ICD-10-CM

## 2025-01-13 DIAGNOSIS — Z98.1 HISTORY OF LUMBAR FUSION: ICD-10-CM

## 2025-01-13 DIAGNOSIS — S22.080D COMPRESSION FRACTURE OF T11 VERTEBRA WITH ROUTINE HEALING, SUBSEQUENT ENCOUNTER: ICD-10-CM

## 2025-01-13 DIAGNOSIS — M79.18 MYOFASCIAL PAIN: ICD-10-CM

## 2025-01-13 DIAGNOSIS — M84.48XA SACRAL INSUFFICIENCY FRACTURE, INITIAL ENCOUNTER: Primary | ICD-10-CM

## 2025-01-13 DIAGNOSIS — M53.3 SACRAL BACK PAIN: ICD-10-CM

## 2025-01-13 NOTE — PROGRESS NOTES
01/13/25 0500   Appointment Info   Signing clinician's name / credentials Amy Adames PT   Total/Authorized Visits E &T 4 + additional 4 for total of 8   Visits Used 6   Medical Diagnosis Sacral insufficiency fracture, initial encounter  Compression fracture of T11 vertebra, initial encounter (H)  History of lumbar fusion  Myofascial pain  Age-related osteoporosis with current pathological fracture with routine healing, subsequent encounter   PT Tx Diagnosis acute low back pain; sacral pain   Other pertinent information has blind  helps take care of; and has to bring him to appt if comes in to clinic, and needs to get transportation   Virtual Visit   Time of service begin: 1120  (able to connect via video today)   Time of service end: 1206   Provider Location (Distant Site) Office   Patient Location (Originating Site) Home/other residence   Virtual Visit Rationale The virtual visit will provide the care the patient needs. We reviewed the patient's chart, and PTRx prescription to determine the following telemedicine visit is appropriate and effective for the patient's care.   Progress Note/Certification   Start of Care Date 10/07/24   Onset of illness/injury or Date of Surgery 09/17/24  (md referral date)   Therapy Frequency every 3 weeks   Predicted Duration 12 weeks   Certification date from 12/03/24   Certification date to 02/24/25   Progress Note Due Date 01/13/25   Progress Note Completed Date 11/18/24   GOALS   PT Goals 2   PT Goal 1   Goal Identifier Standing   Goal Description Minute able to stand for daily activities 30 min with pain 1/10   Rationale to maximize safety and independence with performance of ADLs and functional tasks;to maximize safety and independence within the home;to maximize safety and independence within the community;to maximize safety and independence with transportation;to maximize safety and independence with self cares   Goal Progress limited more by balance/neuropathy  issues rather than sacral pain at this point, improved standing with less pain; goal is appropriate and ongoing   Target Date 02/24/25   PT Goal 2   Goal Identifier Sitting   Goal Description Able to sit for 60 min for daily activities and cares pain 1/10   Rationale to maximize safety and independence with performance of ADLs and functional tasks;to maximize safety and independence within the home;to maximize safety and independence within the community;to maximize safety and independence with transportation;to maximize safety and independence with self cares   Goal Progress improving, less pain with sitting; goal is appropriate and ongoing.   Target Date 02/24/25   Subjective Report   Subjective Report Reports sacral pain has pretty much resolved, just has her usual low back pain. Exercises are going better, can do the bridges now without pain. Getting into a good exercise routine now. Feels like nearing indepence with HEP, perhaps one more recheck then should be ready.   Objective Measures   Objective Measures Objective Measure 1;Objective Measure 2   Objective Measure 1   Objective Measure Lumbar AROM   Details can perform standing lumbar flexion finger tips mid calf level without increased pain; improved from initial eval.   Objective Measure 2   Objective Measure strength   Details over core and LE strength improving, able to perform sit to stand from chair with light arm support more for balance versus weakness   Treatment Interventions (PT)   Interventions Therapeutic Procedure/Exercise;Neuromuscular Re-education   Therapeutic Procedure/Exercise   Therapeutic Procedures: strength, endurance, ROM, flexibility minutes (70553) 32   PTRx Ther Proc 1 Single Knee to Chest   PTRx Ther Proc 1 - Details brief review these are going well   PTRx Ther Proc 2 Supine Lumbar Hip Roll   PTRx Ther Proc 2 - Details brief review these are going well   PTRx Ther Proc 3 Bridging #1   PTRx Ther Proc 3 - Details able to do 10 reps  with these now without pain   PTRx Ther Proc 4 Hip Flexion Straight Leg Raise   PTRx Ther Proc 4 - Details forgot about this one with HEP will add back in  x 5 x 2 demonstrated technique cueing for keeping knee straight so tightening quad then lifting. work up to 10 x 2 as able..     PTRx Ther Proc 5 Supine Ceiling Punch   PTRx Ther Proc 5 - Details x 10 can start without weight but then add in 1 lb weight   PTRx Ther Proc 6 Roll Ins   PTRx Ther Proc 6 - Details 5 sec hold x 10 good form no pain   PTRx Ther Proc 7 Roll Outs   PTRx Ther Proc 7 - Details redx 10 x 2these are going well   PTRx Ther Proc 8 Sit to Stand   PTRx Ther Proc 8 - Details can do up to 10 reps now with this   PTRx Ther Proc 9 Seated Toe Raises/Heel Raises   PTRx Ther Proc 9 - Details x 10   Skilled Intervention Verbal cues and demonstration used for proper performance of exercise including cues for proper form to decrease substitutions and isolate muscle being targeted; cues for slow and controlled motion both concentrically and eccentrically; cueing for good starting position and posture, and to ensure safe performance of motion   Patient Response/Progress good overall progress with HEP   PTRx Ther Proc 10 Toe Raises   PTRx Ther Proc 10 - Details x 10    PTRx Ther Proc 11 Standing Hip Abduction   PTRx Ther Proc 11 - Details x 5 right and left x 2; pt standing at railing  these are going well.    PTRx Ther Proc 12 Hip AROM Standing Extension   PTRx Ther Proc 12 - Details x 5 right and left these feel easier now.    Neuromuscular Re-education   Neuromuscular re-ed of mvmt, balance, coord, kinesthetic sense, posture, proprioception minutes (18957) 10   PTRx Neuro Re-ed 1 Shoulder Scapular Retraction with Tubing   PTRx Neuro Re-ed 1 - Details start with yellow demonstrated and performed without band to start without difficulty.    PTRx Neuro Re-ed 2 Semi-Tandem Stance   PTRx Neuro Re-ed 2 - Details able to to about 20 sec with HEP   Skilled  Intervention Verbal cues and demonstration used for proper performance of exercise including cues for proper form to decrease substitutions and isolate muscle being targeted; cues for slow and controlled motion both concentrically and eccentrically; cueing for good starting position and posture, and to ensure safe performance of motion   Patient Response/Progress good, able to demonstrate with good form   PTRx Neuro Re-ed 3 Heel Toe Walking Supported   PTRx Neuro Re-ed 3 - Details brief review of this has been doing this for balance exercise   PTRx Neuro Re-ed 4 Stance Variation - Feet Together   PTRx Neuro Re-ed 4 - Details eyes open working up to 20 sec   Education   Learner/Method Patient;Pictures/Video;No Barriers to Learning   Education Comments pt likes handouts for HEP   Plan   Home program see PTRX   Updates to plan of care PN completed. good overall progress and compliance with HEP. will recheck in 3-4 weeks and then should be ready to discharge to HEP   Plan for next session progress with  stretching as able; core and trunk stabilizatin as tolerated; gluteal strengthening; balance and proprioception activities as tolerated. may be ready for discharge next visit   Total Session Time   Timed Code Treatment Minutes 42   Total Treatment Time (sum of timed and untimed services) 42       PLAN  Continue therapy per current plan of care.    Beginning/End Dates of Progress Note Reporting Period:  11/18/24 to 01/13/2025    Referring Provider:  Donta Carlin

## 2025-01-21 DIAGNOSIS — M33.13 DERMATOMYOSITIS (H): ICD-10-CM

## 2025-01-21 DIAGNOSIS — R21 RASH: ICD-10-CM

## 2025-01-28 ENCOUNTER — OFFICE VISIT (OUTPATIENT)
Dept: DERMATOLOGY | Facility: CLINIC | Age: 89
End: 2025-01-28
Payer: MEDICARE

## 2025-01-28 DIAGNOSIS — M33.13 DERMATOMYOSITIS (H): ICD-10-CM

## 2025-01-28 DIAGNOSIS — Z51.81 ENCOUNTER FOR MEDICATION MONITORING: Primary | ICD-10-CM

## 2025-01-28 DIAGNOSIS — R21 RASH: ICD-10-CM

## 2025-01-28 PROCEDURE — 99214 OFFICE O/P EST MOD 30 MIN: CPT | Mod: GC | Performed by: DERMATOLOGY

## 2025-01-28 RX ORDER — METHOTREXATE 2.5 MG/1
12.5 TABLET ORAL
Qty: 20 TABLET | Refills: 2 | Status: SHIPPED | OUTPATIENT
Start: 2025-01-28

## 2025-01-28 RX ORDER — FOLIC ACID 1 MG/1
1 TABLET ORAL DAILY
Qty: 100 TABLET | Refills: 3 | Status: SHIPPED | OUTPATIENT
Start: 2025-01-28

## 2025-01-28 ASSESSMENT — PAIN SCALES - GENERAL: PAINLEVEL_OUTOF10: SEVERE PAIN (7)

## 2025-01-28 NOTE — LETTER
1/28/2025       RE: Kerri Rocha  [address]  [facility] [address]     Dear Colleague,    Thank you for referring your patient, Kerri Rocha, to the [facility] [place] at [facility]. Please see a copy of my visit note below.    [facility] Dermatology Note  Encounter Date: Jan 28, 2025  Office Visit     Dermatology Problem List:  1. Dermatomyositis sin myositis  - Current txt: MTX 12.5 mg weekly  - Last safety labs 12/27/24  - Prior txt: pred taper  - Bx L 3rd MCP and upper back (2/13/24): mild perivascular lymphocytic inflammation, favor partially treated eczematous dermatitis, no evidence of interface process.    - L Scalp Bx 4/2024: epidermal atrophy with rare necrotic keratinocytes, and a mild perivascular lymphocytic infiltrate   - Labs (2/13/24): LOYDA 1:1280 (nucleolar); CK and aldolase and myositis panel wnl    ____________________________________________    Assessment & Plan:     # Dermatomyositis sine myositis  Skin continues to improve on oral methotrexate. No rash on today's exam. We reviewed again today that dermatomyositis can less commonly be associated with underlying malignancy although patient reports being up to date with age appropriate cancer screening and denies any B symptoms. Normal SPEP in June. Last safety labs in December were largely unchanged from prior. No major changes in treatment today.  - Continue methotrexate 12.5 mg weekly + folic acid (refilled today for 3 months supply)  - Safety labs ordered to be checked in 2 months (CBC w diff, CMP)  - Ok to stop prednisone as she is only on 2.5 mg dose  - ketoconazole and clobetasol shampoos as needed for symptom (itch) relief.   - patient understands to continue close monitoring for s/sx evolving malignancy (unexpected weight loss, LAD, fevers/night sweats)    Procedures Performed:   None    Follow-up: 4 months    Staff and  Resident:  I, Conrad Kim MD, discussed and evaluated the patient with Dr. Talley.    I have seen and examined this patient and agree with the assessment and plan as documented in the resident's note.    Rakesh Talley MD  Dermatology Attending    ____________________________________________    CC: No chief complaint on file.    HPI:  Ms. Kerri Rocha is a(n) 89 year old female who presents today as a return patient for dermatomyositis. Reports skin continues to improve and she rarely has itching. She is not needing any topicals. She denies any fever, chills, weight loss, night sweats, cough, chest pain, abdominal pain, n/v/d. Patient is otherwise feeling well, without additional skin concerns.    Labs Reviewed:  12/27/24 CBC and CMP    Physical Exam:  Vitals: LMP  (LMP Unknown)   SKIN: Focused examination of face, chest, hands, arms was performed.  - Chest clear  - Dorsal hands clear  - Mild erythema on R elbow, no scale  - No other lesions of concern on areas examined.     Medications:  Current Outpatient Medications   Medication Sig Dispense Refill     amLODIPine (NORVASC) 5 MG tablet Take 1 tablet (5 mg) by mouth daily. 90 tablet 3     atorvastatin (LIPITOR) 20 MG tablet Take 1 tablet (20 mg) by mouth at bedtime. 90 tablet 3     calcium carbonate (OS-CAMMIE 600 MG [place]. CA) 600 MG tablet Take 1,200 mg by mouth daily        Diphenhydramine-APAP, sleep, (TYLENOL PM EXTRA STRENGTH PO) Take by mouth nightly as needed       FLOWFLEX COVID-19 AG HOME TEST KIT USE TO TEST AT HOME FOR COVID       folic acid (FOLVITE) 1 MG tablet Take 1 tablet (1 mg) by mouth daily On days you don't take methotrexate 100 tablet 3     gabapentin (NEURONTIN) 400 MG capsule TAKE 2 CAPSULES IN THE MORNING, 2 CAPSULES AT NOON AND 1 CAPSULE IN THE EVENING 150 capsule 4     lisinopril (ZESTRIL) 10 MG tablet Take 1 tablet (10 mg) by mouth daily. 90 tablet 0     methotrexate 2.5 MG tablet Take 5 tablets (12.5 mg) by mouth every 7 days. take 5  tablets by mouth once weekly 20 tablet 0     predniSONE (DELTASONE) 2.5 MG tablet Take one pill daily 30 tablet 3     traMADol (ULTRAM) 50 MG tablet Take 0.5 tablets (25 mg) by mouth daily as needed for severe pain. 30 tablet 0     No current facility-administered medications for this visit.        Past Medical History:   Patient Active Problem List   Diagnosis     Essential hypertension     Idiopathic peripheral neuropathy     Pseudophakia of both eyes     Regular astigmatism of both eyes     Localized osteoporosis without current pathological fracture - 8/2020 waiting for dental work to start fosamax.      Mixed hyperlipidemia     Gait disorder     Vertigo     Chronic kidney disease, stage 3 (H)     Lumbar spondylosis     Lumbar facet arthropathy     Neural foraminal stenosis of lumbosacral spine     S/P lumbar spinal fusion     Rash and nonspecific skin eruption     Rash     Dermatomyositis (H)     Encounter for long-term (current) use of high-risk medication     Imbalance     Hip pain, left     Chronic, continuous use of opioids - Tramadol     Sacral insufficiency fracture, initial encounter     Compression fracture of T11 vertebra with routine healing, subsequent encounter     History of lumbar fusion     Myofascial pain     Age-related osteoporosis with current pathological fracture with routine healing, subsequent encounter     Sacral back pain     Past Medical History:   Diagnosis Date     HLD (hyperlipidemia)      HTN (hypertension)      Idiopathic neuropathy      Vitamin D deficiency        CC Parrish Arizmendi MD  [address] [place] [address] on close of this encounter.      Again, thank you for allowing me to participate in the care of your patient.      Sincerely,    Rakesh Talley MD

## 2025-01-28 NOTE — NURSING NOTE
Dermatology Rooming Note    Kerri Rocha's goals for this visit include:   Chief Complaint   Patient presents with    Derm Problem     No one had told her that she needed labs while on Methotrexate. Is out of Methotrexate now. Would like to discuss if she needs to continue taking it. Feels like things have cleared up. Mostly still located on the right elbow. Still occasionally itchy on the scalp but it does not affect her sleep. Has some questions regarding her labs. Kidney and vitamin D.     Has questions about medications for her osteoarthritis and and medications that might interfere with Methotrexate.      Christopher Keita, EMT  Clinic Support  [facility]     [phone]    Employed by [facility] [employer]

## 2025-01-28 NOTE — PROGRESS NOTES
[facility] Dermatology Note  Encounter Date: Jan 28, 2025  Office Visit     Dermatology Problem List:  1. Dermatomyositis sin myositis  - Current txt: MTX 12.5 mg weekly  - Last safety labs 12/27/24  - Prior txt: pred taper  - Bx L 3rd MCP and upper back (2/13/24): mild perivascular lymphocytic inflammation, favor partially treated eczematous dermatitis, no evidence of interface process.    - L Scalp Bx 4/2024: epidermal atrophy with rare necrotic keratinocytes, and a mild perivascular lymphocytic infiltrate   - Labs (2/13/24): LOYDA 1:1280 (nucleolar); CK and aldolase and myositis panel wnl    ____________________________________________    Assessment & Plan:     # Dermatomyositis sine myositis  Skin continues to improve on oral methotrexate. No rash on today's exam. We reviewed again today that dermatomyositis can less commonly be associated with underlying malignancy although patient reports being up to date with age appropriate cancer screening and denies any B symptoms. Normal SPEP in June. Last safety labs in December were largely unchanged from prior. No major changes in treatment today.  - Continue methotrexate 12.5 mg weekly + folic acid (refilled today for 3 months supply)  - Safety labs ordered to be checked in 2 months (CBC w diff, CMP)  - Ok to stop prednisone as she is only on 2.5 mg dose  - ketoconazole and clobetasol shampoos as needed for symptom (itch) relief.   - patient understands to continue close monitoring for s/sx evolving malignancy (unexpected weight loss, LAD, fevers/night sweats)    Procedures Performed:   None    Follow-up: 4 months    Staff and Resident:  I, Conrad Kim MD, discussed and evaluated the patient with Dr. Talley.    I have seen and examined this patient and agree with the assessment and plan as documented in the resident's note.    Rakesh Talley MD  Dermatology Attending    ____________________________________________    CC: No chief complaint on  file.    HPI:  Ms. Kerri Rocha is a(n) 89 year old female who presents today as a return patient for dermatomyositis. Reports skin continues to improve and she rarely has itching. She is not needing any topicals. She denies any fever, chills, weight loss, night sweats, cough, chest pain, abdominal pain, n/v/d. Patient is otherwise feeling well, without additional skin concerns.    Labs Reviewed:  12/27/24 CBC and CMP    Physical Exam:  Vitals: LMP  (LMP Unknown)   SKIN: Focused examination of face, chest, hands, arms was performed.  - Chest clear  - Dorsal hands clear  - Mild erythema on R elbow, no scale  - No other lesions of concern on areas examined.     Medications:  Current Outpatient Medications   Medication Sig Dispense Refill    amLODIPine (NORVASC) 5 MG tablet Take 1 tablet (5 mg) by mouth daily. 90 tablet 3    atorvastatin (LIPITOR) 20 MG tablet Take 1 tablet (20 mg) by mouth at bedtime. 90 tablet 3    calcium carbonate (OS-CAMMIE 600 MG [place]. [place]) 600 MG tablet Take 1,200 mg by mouth daily       Diphenhydramine-APAP, sleep, (TYLENOL PM EXTRA STRENGTH PO) Take by mouth nightly as needed      FLOWFLEX COVID-19 AG HOME TEST KIT USE TO TEST AT HOME FOR COVID      folic acid (FOLVITE) 1 MG tablet Take 1 tablet (1 mg) by mouth daily On days you don't take methotrexate 100 tablet 3    gabapentin (NEURONTIN) 400 MG capsule TAKE 2 CAPSULES IN THE MORNING, 2 CAPSULES AT NOON AND 1 CAPSULE IN THE EVENING 150 capsule 4    lisinopril (ZESTRIL) 10 MG tablet Take 1 tablet (10 mg) by mouth daily. 90 tablet 0    methotrexate 2.5 MG tablet Take 5 tablets (12.5 mg) by mouth every 7 days. take 5 tablets by mouth once weekly 20 tablet 0    predniSONE (DELTASONE) 2.5 MG tablet Take one pill daily 30 tablet 3    traMADol (ULTRAM) 50 MG tablet Take 0.5 tablets (25 mg) by mouth daily as needed for severe pain. 30 tablet 0     No current facility-administered medications for this visit.        Past Medical History:   Patient  Active Problem List   Diagnosis    Essential hypertension    Idiopathic peripheral neuropathy    Pseudophakia of both eyes    Regular astigmatism of both eyes    Localized osteoporosis without current pathological fracture - 8/2020 waiting for dental work to start fosamax.     Mixed hyperlipidemia    Gait disorder    Vertigo    Chronic kidney disease, stage 3 (H)    Lumbar spondylosis    Lumbar facet arthropathy    Neural foraminal stenosis of lumbosacral spine    S/P lumbar spinal fusion    Rash and nonspecific skin eruption    Rash    Dermatomyositis (H)    Encounter for long-term (current) use of high-risk medication    Imbalance    Hip pain, left    Chronic, continuous use of opioids - Tramadol    Sacral insufficiency fracture, initial encounter    Compression fracture of T11 vertebra with routine healing, subsequent encounter    History of lumbar fusion    Myofascial pain    Age-related osteoporosis with current pathological fracture with routine healing, subsequent encounter    Sacral back pain     Past Medical History:   Diagnosis Date    HLD (hyperlipidemia)     HTN (hypertension)     Idiopathic neuropathy     Vitamin D deficiency        CC Parrish Arizmendi MD  [address] [place] [address] on close of this encounter.

## 2025-01-28 NOTE — TELEPHONE ENCOUNTER
Methotrexate Sodium Oral Tablet 2.5 MG      Last Written Prescription :     methotrexate 2.5 MG tablet 20 tablet 0 12/27/2024 -- No   Sig - Route: Take 5 tablets (12.5 mg) by mouth every 7 days. take 5 tablets by mouth once weekly -   Last Office Visit : 9/10/24  Future Office visit:  1/28/25       CBC RESULTS:   Recent Labs   Lab Test 12/27/24  1110   WBC 9.2   RBC 3.59*   HGB 11.5*   HCT 36.3   *   MCH 32.0   MCHC 31.7   RDW 14.9          Creatinine   Date Value Ref Range Status   12/27/2024 1.06 (H) 0.51 - 0.95 mg/dL Final   08/10/2020 0.97 0.52 - 1.04 mg/dL Final   ]    Liver Function Studies -   Recent Labs   Lab Test 12/27/24  1110   PROTTOTAL 6.6   ALBUMIN 4.2   BILITOTAL 0.3   ALKPHOS 63   AST 26   ALT 19       Routing refill request to provider for review/approval because:  Methotrexate Sodium Oral Tablet 2.5 MG Not on protocol, appt 1/28/25 Talley  Labs done 12/27/24

## 2025-02-04 ENCOUNTER — TELEPHONE (OUTPATIENT)
Dept: DERMATOLOGY | Facility: CLINIC | Age: 89
End: 2025-02-04
Payer: MEDICARE

## 2025-02-04 NOTE — TELEPHONE ENCOUNTER
[facility] Call Center    Phone Message    May a detailed message be left on voicemail: yes     Reason for Call: Other: Patient is wondering if she needs to fast for lobs on 2/6 that  ordered. Please call patient back to discuss. Thanks.     Action Taken: te sent    Travel Screening: Not Applicable     Date of Service:

## 2025-02-06 ENCOUNTER — LAB (OUTPATIENT)
Dept: LAB | Facility: CLINIC | Age: 89
End: 2025-02-06
Payer: MEDICARE

## 2025-02-06 DIAGNOSIS — M33.13 DERMATOMYOSITIS (H): ICD-10-CM

## 2025-02-06 DIAGNOSIS — Z51.81 ENCOUNTER FOR MEDICATION MONITORING: ICD-10-CM

## 2025-02-06 LAB
ALBUMIN SERPL BCG-MCNC: 4.5 G/DL (ref 3.5–5.2)
ALP SERPL-CCNC: 58 U/L (ref 40–150)
ALT SERPL W P-5'-P-CCNC: 18 U/L (ref 0–50)
ANION GAP SERPL CALCULATED.3IONS-SCNC: 14 MMOL/L (ref 7–15)
AST SERPL W P-5'-P-CCNC: 32 U/L (ref 0–45)
BASOPHILS # BLD AUTO: 0.1 10E3/UL (ref 0–0.2)
BASOPHILS NFR BLD AUTO: 1 %
BILIRUB SERPL-MCNC: 0.4 MG/DL
BUN SERPL-MCNC: 34.9 MG/DL (ref 8–23)
CALCIUM SERPL-MCNC: 10.2 MG/DL (ref 8.8–10.4)
CHLORIDE SERPL-SCNC: 102 MMOL/L (ref 98–107)
CREAT SERPL-MCNC: 1.07 MG/DL (ref 0.51–0.95)
EGFRCR SERPLBLD CKD-EPI 2021: 50 ML/MIN/1.73M2
EOSINOPHIL # BLD AUTO: 0.1 10E3/UL (ref 0–0.7)
EOSINOPHIL NFR BLD AUTO: 1 %
ERYTHROCYTE [DISTWIDTH] IN BLOOD BY AUTOMATED COUNT: 14.5 % (ref 10–15)
GLUCOSE SERPL-MCNC: 87 MG/DL (ref 70–99)
HCO3 SERPL-SCNC: 25 MMOL/L (ref 22–29)
HCT VFR BLD AUTO: 38.7 % (ref 35–47)
HGB BLD-MCNC: 12.6 G/DL (ref 11.7–15.7)
IMM GRANULOCYTES # BLD: 0 10E3/UL
IMM GRANULOCYTES NFR BLD: 0 %
LYMPHOCYTES # BLD AUTO: 1.6 10E3/UL (ref 0.8–5.3)
LYMPHOCYTES NFR BLD AUTO: 19 %
MCH RBC QN AUTO: 32.8 PG (ref 26.5–33)
MCHC RBC AUTO-ENTMCNC: 32.6 G/DL (ref 31.5–36.5)
MCV RBC AUTO: 101 FL (ref 78–100)
MONOCYTES # BLD AUTO: 0.8 10E3/UL (ref 0–1.3)
MONOCYTES NFR BLD AUTO: 9 %
NEUTROPHILS # BLD AUTO: 5.8 10E3/UL (ref 1.6–8.3)
NEUTROPHILS NFR BLD AUTO: 70 %
PLATELET # BLD AUTO: 262 10E3/UL (ref 150–450)
POTASSIUM SERPL-SCNC: 4.5 MMOL/L (ref 3.4–5.3)
PROT SERPL-MCNC: 7.2 G/DL (ref 6.4–8.3)
RBC # BLD AUTO: 3.84 10E6/UL (ref 3.8–5.2)
SODIUM SERPL-SCNC: 141 MMOL/L (ref 135–145)
WBC # BLD AUTO: 8.4 10E3/UL (ref 4–11)

## 2025-02-18 PROBLEM — Z51.81 ENCOUNTER FOR MEDICATION MONITORING: Status: ACTIVE | Noted: 2025-02-18

## 2025-03-13 DIAGNOSIS — I10 ESSENTIAL HYPERTENSION: ICD-10-CM

## 2025-03-13 RX ORDER — METHOTREXATE 2.5 MG/1
12.5 TABLET ORAL
Qty: 25 TABLET | Refills: 0 | OUTPATIENT
Start: 2025-03-13

## 2025-03-13 RX ORDER — LISINOPRIL 10 MG/1
10 TABLET ORAL DAILY
Qty: 90 TABLET | Refills: 0 | Status: SHIPPED | OUTPATIENT
Start: 2025-03-13

## 2025-03-18 PROBLEM — M53.3 SACRAL BACK PAIN: Status: RESOLVED | Noted: 2024-10-07 | Resolved: 2025-03-18

## 2025-03-18 PROBLEM — M79.18 MYOFASCIAL PAIN: Status: RESOLVED | Noted: 2024-10-07 | Resolved: 2025-03-18

## 2025-03-18 PROBLEM — M80.00XD AGE-RELATED OSTEOPOROSIS WITH CURRENT PATHOLOGICAL FRACTURE WITH ROUTINE HEALING, SUBSEQUENT ENCOUNTER: Status: RESOLVED | Noted: 2024-10-07 | Resolved: 2025-03-18

## 2025-03-18 PROBLEM — Z98.1 HISTORY OF LUMBAR FUSION: Status: RESOLVED | Noted: 2024-10-07 | Resolved: 2025-03-18

## 2025-03-18 PROBLEM — S22.080D COMPRESSION FRACTURE OF T11 VERTEBRA WITH ROUTINE HEALING, SUBSEQUENT ENCOUNTER: Status: RESOLVED | Noted: 2024-10-07 | Resolved: 2025-03-18

## 2025-03-18 PROBLEM — M84.48XA SACRAL INSUFFICIENCY FRACTURE, INITIAL ENCOUNTER: Status: RESOLVED | Noted: 2024-10-07 | Resolved: 2025-03-18

## 2025-04-14 DIAGNOSIS — M33.13 DERMATOMYOSITIS (H): ICD-10-CM

## 2025-04-14 DIAGNOSIS — R21 RASH: ICD-10-CM

## 2025-04-18 NOTE — TELEPHONE ENCOUNTER
Last Written Prescription:    methotrexate 2.5 MG tablet 20 tablet 2 1/28/2025       LOV: 1/28/25  FOV: 6/10/25  CBC RESULTS:   Recent Labs   Lab Test 04/11/25  0947   WBC 4.8   RBC 3.72*   HGB 12.6   HCT 37.9   *   MCH 33.9*   MCHC 33.2   RDW 13.7          Creatinine   Date Value Ref Range Status   04/11/2025 0.98 (H) 0.51 - 0.95 mg/dL Final   08/10/2020 0.97 0.52 - 1.04 mg/dL Final   ]    Liver Function Studies -   Recent Labs   Lab Test 04/11/25  0947   PROTTOTAL 6.8   ALBUMIN 4.3   BILITOTAL 0.4   ALKPHOS 60   AST 39   ALT 27         Routing refill request to provider for review/approval because:   DMARD.      Request from pharmacy:  Requested Prescriptions   Pending Prescriptions Disp Refills    methotrexate 2.5 MG tablet [Pharmacy Med Name: Methotrexate Sodium Oral Tablet 2.5 MG] 20 tablet 0     Sig: Take 5 tablets (12.5 mg) by mouth every 7 days.       There is no refill protocol information for this order          Rheumatology Refill Process Addendum - DMARDS  Protocol Details  LOV: 6mo/next 30d  Pend 90 days +1 refill  *Provider must authorize  *Required labs: CBC/D/P, ALT, AST, ALB, Cr q8-12 weeks  **Cytoxin needs UA q8-12weeks  Includes:   Tocilizumab (Actemra)  Sarilumab (Kevzara)  Tofacitinib (Xeljanz)  Baracitinib (Oluminant)  Upadacitinib (Rinvoq)  Methotrexate  Leflunomide (Arava)  Sulfasalazine (Azulfidine - ER&IR)  Mycophenolate mofetil (Cellcept)  Mycophenolic acid (Myfortic)  Azathioprine (Imuran)  Cyclophoshamide (Cytoxan)  Cyclosporin

## 2025-04-21 RX ORDER — METHOTREXATE 2.5 MG/1
12.5 TABLET ORAL
Qty: 20 TABLET | Refills: 0 | OUTPATIENT
Start: 2025-04-21

## 2025-04-22 NOTE — TELEPHONE ENCOUNTER
Encounter Date: Jan 28, 2025  Office Visit      Dermatology Problem List:  1. Dermatomyositis sin myositis  - Current txt: MTX 12.5 mg weekly  - Last safety labs 12/27/24  - Prior txt: pred taper  - Bx L 3rd MCP and upper back (2/13/24): mild perivascular lymphocytic inflammation, favor partially treated eczematous dermatitis, no evidence of interface process.    - L Scalp Bx 4/2024: epidermal atrophy with rare necrotic keratinocytes, and a mild perivascular lymphocytic infiltrate   - Labs (2/13/24): LOYDA 1:1280 (nucleolar); CK and aldolase and myositis panel wnl     ____________________________________________     Assessment & Plan:      # Dermatomyositis sine myositis  Skin continues to improve on oral methotrexate. No rash on today's exam. We reviewed again today that dermatomyositis can less commonly be associated with underlying malignancy although patient reports being up to date with age appropriate cancer screening and denies any B symptoms. Normal SPEP in June. Last safety labs in December were largely unchanged from prior. No major changes in treatment today.  - Continue methotrexate 12.5 mg weekly + folic acid (refilled today for 3 months supply)  - Safety labs ordered to be checked in 2 months (CBC w diff, CMP)  - Ok to stop prednisone as she is only on 2.5 mg dose  - ketoconazole and clobetasol shampoos as needed for symptom (itch) relief.   - patient understands to continue close monitoring for s/sx evolving malignancy (unexpected weight loss, LAD, fevers/night sweats)     Procedures Performed:   None     Follow-up: 4 months

## 2025-04-24 RX ORDER — METHOTREXATE 2.5 MG/1
12.5 TABLET ORAL
Qty: 20 TABLET | Refills: 1 | Status: SHIPPED | OUTPATIENT
Start: 2025-04-24

## 2025-04-24 NOTE — TELEPHONE ENCOUNTER
Pharmacy called back, patient is out of med. Routing to [facility] due to time sensitivity

## 2025-04-25 PROBLEM — R26.89 IMBALANCE: Status: RESOLVED | Noted: 2024-07-29 | Resolved: 2025-04-25

## 2025-05-06 DIAGNOSIS — G60.9 IDIOPATHIC PERIPHERAL NEUROPATHY: ICD-10-CM

## 2025-05-07 RX ORDER — GABAPENTIN 400 MG/1
CAPSULE ORAL
Qty: 150 CAPSULE | Refills: 11 | Status: SHIPPED | OUTPATIENT
Start: 2025-05-07

## 2025-05-09 NOTE — TELEPHONE ENCOUNTER
She can try topical voltaren instead.     IVIS Arizmendi MD  Internal Medicine-Pediatrics     Average build

## 2025-05-12 ENCOUNTER — OFFICE VISIT (OUTPATIENT)
Dept: NEUROLOGY | Facility: CLINIC | Age: 89
End: 2025-05-12
Payer: MEDICARE

## 2025-05-12 VITALS
HEIGHT: 59 IN | DIASTOLIC BLOOD PRESSURE: 72 MMHG | WEIGHT: 117 LBS | BODY MASS INDEX: 23.59 KG/M2 | OXYGEN SATURATION: 98 % | RESPIRATION RATE: 16 BRPM | HEART RATE: 66 BPM | SYSTOLIC BLOOD PRESSURE: 157 MMHG

## 2025-05-12 DIAGNOSIS — R26.89 IMBALANCE: Primary | ICD-10-CM

## 2025-05-12 DIAGNOSIS — G60.9 IDIOPATHIC PERIPHERAL NEUROPATHY: ICD-10-CM

## 2025-05-12 NOTE — PROGRESS NOTES
"[facility] Neurology Follow Up Visit    Kerri Rocha MRN# [id number]   Age: 89 year old YOB: 1935     Brief history of symptoms: The patient was initially seen in neurologic consultation on 10/9/2020 for evaluation of balance difficulties. Please see the comprehensive neurologic consultation note from that date in the Epic records for details.          Assessment and Plan:   Assessment:  Kerri Rocha is a 89 year old female who presents today for follow-up of balance problems. 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 She also has lumbar stenosis and had decompression surgery in 2016. Balance has continued to worsen over the last several years. Symptoms are likely related to progression of polyneuropathy, orthopedic changes, and age related changes. We discussed that additional work-up at this time such as repeat EMG would be unlikely to .     Plan:  - Continue PT exercises    Follow up in Neurology clinic in 6 months or sooner if new issues arise    Jason Gillette MD   of Neurology  [facility]  -----------------------------------------------------------------------------------------------------    Interval history:  The numbness in the feet and balance is worse.     She gets pain in the feet. Wearing shoes can make it feel worse. She gets feeling of fullness/swelling/pins/needles.     In the morning she can only take \"baby steps\".     She feels a discomfort in her neck of not being in control which she attributes to imbalance.     She does gabapentin 800 mg AM, 800 mg afternoon, 400 mg at night. She thinks this helps enough for neuropathy pains.     She sees Dr Momin in July.       Past Medical History:     Patient Active Problem List   Diagnosis    Essential hypertension    Idiopathic peripheral neuropathy    Pseudophakia of both eyes    Regular astigmatism of both eyes    Localized " osteoporosis without current pathological fracture - 2020 waiting for dental work to start fosamax.     Mixed hyperlipidemia    Gait disorder    Vertigo    Chronic kidney disease, stage 3 (H)    Lumbar spondylosis    Lumbar facet arthropathy    Neural foraminal stenosis of lumbosacral spine    S/P lumbar spinal fusion    Rash and nonspecific skin eruption    Rash    Dermatomyositis (H)    Encounter for long-term (current) use of high-risk medication    Hip pain, left    Chronic, continuous use of opioids - Tramadol    Encounter for medication monitoring     Past Medical History:   Diagnosis Date    HLD (hyperlipidemia)     HTN (hypertension)     Idiopathic neuropathy     Vitamin D deficiency         Past Surgical History:     Past Surgical History:   Procedure Laterality Date    APPENDECTOMY      BREAST BIOPSY, CORE RT/LT      CARPAL TUNNEL RELEASE RT/LT      CATARACT IOL, RT/LT       SECTION      x2    COLONOSCOPY N/A 8/10/2022    Procedure: COLONOSCOPY (fv);  Surgeon: Constantin Ahn MD;  Location: [facility]    HYSTERECTOMY TOTAL ABDOMINAL, BILATERAL SALPINGO-OOPHORECTOMY, COMBINED      INJECT BLOCK MEDIAL BRANCH CERVICAL/THORACIC/LUMBAR Bilateral 10/18/2021    Procedure: Bilateral L5-S1 FACET JOINT Injection;  Surgeon: Stephanie Momin MD;  Location: [facility] OR    INJECT EPIDURAL CAUDAL N/A 2022    Procedure: INJECTION, EPIDURAL, CAUDAL;  Surgeon: Stephanie Momin MD;  Location: [facility] OR    LAMINECTOMY LUMBAR THREE+ LEVELS  2016    L3-L5    TONSILLECTOMY & ADENOIDECTOMY          Social History:     Social History     Tobacco Use    Smoking status: Never     Passive exposure: Never    Smokeless tobacco: Never   Vaping Use    Vaping status: Never Used   Substance Use Topics    Alcohol use: Yes     Alcohol/week: 1.0 standard drink of alcohol     Comment: 1 or 2 per month    Drug use: No        Family History:     Family History   Problem Relation Age of Onset    Hypertension Mother     Colon Cancer  "Father     Heart Disease Father     Hypertension Father     Coronary Artery Disease Father     Heart Disease Brother     Hypertension Brother     Hypertension Sister     Diabetes No family hx of     Cancer No family hx of     Anesthesia Reaction No family hx of         Medications:     Current Outpatient Medications   Medication Sig Dispense Refill    amLODIPine (NORVASC) 5 MG tablet Take 1 tablet (5 mg) by mouth daily. 90 tablet 3    atorvastatin (LIPITOR) 20 MG tablet Take 1 tablet (20 mg) by mouth at bedtime. 90 tablet 3    calcium carbonate (OS-CAMMIE 600 MG [place]. [place]) 600 MG tablet Take 1,200 mg by mouth daily       Diphenhydramine-APAP, sleep, (TYLENOL PM EXTRA STRENGTH PO) Take by mouth nightly as needed      FLOWFLEX COVID-19 AG HOME TEST KIT USE TO TEST AT HOME FOR COVID      folic acid (FOLVITE) 1 MG tablet Take 1 tablet (1 mg) by mouth daily. On days you don't take methotrexate 100 tablet 3    gabapentin (NEURONTIN) 400 MG capsule TAKE 2 CAPSULES IN THE MORNING, 2 CAPSULES AT NOON AND 1 CAPSULE IN THE EVENING 150 capsule 11    lisinopril (ZESTRIL) 10 MG tablet Take 1 tablet (10 mg) by mouth daily. 90 tablet 0    methotrexate 2.5 MG tablet Take 5 tablets (12.5 mg) by mouth every 7 days. 20 tablet 1    traMADol (ULTRAM) 50 MG tablet Take 0.5 tablets (25 mg) by mouth daily as needed for severe pain. Three times weekly. 30 tablet 1     No current facility-administered medications for this visit.        Allergies:     Allergies   Allergen Reactions    Codeine     Sulfa Antibiotics     Sulfasalazine Other (See Comments)    Sulfate Rash        Review of Systems:   As above     Physical Exam:   Vitals: BP (!) 157/72   Pulse 66   Resp 16   Ht 1.499 m (4' 11\")   Wt 53.1 kg (117 lb)   LMP  (LMP Unknown)   SpO2 98%   BMI 23.63 kg/m     Neurologic:     Mental Status: Fully alert, attentive. Grossly normal memory and fund of knowledge. Language normal, speech clear and fluent, no paraphasic errors.    Cranial " Nerves: No dysarthria. EOMI. Visual fields intact. Facial movements symmetric. No ptosis. Facial sensation intact. Hearing intact. Tongue movements and palate elevation intact. Pupils equal.      Motor: No tremors or other abnormal movements observed. Muscle tone normal throughout. Normal/symmetric rapid finger tapping. Strength 5/5 throughout upper and lower extremities with exception of 4-/5 right APB and 4+/5 finger extension, 4+/5 left APB, 4+/5 left finger extension / ADM / FDI. Possible left dorsiflexion weakness 5-/5, but may be orthopedic in nature.      Deep Tendon Reflexes: 2+/symmetric throughout upper extremities, 2+ bilateral patella, and trace ankle jerks. No clonus.     Sensory: Vibration is absent the great toes and ~2-3 seconds at the ankles, normal in the hands. Decreased sensation to light touch and temperature in the feet compared to hands. Proprioception is intact. Positive Romberg.      Coordination: Finger-nose-finger and heel to shin without dysmetria.     Gait: Mildly wide based casual gait with small steps.. Not able to walk on toes, heels or tandem.      Data reviewed on previous visits    Imaging:  MRI lumbar 7/2022  IMPRESSION:  Stable surgical changes of posterior instrumented fusion and interbody  prosthesis at L4-5. Relatively stable multilevel lumbar spondylosis,  as detailed above.    MRI lumbar 4/13/2019  Impression:   1. Multilevel lumbar spondylosis, most pronounced at L3-4 with  moderate severe left and mild to moderate right neural foraminal  stenosis.  2. Synovial cyst within the right neural foramen of L5-S1 narrows the  right lateral recess and likely impinges upon the traversing right S1  nerve root.  3. Postsurgical changes of instrumented fusion of L4-5.     MRI cervical spine 1/2019  IMPRESSION: Mild multilevel degenerative changes with multilevel  neural foraminal stenoses without significant canal stenosis. No  myelopathy signal changes.      MRI brain  2018  Impression:    Normal brain for age with attention to the vestibular and auditory  structures. No specific finding to explain the patient's symptoms.     Procedures:  EMG 2019  Interpretation:  This is an abnormal EMG.  Findings are consistent with a severe length-dependent axonal sensorimotor polyneuropathy that appears to have advanced from prior EMG in .  There is not clear evidence for overlying lumbosacral radiculopathy but in light of the severe peripheral neuropathy these may be masked and clinical correlation is advised.    EMG   Interpretation:  The EMG is abnormal. The findings are consistent with the followin. A severe distal symmetric axonal peripheral neuropathy that has progressed since last EMG 2019  2. Possible L3 or L4 chronic inactive radiculopathy on the right     Laboratory:  B12 1705 (2020)  TSH normal 2017  Per chart review, SPEP and A1c many years back was reportedly normal    MRI thoracic 10/28/2020  IMPRESSION:  1. Small posterior disc herniation at the T7-T8 level that mildly  deforms the anterior surface of the thoracic spinal cord.  2. Otherwise, normal thoracic spine MRI. No spinal canal or neural  foraminal stenosis. No evidence for fracture.    Labs 10/2020 - unremarkable ESR/CRP, SPEP/immunofixation, light chains, A1c, TSH  Labs 2021 - With exception of positive LOYDA (1:1250, nucleolar), normal/negative Copper, Zinc, Vitamin E, heavy metal screen, B1, B6, LOYDA, SSA/SSB  Ceruloplasmin low at 16 (2023)    MRI T spine 2024  IMPRESSION:  1.  There is mild central and anterior superior endplate compression deformity at T11 with less than 25% height loss. No retropulsion. Edema along the superior aspect of the T11 vertebral body compatible with an acute or recent fracture. No significant   edema within the prevertebral soft tissues. No retropulsion.  2.  Shallow posterior disc bulges T12-L1 and L1-L2. No spinal canal stenosis. Mild right and moderate left  L1-L2 and mild bilateral T12-L1 neural foraminal stenosis.    MRI cervical 7/2024  IMPRESSION:  1.  No cord compression. Normal cord signal. Multilevel cervical spondylosis, as above.   2.  No evidence of acute bone or soft tissue injury involving the cervical spine.    Pertinent Investigations since last visit:   None    The longitudinal plan of care for the diagnosis(es)/condition(s) as documented were addressed during this visit. Due to the added complexity in care, I will continue to support Kerri in the subsequent management and with ongoing continuity of care.

## 2025-05-12 NOTE — LETTER
"5/12/2025       RE: Kerri Rocha  [address]  [facility] [address]     Dear Colleague,    Thank you for referring your patient, Kerri Rocha, to the [facility] [place] [facility]. Please see a copy of my visit note below.    [facility] Neurology Follow Up Visit    Kerri Rocha MRN# [id number]   Age: 89 year old YOB: 1935     Brief history of symptoms: The patient was initially seen in neurologic consultation on 10/9/2020 for evaluation of balance difficulties. Please see the comprehensive neurologic consultation note from that date in the Epic records for details.          Assessment and Plan:   Assessment:  Kerri Rocha is a 89 year old female who presents today for follow-up of balance problems. 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 She also has lumbar stenosis and had decompression surgery in 2016. Balance has continued to worsen over the last several years. Symptoms are likely related to progression of polyneuropathy, orthopedic changes, and age related changes. We discussed that additional work-up at this time such as repeat EMG would be unlikely to .     Plan:  - Continue PT exercises    Follow up in Neurology clinic in 6 months or sooner if new issues arise    Jason Gillette MD   of Neurology  [facility]  -----------------------------------------------------------------------------------------------------    Interval history:  The numbness in the feet and balance is worse.     She gets pain in the feet. Wearing shoes can make it feel worse. She gets feeling of fullness/swelling/pins/needles.     In the morning she can only take \"baby steps\".     She feels a discomfort in her neck of not being in control which she attributes to imbalance.     She does gabapentin 800 mg AM, 800 mg " afternoon, 400 mg at night. She thinks this helps enough for neuropathy pains.     She sees Dr Momin in July.       Past Medical History:     Patient Active Problem List   Diagnosis     Essential hypertension     Idiopathic peripheral neuropathy     Pseudophakia of both eyes     Regular astigmatism of both eyes     Localized osteoporosis without current pathological fracture - 2020 waiting for dental work to start fosamax.      Mixed hyperlipidemia     Gait disorder     Vertigo     Chronic kidney disease, stage 3 (H)     Lumbar spondylosis     Lumbar facet arthropathy     Neural foraminal stenosis of lumbosacral spine     S/P lumbar spinal fusion     Rash and nonspecific skin eruption     Rash     Dermatomyositis (H)     Encounter for long-term (current) use of high-risk medication     Hip pain, left     Chronic, continuous use of opioids - Tramadol     Encounter for medication monitoring     Past Medical History:   Diagnosis Date     HLD (hyperlipidemia)      HTN (hypertension)      Idiopathic neuropathy      Vitamin D deficiency         Past Surgical History:     Past Surgical History:   Procedure Laterality Date     APPENDECTOMY       BREAST BIOPSY, CORE RT/LT       CARPAL TUNNEL RELEASE RT/LT       CATARACT IOL, RT/LT        SECTION      x2     COLONOSCOPY N/A 8/10/2022    Procedure: COLONOSCOPY (fv);  Surgeon: Constantin Ahn MD;  Location: [facility]     HYSTERECTOMY TOTAL ABDOMINAL, BILATERAL SALPINGO-OOPHORECTOMY, COMBINED       INJECT BLOCK MEDIAL BRANCH CERVICAL/THORACIC/LUMBAR Bilateral 10/18/2021    Procedure: Bilateral L5-S1 FACET JOINT Injection;  Surgeon: Stephanie Momin MD;  Location: [facility] OR     INJECT EPIDURAL CAUDAL N/A 2022    Procedure: INJECTION, EPIDURAL, CAUDAL;  Surgeon: Stephanie Momin MD;  Location: [facility] OR     LAMINECTOMY LUMBAR THREE+ LEVELS  2016    L3-L5     TONSILLECTOMY & ADENOIDECTOMY          Social History:     Social History     Tobacco Use     Smoking  status: Never     Passive exposure: Never     Smokeless tobacco: Never   Vaping Use     Vaping status: Never Used   Substance Use Topics     Alcohol use: Yes     Alcohol/week: 1.0 standard drink of alcohol     Comment: 1 or 2 per month     Drug use: No        Family History:     Family History   Problem Relation Age of Onset     Hypertension Mother      Colon Cancer Father      Heart Disease Father      Hypertension Father      Coronary Artery Disease Father      Heart Disease Brother      Hypertension Brother      Hypertension Sister      Diabetes No family hx of      Cancer No family hx of      Anesthesia Reaction No family hx of         Medications:     Current Outpatient Medications   Medication Sig Dispense Refill     amLODIPine (NORVASC) 5 MG tablet Take 1 tablet (5 mg) by mouth daily. 90 tablet 3     atorvastatin (LIPITOR) 20 MG tablet Take 1 tablet (20 mg) by mouth at bedtime. 90 tablet 3     calcium carbonate (OS-CAMMIE 600 MG [place]. CA) 600 MG tablet Take 1,200 mg by mouth daily        Diphenhydramine-APAP, sleep, (TYLENOL PM EXTRA STRENGTH PO) Take by mouth nightly as needed       FLOWFLEX COVID-19 AG HOME TEST KIT USE TO TEST AT HOME FOR COVID       folic acid (FOLVITE) 1 MG tablet Take 1 tablet (1 mg) by mouth daily. On days you don't take methotrexate 100 tablet 3     gabapentin (NEURONTIN) 400 MG capsule TAKE 2 CAPSULES IN THE MORNING, 2 CAPSULES AT NOON AND 1 CAPSULE IN THE EVENING 150 capsule 11     lisinopril (ZESTRIL) 10 MG tablet Take 1 tablet (10 mg) by mouth daily. 90 tablet 0     methotrexate 2.5 MG tablet Take 5 tablets (12.5 mg) by mouth every 7 days. 20 tablet 1     traMADol (ULTRAM) 50 MG tablet Take 0.5 tablets (25 mg) by mouth daily as needed for severe pain. Three times weekly. 30 tablet 1     No current facility-administered medications for this visit.        Allergies:     Allergies   Allergen Reactions     Codeine      Sulfa Antibiotics      Sulfasalazine Other (See Comments)     Sulfate  "Rash        Review of Systems:   As above     Physical Exam:   Vitals: BP (!) 157/72   Pulse 66   Resp 16   Ht 1.499 m (4' 11\")   Wt 53.1 kg (117 lb)   LMP  (LMP Unknown)   SpO2 98%   BMI 23.63 kg/m     Neurologic:     Mental Status: Fully alert, attentive. Grossly normal memory and fund of knowledge. Language normal, speech clear and fluent, no paraphasic errors.    Cranial Nerves: No dysarthria. EOMI. Visual fields intact. Facial movements symmetric. No ptosis. Facial sensation intact. Hearing intact. Tongue movements and palate elevation intact. Pupils equal.      Motor: No tremors or other abnormal movements observed. Muscle tone normal throughout. Normal/symmetric rapid finger tapping. Strength 5/5 throughout upper and lower extremities with exception of 4-/5 right APB and 4+/5 finger extension, 4+/5 left APB, 4+/5 left finger extension / ADM / FDI. Possible left dorsiflexion weakness 5-/5, but may be orthopedic in nature.      Deep Tendon Reflexes: 2+/symmetric throughout upper extremities, 2+ bilateral patella, and trace ankle jerks. No clonus.     Sensory: Vibration is absent the great toes and ~2-3 seconds at the ankles, normal in the hands. Decreased sensation to light touch and temperature in the feet compared to hands. Proprioception is intact. Positive Romberg.      Coordination: Finger-nose-finger and heel to shin without dysmetria.     Gait: Mildly wide based casual gait with small steps.. Not able to walk on toes, heels or tandem.      Data reviewed on previous visits    Imaging:  MRI lumbar 7/2022  IMPRESSION:  Stable surgical changes of posterior instrumented fusion and interbody  prosthesis at L4-5. Relatively stable multilevel lumbar spondylosis,  as detailed above.    MRI lumbar 4/13/2019  Impression:   1. Multilevel lumbar spondylosis, most pronounced at L3-4 with  moderate severe left and mild to moderate right neural foraminal  stenosis.  2. Synovial cyst within the right neural " foramen of L5-S1 narrows the  right lateral recess and likely impinges upon the traversing right S1  nerve root.  3. Postsurgical changes of instrumented fusion of L4-5.     MRI cervical spine 2019  IMPRESSION: Mild multilevel degenerative changes with multilevel  neural foraminal stenoses without significant canal stenosis. No  myelopathy signal changes.      MRI brain 2018  Impression:    Normal brain for age with attention to the vestibular and auditory  structures. No specific finding to explain the patient's symptoms.     Procedures:  EMG 2019  Interpretation:  This is an abnormal EMG.  Findings are consistent with a severe length-dependent axonal sensorimotor polyneuropathy that appears to have advanced from prior EMG in .  There is not clear evidence for overlying lumbosacral radiculopathy but in light of the severe peripheral neuropathy these may be masked and clinical correlation is advised.    EMG   Interpretation:  The EMG is abnormal. The findings are consistent with the followin. A severe distal symmetric axonal peripheral neuropathy that has progressed since last EMG 2019  2. Possible L3 or L4 chronic inactive radiculopathy on the right     Laboratory:  B12 1705 (2020)  TSH normal 2017  Per chart review, SPEP and A1c many years back was reportedly normal    MRI thoracic 10/28/2020  IMPRESSION:  1. Small posterior disc herniation at the T7-T8 level that mildly  deforms the anterior surface of the thoracic spinal cord.  2. Otherwise, normal thoracic spine MRI. No spinal canal or neural  foraminal stenosis. No evidence for fracture.    Labs 10/2020 - unremarkable ESR/CRP, SPEP/immunofixation, light chains, A1c, TSH  Labs 2021 - With exception of positive LOYDA (1:1250, nucleolar), normal/negative Copper, Zinc, Vitamin E, heavy metal screen, B1, B6, LOYDA, SSA/SSB  Ceruloplasmin low at 16 (2023)    MRI T spine 2024  IMPRESSION:  1.  There is mild central and anterior superior  endplate compression deformity at T11 with less than 25% height loss. No retropulsion. Edema along the superior aspect of the T11 vertebral body compatible with an acute or recent fracture. No significant   edema within the prevertebral soft tissues. No retropulsion.  2.  Shallow posterior disc bulges T12-L1 and L1-L2. No spinal canal stenosis. Mild right and moderate left L1-L2 and mild bilateral T12-L1 neural foraminal stenosis.    MRI cervical 7/2024  IMPRESSION:  1.  No cord compression. Normal cord signal. Multilevel cervical spondylosis, as above.   2.  No evidence of acute bone or soft tissue injury involving the cervical spine.    Pertinent Investigations since last visit:   None    The longitudinal plan of care for the diagnosis(es)/condition(s) as documented were addressed during this visit. Due to the added complexity in care, I will continue to support Kerri in the subsequent management and with ongoing continuity of care.      Again, thank you for allowing me to participate in the care of your patient.      Sincerely,    Jason Gillette MD

## 2025-05-14 ENCOUNTER — RESULTS FOLLOW-UP (OUTPATIENT)
Dept: DERMATOLOGY | Facility: CLINIC | Age: 89
End: 2025-05-14

## 2025-05-26 DIAGNOSIS — I10 ESSENTIAL HYPERTENSION: ICD-10-CM

## 2025-05-27 RX ORDER — LISINOPRIL 10 MG/1
10 TABLET ORAL DAILY
Qty: 90 TABLET | Refills: 0 | Status: SHIPPED | OUTPATIENT
Start: 2025-05-27

## 2025-06-02 DIAGNOSIS — E78.2 MIXED HYPERLIPIDEMIA: ICD-10-CM

## 2025-06-02 RX ORDER — ATORVASTATIN CALCIUM 20 MG/1
20 TABLET, FILM COATED ORAL AT BEDTIME
Qty: 90 TABLET | Refills: 2 | Status: SHIPPED | OUTPATIENT
Start: 2025-06-02

## 2025-06-09 ENCOUNTER — OFFICE VISIT (OUTPATIENT)
Dept: PHYSICAL MEDICINE AND REHAB | Facility: CLINIC | Age: 89
End: 2025-06-09
Attending: PHYSICAL MEDICINE & REHABILITATION
Payer: MEDICARE

## 2025-06-09 VITALS
RESPIRATION RATE: 16 BRPM | DIASTOLIC BLOOD PRESSURE: 72 MMHG | OXYGEN SATURATION: 96 % | SYSTOLIC BLOOD PRESSURE: 145 MMHG | HEART RATE: 80 BPM

## 2025-06-09 DIAGNOSIS — M25.551 CHRONIC HIP PAIN, RIGHT: Primary | ICD-10-CM

## 2025-06-09 DIAGNOSIS — G89.29 CHRONIC HIP PAIN, RIGHT: Primary | ICD-10-CM

## 2025-06-09 PROCEDURE — 99214 OFFICE O/P EST MOD 30 MIN: CPT | Mod: 25 | Performed by: PHYSICAL MEDICINE & REHABILITATION

## 2025-06-09 PROCEDURE — 3078F DIAST BP <80 MM HG: CPT | Performed by: PHYSICAL MEDICINE & REHABILITATION

## 2025-06-09 PROCEDURE — 3077F SYST BP >= 140 MM HG: CPT | Performed by: PHYSICAL MEDICINE & REHABILITATION

## 2025-06-09 PROCEDURE — 20611 DRAIN/INJ JOINT/BURSA W/US: CPT | Mod: RT | Performed by: PHYSICAL MEDICINE & REHABILITATION

## 2025-06-09 RX ORDER — TRIAMCINOLONE ACETONIDE 40 MG/ML
40 INJECTION, SUSPENSION INTRA-ARTICULAR; INTRAMUSCULAR ONCE
Status: COMPLETED | OUTPATIENT
Start: 2025-06-09 | End: 2025-06-09

## 2025-06-09 RX ADMIN — TRIAMCINOLONE ACETONIDE 40 MG: 40 INJECTION, SUSPENSION INTRA-ARTICULAR; INTRAMUSCULAR at 15:18

## 2025-06-09 NOTE — PATIENT INSTRUCTIONS
It was a pleasure seeing you today in our [facility]. We discussed the following:    #. INJECTION AFTERCARE     Procedure performed: Right hip joint steroid injection    Medications used today: 1mL Kenalog (40mg/mL), 1% Lidocaine    After any kind of injection, it is not uncommon to experience:  - Soreness, swelling or bruising around the injection site.  - Mild numbness, tingling or weakness around the injection site caused by the numbing medicine used before or with the injection.      It is also possible to experience the following effects associated with the specific agent after injection:  - Allergic reaction.  - Increased blood sugar levels for 10-14 days following cortisone injection. If you have diabetes and notice that your blood sugar levels have increased, notify your primary care physician.  - Increased blood pressure levels.  - Mood swings.  - Increased fluid accumulation in the injected joint.     These effects all should resolve within a day or two of your procedure.     Please note that it may take up to 14 days for the steroid (cortisone) medication to start working.      HOME CARE INSTRUCTIONS     - Limit yourself to light activity activity on the day of your procedure. Avoid lifting heavy objects, bending, stooping or twisting.   - You may shower, but please avoid swimming, hot tubs or tub baths for 24 hours following the procedure.   - Take over-the-counter pain medication (NSAIDS or Tylenol) for pain control after your procedure as needed.    - You may use ice packs for 10-15 minutes 3-4 times per day at the injection site to reduce pain and swelling after your procedure.   + Put ice in a plastic bag  + Place a towel between your skin and the ice bag  + Leave the ice on for no longer than 20 minutes each time to avoid injuring your skin or nerves  + This can be repeated 3-4 times per day for a few days after the injection     SEEK IMMEDIATE MEDICAL CARE IF:     - Pain and swelling  get worse rather than better or extend beyond the injection site  - Numbness does not go away after a few hours  - Blood or fluid continues to leak from the injection site  - You experience chest pain  - You have swelling of your face or tongue  - You have trouble breathing or become dizzy  - You develop fever, chills or severe tenderness at the injection site that lasts longer than 1 day     MAKE SURE YOU:     - Understand these instructions  - Watch your condition  - Get help right away if you are not doing well or if you get worse        #. X-ray: Right hip injection    An XR order was added today. You may schedule this at the discharge desk or radiology will call you to schedule. You may also call [facility] Sched[facility] directly if you do not hear from them in the next couple of days.    I would suggest waiting 1-2 weeks after the hip injection prior to getting the x-ray completed.    Imaging scheduling  -[facility] [phone] [facility] ([facility], including [facility], [place]  -[facility] [phone] [facility], [facility] Clinics including [facility], [place], and [place]  -[facility] [phone] [facility], [facility], [facility], [facility] clinics, including [place], [facility], and [facility]  -[facility] [phone] [facility], [facility] [place], [facility] Clinics, including [place], [facility], [facility]       Follow-up: Repeating corticosteroid injections could be considered, not exceeding 3-4 injections into anyone area in a calender year and no sooner than 3 months.

## 2025-06-09 NOTE — PROGRESS NOTES
PROCEDURE NOTE: Intra-articular Hip Under Ultrasound Guidance    PROCEDURE DATE: 6/9/2025    PATIENT NAME: Kerri Rocha  YOB: 1935    ATTENDING PHYSICIAN: Stephanie Momin MD  FELLOW/RESIDENT PHYSICIAN: None    PREOPERATIVE DIAGNOSIS:   Chronic hip pain, right  (primary encounter diagnosis)   POSTOPERATIVE DIAGNOSIS: same    PROCEDURE PERFORMED: Right Intra-articular Hip Injection Under Ultrasound Guidance     ULTRASOUND WAS USED.    INDICATIONS FOR THE PROCEDURE:  Kerri Rocha is a 89 year old female who presents with chronic right hip pain worse over the last 1 month.     PROCEDURE AND FINDINGS:  She was greeted in the clinic. The risk, benefits and alternatives to the procedure were again reviewed with her and informed consent was obtained and the patient agreed to proceed. A time-out was performed. Following review alternatives, benefits and risks, the procedure was carried out under sterile prep with sterile gel. The use of direct sonographic guidance was used to ensure accurate placement of the needle (rather than non-guided injection) and required to minimize the risk of bleeding or injury to nearby neurovascular structures. Images were recorded and stored.     A 5-1MHz ultrasound transducer was used to visualize the relevant structures and determine the optimal needle path for the procedure.  2mL 1% lidocaine was infiltrated at the needle insertion site subcutaneously. Then, a 22 gauge 5-inch Quincke spinal needle was advanced from lateral to medial utilizing an in-plane approach with the transducer, under continuous ultrasound guidance deep to the hip joint capsule. After negative aspiration, slow injection of the treatment solution 5mL total consisting of 1mL Kenalog (40mg/mL) and 4mL of 1% Lidocaine was instilled into affected area on the right side(s). The tip of the needle was visualized throughout the procedure. The remainder of the single-use vials were discarded.      She tolerated  the procedure well, was discharged home in stable condition.     Before the procedure, she reported a pain score of 5-6/10.   After the procedure, she reported a pain score of 0/10.      Follow-up will be in clinic or as needed. Repeating corticosteroid injections could be considered, not exceeding 3-4 injections into anyone area in a calender year.      COMPLICATIONS: None    COMMENTS: None

## 2025-06-09 NOTE — NURSING NOTE
Chief Complaint   Patient presents with    New Patient     Here for a consult, confirmed with patient     Merry Reza

## 2025-06-09 NOTE — PROGRESS NOTES
PM&R Follow-Up Visit -     Date of Initial Visit: 09/30/2021  LOV: 09/14/22 (procedure)  TD:  6/9/2025     Recall: Kerri Rocha is a 87 year old female s/p L4-5 fusion, myofascial pain, sacral insufficiency fracture, T11 compression fracture age-related osteoporosis, idiopathic peripheral polyneuropathy who has been referred back to our PM&R clinic for a new issue: Right hip pain    INTERVAL HISTORY:  Patient was last seen in clinic September 2022.  She has also followed in the pain clinic with Dr. Novak in [place], and spine clinic with Dr. Carlin at [facility] since her last visit.  She has been referred back to our PM&R clinic by her primary care physician for low back and right hip pain.    Today, Kerri presents with right lumbosacral and hip pain which she localizes to the gluteal region wrapping around towards the front pocket/groin.  She has had pain in this area for several months but notably worse over the last 1 month.  She denies any falls, inciting event or injury.  She does note that her  has ongoing health issues and is legally blind which requires her to provide quite a bit of care for him and this may have aggravated some of her symptoms.  Her primary doctor also has provided her with some tramadol which she may take up to 3 times per week but tries to avoid.     She ambulates with a straight cane in her right hand.  Pain score 5-6/10 today and 9/10 is worse than last week.  Pain is described as dull, aching and sharp in nature.  Worse with weightbearing and ambulation.     RECALL HISTORY OF PRESENT ILLNESS:  Kerri Rocha is a 89 year old female with history of L4/L5 lumbar fusion who presents to Spine Clinic for evaluation of low back pain. She was seen at the request of [place].    Patient reports low back pain began 7 years ago while she was living in [place]. During that time she localized her pain bilateral low back at the L4 level. No pain in the  legs at that time. She underwent L4/L5 lumbar fusion in [place] with significant improvement. No she reports different pain in her bilateral gluteal region with radiation to the posterior thighs with pain on R > L. She denies any shooting pain, or pain past the knee. She states 90% of her pain is axial vs legs She reports morning stiffness when waking up, pain will worsen with activity throughout the day and be worse at night. 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    Of note she has a 20 year history of idiopathic bilateral peripheral neuropathy. Is confirmed by a recent EMG this year. She has significant stocking numbness to the level of the ankles. Occasionally she experiences paresthesia, taking gabapentin for this rather than pain. Denies progressive weakness.    She has followed with Pain Medicine and has received bilateral SI joint injections in the past. The last to injections did not give her any significant improvement. She see physical therapy in [place] which helps. She is planning to continue to see them.     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     PRIOR INJURIES/TREATMENT:   Ice/Heat: Not helpful  Brace: Not attempted  Physical Therapy:   Completed PT in [place] for Spine PT and transitioned to HEP.      - Current Pain Medications -   Gabapentin 800 mg TID  Tramadol 50mg prn     - Prior/Trailed Pain Medications -   Ibuprofen as needed    Prior Procedures:  Date    Procedure     Improvement (%)  04/27/2021 Bilateral SI Joint Injections  No improvement  08/17/2020 Bilateral SI Joint Injections  No improvement  12/13/2019 Bilateral SI Joint Injection  100% relief for 3-4 months  10/18/2021 B L5-S1 IAF CSI inj    Good relief xweeks  09/14/2022 Caudal MAGUE     No sig relief  04/06/23 B/L Piriformis CSI (fluoro)        Prior Related Surgery:   S/p L4-5 fusion     Other (acupuncture, OMT, CMM, TENS, DME, etc.): None    Specialists Seen - (with most  recent, available notes and clinic visits reviewed)   1. Neurology - Dr Gillette   2. Pain Clinic - Dr Frieda Novak   3. Rheumatology - Dr Rock  4. [facility] - Dr. SINDI Carlin   5. Sports Medicine - Dr. KAITLIN Pelayo     IMAGING - reviewed   MR Pelvis Bone wo Contrast (2024 1:00 PM)   IMPRESSION:  1.  Nondisplaced insufficiency fractures involving the left greater than right sacral ala with surrounding marrow edema.  2.  Additional small nondisplaced insufficiency fractures involving the right and left superior pubic rami.  3.  Mild bilateral distal gluteal tendinopathy with superimposed chronic partial tear of the left gluteus minimus tendon insertion.    XR Pelvis and Hip Bilateral 1 View (2023 10:08 AM)   Impression:   1.  Normal joint alignment. No fracture or bone lesion.   2.  Mild joint space narrowing in both hips without significant  arthritic changes.   3.  Operative changes of lower lumbar spinal fusion (at L4-5).    MRI Lumbar spine w & w/o contrast (2022 2:43 PM)   IMPRESSION:  Stable surgical changes of posterior instrumented fusion and interbody  prosthesis at L4-5. Relatively stable multilevel lumbar spondylosis,  as detailed above.    10/28/2020 MR T spine  IMPRESSION:  1. Small posterior disc herniation at the T7-T8 level that mildly  deforms the anterior surface of the thoracic spinal cord.  2. Otherwise, normal thoracic spine MRI. No spinal canal or neural  foraminal stenosis. No evidence for fracture.     2021 NCS/EMG    Interpretation:  The EMG is abnormal. The findings are consistent with the followin. A severe distal symmetric axonal peripheral neuropathy that has progressed since last EMG 2019  2. Possible L3 or L4 chronic inactive radiculopathy on the right    Medical History:  She  has a past medical history of HLD (hyperlipidemia), HTN (hypertension), Idiopathic neuropathy, and Vitamin D deficiency.     She  has a past surgical  history that includes appendectomy;  section; carpal tunnel release rt/lt; breast biopsy, core rt/lt; Hysterectomy total abdominal, bilateral salpingo-oophorectomy, combined; tonsillectomy & adenoidectomy; cataract iol, rt/lt; Laminectomy lumbar three+ levels (2016); Inject block medial branch cervical/thoracic/lumbar (Bilateral, 10/18/2021); Colonoscopy (N/A, 8/10/2022); and Inject epidural caudal (N/A, 2022).    Family History  Her family history includes Colon Cancer in her father; Coronary Artery Disease in her father; Heart Disease in her brother and father; Hypertension in her brother, father, mother, and sister.     Social History:  Work: Retired. Previous   History of any legal related pain issues: None  Current living situation: Lives in house with  who is legally blind  She  reports that she has never smoked. She has never been exposed to tobacco smoke. She has never used smokeless tobacco. She reports current alcohol use of about 1.0 standard drink of alcohol per week. She reports that she does not use drugs.     Current Medications:   She has a current medication list which includes the following prescription(s): amlodipine, atorvastatin, calcium carbonate, diphenhydramine-apap (sleep), flowflex covid-19 ag home test, folic acid, gabapentin, lisinopril, methotrexate, and tramadol.     Allergies:   Allergies   Allergen Reactions    Codeine     Sulfa Antibiotics     Sulfasalazine Other (See Comments)    Sulfate Rash     OBJECTIVE   PHYSICAL EXAMINATION:  BP (!) 145/72 (BP Location: Right arm, Patient Position: Sitting, Cuff Size: Adult Regular)   Pulse 80   Resp 16   LMP  (LMP Unknown)   SpO2 96%    General: Pleasant, straightforward, WDWN individual.  Mental Status: Pleasant, direct, appropriate mood and affect  Resp: breathing is unlabored without audible wheeze  Vascular: No visible cyanosis, no venous stasis changes  Heme: No visible ecchymosis or  erythema on extremities  Skin: No notable rash    Neurologic:  Strength: All major muscle groups of the bilateral lower extremities have normal and symmetric muscle strength EXCEPT pain limited right hip flexion 4 to 4+/5 and hip abductors 4+/5      Musculoskeletal:  Right Hip Exam: Mod reduced ROM, nontender, SANJIV, Stinchfield, tests positive with typical pain.    Gait - unsteady with careful foot placement, plodding.      ASSESSMENT:  Kerri Rocha is a very pleasant 89 year old female who presents:    #. Chronic hip pain, right likely secondary to OA (primary encounter diagnosis)      Previously evaluated in the [facility] for:  #. Lumbar spondylosis s/p L4-5 lumbar spinal fusion.   #. Lumbar facet arthropathy   #. Sacroiliac pain  #. Chronic sacral pain. H/o sacral insufficiency fracture  #. H/o T11 compression fracture    Complicating co-morbidities include:   #.  CKD stage III  #.  Idiopathic peripheral polyneuropathy  #.  Osteoporosis    PLAN:  -Sonographically guided right intra-articular hip corticosteroid injection performed today (see detailed procedure note)  -X-ray right hip  -Reports continuing HEP from prior physical therapy visits.  -Reviewed appropriate use of DME: Cane in her left hand.  However, she states that her peripheral polyneuropathy and  balance are improved with use on her right side.  -Follow up if ongoing pain and symptoms. Repeating corticosteroid injections could be considered, not exceeding 3-4 injections into anyone area in a calender year.      Ready to learn, no apparent learning barriers.  Education provided on treatment plan according to patient's preferred learning style.  Patient verbalizes understanding.   __________________________________  Stephanie Momin MD   Physical Medicine and Rehabilitation

## 2025-06-09 NOTE — LETTER
6/9/2025       RE: Kerri Rohca  [address]  [facility] [place] [address]     Dear Colleague,    Thank you for referring your patient, Kerri Rocha, to the [facility] PHYSICAL MEDICINE AND REHABILITATION CLINIC [place] at [facility]. Please see a copy of my visit note below.    PM&R Follow-Up Visit -     Date of Initial Visit: 09/30/2021  LOV: 09/14/22 (procedure)  TD:  6/9/2025     Recall: Kerri Rocha is a 87 year old female s/p L4-5 fusion, myofascial pain, sacral insufficiency fracture, T11 compression fracture age-related osteoporosis, idiopathic peripheral polyneuropathy who has been referred back to our PM&R clinic for a new issue: Right hip pain    INTERVAL HISTORY:  Patient was last seen in clinic September 2022.  She has also followed in the pain clinic with Dr. Novak in [place], and spine clinic with Dr. Carlin at [facility] since her last visit.  She has been referred back to our PM&R clinic by her primary care physician for low back and right hip pain.    Today, Kerri presents with right lumbosacral and hip pain which she localizes to the gluteal region wrapping around towards the front pocket/groin.  She has had pain in this area for several months but notably worse over the last 1 month.  She denies any falls, inciting event or injury.  She does note that her  has ongoing health issues and is legally blind which requires her to provide quite a bit of care for him and this may have aggravated some of her symptoms.  Her primary doctor also has provided her with some tramadol which she may take up to 3 times per week but tries to avoid.     She ambulates with a straight cane in her right hand.  Pain score 5-6/10 today and 9/10 is worse than last week.  Pain is described as dull, aching and sharp in nature.  Worse with weightbearing and ambulation.     RECALL HISTORY OF PRESENT ILLNESS:  Kerri Rocha is  a 89 year old female with history of L4/L5 lumbar fusion who presents to Spine Clinic for evaluation of low back pain. She was seen at the request of [place].    Patient reports low back pain began 7 years ago while she was living in [place]. During that time she localized her pain bilateral low back at the L4 level. No pain in the legs at that time. She underwent L4/L5 lumbar fusion in [place] with significant improvement. No she reports different pain in her bilateral gluteal region with radiation to the posterior thighs with pain on R > L. She denies any shooting pain, or pain past the knee. She states 90% of her pain is axial vs legs She reports morning stiffness when waking up, pain will worsen with activity throughout the day and be worse at night. 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    Of note she has a 20 year history of idiopathic bilateral peripheral neuropathy. Is confirmed by a recent EMG this year. She has significant stocking numbness to the level of the ankles. Occasionally she experiences paresthesia, taking gabapentin for this rather than pain. Denies progressive weakness.    She has followed with Pain Medicine and has received bilateral SI joint injections in the past. The last to injections did not give her any significant improvement. She see physical therapy in [place] which helps. She is planning to continue to see them.     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     PRIOR INJURIES/TREATMENT:   Ice/Heat: Not helpful  Brace: Not attempted  Physical Therapy:   Completed PT in [place] for Spine PT and transitioned to HEP.      - Current Pain Medications -   Gabapentin 800 mg TID  Tramadol 50mg prn     - Prior/Trailed Pain Medications -   Ibuprofen as needed    Prior Procedures:  Date    Procedure     Improvement (%)  04/27/2021 Bilateral SI Joint Injections  No improvement  08/17/2020 Bilateral SI Joint Injections  No  improvement  12/13/2019 Bilateral SI Joint Injection  100% relief for 3-4 months  10/18/2021 B L5-S1 IAF CSI inj    Good relief xweeks  09/14/2022 Caudal MAGUE     No sig relief  04/06/23 B/L Piriformis CSI (fluoro)        Prior Related Surgery:   S/p L4-5 fusion     Other (acupuncture, OMT, CMM, TENS, DME, etc.): None    Specialists Seen - (with most recent, available notes and clinic visits reviewed)   1. Neurology - Dr Gillette   2. Pain Clinic - Dr Frieda Novak   3. Rheumatology - Dr Rock  4. [facility] - Dr. SINDI Carlin   5. Sports Medicine - Dr. KAITLIN Pelayo     IMAGING - reviewed   MR Pelvis Bone wo Contrast (08/27/2024 1:00 PM)   IMPRESSION:  1.  Nondisplaced insufficiency fractures involving the left greater than right sacral ala with surrounding marrow edema.  2.  Additional small nondisplaced insufficiency fractures involving the right and left superior pubic rami.  3.  Mild bilateral distal gluteal tendinopathy with superimposed chronic partial tear of the left gluteus minimus tendon insertion.    XR Pelvis and Hip Bilateral 1 View (03/09/2023 10:08 AM)   Impression:   1.  Normal joint alignment. No fracture or bone lesion.   2.  Mild joint space narrowing in both hips without significant  arthritic changes.   3.  Operative changes of lower lumbar spinal fusion (at L4-5).    MRI Lumbar spine w & w/o contrast (07/31/2022 2:43 PM)   IMPRESSION:  Stable surgical changes of posterior instrumented fusion and interbody  prosthesis at L4-5. Relatively stable multilevel lumbar spondylosis,  as detailed above.    10/28/2020 MR T spine  IMPRESSION:  1. Small posterior disc herniation at the T7-T8 level that mildly  deforms the anterior surface of the thoracic spinal cord.  2. Otherwise, normal thoracic spine MRI. No spinal canal or neural  foraminal stenosis. No evidence for fracture.     04/28/2021 NCS/EMG    Interpretation:  The EMG is abnormal. The findings are consistent with the  followin. A severe distal symmetric axonal peripheral neuropathy that has progressed since last EMG 2019  2. Possible L3 or L4 chronic inactive radiculopathy on the right    Medical History:  She  has a past medical history of HLD (hyperlipidemia), HTN (hypertension), Idiopathic neuropathy, and Vitamin D deficiency.     She  has a past surgical history that includes appendectomy;  section; carpal tunnel release rt/lt; breast biopsy, core rt/lt; Hysterectomy total abdominal, bilateral salpingo-oophorectomy, combined; tonsillectomy & adenoidectomy; cataract iol, rt/lt; Laminectomy lumbar three+ levels (2016); Inject block medial branch cervical/thoracic/lumbar (Bilateral, 10/18/2021); Colonoscopy (N/A, 8/10/2022); and Inject epidural caudal (N/A, 2022).    Family History  Her family history includes Colon Cancer in her father; Coronary Artery Disease in her father; Heart Disease in her brother and father; Hypertension in her brother, father, mother, and sister.     Social History:  Work: Retired. Previous   History of any legal related pain issues: None  Current living situation: Lives in house with  who is legally blind  She  reports that she has never smoked. She has never been exposed to tobacco smoke. She has never used smokeless tobacco. She reports current alcohol use of about 1.0 standard drink of alcohol per week. She reports that she does not use drugs.     Current Medications:   She has a current medication list which includes the following prescription(s): amlodipine, atorvastatin, calcium carbonate, diphenhydramine-apap (sleep), flowflex covid-19 ag home test, folic acid, gabapentin, lisinopril, methotrexate, and tramadol.     Allergies:   Allergies   Allergen Reactions     Codeine      Sulfa Antibiotics      Sulfasalazine Other (See Comments)     Sulfate Rash     OBJECTIVE   PHYSICAL EXAMINATION:  BP (!) 145/72 (BP Location: Right arm, Patient  Position: Sitting, Cuff Size: Adult Regular)   Pulse 80   Resp 16   LMP  (LMP Unknown)   SpO2 96%    General: Pleasant, straightforward, WDWN individual.  Mental Status: Pleasant, direct, appropriate mood and affect  Resp: breathing is unlabored without audible wheeze  Vascular: No visible cyanosis, no venous stasis changes  Heme: No visible ecchymosis or erythema on extremities  Skin: No notable rash    Neurologic:  Strength: All major muscle groups of the bilateral lower extremities have normal and symmetric muscle strength EXCEPT pain limited right hip flexion 4 to 4+/5 and hip abductors 4+/5      Musculoskeletal:  Right Hip Exam: Mod reduced ROM, nontender, SANJIV, Stinchfield, tests positive with typical pain.    Gait - unsteady with careful foot placement, plodding.      ASSESSMENT:  Kerri Rocha is a very pleasant 89 year old female who presents:    #. Chronic hip pain, right likely secondary to OA (primary encounter diagnosis)      [facility] for:  #. Lumbar spondylosis s/p L4-5 lumbar spinal fusion.   #. Lumbar facet arthropathy   #. Sacroiliac pain  #. Chronic sacral pain. H/o sacral insufficiency fracture  #. H/o T11 compression fracture    Complicating co-morbidities include:   #.  CKD stage III  #.  Idiopathic peripheral polyneuropathy  #.  Osteoporosis    PLAN:  -Sonographically guided right intra-articular hip corticosteroid injection performed today (see detailed procedure note)  -X-ray right hip  -Reports continuing HEP from prior physical therapy visits.  -Reviewed appropriate use of DME: Cane in her left hand.  However, she states that her peripheral polyneuropathy and  balance are improved with use on her right side.  -Follow up if ongoing pain and symptoms. Repeating corticosteroid injections could be considered, not exceeding 3-4 injections into anyone area in a calender year.      Ready to learn, no apparent learning barriers.  Education provided on  treatment plan according to patient's preferred learning style.  Patient verbalizes understanding.   __________________________________  Stephanie Momin MD   Physical Medicine and Rehabilitation    PROCEDURE NOTE: Intra-articular Hip Under Ultrasound Guidance    PROCEDURE DATE: 6/9/2025    PATIENT NAME: Kerri Rocha  YOB: 1935    ATTENDING PHYSICIAN: Stephanie Momin MD  FELLOW/RESIDENT PHYSICIAN: None    PREOPERATIVE DIAGNOSIS:   Chronic hip pain, right  (primary encounter diagnosis)   POSTOPERATIVE DIAGNOSIS: same    PROCEDURE PERFORMED: Right Intra-articular Hip Injection Under Ultrasound Guidance     ULTRASOUND WAS USED.    INDICATIONS FOR THE PROCEDURE:  Kerri Rocha is a 89 year old female who presents with chronic right hip pain worse over the last 1 month.     PROCEDURE AND FINDINGS:  She was greeted in the clinic. The risk, benefits and alternatives to the procedure were again reviewed with her and informed consent was obtained and the patient agreed to proceed. A time-out was performed. Following review alternatives, benefits and risks, the procedure was carried out under sterile prep with sterile gel. The use of direct sonographic guidance was used to ensure accurate placement of the needle (rather than non-guided injection) and required to minimize the risk of bleeding or injury to nearby neurovascular structures. Images were recorded and stored.     A 5-1MHz ultrasound transducer was used to visualize the relevant structures and determine the optimal needle path for the procedure.  2mL 1% lidocaine was infiltrated at the needle insertion site subcutaneously. Then, a 22 gauge 5-inch Quincke spinal needle was advanced from lateral to medial utilizing an in-plane approach with the transducer, under continuous ultrasound guidance deep to the hip joint capsule. After negative aspiration, slow injection of the treatment solution 5mL total consisting of 1mL Kenalog (40mg/mL) and 4mL of 1%  Lidocaine was instilled into affected area on the right side(s). The tip of the needle was visualized throughout the procedure. The remainder of the single-use vials were discarded.      She tolerated the procedure well, was discharged home in stable condition.     Before the procedure, she reported a pain score of 5-6/10.   After the procedure, she reported a pain score of 0/10.      Follow-up will be in clinic or as needed. Repeating corticosteroid injections could be considered, not exceeding 3-4 injections into anyone area in a calender year.      COMPLICATIONS: None    COMMENTS: None           Again, thank you for allowing me to participate in the care of your patient.      Sincerely,    Stephanie Momin MD

## 2025-06-10 ENCOUNTER — OFFICE VISIT (OUTPATIENT)
Dept: DERMATOLOGY | Facility: CLINIC | Age: 89
End: 2025-06-10
Attending: DERMATOLOGY
Payer: MEDICARE

## 2025-06-10 DIAGNOSIS — Z79.899 ENCOUNTER FOR LONG-TERM CURRENT USE OF HIGH RISK MEDICATION: Primary | ICD-10-CM

## 2025-06-10 DIAGNOSIS — M33.13 DERMATOMYOSITIS (H): ICD-10-CM

## 2025-06-10 DIAGNOSIS — R21 RASH: ICD-10-CM

## 2025-06-10 PROCEDURE — 1126F AMNT PAIN NOTED NONE PRSNT: CPT | Performed by: DERMATOLOGY

## 2025-06-10 PROCEDURE — 99214 OFFICE O/P EST MOD 30 MIN: CPT | Performed by: DERMATOLOGY

## 2025-06-10 RX ORDER — FOLIC ACID 1 MG/1
1 TABLET ORAL DAILY
Qty: 100 TABLET | Refills: 3 | Status: SHIPPED | OUTPATIENT
Start: 2025-06-10

## 2025-06-10 RX ORDER — METHOTREXATE 2.5 MG/1
10 TABLET ORAL
Qty: 60 TABLET | Refills: 1 | Status: SHIPPED | OUTPATIENT
Start: 2025-06-10

## 2025-06-10 ASSESSMENT — PAIN SCALES - GENERAL: PAINLEVEL_OUTOF10: NO PAIN (0)

## 2025-06-10 NOTE — LETTER
6/10/2025       RE: Kerri Rocha  [address]  [facility] [place] [address]     Dear Colleague,    Thank you for referring your patient, Kerri Rocha, to the [facility] DERMATOLOGY CLINIC [place] at [facility]. Please see a copy of my visit note below.    [facility] Dermatology Note  Encounter Date: Willem 10, 2025  Office Visit     Dermatology Problem List:  1. Dermatomyositis sin myositis  - Current txt: Methotrexate 10 mg weekly  - Last safety labs 4/11/2025, 5/9/2025  - Prior txt: pred taper  - Bx L 3rd MCP and upper back (2/13/24): mild perivascular lymphocytic inflammation, favor partially treated eczematous dermatitis, no evidence of interface process.    - L Scalp Bx 4/2024: epidermal atrophy with rare necrotic keratinocytes, and a mild perivascular lymphocytic infiltrate   - Labs (2/13/24): LOYDA 1:1280 (nucleolar); CK and aldolase and myositis panel wnl    ____________________________________________    Assessment & Plan:    #  Dermatomyositis sine myositis   - Decrease methotrexate 10 mg weekly (4 tablets) + folic acid, refilled today for 3 month supply; patient can increase her dose if needed if flaring  - Discussed purpose of folate with methotrexate, potential to continue tapering methotrexate at future visits pending symptoms  - Safety labs checked 5/9, 4/11 (CBC w diff, CMP), wnl (reviewed with patient today)  - Patient understands to continue close monitoring for s/sx evolving malignancy (unexpected weight loss, LAD, fevers/night sweats)    Procedures Performed:   None    Follow-up: 4 month(s) in-person, or earlier for new or changing lesions    Staff and Medical Student:     Bernarda Henriquez, MS3  [employer]    I was present with the medical student who participated in the service and in the documentation.  I have verified the history and personally performed the physical exam and medical  decision making.  I agree with the assessment and plan of care as documented in the note.     Rakesh Talley MD  Dermatology Attending    ____________________________________________    CC: Derm Problem (Dermatomyositis. Patient has a few questions. Has some occasional itching on the scalp. Has a few questions regarding Folic Acid and why she needs to take it. Has some questions about immunosuppression. Would like to get a refill of methotrexate. )    HPI:  Ms. Kerri Rocha is a(n) 89 year old female who presents today as a return patient for follow-up of dermatomyositis sine myositis. She has been doing well and denies rashes on her hands, neck, face, or scalp. She occasionally has periodic pruritus that resolves without treatment. She is interested in discussing the timeline of her disease and treatment, especially the duration of methotrexate and purpose of folic acid.     Patient is otherwise feeling well, without additional skin concerns.    Labs:  CBC  and CMP  reviewed.    Physical Exam:  Vitals: LMP  (LMP Unknown)   SKIN: Focused examination of hands, face, neck, and scalp was performed.  - Chest and neck clear  - Face clear  - Bilateral dorsal hands clear  - Mild diffuse erythema on scalp, no scale  - No other lesions of concern on areas examined.     Medications:  Current Outpatient Medications   Medication Sig Dispense Refill     amLODIPine (NORVASC) 5 MG tablet Take 1 tablet (5 mg) by mouth daily. 90 tablet 3     atorvastatin (LIPITOR) 20 MG tablet Take 1 tablet (20 mg) by mouth at bedtime. 90 tablet 2     calcium carbonate (OS-CAMMIE 600 MG [place]. [place]) 600 MG tablet Take 1,200 mg by mouth daily        Diphenhydramine-APAP, sleep, (TYLENOL PM EXTRA STRENGTH PO) Take by mouth nightly as needed       FLOWFLEX COVID-19 AG HOME TEST KIT USE TO TEST AT HOME FOR COVID       folic acid (FOLVITE) 1 MG tablet Take 1 tablet (1 mg) by mouth daily. On days you don't take methotrexate 100 tablet 3     gabapentin  (NEURONTIN) 400 MG capsule TAKE 2 CAPSULES IN THE MORNING, 2 CAPSULES AT NOON AND 1 CAPSULE IN THE EVENING 150 capsule 11     lisinopril (ZESTRIL) 10 MG tablet Take 1 tablet (10 mg) by mouth daily. 90 tablet 0     methotrexate 2.5 MG tablet Take 5 tablets (12.5 mg) by mouth every 7 days. 20 tablet 1     traMADol (ULTRAM) 50 MG tablet Take 0.5 tablets (25 mg) by mouth daily as needed for severe pain. Three times weekly. 30 tablet 1     No current facility-administered medications for this visit.      Past Medical History:   Patient Active Problem List   Diagnosis     Essential hypertension     Idiopathic peripheral neuropathy     Pseudophakia of both eyes     Regular astigmatism of both eyes     Localized osteoporosis without current pathological fracture - 8/2020 waiting for dental work to start fosamax.      Mixed hyperlipidemia     Gait disorder     Vertigo     Chronic kidney disease, stage 3 (H)     Lumbar spondylosis     Lumbar facet arthropathy     Neural foraminal stenosis of lumbosacral spine     S/P lumbar spinal fusion     Rash and nonspecific skin eruption     Rash     Dermatomyositis (H)     Encounter for long-term (current) use of high-risk medication     Hip pain, left     Chronic, continuous use of opioids - Tramadol     Encounter for medication monitoring     Past Medical History:   Diagnosis Date     HLD (hyperlipidemia)      HTN (hypertension)      Idiopathic neuropathy      Vitamin D deficiency         CC Rakesh Talley MD  [address] [place] [id number] on close of this encounter.      Again, thank you for allowing me to participate in the care of your patient.      Sincerely,    Rakesh Talley MD

## 2025-06-10 NOTE — PATIENT INSTRUCTIONS
Take 4 tablets of methotrexate (10 mg) once weekly. If symptoms return, can increase back to 5 tablets once weekly (12.5 mg).

## 2025-06-10 NOTE — NURSING NOTE
Dermatology Rooming Note    Kerri Rocha's goals for this visit include:   Chief Complaint   Patient presents with    Derm Problem     Dermatomyositis. Patient has a few questions. Has some occasional itching on the scalp. Has a few questions regarding Folic Acid and why she needs to take it. Has some questions about immunosuppression. Would like to get a refill of methotrexate.      Christopher Keita, EMT  Clinic Support  [facility]     [phone]    Employed by [employer]

## 2025-06-10 NOTE — PROGRESS NOTES
[facility] Dermatology Note  Encounter Date: Willem 10, 2025  Office Visit     Dermatology Problem List:  1. Dermatomyositis sin myositis  - Current txt: Methotrexate 10 mg weekly  - Last safety labs 4/11/2025, 5/9/2025  - Prior txt: pred taper  - Bx L 3rd MCP and upper back (2/13/24): mild perivascular lymphocytic inflammation, favor partially treated eczematous dermatitis, no evidence of interface process.    - L Scalp Bx 4/2024: epidermal atrophy with rare necrotic keratinocytes, and a mild perivascular lymphocytic infiltrate   - Labs (2/13/24): LOYDA 1:1280 (nucleolar); CK and aldolase and myositis panel wnl    ____________________________________________    Assessment & Plan:    #  Dermatomyositis sine myositis   - Decrease methotrexate 10 mg weekly (4 tablets) + folic acid, refilled today for 3 month supply; patient can increase her dose if needed if flaring  - Discussed purpose of folate with methotrexate, potential to continue tapering methotrexate at future visits pending symptoms  - Safety labs checked 5/9, 4/11 (CBC w diff, CMP), wnl (reviewed with patient today)  - Patient understands to continue close monitoring for s/sx evolving malignancy (unexpected weight loss, LAD, fevers/night sweats)    Procedures Performed:   None    Follow-up: 4 month(s) in-person, or earlier for new or changing lesions    Staff and Medical Student:     Bernarda Henriquez, MS3  [facility] [employer] [facility] [employer]    I was present with the medical student who participated in the service and in the documentation.  I have verified the history and personally performed the physical exam and medical decision making.  I agree with the assessment and plan of care as documented in the note.     Rakesh Talley MD  Dermatology Attending    ____________________________________________    CC: Derm Problem (Dermatomyositis. Patient has a few questions. Has some occasional itching on the scalp. Has a few questions regarding  Folic Acid and why she needs to take it. Has some questions about immunosuppression. Would like to get a refill of methotrexate. )    HPI:  Ms. Kerri Rocha is a(n) 89 year old female who presents today as a return patient for follow-up of dermatomyositis sine myositis. She has been doing well and denies rashes on her hands, neck, face, or scalp. She occasionally has periodic pruritus that resolves without treatment. She is interested in discussing the timeline of her disease and treatment, especially the duration of methotrexate and purpose of folic acid.     Patient is otherwise feeling well, without additional skin concerns.    Labs:  CBC  and CMP  reviewed.    Physical Exam:  Vitals: LMP  (LMP Unknown)   SKIN: Focused examination of hands, face, neck, and scalp was performed.  - Chest and neck clear  - Face clear  - Bilateral dorsal hands clear  - Mild diffuse erythema on scalp, no scale  - No other lesions of concern on areas examined.     Medications:  Current Outpatient Medications   Medication Sig Dispense Refill    amLODIPine (NORVASC) 5 MG tablet Take 1 tablet (5 mg) by mouth daily. 90 tablet 3    atorvastatin (LIPITOR) 20 MG tablet Take 1 tablet (20 mg) by mouth at bedtime. 90 tablet 2    calcium carbonate (OS-CAMMIE 600 MG [place]. CA) 600 MG tablet Take 1,200 mg by mouth daily       Diphenhydramine-APAP, sleep, (TYLENOL PM EXTRA STRENGTH PO) Take by mouth nightly as needed      FLOWFLEX COVID-19 AG HOME TEST KIT USE TO TEST AT HOME FOR COVID      folic acid (FOLVITE) 1 MG tablet Take 1 tablet (1 mg) by mouth daily. On days you don't take methotrexate 100 tablet 3    gabapentin (NEURONTIN) 400 MG capsule TAKE 2 CAPSULES IN THE MORNING, 2 CAPSULES AT NOON AND 1 CAPSULE IN THE EVENING 150 capsule 11    lisinopril (ZESTRIL) 10 MG tablet Take 1 tablet (10 mg) by mouth daily. 90 tablet 0    methotrexate 2.5 MG tablet Take 5 tablets (12.5 mg) by mouth every 7 days. 20 tablet 1    traMADol (ULTRAM) 50 MG tablet  Take 0.5 tablets (25 mg) by mouth daily as needed for severe pain. Three times weekly. 30 tablet 1     No current facility-administered medications for this visit.      Past Medical History:   Patient Active Problem List   Diagnosis    Essential hypertension    Idiopathic peripheral neuropathy    Pseudophakia of both eyes    Regular astigmatism of both eyes    Localized osteoporosis without current pathological fracture - 8/2020 waiting for dental work to start fosamax.     Mixed hyperlipidemia    Gait disorder    Vertigo    Chronic kidney disease, stage 3 (H)    Lumbar spondylosis    Lumbar facet arthropathy    Neural foraminal stenosis of lumbosacral spine    S/P lumbar spinal fusion    Rash and nonspecific skin eruption    Rash    Dermatomyositis (H)    Encounter for long-term (current) use of high-risk medication    Hip pain, left    Chronic, continuous use of opioids - Tramadol    Encounter for medication monitoring     Past Medical History:   Diagnosis Date    HLD (hyperlipidemia)     HTN (hypertension)     Idiopathic neuropathy     Vitamin D deficiency         CC Rakesh Talley MD  [address] [facility] [address]  [place] [address] on close of this encounter.

## 2025-06-14 PROBLEM — Z79.899 ENCOUNTER FOR LONG-TERM CURRENT USE OF HIGH RISK MEDICATION: Status: ACTIVE | Noted: 2024-07-12

## 2025-07-10 ENCOUNTER — TELEPHONE (OUTPATIENT)
Dept: DERMATOLOGY | Facility: CLINIC | Age: 89
End: 2025-07-10
Payer: MEDICARE

## 2025-07-10 NOTE — TELEPHONE ENCOUNTER
7/10 SWP to reschedule their 10/09/2025 return derm autoimmune appt with Dr. Talley. The next opening for Dr. Talley is April 2026. Pt said they need to be seen every 3 months, so April 2026 will be too far out. Pt decline to schedule. Routing to the clinic.

## 2025-07-10 NOTE — TELEPHONE ENCOUNTER
Patient Contact     Attempt # 1     Was call answered? Yes        Called Kerri Rocha and R/S for October due to higher risk med monitoring    GUSTAVO Peñaloza

## 2025-07-11 ENCOUNTER — TELEPHONE (OUTPATIENT)
Dept: PHYSICAL MEDICINE AND REHAB | Facility: CLINIC | Age: 89
End: 2025-07-11
Payer: MEDICARE

## 2025-07-11 NOTE — TELEPHONE ENCOUNTER
Called patient back and she states the right hip/groin pain and low back pain on the right is gone still since she received the Right Intra-articular Hip Injection Under Ultrasound Guidance on 6/9/25 with Dr. Momin. She still has the left hip pain and reports it travels around to the left lumbar & buttocks areas. She states in the morning it is much worse and gets better with walking with her cane that she uses.     Patient inquired if in addition to the ordered right hip x-ray if she could have the left hip xray order added to see if there are treatment options for the left hip as well.     Reviewed with patient that this request would be forwarded to Dr. Momin, to see if he felt the exam supported the left hip x-ray also being ordered or if she would need a follow up visit for that to also be assessed. Patient verbalized understanding and will await a call back, stating that if she is unavailable that it is okay to leave her a detailed message on her voicemail.     Routed request to Dr. Momin to see if he is able to also order a left hip x-ray or if patient will need a follow up visit to further assess the left hip as well.     Analia Tovar, LISAN RN  RN Care Coordinator for Physical Medicine and Rehabilitation  [facility] [place] - [address] [address]  Main clinic office: [phone]  Main clinic fax: [phone]

## 2025-07-11 NOTE — TELEPHONE ENCOUNTER
[facility] Call Center    Phone Message    May a detailed message be left on voicemail: yes     Reason for Call: Pt was calling to state her injection went well. Pt does have some follow up questions she would like to discuss with a care team member.    Please contact Pt at [phone]    Action Taken: [facility]    Travel Screening: Not Applicable

## 2025-07-29 NOTE — TELEPHONE ENCOUNTER
----- Message -----  From: Stephanie Momin MD  Sent: 7/25/2025   3:06 PM CDT  To: Analia Tovar RN    She has a left hip XR from last year available for review.   ----- Message -----  From: Analia Tovar RN  Sent: 7/11/2025  12:15 PM CDT  To: Stephanie Momin MD; Analia Tovar RN    ----- Message from Analia Tovar RN sent at 7/11/2025 12:15 PM CDT -----    Hi Dr. Momin,     Patient reports she had great relief from the right hip injection completed when she saw you last on 6/9/25. She is requesting a left hip xray to complete when she gets the right hip xray you ordered   for her (please see my note for full discussion with pt). Do you feel the exam supported her left hip xray being ordered as well, or do you need to see her to exam that left hip further before   ordering?    Thank you,     Analia

## 2025-07-29 NOTE — TELEPHONE ENCOUNTER
Called patient back and reviewed that Dr. Momin can see and review the left hip xray that she had completed in 2024.     Reviewed the imaging scheduling phone number for patient to call and get scheduled for her Right hip xray.     Patient will call and get the right hip xray scheduled.     No further questions at this time.     LISA ValdezN RN  RN Care Coordinator for Physical Medicine and Rehabilitation  [facility] [address] [address]  Main clinic office: [phone]  Main clinic fax: [phone]

## 2025-08-06 ENCOUNTER — TELEPHONE (OUTPATIENT)
Dept: DERMATOLOGY | Facility: CLINIC | Age: 89
End: 2025-08-06
Payer: MEDICARE

## 2025-08-06 ENCOUNTER — LAB (OUTPATIENT)
Dept: LAB | Facility: CLINIC | Age: 89
End: 2025-08-06
Payer: MEDICARE

## 2025-08-06 DIAGNOSIS — M33.13 DERMATOMYOSITIS (H): Primary | ICD-10-CM

## 2025-08-06 DIAGNOSIS — Z79.899 ENCOUNTER FOR LONG-TERM (CURRENT) USE OF HIGH-RISK MEDICATION: ICD-10-CM

## 2025-08-07 ENCOUNTER — LAB (OUTPATIENT)
Dept: LAB | Facility: CLINIC | Age: 89
End: 2025-08-07
Payer: MEDICARE

## 2025-08-07 DIAGNOSIS — M33.13 DERMATOMYOSITIS (H): Primary | ICD-10-CM

## 2025-08-07 DIAGNOSIS — Z79.899 ENCOUNTER FOR LONG-TERM (CURRENT) USE OF HIGH-RISK MEDICATION: Primary | ICD-10-CM

## 2025-08-07 LAB
ALBUMIN SERPL BCG-MCNC: 4.1 G/DL (ref 3.5–5.2)
ALP SERPL-CCNC: 67 U/L (ref 40–150)
ALT SERPL W P-5'-P-CCNC: 12 U/L (ref 0–50)
ANION GAP SERPL CALCULATED.3IONS-SCNC: 11 MMOL/L (ref 7–15)
AST SERPL W P-5'-P-CCNC: 27 U/L (ref 0–45)
BASOPHILS # BLD AUTO: 0 10E3/UL (ref 0–0.2)
BASOPHILS NFR BLD AUTO: 1 %
BILIRUB SERPL-MCNC: 0.3 MG/DL
BUN SERPL-MCNC: 31.3 MG/DL (ref 8–23)
CALCIUM SERPL-MCNC: 9.7 MG/DL (ref 8.8–10.4)
CHLORIDE SERPL-SCNC: 106 MMOL/L (ref 98–107)
CREAT SERPL-MCNC: 1.03 MG/DL (ref 0.51–0.95)
EGFRCR SERPLBLD CKD-EPI 2021: 52 ML/MIN/1.73M2
EOSINOPHIL # BLD AUTO: 0.1 10E3/UL (ref 0–0.7)
EOSINOPHIL NFR BLD AUTO: 2 %
ERYTHROCYTE [DISTWIDTH] IN BLOOD BY AUTOMATED COUNT: 14.2 % (ref 10–15)
GLUCOSE SERPL-MCNC: 77 MG/DL (ref 70–99)
HCO3 SERPL-SCNC: 23 MMOL/L (ref 22–29)
HCT VFR BLD AUTO: 37.1 % (ref 35–47)
HGB BLD-MCNC: 12.2 G/DL (ref 11.7–15.7)
HOLD SPECIMEN: NORMAL
HOLD SPECIMEN: NORMAL
IMM GRANULOCYTES # BLD: 0 10E3/UL
IMM GRANULOCYTES NFR BLD: 0 %
LYMPHOCYTES # BLD AUTO: 1.6 10E3/UL (ref 0.8–5.3)
LYMPHOCYTES NFR BLD AUTO: 29 %
MCH RBC QN AUTO: 32.9 PG (ref 26.5–33)
MCHC RBC AUTO-ENTMCNC: 32.9 G/DL (ref 31.5–36.5)
MCV RBC AUTO: 100 FL (ref 78–100)
MONOCYTES # BLD AUTO: 0.8 10E3/UL (ref 0–1.3)
MONOCYTES NFR BLD AUTO: 16 %
NEUTROPHILS # BLD AUTO: 2.8 10E3/UL (ref 1.6–8.3)
NEUTROPHILS NFR BLD AUTO: 52 %
PLATELET # BLD AUTO: 265 10E3/UL (ref 150–450)
POTASSIUM SERPL-SCNC: 5 MMOL/L (ref 3.4–5.3)
PROT SERPL-MCNC: 6.7 G/DL (ref 6.4–8.3)
RBC # BLD AUTO: 3.71 10E6/UL (ref 3.8–5.2)
SODIUM SERPL-SCNC: 140 MMOL/L (ref 135–145)
WBC # BLD AUTO: 5.3 10E3/UL (ref 4–11)

## 2025-08-09 DIAGNOSIS — I10 ESSENTIAL HYPERTENSION: ICD-10-CM

## 2025-08-11 RX ORDER — LISINOPRIL 10 MG/1
10 TABLET ORAL DAILY
Qty: 90 TABLET | Refills: 3 | Status: SHIPPED | OUTPATIENT
Start: 2025-08-11

## 2025-08-21 DIAGNOSIS — I10 ESSENTIAL HYPERTENSION: ICD-10-CM

## 2025-08-21 RX ORDER — AMLODIPINE BESYLATE 5 MG/1
5 TABLET ORAL DAILY
Qty: 90 TABLET | Refills: 3 | OUTPATIENT
Start: 2025-08-21

## 2025-08-25 DIAGNOSIS — I10 ESSENTIAL HYPERTENSION: ICD-10-CM

## 2025-08-26 RX ORDER — AMLODIPINE BESYLATE 5 MG/1
5 TABLET ORAL DAILY
Qty: 90 TABLET | Refills: 3 | OUTPATIENT
Start: 2025-08-26

## (undated) DEVICE — RX ELEVIEW SUBMUCOSAL ING 10ML AMP 05391530190015

## (undated) DEVICE — KIT ENDO TURNOVER/PROCEDURE W/CLEAN A SCOPE LINERS 103888

## (undated) DEVICE — PREP CHLORAPREP W/ORANGE TINT 10.5ML 260715

## (undated) DEVICE — TRAY PAIN INJECTION 97A 640

## (undated) DEVICE — NDL SPINAL 22GA 3.5" QUINCKE 405181

## (undated) DEVICE — GLOVE PROTEXIS POWDER FREE SMT 7.0  2D72PT70X

## (undated) DEVICE — GLOVE ESTEEM POWDER FREE 7.0  2D72PL70

## (undated) RX ORDER — TRIAMCINOLONE ACETONIDE 40 MG/ML
INJECTION, SUSPENSION INTRA-ARTICULAR; INTRAMUSCULAR
Status: DISPENSED
Start: 2018-05-15

## (undated) RX ORDER — LIDOCAINE HYDROCHLORIDE 10 MG/ML
INJECTION, SOLUTION EPIDURAL; INFILTRATION; INTRACAUDAL; PERINEURAL
Status: DISPENSED
Start: 2018-05-15

## (undated) RX ORDER — PROPOFOL 10 MG/ML
INJECTION, EMULSION INTRAVENOUS
Status: DISPENSED
Start: 2021-10-19

## (undated) RX ORDER — LIDOCAINE HYDROCHLORIDE 10 MG/ML
INJECTION, SOLUTION EPIDURAL; INFILTRATION; INTRACAUDAL; PERINEURAL
Status: DISPENSED
Start: 2025-06-09

## (undated) RX ORDER — SIMETHICONE 40MG/0.6ML
SUSPENSION, DROPS(FINAL DOSAGE FORM)(ML) ORAL
Status: DISPENSED
Start: 2021-10-19

## (undated) RX ORDER — FENTANYL CITRATE 0.05 MG/ML
INJECTION, SOLUTION INTRAMUSCULAR; INTRAVENOUS
Status: DISPENSED
Start: 2022-08-10

## (undated) RX ORDER — TRIAMCINOLONE ACETONIDE 40 MG/ML
INJECTION, SUSPENSION INTRA-ARTICULAR; INTRAMUSCULAR
Status: DISPENSED
Start: 2025-06-09